# Patient Record
Sex: FEMALE | Race: WHITE | NOT HISPANIC OR LATINO | Employment: OTHER | URBAN - NONMETROPOLITAN AREA
[De-identification: names, ages, dates, MRNs, and addresses within clinical notes are randomized per-mention and may not be internally consistent; named-entity substitution may affect disease eponyms.]

---

## 2017-01-06 RX ORDER — APIXABAN 5 MG/1
TABLET, FILM COATED ORAL
Qty: 180 TABLET | Refills: 0 | Status: SHIPPED | OUTPATIENT
Start: 2017-01-06 | End: 2017-04-11 | Stop reason: SDUPTHER

## 2017-01-09 RX ORDER — CARVEDILOL 6.25 MG/1
TABLET ORAL
Qty: 180 TABLET | Refills: 0 | Status: SHIPPED | OUTPATIENT
Start: 2017-01-09 | End: 2017-04-25 | Stop reason: SDUPTHER

## 2017-01-09 RX ORDER — FUROSEMIDE 40 MG/1
TABLET ORAL
Qty: 135 TABLET | Refills: 0 | Status: SHIPPED | OUTPATIENT
Start: 2017-01-09 | End: 2017-06-10 | Stop reason: SDUPTHER

## 2017-01-10 RX ORDER — ALENDRONATE SODIUM 70 MG/1
TABLET ORAL
Qty: 12 TABLET | Refills: 0 | Status: SHIPPED | OUTPATIENT
Start: 2017-01-10 | End: 2017-06-10 | Stop reason: SDUPTHER

## 2017-01-16 RX ORDER — PANTOPRAZOLE SODIUM 40 MG/1
TABLET, DELAYED RELEASE ORAL
Qty: 90 TABLET | Refills: 0 | Status: SHIPPED | OUTPATIENT
Start: 2017-01-16 | End: 2017-06-10 | Stop reason: SDUPTHER

## 2017-02-23 ENCOUNTER — OFFICE VISIT (OUTPATIENT)
Dept: CARDIOLOGY | Facility: CLINIC | Age: 77
End: 2017-02-23

## 2017-02-23 VITALS
BODY MASS INDEX: 29.89 KG/M2 | SYSTOLIC BLOOD PRESSURE: 118 MMHG | HEIGHT: 66 IN | OXYGEN SATURATION: 98 % | DIASTOLIC BLOOD PRESSURE: 68 MMHG | WEIGHT: 186 LBS | HEART RATE: 66 BPM

## 2017-02-23 DIAGNOSIS — K21.9 GASTROESOPHAGEAL REFLUX DISEASE WITHOUT ESOPHAGITIS: ICD-10-CM

## 2017-02-23 DIAGNOSIS — J06.9 UPPER RESPIRATORY TRACT INFECTION, UNSPECIFIED TYPE: ICD-10-CM

## 2017-02-23 DIAGNOSIS — E78.2 MIXED HYPERLIPIDEMIA: ICD-10-CM

## 2017-02-23 DIAGNOSIS — I10 ESSENTIAL HYPERTENSION: ICD-10-CM

## 2017-02-23 DIAGNOSIS — I25.5 ISCHEMIC CARDIOMYOPATHY: ICD-10-CM

## 2017-02-23 DIAGNOSIS — I50.22 CHRONIC SYSTOLIC CONGESTIVE HEART FAILURE (HCC): ICD-10-CM

## 2017-02-23 DIAGNOSIS — Z79.01 CHRONIC ANTICOAGULATION: ICD-10-CM

## 2017-02-23 DIAGNOSIS — J20.9 ACUTE BRONCHITIS, UNSPECIFIED ORGANISM: ICD-10-CM

## 2017-02-23 DIAGNOSIS — J42 CHRONIC BRONCHITIS, UNSPECIFIED CHRONIC BRONCHITIS TYPE (HCC): ICD-10-CM

## 2017-02-23 DIAGNOSIS — J02.9 SORE THROAT: ICD-10-CM

## 2017-02-23 DIAGNOSIS — N19 RENAL FAILURE: ICD-10-CM

## 2017-02-23 DIAGNOSIS — I25.10 CORONARY ARTERY DISEASE INVOLVING NATIVE CORONARY ARTERY OF NATIVE HEART WITHOUT ANGINA PECTORIS: Primary | ICD-10-CM

## 2017-02-23 DIAGNOSIS — E03.9 HYPOTHYROIDISM, UNSPECIFIED TYPE: ICD-10-CM

## 2017-02-23 DIAGNOSIS — R05.9 COUGH: ICD-10-CM

## 2017-02-23 DIAGNOSIS — Z95.0 PACEMAKER: ICD-10-CM

## 2017-02-23 DIAGNOSIS — I48.20 CHRONIC A-FIB (HCC): ICD-10-CM

## 2017-02-23 LAB
FLUAV AG NPH QL: NEGATIVE
FLUBV AG NPH QL IA: NEGATIVE
S PYO AG THROAT QL: NEGATIVE

## 2017-02-23 PROCEDURE — 87880 STREP A ASSAY W/OPTIC: CPT | Performed by: INTERNAL MEDICINE

## 2017-02-23 PROCEDURE — 96372 THER/PROPH/DIAG INJ SC/IM: CPT | Performed by: INTERNAL MEDICINE

## 2017-02-23 PROCEDURE — 87081 CULTURE SCREEN ONLY: CPT | Performed by: INTERNAL MEDICINE

## 2017-02-23 PROCEDURE — 87804 INFLUENZA ASSAY W/OPTIC: CPT | Performed by: INTERNAL MEDICINE

## 2017-02-23 PROCEDURE — 99214 OFFICE O/P EST MOD 30 MIN: CPT | Performed by: INTERNAL MEDICINE

## 2017-02-23 RX ORDER — TRIAMCINOLONE ACETONIDE 40 MG/ML
40 INJECTION, SUSPENSION INTRA-ARTICULAR; INTRAMUSCULAR ONCE
Status: COMPLETED | OUTPATIENT
Start: 2017-02-23 | End: 2017-02-23

## 2017-02-23 RX ORDER — CEFTRIAXONE 500 MG/1
500 INJECTION, POWDER, FOR SOLUTION INTRAMUSCULAR; INTRAVENOUS EVERY 24 HOURS
Status: DISCONTINUED | OUTPATIENT
Start: 2017-02-23 | End: 2017-02-23

## 2017-02-23 RX ORDER — AMOXICILLIN 500 MG/1
500 CAPSULE ORAL 3 TIMES DAILY
Qty: 30 CAPSULE | Refills: 0 | Status: SHIPPED | OUTPATIENT
Start: 2017-02-23 | End: 2017-05-11

## 2017-02-23 RX ORDER — ALBUTEROL SULFATE 90 UG/1
2 AEROSOL, METERED RESPIRATORY (INHALATION) EVERY 4 HOURS PRN
Qty: 3.7 G | Refills: 2 | Status: SHIPPED | OUTPATIENT
Start: 2017-02-23 | End: 2018-05-24 | Stop reason: SDUPTHER

## 2017-02-23 RX ORDER — CEFTRIAXONE 500 MG/1
500 INJECTION, POWDER, FOR SOLUTION INTRAMUSCULAR; INTRAVENOUS ONCE
Status: COMPLETED | OUTPATIENT
Start: 2017-02-23 | End: 2017-02-23

## 2017-02-23 RX ADMIN — TRIAMCINOLONE ACETONIDE 40 MG: 40 INJECTION, SUSPENSION INTRA-ARTICULAR; INTRAMUSCULAR at 14:02

## 2017-02-23 RX ADMIN — CEFTRIAXONE 500 MG: 500 INJECTION, POWDER, FOR SOLUTION INTRAMUSCULAR; INTRAVENOUS at 14:01

## 2017-02-23 NOTE — PROGRESS NOTES
subjective     Chief Complaint   Patient presents with   • Coronary Artery Disease   • Atrial Fibrillation   • Hypertension     History of Present Illness     So throat and cough and wheezing.  Patient states that for last week or so she has been having sore throat and runny nose congestion coughing and mild wheezing.  Will check strep and flu screen.  She denies any fever or chills.  No myalgias.    Coronary artery disease  Ashely Feliciano has known coronary artery disease as described in the problem list. Quite stable. No chest pain palpitations or shortness of breath. she has not used any nitroglycerin. No drug side effects. she is trying to follow diet also. she is quite satisfied with the progress.    Ischemic cardiomyopathy  Patient has had ischemic cardiomyopathy LV ejection fraction 40% with apical hypokinesis and chronic systolic congestive heart failure.  She is compensated breathing very well denies any orthopnea PND or ankle edema.    Pacemaker  Patient has dual-chamber pacemaker.  Which has been functioning normally    Hypothyroidism  Ashely Feliciano has long-standing history of hypothyroidism.she is taking Synthroid.  Doing very well.  No drug side effects.  No change in Weight.  No cold intolerance. denies any constipation.  No dry skin and no hair loss.     HYPERTENSION  Ashely Feliciano has long-standing essential hypertension. she is taking medications regularly. There are no medication side effects. Blood pressure is very well controlled. There has been no headache nausea chest pain. There has been no syncopal or presyncopal episode. she denies episodes of hypo-tension or accelerated hypertension.    Atrial Fibrillation  Patient has persistent atrial fibrillation. This has been going on for years. Heart rate is controlled. Patient is fully anticoagulated. Patient is asymptomatic. No chest pain palpitations or shortness of breath. No syncope or presyncope.    GERD  Ashely Feliciano  has gastroesophageal reflux disorder however she significantly better on medications. There is no nausea or vomiting. No hematemesis or melena. No abdominal pain. Mild epigastric heartburns are noted. Appetite is good bowel movements are normal.    Hyperlipidemia  Ashely Feliciano has long-standing history of hyperlipidemia. Has been trying to lose weight and trying to follow diet and activity recommandations. Patient is tolerating medications very well. There has been no side effects. Latest lipid levels have been fluctuating.     Patient Active Problem List   Diagnosis   • CAD (coronary artery disease) status post CABG 1997 single-vessel*   • Ischemic cardiomyopathy with apical hypokinesis LV ejection fraction 40%*   • Chronic systolic congestive heart failure*   • Hyperlipidemia*   • Chronic a-fib*   • Pacemaker dual-chamber pacemaker [employer] December 2015*   • Hypothyroidism*   • Hypertension*   • Chronic anticoagulation*   • Gastroesophageal reflux disease without esophagitis*   • Renal failure   • URI (upper respiratory infection)       Social History   Substance Use Topics   • Smoking status: Former Smoker     Quit date: 1990   • Smokeless tobacco: Never Used   • Alcohol use No       Allergies   Allergen Reactions   • Codeine    • Eggs Or Egg-Derived Products    • Lisinopril          Current Outpatient Prescriptions:   •  alendronate (FOSAMAX) 70 MG tablet, TAKE 1 TABLET BY MOUTH EVERY WEEK, Disp: 12 tablet, Rfl: 0  •  carvedilol (COREG) 6.25 MG tablet, TAKE 1 TABLET BY MOUTH TWICE DAILY WITH MEALS, Disp: 180 tablet, Rfl: 0  •  cetirizine (ZyrTEC) 10 MG tablet, Take 1 tablet by mouth Daily., Disp: 90 tablet, Rfl: 0  •  ELIQUIS 5 MG tablet tablet, TAKE 1 TABLET BY MOUTH TWICE DAILY, Disp: 180 tablet, Rfl: 0  •  furosemide (LASIX) 40 MG tablet, TAKE 1 AND 1/2 TABLET BY MOUTH EVERY DAY, Disp: 135 tablet, Rfl: 0  •  gabapentin (NEURONTIN) 100 MG capsule, Take 1 capsule by mouth 2 (two) times a day., Disp:  180 capsule, Rfl: 2  •  glycopyrrolate (ROBINUL) 1 MG tablet, Take 1 mg by mouth 3 (three) times a day as needed., Disp: , Rfl:   •  isosorbide mononitrate (IMDUR) 60 MG 24 hr tablet, Take 1 tablet by mouth daily., Disp: 90 tablet, Rfl: 0  •  levothyroxine (SYNTHROID, LEVOTHROID) 25 MCG tablet, Take 1 tablet by mouth Daily., Disp: 90 tablet, Rfl: 0  •  montelukast (SINGULAIR) 10 MG tablet, Take 1 tablet by mouth every night., Disp: 90 tablet, Rfl: 0  •  nitroglycerin (NITROSTAT) 0.4 MG SL tablet, Place 0.4 mg under the tongue every 5 (five) minutes as needed for chest pain. Take no more than 3 doses in 15 minutes., Disp: , Rfl:   •  pantoprazole (PROTONIX) 40 MG EC tablet, TAKE 1 TABLET BY MOUTH EVERY DAY, Disp: 90 tablet, Rfl: 0  •  pitavastatin calcium (LIVALO) 1 MG tablet tablet, Take 1 tablet by mouth Daily., Disp: 90 tablet, Rfl: 0  •  sotalol (BETAPACE AF) 80 MG tablet tablet, TAKE 1/2 TABLET BY MOUTH TWICE DAILY, Disp: 90 tablet, Rfl: 0  •  spironolactone (ALDACTONE) 25 MG tablet, Take 1 tablet by mouth daily., Disp: 90 tablet, Rfl: 0  •  albuterol (PROVENTIL HFA;VENTOLIN HFA) 108 (90 BASE) MCG/ACT inhaler, Inhale 2 puffs Every 4 (Four) Hours As Needed for wheezing., Disp: 3.7 g, Rfl: 2  •  amoxicillin (AMOXIL) 500 MG capsule, Take 1 capsule by mouth 3 (Three) Times a Day., Disp: 30 capsule, Rfl: 0      The following portions of the patient's history were reviewed and updated as appropriate: allergies, current medications, past family history, past medical history, past social history, past surgical history and problem list.    Review of Systems   Constitution: Positive for weakness and malaise/fatigue. Negative for chills, diaphoresis and fever.   HENT: Positive for congestion and sore throat.    Eyes: Negative.    Cardiovascular: Negative for leg swelling.   Respiratory: Positive for cough. Negative for shortness of breath, sputum production and wheezing.    Skin: Negative.    Musculoskeletal: Positive for  "back pain and myalgias.   Gastrointestinal: Negative.    Genitourinary: Negative.           Objective:     Visit Vitals   • /68 (BP Location: Left arm, Patient Position: Sitting)   • Pulse 66   • Ht 66\" (167.6 cm)   • Wt 186 lb (84.4 kg)   • SpO2 98%   • BMI 30.02 kg/m2     Physical Exam   Constitutional: She appears well-developed and well-nourished.   HENT:   Head: Normocephalic and atraumatic.   Mouth/Throat: Oropharynx is clear and moist.   Eyes: Conjunctivae and EOM are normal. Pupils are equal, round, and reactive to light. No scleral icterus.   Neck: Normal range of motion. Neck supple. No JVD present. No tracheal deviation present. No thyromegaly present.   Cardiovascular: Normal rate, regular rhythm, normal heart sounds and intact distal pulses.  Exam reveals no friction rub.    No murmur heard.  Pulmonary/Chest: Effort normal and breath sounds normal. No respiratory distress. She has no wheezes. She has no rales. She exhibits no tenderness.   Abdominal: Soft. Bowel sounds are normal. She exhibits no distension and no mass. There is no tenderness. There is no rebound and no guarding.   Musculoskeletal: Normal range of motion. She exhibits no edema, tenderness or deformity.   Lymphadenopathy:     She has no cervical adenopathy.   Neurological: She is alert. She has normal reflexes. No cranial nerve deficit. She exhibits normal muscle tone. Coordination normal.   Skin: Skin is warm and dry.   Psychiatric: She has a normal mood and affect. Her behavior is normal. Judgment and thought content normal.         Lab Review  Lab Results   Component Value Date     12/07/2016    K 4.2 12/07/2016     12/07/2016    BUN 28 (H) 12/07/2016    CREATININE 1.43 (H) 12/07/2016    GLUCOSE 98 12/07/2016    CALCIUM 9.4 12/07/2016    ALT 21 12/07/2016    ALKPHOS 77 12/07/2016    LABIL2 1.3 (L) 12/07/2016     Lab Results   Component Value Date    CKTOTAL 51 12/07/2016     Lab Results   Component Value Date    WBC " 6.10 12/07/2016    HGB 12.8 12/07/2016    HCT 39.8 12/07/2016     12/07/2016     Lab Results   Component Value Date    INR 0.91 12/17/2015    INR 1.00 02/20/2014     Lab Results   Component Value Date    MG 2.2 01/09/2014     Lab Results   Component Value Date    TSH 5.647 (H) 12/07/2016     Lab Results   Component Value Date    BNP 77.0 12/07/2016     Lab Results   Component Value Date    CHLPL 219 (H) 04/05/2016     Lab Results   Component Value Date    CHOL 158 12/07/2016    TRIG 158 (H) 12/07/2016    HDL 50 (L) 12/07/2016    LDLCALC 76 12/07/2016    VLDL 31.6 12/07/2016    LDLHDL 1.53 12/07/2016         Procedures     I personally viewed and interpreted the patient's LAB data         Assessment:     1. Coronary artery disease involving native coronary artery of native heart without angina pectoris    2. Ischemic cardiomyopathy with apical hypokinesis LV ejection fraction 40%*    3. Chronic systolic congestive heart failure*    4. Mixed hyperlipidemia    5. Chronic a-fib*    6. Essential hypertension    7. Pacemaker dual-chamber pacemaker [employer] December 2015*    8. Hypothyroidism, unspecified type    9. Chronic anticoagulation*    10. Gastroesophageal reflux disease without esophagitis*    11. Renal failure    12. Chronic bronchitis, unspecified chronic bronchitis type    13. Acute bronchitis, unspecified organism    14. Sore throat    15. Cough    16. Upper respiratory tract infection, unspecified type          Plan:      Coronary artery disease  Patient is doing very well she has not had any chest pain palpitations or shortness of breath.  Patient has ischemic cardiomyopathy with LV ejection fraction of 40% with compensated heart failure and is totally asymptomatic.  She will continue current medications.  Refills of medication was given.    Hyperlipidemia  Cholesterol is fairly well controlled with the LDL around 76.  Further control was again discussed with healthy lifestyle.  Triglyceride is  elevated.  Patient needs to lose weight also.    Blood pressure is controlled.    Patient has chronic atrial fibrillation rate is controlled  She is fully anticoagulated with eliquis without any side effects.  She will continue current medications.  Lab work and EKG next visit.    Patient complains of upper respiratory tract infection with the wheezing and shortness of breath.  She does have COPD.  For that reason she was given Rocephin and Kenalog.  Flu antigen and strep screen was also obtained.  And patient was started on amoxicillin 500 mg 3 times a day.      Return in about 3 months (around 5/23/2017).

## 2017-02-25 LAB — BACTERIA SPEC AEROBE CULT: NORMAL

## 2017-02-27 PROBLEM — J06.9 URI (UPPER RESPIRATORY INFECTION): Status: ACTIVE | Noted: 2017-02-27

## 2017-03-14 RX ORDER — ISOSORBIDE MONONITRATE 60 MG/1
60 TABLET, EXTENDED RELEASE ORAL DAILY
Qty: 90 TABLET | Refills: 0 | Status: SHIPPED | OUTPATIENT
Start: 2017-03-14 | End: 2017-06-10 | Stop reason: SDUPTHER

## 2017-03-14 RX ORDER — SOTALOL HYDROCHLORIDE 80 MG/1
TABLET ORAL
Qty: 90 TABLET | Refills: 0 | Status: SHIPPED | OUTPATIENT
Start: 2017-03-14 | End: 2017-06-08 | Stop reason: SDUPTHER

## 2017-03-14 RX ORDER — SPIRONOLACTONE 25 MG/1
25 TABLET ORAL DAILY
Qty: 90 TABLET | Refills: 0 | Status: SHIPPED | OUTPATIENT
Start: 2017-03-14 | End: 2017-05-11 | Stop reason: SDUPTHER

## 2017-03-14 RX ORDER — SPIRONOLACTONE 25 MG/1
TABLET ORAL
Qty: 90 TABLET | Refills: 0 | Status: SHIPPED | OUTPATIENT
Start: 2017-03-14 | End: 2017-05-12

## 2017-03-14 RX ORDER — LEVOTHYROXINE SODIUM 0.03 MG/1
TABLET ORAL
Qty: 90 TABLET | Refills: 0 | Status: SHIPPED | OUTPATIENT
Start: 2017-03-14 | End: 2017-05-11

## 2017-03-28 RX ORDER — GABAPENTIN 100 MG/1
100 CAPSULE ORAL 2 TIMES DAILY
Qty: 180 CAPSULE | Refills: 2 | Status: SHIPPED | OUTPATIENT
Start: 2017-03-28 | End: 2018-01-10 | Stop reason: SDUPTHER

## 2017-04-05 ENCOUNTER — OFFICE VISIT (OUTPATIENT)
Dept: CARDIOLOGY | Facility: CLINIC | Age: 77
End: 2017-04-05

## 2017-04-05 DIAGNOSIS — I48.20 CHRONIC A-FIB (HCC): ICD-10-CM

## 2017-04-05 DIAGNOSIS — Z95.0 PACEMAKER: ICD-10-CM

## 2017-04-05 PROCEDURE — 93280 PM DEVICE PROGR EVAL DUAL: CPT | Performed by: INTERNAL MEDICINE

## 2017-04-11 ENCOUNTER — TELEPHONE (OUTPATIENT)
Dept: CARDIOLOGY | Facility: CLINIC | Age: 77
End: 2017-04-11

## 2017-04-11 NOTE — TELEPHONE ENCOUNTER
----- Message from Deja Seymour sent at 4/11/2017 10:46 AM EDT -----   ELIQUIS 5 MG 1 BID    SUZY YAÑEZ

## 2017-04-18 RX ORDER — CETIRIZINE HYDROCHLORIDE 10 MG/1
TABLET ORAL
Qty: 90 TABLET | Refills: 0 | Status: SHIPPED | OUTPATIENT
Start: 2017-04-18 | End: 2017-07-26 | Stop reason: SDUPTHER

## 2017-04-25 RX ORDER — CARVEDILOL 6.25 MG/1
6.25 TABLET ORAL 2 TIMES DAILY WITH MEALS
Qty: 180 TABLET | Refills: 0 | Status: SHIPPED | OUTPATIENT
Start: 2017-04-25 | End: 2017-07-26 | Stop reason: SDUPTHER

## 2017-05-09 ENCOUNTER — LAB (OUTPATIENT)
Dept: CARDIOLOGY | Facility: CLINIC | Age: 77
End: 2017-05-09

## 2017-05-09 DIAGNOSIS — E03.9 HYPOTHYROIDISM, UNSPECIFIED TYPE: ICD-10-CM

## 2017-05-09 DIAGNOSIS — E78.2 MIXED HYPERLIPIDEMIA: ICD-10-CM

## 2017-05-09 LAB
ALBUMIN SERPL-MCNC: 4.2 G/DL (ref 3.4–4.8)
ALBUMIN/GLOB SERPL: 1.2 G/DL (ref 1.5–2.5)
ALP SERPL-CCNC: 88 U/L (ref 35–104)
ALT SERPL W P-5'-P-CCNC: 45 U/L (ref 10–36)
ANION GAP SERPL CALCULATED.3IONS-SCNC: 5.5 MMOL/L (ref 3.6–11.2)
AST SERPL-CCNC: 38 U/L (ref 10–30)
BASOPHILS # BLD AUTO: 0.16 10*3/MM3 (ref 0–0.3)
BASOPHILS NFR BLD AUTO: 2.5 % (ref 0–2)
BILIRUB SERPL-MCNC: 0.6 MG/DL (ref 0.2–1.8)
BUN BLD-MCNC: 30 MG/DL (ref 7–21)
BUN/CREAT SERPL: 20.4 (ref 7–25)
CALCIUM SPEC-SCNC: 9.5 MG/DL (ref 7.7–10)
CHLORIDE SERPL-SCNC: 109 MMOL/L (ref 99–112)
CHOLEST SERPL-MCNC: 138 MG/DL (ref 0–200)
CK SERPL-CCNC: 64 U/L (ref 24–173)
CO2 SERPL-SCNC: 27.5 MMOL/L (ref 24.3–31.9)
CREAT BLD-MCNC: 1.47 MG/DL (ref 0.43–1.29)
DEPRECATED RDW RBC AUTO: 43.3 FL (ref 37–54)
EOSINOPHIL # BLD AUTO: 0.4 10*3/MM3 (ref 0–0.7)
EOSINOPHIL NFR BLD AUTO: 6.3 % (ref 0–7)
ERYTHROCYTE [DISTWIDTH] IN BLOOD BY AUTOMATED COUNT: 13 % (ref 11.5–14.5)
GFR SERPL CREATININE-BSD FRML MDRD: 35 ML/MIN/1.73
GLOBULIN UR ELPH-MCNC: 3.4 GM/DL
GLUCOSE BLD-MCNC: 95 MG/DL (ref 70–110)
HCT VFR BLD AUTO: 38.9 % (ref 37–47)
HDLC SERPL-MCNC: 45 MG/DL (ref 60–100)
HGB BLD-MCNC: 12.3 G/DL (ref 12–16)
IMM GRANULOCYTES # BLD: 0.02 10*3/MM3 (ref 0–0.03)
IMM GRANULOCYTES NFR BLD: 0.3 % (ref 0–0.5)
LDLC SERPL CALC-MCNC: 59 MG/DL (ref 0–100)
LDLC/HDLC SERPL: 1.32 {RATIO}
LYMPHOCYTES # BLD AUTO: 1.65 10*3/MM3 (ref 1–3)
LYMPHOCYTES NFR BLD AUTO: 26.1 % (ref 16–46)
MCH RBC QN AUTO: 30.1 PG (ref 27–33)
MCHC RBC AUTO-ENTMCNC: 31.6 G/DL (ref 33–37)
MCV RBC AUTO: 95.1 FL (ref 80–94)
MONOCYTES # BLD AUTO: 0.47 10*3/MM3 (ref 0.1–0.9)
MONOCYTES NFR BLD AUTO: 7.4 % (ref 0–12)
NEUTROPHILS # BLD AUTO: 3.63 10*3/MM3 (ref 1.4–6.5)
NEUTROPHILS NFR BLD AUTO: 57.4 % (ref 40–75)
OSMOLALITY SERPL CALC.SUM OF ELEC: 289.1 MOSM/KG (ref 273–305)
PLATELET # BLD AUTO: 169 10*3/MM3 (ref 130–400)
PMV BLD AUTO: 11.6 FL (ref 6–10)
POTASSIUM BLD-SCNC: 5.1 MMOL/L (ref 3.5–5.3)
PROT SERPL-MCNC: 7.6 G/DL (ref 6–8)
RBC # BLD AUTO: 4.09 10*6/MM3 (ref 4.2–5.4)
SODIUM BLD-SCNC: 142 MMOL/L (ref 135–153)
T4 FREE SERPL-MCNC: 1.09 NG/DL (ref 0.89–1.76)
TRIGL SERPL-MCNC: 169 MG/DL (ref 0–150)
TSH SERPL DL<=0.05 MIU/L-ACNC: 6.53 MIU/ML (ref 0.55–4.78)
VLDLC SERPL-MCNC: 33.8 MG/DL
WBC NRBC COR # BLD: 6.33 10*3/MM3 (ref 4.5–12.5)

## 2017-05-09 PROCEDURE — 80061 LIPID PANEL: CPT | Performed by: INTERNAL MEDICINE

## 2017-05-09 PROCEDURE — 84443 ASSAY THYROID STIM HORMONE: CPT | Performed by: INTERNAL MEDICINE

## 2017-05-09 PROCEDURE — 82550 ASSAY OF CK (CPK): CPT | Performed by: INTERNAL MEDICINE

## 2017-05-09 PROCEDURE — 80053 COMPREHEN METABOLIC PANEL: CPT | Performed by: INTERNAL MEDICINE

## 2017-05-09 PROCEDURE — 84439 ASSAY OF FREE THYROXINE: CPT | Performed by: INTERNAL MEDICINE

## 2017-05-09 PROCEDURE — 85025 COMPLETE CBC W/AUTO DIFF WBC: CPT | Performed by: INTERNAL MEDICINE

## 2017-05-11 ENCOUNTER — OFFICE VISIT (OUTPATIENT)
Dept: CARDIOLOGY | Facility: CLINIC | Age: 77
End: 2017-05-11

## 2017-05-11 DIAGNOSIS — J45.901 ASTHMATIC BRONCHITIS WITH ACUTE EXACERBATION: ICD-10-CM

## 2017-05-11 DIAGNOSIS — I25.10 CORONARY ARTERY DISEASE INVOLVING NATIVE CORONARY ARTERY OF NATIVE HEART WITHOUT ANGINA PECTORIS: ICD-10-CM

## 2017-05-11 DIAGNOSIS — I48.20 CHRONIC A-FIB (HCC): ICD-10-CM

## 2017-05-11 DIAGNOSIS — J06.9 UPPER RESPIRATORY TRACT INFECTION, UNSPECIFIED TYPE: Primary | ICD-10-CM

## 2017-05-11 DIAGNOSIS — E03.9 HYPOTHYROIDISM, UNSPECIFIED TYPE: ICD-10-CM

## 2017-05-11 PROCEDURE — 93000 ELECTROCARDIOGRAM COMPLETE: CPT | Performed by: INTERNAL MEDICINE

## 2017-05-11 PROCEDURE — 96372 THER/PROPH/DIAG INJ SC/IM: CPT | Performed by: INTERNAL MEDICINE

## 2017-05-11 PROCEDURE — 99214 OFFICE O/P EST MOD 30 MIN: CPT | Performed by: INTERNAL MEDICINE

## 2017-05-11 RX ORDER — AMOXICILLIN AND CLAVULANATE POTASSIUM 875; 125 MG/1; MG/1
1 TABLET, FILM COATED ORAL 2 TIMES DAILY
Qty: 10 TABLET | Refills: 0 | Status: SHIPPED | OUTPATIENT
Start: 2017-05-11 | End: 2017-08-10

## 2017-05-11 RX ORDER — LEVOTHYROXINE SODIUM 0.05 MG/1
50 TABLET ORAL DAILY
Qty: 90 TABLET | Refills: 1 | Status: SHIPPED | OUTPATIENT
Start: 2017-05-11 | End: 2017-11-02 | Stop reason: SDUPTHER

## 2017-05-12 VITALS
BODY MASS INDEX: 30.53 KG/M2 | WEIGHT: 190 LBS | SYSTOLIC BLOOD PRESSURE: 130 MMHG | HEIGHT: 66 IN | OXYGEN SATURATION: 97 % | DIASTOLIC BLOOD PRESSURE: 80 MMHG | HEART RATE: 78 BPM

## 2017-06-08 RX ORDER — SOTALOL HYDROCHLORIDE 80 MG/1
80 TABLET ORAL 2 TIMES DAILY
Qty: 90 TABLET | Refills: 0 | Status: SHIPPED | OUTPATIENT
Start: 2017-06-08 | End: 2017-09-13 | Stop reason: SDUPTHER

## 2017-06-12 RX ORDER — FUROSEMIDE 40 MG/1
TABLET ORAL
Qty: 135 TABLET | Refills: 0 | Status: SHIPPED | OUTPATIENT
Start: 2017-06-12 | End: 2018-05-24 | Stop reason: DRUGHIGH

## 2017-06-12 RX ORDER — ALENDRONATE SODIUM 70 MG/1
TABLET ORAL
Qty: 12 TABLET | Refills: 0 | Status: SHIPPED | OUTPATIENT
Start: 2017-06-12 | End: 2017-06-15

## 2017-06-12 RX ORDER — PANTOPRAZOLE SODIUM 40 MG/1
TABLET, DELAYED RELEASE ORAL
Qty: 90 TABLET | Refills: 0 | Status: ON HOLD | OUTPATIENT
Start: 2017-06-12 | End: 2019-02-25

## 2017-06-12 RX ORDER — ISOSORBIDE MONONITRATE 60 MG/1
TABLET, EXTENDED RELEASE ORAL
Qty: 90 TABLET | Refills: 0 | Status: SHIPPED | OUTPATIENT
Start: 2017-06-12 | End: 2017-12-15 | Stop reason: SDUPTHER

## 2017-06-15 ENCOUNTER — TELEPHONE (OUTPATIENT)
Dept: CARDIOLOGY | Facility: CLINIC | Age: 77
End: 2017-06-15

## 2017-06-15 NOTE — TELEPHONE ENCOUNTER
----- Message from Maria R Sanchez MD sent at 6/15/2017 12:07 PM EDT -----  DC Fosamax and Aldactone

## 2017-06-23 RX ORDER — PITAVASTATIN CALCIUM 1.04 MG/1
TABLET, FILM COATED ORAL
Qty: 90 TABLET | Refills: 0 | Status: SHIPPED | OUTPATIENT
Start: 2017-06-23 | End: 2017-09-19 | Stop reason: SDUPTHER

## 2017-07-13 RX ORDER — APIXABAN 5 MG/1
TABLET, FILM COATED ORAL
Qty: 180 TABLET | Refills: 0 | Status: SHIPPED | OUTPATIENT
Start: 2017-07-13 | End: 2017-10-08 | Stop reason: SDUPTHER

## 2017-07-26 RX ORDER — CARVEDILOL 6.25 MG/1
TABLET ORAL
Qty: 180 TABLET | Refills: 0 | Status: SHIPPED | OUTPATIENT
Start: 2017-07-26 | End: 2017-11-01 | Stop reason: SDUPTHER

## 2017-07-26 RX ORDER — CETIRIZINE HYDROCHLORIDE 10 MG/1
TABLET ORAL
Qty: 90 TABLET | Refills: 0 | Status: SHIPPED | OUTPATIENT
Start: 2017-07-26 | End: 2017-11-01 | Stop reason: SDUPTHER

## 2017-08-10 ENCOUNTER — OFFICE VISIT (OUTPATIENT)
Dept: CARDIOLOGY | Facility: CLINIC | Age: 77
End: 2017-08-10

## 2017-08-10 VITALS
OXYGEN SATURATION: 97 % | WEIGHT: 184.6 LBS | HEART RATE: 62 BPM | DIASTOLIC BLOOD PRESSURE: 84 MMHG | SYSTOLIC BLOOD PRESSURE: 128 MMHG | BODY MASS INDEX: 29.67 KG/M2 | HEIGHT: 66 IN

## 2017-08-10 DIAGNOSIS — E78.2 MIXED HYPERLIPIDEMIA: ICD-10-CM

## 2017-08-10 DIAGNOSIS — I50.22 CHRONIC SYSTOLIC CONGESTIVE HEART FAILURE (HCC): ICD-10-CM

## 2017-08-10 DIAGNOSIS — I48.20 CHRONIC A-FIB (HCC): ICD-10-CM

## 2017-08-10 DIAGNOSIS — Z12.31 ENCOUNTER FOR MAMMOGRAM TO ESTABLISH BASELINE MAMMOGRAM: ICD-10-CM

## 2017-08-10 DIAGNOSIS — I25.5 ISCHEMIC CARDIOMYOPATHY: ICD-10-CM

## 2017-08-10 DIAGNOSIS — Z95.0 PACEMAKER: ICD-10-CM

## 2017-08-10 DIAGNOSIS — J20.9 ACUTE BRONCHITIS, UNSPECIFIED ORGANISM: Primary | ICD-10-CM

## 2017-08-10 DIAGNOSIS — I10 ESSENTIAL HYPERTENSION: ICD-10-CM

## 2017-08-10 DIAGNOSIS — N19 RENAL FAILURE: ICD-10-CM

## 2017-08-10 DIAGNOSIS — I25.10 CORONARY ARTERY DISEASE INVOLVING NATIVE CORONARY ARTERY OF NATIVE HEART WITHOUT ANGINA PECTORIS: ICD-10-CM

## 2017-08-10 DIAGNOSIS — Z79.01 CHRONIC ANTICOAGULATION: ICD-10-CM

## 2017-08-10 DIAGNOSIS — E03.9 HYPOTHYROIDISM, UNSPECIFIED TYPE: ICD-10-CM

## 2017-08-10 PROCEDURE — 99214 OFFICE O/P EST MOD 30 MIN: CPT | Performed by: INTERNAL MEDICINE

## 2017-08-10 RX ORDER — AMOXICILLIN 500 MG/1
500 CAPSULE ORAL 3 TIMES DAILY
Qty: 30 CAPSULE | Refills: 0 | Status: SHIPPED | OUTPATIENT
Start: 2017-08-10 | End: 2017-08-10 | Stop reason: SDUPTHER

## 2017-08-10 RX ORDER — MONTELUKAST SODIUM 10 MG/1
10 TABLET ORAL NIGHTLY
Qty: 30 TABLET | Refills: 0 | Status: SHIPPED | OUTPATIENT
Start: 2017-08-10 | End: 2017-09-06 | Stop reason: SDUPTHER

## 2017-08-10 RX ORDER — AMOXICILLIN 500 MG/1
500 CAPSULE ORAL 3 TIMES DAILY
Qty: 21 CAPSULE | Refills: 0 | Status: SHIPPED | OUTPATIENT
Start: 2017-08-10 | End: 2018-02-22

## 2017-08-10 NOTE — PROGRESS NOTES
subjective     Chief Complaint   Patient presents with   • Follow-up   • Coronary Artery Disease   • Hypertension   • Hyperlipidemia   • Diarrhea     History of Present Illness  Patient complains of runny nose and sinus congestion cough and shortness of breath.  No fever or chills this has been going on for 4 days.  Otherwise he seems be doing very well.    Patient is 76 years old white female who states that for last 4 days she has been having upper respiratory tract infection she complains of postnasal drip, sore throat, hoarseness dry cough.  No fever but feels chilly.  She has not taken any antibiotics.  But feels that she needs something she is not getting any better.     Coronary artery disease  Ashely Feliciano has known coronary artery disease as described in the problem list. Quite stable. No chest pain palpitations or shortness of breath. she has not used any nitroglycerin. No drug side effects. she is trying to follow diet also. she is quite satisfied with the progress.     Ischemic cardiomyopathy  Patient has known coronary artery disease status post CABG.  She has ischemic cardiomyopathy with apical hypokinesis.  LV ejection fraction is 40%.  She has chronic systolic congestive heart failure.  Patient is taking Lasix.  Because of renal failure Lasix dose was decreased.  And Aldactone was discontinued.     HYPERTENSION  Ashely Feliciano has long-standing essential hypertension. she is taking medications regularly. There are no medication side effects. Blood pressure is very well controlled. There has been no headache nausea chest pain. There has been no syncopal or presyncopal episode. she denies episodes of hypo-tension or accelerated hypertension.     Atrial Fibrillation  Patient has persistent atrial fibrillation. This has been going on for years. Heart rate is controlled. Patient is fully anticoagulated. Patient is asymptomatic. No chest pain palpitations or shortness of breath. No syncope or  presyncope.     Chronic anticoagulation  Because of atrial fibrillation patient is anticoagulated.  She is taking eliquis 5 mg twice a day.  There are no side effects.  Patient has no bruising or bleeding.  Tolerating the dose very well.  Because of renal failure eliquis dose may need to be decreased but she is only 76 and is overweight will continue current dose.     GERD  Ashely Feliciano has gastroesophageal reflux disorder however she significantly better on medications. There is no nausea or vomiting. No hematemesis or melena. No abdominal pain. Mild epigastric heartburns are noted. Appetite is good bowel movements are normal.     COPD  Patient has COPD and is taking Proventil HFA and Singulair  She has been doing very well but she had developed bronchitis just few days ago and has been having coughing but no wheezing.     Hyperlipidemia  Ashely Feliciano has long-standing history of hyperlipidemia. Has been trying to lose weight and trying to follow diet and activity recommandations. Patient is tolerating medications very well. There has been no side effects. Latest lipid levels have been fluctuating.     Hypothyroidism  Ashely Feliciano has long-standing history of hypothyroidism.she is taking Synthroid.  Doing very well.  No drug side effects.  No change in Weight.  No cold intolerance. denies any constipation.  No dry skin and no hair loss.       Renal failure  Patient has chronic renal failure.  Creatinine is 1.47 on this visit.  Patient is asymptomatic.     High risk medication Betapace therapy  Patient is taking Betapace and is maintaining in sinus rhythm.  There has been no side effects.  QT is not prolonged.  No proarrhythmia effects.     Dual-chamber pacemaker  Patient has sick sinus syndrome requiring pacemaker  Patient has dual-chamber pacemaker which has been functioning normally.     Past Surgical History:   Procedure Laterality Date   • CARDIOVASCULAR STRESS TEST  06/2014   • CATARACT  EXTRACTION, BILATERAL     • CHOLECYSTECTOMY  2002   • COLONOSCOPY  05/2012   • ECHO - CONVERTED  07/2014   • PACEMAKER IMPLANTATION  12/17/2015     Family History   Problem Relation Age of Onset   • Coronary artery disease Other    • Hypertension Other    • Diabetes Other    • Heart attack Other    • Heart attack Mother 62     fatal MI   • Skin cancer Mother    • Diabetes type II Mother    • Melanoma Mother    • Heart attack Father 63     fatal MI   • Melanoma Sister 45   • Skin cancer Sister    • Heart attack Brother 62     fatal MI   • Heart attack Brother 80     Fatal MI   • Skin cancer Brother    • Heart disease Sister    • Heart disease Brother 75     pacemaker, CABG, Stents   • Osteoporosis Sister    • Pneumonia Brother    • Hypertension Brother    • Heart attack Sister 54     Fatal MI   • Heart attack Brother 59     Fatal MI     Past Medical History:   Diagnosis Date   • Atrial fibrillation    • CAD (coronary artery disease)    • Chronic a-fib    • Chronic anticoagulation    • Congestive heart failure     compensated   • Disease of thyroid gland    • Hyperlipidemia    • Hypertension    • Hypoxemia    • Ischemic cardiomyopathy with severe LV Disfunction    • Myocardial infarction    • Pacemaker     VVI     Patient Active Problem List   Diagnosis   • CAD (coronary artery disease) status post CABG 1997 single-vessel*   • Ischemic cardiomyopathy with apical hypokinesis LV ejection fraction 40%*   • Chronic systolic congestive heart failure*   • Hyperlipidemia*   • Chronic a-fib*   • Pacemaker dual-chamber pacemaker [employer] December 2015*   • Hypothyroidism*   • Hypertension*   • Chronic anticoagulation*   • Gastroesophageal reflux disease without esophagitis*   • Renal failure   • URI (upper respiratory infection)   • Asthmatic bronchitis with acute exacerbation       Social History   Substance Use Topics   • Smoking status: Former Smoker     Quit date: 1990   • Smokeless tobacco: Never Used   • Alcohol use No        Allergies   Allergen Reactions   • Codeine    • Eggs Or Egg-Derived Products    • Lisinopril        Current Outpatient Prescriptions on File Prior to Visit   Medication Sig   • albuterol (PROVENTIL HFA;VENTOLIN HFA) 108 (90 BASE) MCG/ACT inhaler Inhale 2 puffs Every 4 (Four) Hours As Needed for wheezing.   • carvedilol (COREG) 6.25 MG tablet TAKE 1 TABLET BY MOUTH TWICE DAILY WITH MEALS   • cetirizine (zyrTEC) 10 MG tablet TAKE 1 TABLET BY MOUTH DAILY   • ELIQUIS 5 MG tablet tablet TAKE 1 TABLET BY MOUTH TWICE DAILY   • furosemide (LASIX) 40 MG tablet TAKE 1 AND 1/2 TABLET BY MOUTH EVERY DAY (Patient taking differently: TAKE 40 MG DAILY)   • gabapentin (NEURONTIN) 100 MG capsule Take 1 capsule by mouth 2 (Two) Times a Day.   • glycopyrrolate (ROBINUL) 1 MG tablet Take 1 mg by mouth 3 (three) times a day as needed.   • isosorbide mononitrate (IMDUR) 60 MG 24 hr tablet TAKE 1 TABLET BY MOUTH DAILY   • levothyroxine (SYNTHROID) 50 MCG tablet Take 1 tablet by mouth Daily.   • LIVALO 1 MG tablet tablet TAKE 1 TABLET BY MOUTH DAILY   • nitroglycerin (NITROSTAT) 0.4 MG SL tablet Place 0.4 mg under the tongue every 5 (five) minutes as needed for chest pain. Take no more than 3 doses in 15 minutes.   • pantoprazole (PROTONIX) 40 MG EC tablet TAKE 1 TABLET BY MOUTH EVERY DAY   • sotalol (BETAPACE AF) 80 MG tablet tablet Take 1 tablet by mouth 2 (Two) Times a Day. PATIENT TAKES 1/2 TABLET TWICE A DAY     No current facility-administered medications on file prior to visit.          The following portions of the patient's history were reviewed and updated as appropriate: allergies, current medications, past family history, past medical history, past social history, past surgical history and problem list.    Review of Systems   Constitution: Negative.   HENT: Negative.  Negative for congestion and headaches.    Eyes: Negative.    Cardiovascular: Negative.  Negative for chest pain, cyanosis, dyspnea on exertion, irregular  "heartbeat, leg swelling, near-syncope, orthopnea, palpitations, paroxysmal nocturnal dyspnea and syncope.   Respiratory: Positive for cough, shortness of breath and wheezing. Negative for hemoptysis, sleep disturbances due to breathing and sputum production.    Hematologic/Lymphatic: Negative.    Musculoskeletal: Negative.    Gastrointestinal: Negative.    Neurological: Negative.           Objective:     /84 (BP Location: Left arm, Patient Position: Sitting)  Pulse 62  Ht 66\" (167.6 cm)  Wt 184 lb 9.6 oz (83.7 kg)  SpO2 97%  BMI 29.8 kg/m2  Physical Exam   Constitutional: She appears well-developed and well-nourished. No distress.   HENT:   Head: Normocephalic and atraumatic.   Right Ear: External ear normal.   Left Ear: External ear normal.   Mouth/Throat: Oropharyngeal exudate present.   Eyes: Conjunctivae and EOM are normal. Pupils are equal, round, and reactive to light. No scleral icterus.   Neck: Normal range of motion. Neck supple. No JVD present. No tracheal deviation present. No thyromegaly present.   Cardiovascular: Normal rate, regular rhythm, normal heart sounds and intact distal pulses.  PMI is not displaced.  Exam reveals no gallop, no friction rub and no decreased pulses.    No murmur heard.  Pulses:       Carotid pulses are 3+ on the right side, and 3+ on the left side.       Radial pulses are 3+ on the right side, and 3+ on the left side.   Pulmonary/Chest: Effort normal. No respiratory distress. She has wheezes. She has no rales. She exhibits no tenderness.   Abdominal: Soft. Bowel sounds are normal. She exhibits no distension, no abdominal bruit and no mass. There is no splenomegaly or hepatomegaly. There is no tenderness. There is no rebound and no guarding.   Musculoskeletal: Normal range of motion. She exhibits no edema, tenderness or deformity.   Lymphadenopathy:     She has no cervical adenopathy.   Neurological: She is alert. She has normal reflexes. No cranial nerve deficit. She " exhibits normal muscle tone. Coordination normal.   Skin: Skin is warm and dry. No rash noted. She is not diaphoretic. No erythema.   Psychiatric: She has a normal mood and affect. Her behavior is normal. Judgment and thought content normal.         Lab Review  Lab Results   Component Value Date     05/09/2017    K 5.1 05/09/2017     05/09/2017    BUN 30 (H) 05/09/2017    CREATININE 1.47 (H) 05/09/2017    GLUCOSE 95 05/09/2017    CALCIUM 9.5 05/09/2017    ALT 45 (H) 05/09/2017    ALKPHOS 88 05/09/2017    LABIL2 1.2 (L) 05/09/2017     Lab Results   Component Value Date    CKTOTAL 64 05/09/2017     Lab Results   Component Value Date    WBC 6.33 05/09/2017    HGB 12.3 05/09/2017    HCT 38.9 05/09/2017     05/09/2017     Lab Results   Component Value Date    INR 0.91 12/17/2015    INR 1.00 02/20/2014     Lab Results   Component Value Date    MG 2.2 01/09/2014     Lab Results   Component Value Date    TSH 6.527 (H) 05/09/2017     Lab Results   Component Value Date    BNP 77.0 12/07/2016     Lab Results   Component Value Date    CHOL 138 05/09/2017    CHLPL 219 (H) 04/05/2016    TRIG 169 (H) 05/09/2017    HDL 45 (L) 05/09/2017    LDLCALC 59 05/09/2017    VLDL 33.8 05/09/2017    LDLHDL 1.32 05/09/2017         Procedures       I personally viewed and interpreted the patient's LAB data         Assessment:     1. Acute bronchitis, unspecified organism    2. Encounter for mammogram to establish baseline mammogram    3. Coronary artery disease involving native coronary artery of native heart without angina pectoris    4. Chronic systolic congestive heart failure*    5. Chronic a-fib*    6. Mixed hyperlipidemia    7. Essential hypertension    8. Ischemic cardiomyopathy with apical hypokinesis LV ejection fraction 40%*    9. Pacemaker dual-chamber pacemaker [employer] December 2015*    10. Hypothyroidism, unspecified type    11. Renal failure    12. Chronic anticoagulation*          Plan:      Patient has  developed acute bronchitis.  He is coughing quite a bit and a lot of sinus drainage.  She was started on Zantac 10 mg daily.  Singulair 10 mg daily and amoxicillin 500 3 times a day.  Lab work scheduled in 3 months.  Health maintenance discussed.  She will have mammogram which was scheduled.    Otherwise patient seems be doing very well.  She has known coronary artery disease and is stable.  Patient has significant congestive heart failure but overall is doing very well.  Hyperlipidemia is very well controlled  Pacemaker is functioning normally.  Thyroid functions are also normal.  Follow-up scheduled            Return in about 3 months (around 11/10/2017).

## 2017-08-24 ENCOUNTER — HOSPITAL ENCOUNTER (OUTPATIENT)
Dept: MAMMOGRAPHY | Facility: HOSPITAL | Age: 77
Discharge: HOME | End: 2017-08-24
Attending: INTERNAL MEDICINE | Admitting: INTERNAL MEDICINE

## 2017-08-24 DIAGNOSIS — Z12.31 ENCOUNTER FOR MAMMOGRAM TO ESTABLISH BASELINE MAMMOGRAM: ICD-10-CM

## 2017-08-24 PROCEDURE — G0202 SCR MAMMO BI INCL CAD: HCPCS

## 2017-08-24 PROCEDURE — G0202 SCR MAMMO BI INCL CAD: HCPCS | Performed by: RADIOLOGY

## 2017-08-24 PROCEDURE — 77063 BREAST TOMOSYNTHESIS BI: CPT | Performed by: RADIOLOGY

## 2017-08-24 PROCEDURE — 77063 BREAST TOMOSYNTHESIS BI: CPT

## 2017-09-07 RX ORDER — MONTELUKAST SODIUM 10 MG/1
TABLET ORAL
Qty: 30 TABLET | Refills: 2 | Status: SHIPPED | OUTPATIENT
Start: 2017-09-07 | End: 2017-11-01 | Stop reason: SDUPTHER

## 2017-09-13 RX ORDER — SOTALOL HYDROCHLORIDE 80 MG/1
80 TABLET ORAL 2 TIMES DAILY
Qty: 60 TABLET | Refills: 0 | Status: SHIPPED | OUTPATIENT
Start: 2017-09-13 | End: 2017-10-11 | Stop reason: SDUPTHER

## 2017-09-13 RX ORDER — SOTALOL HYDROCHLORIDE 80 MG/1
TABLET ORAL
Qty: 90 TABLET | Refills: 0 | Status: CANCELLED | OUTPATIENT
Start: 2017-09-13

## 2017-09-15 ENCOUNTER — EPISODE CHANGES (OUTPATIENT)
Dept: CASE MANAGEMENT | Facility: OTHER | Age: 77
End: 2017-09-15

## 2017-09-19 RX ORDER — PITAVASTATIN CALCIUM 1.04 MG/1
TABLET, FILM COATED ORAL
Qty: 90 TABLET | Refills: 0 | Status: SHIPPED | OUTPATIENT
Start: 2017-09-19 | End: 2017-12-16 | Stop reason: SDUPTHER

## 2017-10-04 ENCOUNTER — CLINICAL SUPPORT (OUTPATIENT)
Dept: CARDIOLOGY | Facility: CLINIC | Age: 77
End: 2017-10-04

## 2017-10-04 DIAGNOSIS — Z95.0 PACEMAKER: ICD-10-CM

## 2017-10-04 DIAGNOSIS — I48.20 CHRONIC A-FIB (HCC): Primary | ICD-10-CM

## 2017-10-09 RX ORDER — APIXABAN 5 MG/1
TABLET, FILM COATED ORAL
Qty: 180 TABLET | Refills: 0 | Status: SHIPPED | OUTPATIENT
Start: 2017-10-09 | End: 2018-01-05 | Stop reason: SDUPTHER

## 2017-10-11 RX ORDER — SOTALOL HYDROCHLORIDE 80 MG/1
TABLET ORAL
Qty: 60 TABLET | Refills: 0 | Status: SHIPPED | OUTPATIENT
Start: 2017-10-11 | End: 2017-11-08 | Stop reason: SDUPTHER

## 2017-11-01 RX ORDER — CARVEDILOL 6.25 MG/1
6.25 TABLET ORAL 2 TIMES DAILY WITH MEALS
Qty: 180 TABLET | Refills: 0 | Status: SHIPPED | OUTPATIENT
Start: 2017-11-01 | End: 2018-05-24 | Stop reason: SDUPTHER

## 2017-11-01 RX ORDER — CETIRIZINE HYDROCHLORIDE 10 MG/1
10 TABLET ORAL DAILY
Qty: 90 TABLET | Refills: 0 | Status: SHIPPED | OUTPATIENT
Start: 2017-11-01 | End: 2018-01-31 | Stop reason: SDUPTHER

## 2017-11-01 RX ORDER — MONTELUKAST SODIUM 10 MG/1
10 TABLET ORAL NIGHTLY
Qty: 30 TABLET | Refills: 0 | Status: SHIPPED | OUTPATIENT
Start: 2017-11-01 | End: 2017-11-29 | Stop reason: SDUPTHER

## 2017-11-02 ENCOUNTER — LAB (OUTPATIENT)
Dept: LAB | Facility: HOSPITAL | Age: 77
End: 2017-11-02
Attending: INTERNAL MEDICINE

## 2017-11-02 DIAGNOSIS — E03.9 HYPOTHYROIDISM, UNSPECIFIED TYPE: ICD-10-CM

## 2017-11-02 DIAGNOSIS — E78.2 MIXED HYPERLIPIDEMIA: ICD-10-CM

## 2017-11-02 LAB
ALBUMIN SERPL-MCNC: 4.4 G/DL (ref 3.4–4.8)
ALBUMIN/GLOB SERPL: 1.4 G/DL (ref 1.5–2.5)
ALP SERPL-CCNC: 89 U/L (ref 35–104)
ALT SERPL W P-5'-P-CCNC: 21 U/L (ref 10–36)
ANION GAP SERPL CALCULATED.3IONS-SCNC: 5.5 MMOL/L (ref 3.6–11.2)
AST SERPL-CCNC: 21 U/L (ref 10–30)
BASOPHILS # BLD AUTO: 0.13 10*3/MM3 (ref 0–0.3)
BASOPHILS NFR BLD AUTO: 2.1 % (ref 0–2)
BILIRUB SERPL-MCNC: 0.8 MG/DL (ref 0.2–1.8)
BUN BLD-MCNC: 20 MG/DL (ref 7–21)
BUN/CREAT SERPL: 16.3 (ref 7–25)
CALCIUM SPEC-SCNC: 9.8 MG/DL (ref 7.7–10)
CHLORIDE SERPL-SCNC: 106 MMOL/L (ref 99–112)
CHOLEST SERPL-MCNC: 147 MG/DL (ref 0–200)
CK SERPL-CCNC: 59 U/L (ref 24–173)
CO2 SERPL-SCNC: 28.5 MMOL/L (ref 24.3–31.9)
CREAT BLD-MCNC: 1.23 MG/DL (ref 0.43–1.29)
DEPRECATED RDW RBC AUTO: 43.3 FL (ref 37–54)
EOSINOPHIL # BLD AUTO: 0.13 10*3/MM3 (ref 0–0.7)
EOSINOPHIL NFR BLD AUTO: 2.1 % (ref 0–7)
ERYTHROCYTE [DISTWIDTH] IN BLOOD BY AUTOMATED COUNT: 13.6 % (ref 11.5–14.5)
GFR SERPL CREATININE-BSD FRML MDRD: 43 ML/MIN/1.73
GLOBULIN UR ELPH-MCNC: 3.2 GM/DL
GLUCOSE BLD-MCNC: 109 MG/DL (ref 70–110)
HCT VFR BLD AUTO: 38.3 % (ref 37–47)
HDLC SERPL-MCNC: 51 MG/DL (ref 60–100)
HGB BLD-MCNC: 13 G/DL (ref 12–16)
IMM GRANULOCYTES # BLD: 0.02 10*3/MM3 (ref 0–0.03)
IMM GRANULOCYTES NFR BLD: 0.3 % (ref 0–0.5)
LDLC SERPL CALC-MCNC: 49 MG/DL (ref 0–100)
LDLC/HDLC SERPL: 0.96 {RATIO}
LYMPHOCYTES # BLD AUTO: 1.15 10*3/MM3 (ref 1–3)
LYMPHOCYTES NFR BLD AUTO: 18.5 % (ref 16–46)
MCH RBC QN AUTO: 30.7 PG (ref 27–33)
MCHC RBC AUTO-ENTMCNC: 33.9 G/DL (ref 33–37)
MCV RBC AUTO: 90.5 FL (ref 80–94)
MONOCYTES # BLD AUTO: 0.45 10*3/MM3 (ref 0.1–0.9)
MONOCYTES NFR BLD AUTO: 7.2 % (ref 0–12)
NEUTROPHILS # BLD AUTO: 4.35 10*3/MM3 (ref 1.4–6.5)
NEUTROPHILS NFR BLD AUTO: 69.8 % (ref 40–75)
OSMOLALITY SERPL CALC.SUM OF ELEC: 282.6 MOSM/KG (ref 273–305)
PLATELET # BLD AUTO: 194 10*3/MM3 (ref 130–400)
PMV BLD AUTO: 10.6 FL (ref 6–10)
POTASSIUM BLD-SCNC: 4 MMOL/L (ref 3.5–5.3)
PROT SERPL-MCNC: 7.6 G/DL (ref 6–8)
RBC # BLD AUTO: 4.23 10*6/MM3 (ref 4.2–5.4)
SODIUM BLD-SCNC: 140 MMOL/L (ref 135–153)
T4 FREE SERPL-MCNC: 1.38 NG/DL (ref 0.89–1.76)
TRIGL SERPL-MCNC: 234 MG/DL (ref 0–150)
TSH SERPL DL<=0.05 MIU/L-ACNC: 2.84 MIU/ML (ref 0.55–4.78)
VLDLC SERPL-MCNC: 46.8 MG/DL
WBC NRBC COR # BLD: 6.23 10*3/MM3 (ref 4.5–12.5)

## 2017-11-02 PROCEDURE — 80053 COMPREHEN METABOLIC PANEL: CPT | Performed by: INTERNAL MEDICINE

## 2017-11-02 PROCEDURE — 80061 LIPID PANEL: CPT | Performed by: INTERNAL MEDICINE

## 2017-11-02 PROCEDURE — 84443 ASSAY THYROID STIM HORMONE: CPT | Performed by: INTERNAL MEDICINE

## 2017-11-02 PROCEDURE — 82550 ASSAY OF CK (CPK): CPT | Performed by: INTERNAL MEDICINE

## 2017-11-02 PROCEDURE — 84439 ASSAY OF FREE THYROXINE: CPT | Performed by: INTERNAL MEDICINE

## 2017-11-02 PROCEDURE — 85025 COMPLETE CBC W/AUTO DIFF WBC: CPT | Performed by: INTERNAL MEDICINE

## 2017-11-02 RX ORDER — LEVOTHYROXINE SODIUM 0.05 MG/1
TABLET ORAL
Qty: 90 TABLET | Refills: 0 | Status: SHIPPED | OUTPATIENT
Start: 2017-11-02 | End: 2018-03-15 | Stop reason: SDUPTHER

## 2017-11-08 RX ORDER — SOTALOL HYDROCHLORIDE 80 MG/1
TABLET ORAL
Qty: 60 TABLET | Refills: 0 | Status: SHIPPED | OUTPATIENT
Start: 2017-11-08 | End: 2018-03-20 | Stop reason: SDUPTHER

## 2017-11-08 NOTE — PROGRESS NOTES
Pt was seen today and had pacemaker checked. Episode of afib controlled with pacemaker. No changes Will recheck in 6 months

## 2017-11-09 ENCOUNTER — OFFICE VISIT (OUTPATIENT)
Dept: CARDIOLOGY | Facility: CLINIC | Age: 77
End: 2017-11-09

## 2017-11-09 VITALS
DIASTOLIC BLOOD PRESSURE: 66 MMHG | HEART RATE: 65 BPM | OXYGEN SATURATION: 95 % | HEIGHT: 66 IN | BODY MASS INDEX: 30.44 KG/M2 | SYSTOLIC BLOOD PRESSURE: 124 MMHG | WEIGHT: 189.4 LBS

## 2017-11-09 DIAGNOSIS — E03.9 HYPOTHYROIDISM, UNSPECIFIED TYPE: ICD-10-CM

## 2017-11-09 DIAGNOSIS — I25.10 CORONARY ARTERY DISEASE INVOLVING NATIVE CORONARY ARTERY OF NATIVE HEART WITHOUT ANGINA PECTORIS: Primary | ICD-10-CM

## 2017-11-09 DIAGNOSIS — E78.2 MIXED HYPERLIPIDEMIA: ICD-10-CM

## 2017-11-09 DIAGNOSIS — I25.5 ISCHEMIC CARDIOMYOPATHY: ICD-10-CM

## 2017-11-09 DIAGNOSIS — I48.20 CHRONIC A-FIB (HCC): ICD-10-CM

## 2017-11-09 DIAGNOSIS — I10 ESSENTIAL HYPERTENSION: ICD-10-CM

## 2017-11-09 DIAGNOSIS — Z79.01 CHRONIC ANTICOAGULATION: ICD-10-CM

## 2017-11-09 DIAGNOSIS — Z95.0 PACEMAKER: ICD-10-CM

## 2017-11-09 DIAGNOSIS — I50.22 CHRONIC SYSTOLIC CONGESTIVE HEART FAILURE (HCC): ICD-10-CM

## 2017-11-09 PROCEDURE — 99214 OFFICE O/P EST MOD 30 MIN: CPT | Performed by: INTERNAL MEDICINE

## 2017-11-09 NOTE — PROGRESS NOTES
subjective     Chief Complaint   Patient presents with   • Nasal Congestion   • Headache   • Hypertension   • Atrial Fibrillation     History of Present Illness  Patient is 76 years old white female who is here for follow-up of multiple chronic medical problems.    Patient has known coronary artery disease requiring coronary artery bypass graft surgery.  She also has LV dysfunction with ejection fraction of 40% chronic systolic congestive heart failure on diuretic therapy.  Overall patient is doing very well.  She denies any chest pain palpitations but does get short of breath often and has poor exercise tolerance.  She denies any syncope or presyncope  No ankle edema.  No orthopnea    Her other problem is hypertension she is taking medications regularly without any side effects.  Overall she is doing better blood pressure 70 very well controlled.  She still has mild headaches off and on and nasal congestion.  No fever or chills.    She also has irregular heartbeat.  Heart rate is controlled.  She is anticoagulated with eliquis.  There are no side effects of the drug.    Patient also is concerned about her COPD she takes Proventil HFA and Singulair no fever or chills no cough or expectoration but complains of nasal congestion and sinus fullness.    Other problems consist of hyperlipidemia and hypothyroidism and renal failure.  No significant change.    Patient is on high risk medication Betapace therapy and eliquis.    She also has dual-chamber pacemaker which has been monitored closely and has been functioning normally.  Past Surgical History:   Procedure Laterality Date   • CARDIOVASCULAR STRESS TEST  06/2014   • CATARACT EXTRACTION, BILATERAL     • CHOLECYSTECTOMY  2002   • COLONOSCOPY  05/2012   • ECHO - CONVERTED  07/2014   • PACEMAKER IMPLANTATION  12/17/2015     Family History   Problem Relation Age of Onset   • Coronary artery disease Other    • Hypertension Other    • Diabetes Other    • Heart attack Other     • Heart attack Mother 62     fatal MI   • Skin cancer Mother    • Diabetes type II Mother    • Melanoma Mother    • Heart attack Father 63     fatal MI   • Melanoma Sister 45   • Skin cancer Sister    • Heart attack Brother 62     fatal MI   • Heart attack Brother 80     Fatal MI   • Skin cancer Brother    • Heart disease Sister    • Heart disease Brother 75     pacemaker, CABG, Stents   • Osteoporosis Sister    • Pneumonia Brother    • Hypertension Brother    • Heart attack Sister 54     Fatal MI   • Heart attack Brother 59     Fatal MI   • Breast cancer Neg Hx      Past Medical History:   Diagnosis Date   • Atrial fibrillation    • CAD (coronary artery disease)    • Chronic a-fib    • Chronic anticoagulation    • Congestive heart failure     compensated   • Disease of thyroid gland    • Hyperlipidemia    • Hypertension    • Hypoxemia    • Ischemic cardiomyopathy with severe LV Disfunction    • Myocardial infarction    • Pacemaker     VVI     Patient Active Problem List   Diagnosis   • CAD (coronary artery disease) status post CABG 1997 single-vessel*   • Ischemic cardiomyopathy with apical hypokinesis LV ejection fraction 40%*   • Chronic systolic congestive heart failure*   • Hyperlipidemia*   • Chronic a-fib*   • Pacemaker dual-chamber pacemaker Bi[employer]ron[employer] December 2015*   • Hypothyroidism*   • Hypertension*   • Chronic anticoagulation*   • Gastroesophageal reflux disease without esophagitis*   • Renal failure   • URI (upper respiratory infection)   • Asthmatic bronchitis with acute exacerbation       Social History   Substance Use Topics   • Smoking status: Former Smoker     Quit date: 1990   • Smokeless tobacco: Never Used   • Alcohol use No       Allergies   Allergen Reactions   • Codeine    • Eggs Or Egg-Derived Products    • Lisinopril        Current Outpatient Prescriptions on File Prior to Visit   Medication Sig   • albuterol (PROVENTIL HFA;VENTOLIN HFA) 108 (90 BASE) MCG/ACT inhaler Inhale 2 puffs Every  4 (Four) Hours As Needed for wheezing.   • amoxicillin (AMOXIL) 500 MG capsule Take 1 capsule by mouth 3 (Three) Times a Day.   • carvedilol (COREG) 6.25 MG tablet Take 1 tablet by mouth 2 (Two) Times a Day With Meals.   • cetirizine (zyrTEC) 10 MG tablet Take 1 tablet by mouth Daily.   • ELIQUIS 5 MG tablet tablet TAKE 1 TABLET BY MOUTH TWICE DAILY   • furosemide (LASIX) 40 MG tablet TAKE 1 AND 1/2 TABLET BY MOUTH EVERY DAY (Patient taking differently: TAKE 40 MG DAILY)   • gabapentin (NEURONTIN) 100 MG capsule Take 1 capsule by mouth 2 (Two) Times a Day.   • glycopyrrolate (ROBINUL) 1 MG tablet Take 1 mg by mouth 3 (three) times a day as needed.   • isosorbide mononitrate (IMDUR) 60 MG 24 hr tablet TAKE 1 TABLET BY MOUTH DAILY   • levothyroxine (SYNTHROID, LEVOTHROID) 50 MCG tablet TAKE 1 TABLET BY MOUTH DAILY   • LIVALO 1 MG tablet tablet TAKE 1 TABLET BY MOUTH DAILY   • montelukast (SINGULAIR) 10 MG tablet Take 1 tablet by mouth Every Night.   • nitroglycerin (NITROSTAT) 0.4 MG SL tablet Place 0.4 mg under the tongue every 5 (five) minutes as needed for chest pain. Take no more than 3 doses in 15 minutes.   • pantoprazole (PROTONIX) 40 MG EC tablet TAKE 1 TABLET BY MOUTH EVERY DAY   • sotalol (BETAPACE AF) 80 MG tablet tablet TAKE 1 TABLET BY MOUTH TWICE DAILY. PATIENT TAKE 1/2 TABLET 2 TIMES A DAY     No current facility-administered medications on file prior to visit.          The following portions of the patient's history were reviewed and updated as appropriate: allergies, current medications, past family history, past medical history, past social history, past surgical history and problem list.    Review of Systems   Constitution: Positive for weakness and malaise/fatigue.   HENT: Positive for congestion, ear pain and sore throat. Negative for ear discharge, hearing loss, hoarse voice and nosebleeds.    Eyes: Negative.    Cardiovascular: Negative.  Negative for chest pain, cyanosis, dyspnea on exertion,  "irregular heartbeat, leg swelling, near-syncope, orthopnea, palpitations, paroxysmal nocturnal dyspnea and syncope.   Respiratory: Positive for shortness of breath. Negative for sputum production and wheezing.    Hematologic/Lymphatic: Negative.    Skin: Negative.    Musculoskeletal: Positive for arthritis, back pain and myalgias.   Gastrointestinal: Negative.    Genitourinary: Negative.    Neurological: Negative for headaches.   Psychiatric/Behavioral: The patient is nervous/anxious.    Allergic/Immunologic: Positive for environmental allergies.          Objective:     /66 (BP Location: Left arm, Patient Position: Sitting)  Pulse 65  Ht 66\" (167.6 cm)  Wt 189 lb 6.4 oz (85.9 kg)  SpO2 95%  BMI 30.57 kg/m2  Physical Exam   Constitutional: She appears well-developed and well-nourished. No distress.   HENT:   Head: Normocephalic and atraumatic.   Mouth/Throat: Oropharynx is clear and moist. No oropharyngeal exudate.   Eyes: Conjunctivae and EOM are normal. Pupils are equal, round, and reactive to light. No scleral icterus.   Neck: Normal range of motion. Neck supple. No JVD present. No tracheal deviation present. No thyromegaly present.   Cardiovascular: Normal rate, regular rhythm, normal heart sounds and intact distal pulses.  PMI is not displaced.  Exam reveals no gallop, no friction rub and no decreased pulses.    No murmur heard.  Pulses:       Carotid pulses are 3+ on the right side, and 3+ on the left side.       Radial pulses are 3+ on the right side, and 3+ on the left side.   Pulmonary/Chest: Effort normal and breath sounds normal. No respiratory distress. She has no wheezes. She has no rales. She exhibits no tenderness.   Abdominal: Soft. Bowel sounds are normal. She exhibits no distension, no abdominal bruit and no mass. There is no splenomegaly or hepatomegaly. There is no tenderness. There is no rebound and no guarding.   Musculoskeletal: Normal range of motion. She exhibits no edema, " tenderness or deformity.   Lymphadenopathy:     She has no cervical adenopathy.   Neurological: She is alert. She has normal reflexes. No cranial nerve deficit. She exhibits normal muscle tone. Coordination normal.   Skin: Skin is warm and dry. No rash noted. She is not diaphoretic. No erythema.   Psychiatric: She has a normal mood and affect. Her behavior is normal. Judgment and thought content normal.         Lab Review  Lab Results   Component Value Date     11/02/2017    K 4.0 11/02/2017     11/02/2017    BUN 20 11/02/2017    CREATININE 1.23 11/02/2017    GLUCOSE 109 11/02/2017    CALCIUM 9.8 11/02/2017    ALT 21 11/02/2017    ALKPHOS 89 11/02/2017    LABIL2 1.4 (L) 11/02/2017     Lab Results   Component Value Date    CKTOTAL 59 11/02/2017     Lab Results   Component Value Date    WBC 6.23 11/02/2017    HGB 13.0 11/02/2017    HCT 38.3 11/02/2017     11/02/2017       Lab Results   Component Value Date    MG 2.2 01/09/2014     Lab Results   Component Value Date    TSH 2.843 11/02/2017     Lab Results   Component Value Date    BNP 77.0 12/07/2016     Lab Results   Component Value Date    CHLPL 219 (H) 04/05/2016    CHOL 147 11/02/2017    TRIG 234 (H) 11/02/2017    HDL 51 (L) 11/02/2017    LDLCALC 49 11/02/2017    VLDL 46.8 11/02/2017    LDLHDL 0.96 11/02/2017         Procedures       I personally viewed and interpreted the patient's LAB data         Assessment:     1. Coronary artery disease involving native coronary artery of native heart without angina pectoris    2. Chronic a-fib*    3. Chronic systolic congestive heart failure*    4. Mixed hyperlipidemia    5. Essential hypertension    6. Ischemic cardiomyopathy with apical hypokinesis LV ejection fraction 40%*    7. Pacemaker dual-chamber pacemaker [employer]ron[employer] December 2015*    8. Hypothyroidism, unspecified type    9. Chronic anticoagulation*          Plan:      Lab work reviewed and discussed with the patient.  Lipids are abnormal.   Cholesterol is normal at 147 but triglyceride 234.  Patient is overweight weight loss and dietary restrictions discussed.  Healthy lifestyle  Emphasized.    Patient will continue current medications.  Patient is taking Livalo 1 mg daily.  There are no side effects.  She was advised to continue Livalo therapy for hyperlipidemia she has not been able to tolerate other statins     She is stable from cardiac standpoint she has had CABG in the past currently she has no chest pain palpitations or shortness of breath.    Patient has compensated congestive heart failure currently asymptomatic  She was advised to continue Lasix 40 mg daily Coreg 6.25 daily.  She continues Betapace also    Pacemaker has been functioning normally.  Blood pressure is very well controlled on current medications  Thyroid functions are normal she was advised to continue Synthroid 50 µg daily.    At patient's request she was given amoxicillin 500 3 times a day although patient has no fever or chills she does have sinus congestion but she is quite concerned and wants antibiotics.  Follow-up scheduled      Return in about 3 months (around 2/9/2018).

## 2017-11-11 ENCOUNTER — HOSPITAL ENCOUNTER (EMERGENCY)
Facility: HOSPITAL | Age: 77
Discharge: HOME | End: 2017-11-11
Attending: EMERGENCY MEDICINE | Admitting: EMERGENCY MEDICINE

## 2017-11-11 VITALS
RESPIRATION RATE: 18 BRPM | OXYGEN SATURATION: 99 % | BODY MASS INDEX: 32.27 KG/M2 | TEMPERATURE: 98.1 F | HEIGHT: 64 IN | SYSTOLIC BLOOD PRESSURE: 154 MMHG | HEART RATE: 71 BPM | WEIGHT: 189 LBS | DIASTOLIC BLOOD PRESSURE: 80 MMHG

## 2017-11-11 DIAGNOSIS — S61.313A LACERATION OF LEFT MIDDLE FINGER WITHOUT FOREIGN BODY WITH DAMAGE TO NAIL, INITIAL ENCOUNTER: Primary | ICD-10-CM

## 2017-11-11 PROCEDURE — 99283 EMERGENCY DEPT VISIT LOW MDM: CPT

## 2017-11-11 RX ORDER — LIDOCAINE HYDROCHLORIDE 10 MG/ML
INJECTION, SOLUTION EPIDURAL; INFILTRATION; INTRACAUDAL; PERINEURAL
Status: COMPLETED
Start: 2017-11-11 | End: 2017-11-11

## 2017-11-11 RX ORDER — LIDOCAINE HYDROCHLORIDE 10 MG/ML
10 INJECTION, SOLUTION EPIDURAL; INFILTRATION; INTRACAUDAL; PERINEURAL ONCE
Status: COMPLETED | OUTPATIENT
Start: 2017-11-11 | End: 2017-11-11

## 2017-11-11 RX ORDER — LIDOCAINE HYDROCHLORIDE 20 MG/ML
10 INJECTION, SOLUTION INFILTRATION; PERINEURAL ONCE
Status: DISCONTINUED | OUTPATIENT
Start: 2017-11-11 | End: 2017-11-11

## 2017-11-11 RX ORDER — MUPIROCIN CALCIUM 20 MG/G
CREAM TOPICAL EVERY 12 HOURS SCHEDULED
Status: DISCONTINUED | OUTPATIENT
Start: 2017-11-11 | End: 2017-11-11

## 2017-11-11 RX ADMIN — LIDOCAINE HYDROCHLORIDE 10 ML: 10 INJECTION, SOLUTION EPIDURAL; INFILTRATION; INTRACAUDAL; PERINEURAL at 22:05

## 2017-11-12 NOTE — DISCHARGE INSTRUCTIONS
Please keep wound clean, covered and dry. Please see your PCP at the end of the week for wound check. Please return to ER if symptoms worsen.  Hypertension  Hypertension, commonly called high blood pressure, is when the force of blood pumping through your arteries is too strong. Your arteries are the blood vessels that carry blood from your heart throughout your body. A blood pressure reading consists of a higher number over a lower number, such as 110/72. The higher number (systolic) is the pressure inside your arteries when your heart pumps. The lower number (diastolic) is the pressure inside your arteries when your heart relaxes. Ideally you want your blood pressure below 120/80.  Hypertension forces your heart to work harder to pump blood. Your arteries may become narrow or stiff. Having untreated or uncontrolled hypertension can cause heart attack, stroke, kidney disease, and other problems.  RISK FACTORS  Some risk factors for high blood pressure are controllable. Others are not.   Risk factors you cannot control include:   · Race. You may be at higher risk if you are .  · Age. Risk increases with age.  · Gender. Men are at higher risk than women before age 45 years. After age 65, women are at higher risk than men.  Risk factors you can control include:  · Not getting enough exercise or physical activity.  · Being overweight.  · Getting too much fat, sugar, calories, or salt in your diet.  · Drinking too much alcohol.  SIGNS AND SYMPTOMS  Hypertension does not usually cause signs or symptoms. Extremely high blood pressure (hypertensive crisis) may cause headache, anxiety, shortness of breath, and nosebleed.  DIAGNOSIS  To check if you have hypertension, your health care provider will measure your blood pressure while you are seated, with your arm held at the level of your heart. It should be measured at least twice using the same arm. Certain conditions can cause a difference in blood pressure  between your right and left arms. A blood pressure reading that is higher than normal on one occasion does not mean that you need treatment. If it is not clear whether you have high blood pressure, you may be asked to return on a different day to have your blood pressure checked again. Or, you may be asked to monitor your blood pressure at home for 1 or more weeks.  TREATMENT  Treating high blood pressure includes making lifestyle changes and possibly taking medicine. Living a healthy lifestyle can help lower high blood pressure. You may need to change some of your habits.  Lifestyle changes may include:  · Following the DASH diet. This diet is high in fruits, vegetables, and whole grains. It is low in salt, red meat, and added sugars.  · Keep your sodium intake below 2,300 mg per day.  · Getting at least 30-45 minutes of aerobic exercise at least 4 times per week.  · Losing weight if necessary.  · Not smoking.  · Limiting alcoholic beverages.  · Learning ways to reduce stress.  Your health care provider may prescribe medicine if lifestyle changes are not enough to get your blood pressure under control, and if one of the following is true:  · You are 18-59 years of age and your systolic blood pressure is above 140.  · You are 60 years of age or older, and your systolic blood pressure is above 150.  · Your diastolic blood pressure is above 90.  · You have diabetes, and your systolic blood pressure is over 140 or your diastolic blood pressure is over 90.  · You have kidney disease and your blood pressure is above 140/90.  · You have heart disease and your blood pressure is above 140/90.  Your personal target blood pressure may vary depending on your medical conditions, your age, and other factors.  HOME CARE INSTRUCTIONS  · Have your blood pressure rechecked as directed by your health care provider.    · Take medicines only as directed by your health care provider. Follow the directions carefully. Blood pressure  medicines must be taken as prescribed. The medicine does not work as well when you skip doses. Skipping doses also puts you at risk for problems.  · Do not smoke.    · Monitor your blood pressure at home as directed by your health care provider.   SEEK MEDICAL CARE IF:   · You think you are having a reaction to medicines taken.  · You have recurrent headaches or feel dizzy.  · You have swelling in your ankles.  · You have trouble with your vision.  SEEK IMMEDIATE MEDICAL CARE IF:  · You develop a severe headache or confusion.  · You have unusual weakness, numbness, or feel faint.  · You have severe chest or abdominal pain.  · You vomit repeatedly.  · You have trouble breathing.  MAKE SURE YOU:   · Understand these instructions.  · Will watch your condition.  · Will get help right away if you are not doing well or get worse.     This information is not intended to replace advice given to you by your health care provider. Make sure you discuss any questions you have with your health care provider.     Document Released: 12/18/2006 Document Revised: 05/03/2016 Document Reviewed: 10/10/2014  [employer] Interactive Patient Education ©2017 [employer]

## 2017-11-12 NOTE — ED PROVIDER NOTES
Subjective   HPI Comments: This is a 76-year-old female patient who presents to the ER with a chief complaint of a finger laceration.  At approximately 8 PM, the patient was cutting up an apple.  The knife slipped and she tried to catch it.  It cut her left hand third digit.  The patient has had the bleeding controlled with a bandage since that time.  Pain is mild and is aching in quality.  It does not radiate.  There is no associated numbness or tingling.  There has been no significant redness, swelling, pus drainage.  Her tetanus vaccination is up-to-date.  She is on a Eliquis.    Patient is a 76 y.o. female presenting with skin laceration.   Laceration   Location:  Finger  Finger laceration location:  L middle finger  Length:  4 cm  Depth:  Through dermis  Quality: jagged    Bleeding: controlled    Time since incident:  2 hours  Laceration mechanism:  Knife  Pain details:     Quality:  Aching    Severity:  Mild    Timing:  Constant    Progression:  Unchanged  Foreign body present:  No foreign bodies  Relieved by:  None tried  Worsened by:  Movement and pressure  Ineffective treatments:  None tried  Tetanus status:  Up to date  Associated symptoms: no fever, no focal weakness, no numbness, no rash, no redness, no swelling and no streaking        Review of Systems   Constitutional: Negative.  Negative for fever.   HENT: Negative.    Respiratory: Negative.    Cardiovascular: Negative.  Negative for chest pain.   Gastrointestinal: Negative.  Negative for abdominal pain.   Endocrine: Negative.    Genitourinary: Negative.  Negative for dysuria.   Skin: Positive for wound. Negative for rash.   Neurological: Negative.  Negative for focal weakness.   Psychiatric/Behavioral: Negative.    All other systems reviewed and are negative.      Past Medical History:   Diagnosis Date   • Atrial fibrillation    • CAD (coronary artery disease)    • Chronic a-fib    • Chronic anticoagulation    • Congestive heart failure      compensated   • Disease of thyroid gland    • Hyperlipidemia    • Hypertension    • Hypoxemia    • Ischemic cardiomyopathy with severe LV Disfunction    • Myocardial infarction    • Pacemaker     VVI       Allergies   Allergen Reactions   • Codeine    • Eggs Or Egg-Derived Products    • Lisinopril        Past Surgical History:   Procedure Laterality Date   • CARDIOVASCULAR STRESS TEST  06/2014   • CATARACT EXTRACTION, BILATERAL     • CHOLECYSTECTOMY  2002   • COLONOSCOPY  05/2012   • ECHO - CONVERTED  07/2014   • PACEMAKER IMPLANTATION  12/17/2015       Family History   Problem Relation Age of Onset   • Coronary artery disease Other    • Hypertension Other    • Diabetes Other    • Heart attack Other    • Heart attack Mother 62     fatal MI   • Skin cancer Mother    • Diabetes type II Mother    • Melanoma Mother    • Heart attack Father 63     fatal MI   • Melanoma Sister 45   • Skin cancer Sister    • Heart attack Brother 62     fatal MI   • Heart attack Brother 80     Fatal MI   • Skin cancer Brother    • Heart disease Sister    • Heart disease Brother 75     pacemaker, CABG, Stents   • Osteoporosis Sister    • Pneumonia Brother    • Hypertension Brother    • Heart attack Sister 54     Fatal MI   • Heart attack Brother 59     Fatal MI   • Breast cancer Neg Hx        Social History     Social History   • Marital status:      Spouse name: N/A   • Number of children: N/A   • Years of education: N/A     Social History Main Topics   • Smoking status: Former Smoker     Quit date: 1990   • Smokeless tobacco: Never Used   • Alcohol use No   • Drug use: No   • Sexual activity: Not Asked     Other Topics Concern   • None     Social History Narrative   • None           Objective   Physical Exam   Constitutional: She is oriented to person, place, and time. She appears well-developed and well-nourished. No distress.   HENT:   Head: Normocephalic and atraumatic.   Right Ear: External ear normal.   Left Ear: External ear  normal.   Nose: Nose normal.   Eyes: Conjunctivae and EOM are normal. Pupils are equal, round, and reactive to light.   Neck: Normal range of motion. Neck supple. No JVD present. No tracheal deviation present.   Cardiovascular: Normal rate, regular rhythm and normal heart sounds.    No murmur heard.  Pulmonary/Chest: Effort normal and breath sounds normal. No respiratory distress. She has no wheezes.   Abdominal: Soft. Bowel sounds are normal. There is no tenderness.   Musculoskeletal: Normal range of motion. She exhibits no edema or deformity.   Neurological: She is alert and oriented to person, place, and time. No cranial nerve deficit.   Skin: Skin is warm and dry. No rash noted. She is not diaphoretic. No erythema. No pallor.   Left hand 3rd digit laceration involving the nail bed. 4 cm in length. 0.5 cm in depth. No edema, erythema, drainage. Bleeding controlled. Neurovascular status and sensation in LUE intact. Full ROM L hand 3rd digit.   Psychiatric: She has a normal mood and affect. Her behavior is normal. Thought content normal.   Nursing note and vitals reviewed.      Laceration repair  Date/Time: 11/12/2017 12:21 AM  Performed by: JOSH BETANCOURT  Authorized by: GABO PATRICIA   Consent: Verbal consent obtained.  Risks and benefits: risks, benefits and alternatives were discussed  Consent given by: patient  Body area: upper extremity  Location details: left long finger  Laceration length: 4 cm  Foreign bodies: no foreign bodies  Tendon involvement: none  Nerve involvement: none  Vascular damage: no  Anesthesia: local infiltration    Anesthesia:  Local Anesthetic: lidocaine 1% without epinephrine  Anesthetic total: 4 mL    Sedation:  Patient sedated: no  Preparation: Patient was prepped and draped in the usual sterile fashion.  Irrigation solution: saline  Irrigation method: syringe  Amount of cleaning: standard  Debridement: none  Degree of undermining: none  Skin closure: 5-0 Prolene  Number of  sutures: 5  Technique: simple  Approximation: close  Approximation difficulty: simple  Dressing: antibiotic ointment, 4x4 sterile gauze and non-adhesive packing strip  Patient tolerance: Patient tolerated the procedure well with no immediate complications               ED Course  ED Course   Comment By Time   Dr. Hdez evaluated the patient and advised suturing with the nail intact. TRAMAINE Tobin 11/11 9483   Patient diagnosed with left hand 3rd digit laceration. She will keep wound clean, covered and dry. She will be d/c. She will f/u with PCP next week for wound check. She will return to ER if symptoms worsen. TRAMAINE Tobin 11/11 2321                  MDM  Number of Diagnoses or Management Options     Amount and/or Complexity of Data Reviewed  Discuss the patient with other providers: yes        Final diagnoses:   Laceration of left middle finger without foreign body with damage to nail, initial encounter            TRAMAINE Tobin  11/12/17 0022       TRAMAINE Tobin  11/12/17 0030

## 2017-11-29 RX ORDER — MONTELUKAST SODIUM 10 MG/1
TABLET ORAL
Qty: 30 TABLET | Refills: 0 | Status: SHIPPED | OUTPATIENT
Start: 2017-11-29 | End: 2017-12-31 | Stop reason: SDUPTHER

## 2017-12-08 RX ORDER — SOTALOL HYDROCHLORIDE 80 MG/1
TABLET ORAL
Qty: 60 TABLET | Refills: 0 | OUTPATIENT
Start: 2017-12-08

## 2017-12-15 RX ORDER — ISOSORBIDE MONONITRATE 60 MG/1
TABLET, EXTENDED RELEASE ORAL
Qty: 90 TABLET | Refills: 0 | Status: SHIPPED | OUTPATIENT
Start: 2017-12-15 | End: 2018-03-14 | Stop reason: SDUPTHER

## 2017-12-18 RX ORDER — PITAVASTATIN CALCIUM 1.04 MG/1
TABLET, FILM COATED ORAL
Qty: 90 TABLET | Refills: 0 | Status: SHIPPED | OUTPATIENT
Start: 2017-12-18 | End: 2018-03-18 | Stop reason: SDUPTHER

## 2018-01-03 RX ORDER — MONTELUKAST SODIUM 10 MG/1
TABLET ORAL
Qty: 30 TABLET | Refills: 0 | Status: SHIPPED | OUTPATIENT
Start: 2018-01-03 | End: 2018-01-30 | Stop reason: SDUPTHER

## 2018-01-05 RX ORDER — APIXABAN 5 MG/1
TABLET, FILM COATED ORAL
Qty: 180 TABLET | Refills: 0 | Status: SHIPPED | OUTPATIENT
Start: 2018-01-05 | End: 2018-03-13 | Stop reason: SDUPTHER

## 2018-01-10 RX ORDER — GABAPENTIN 100 MG/1
100 CAPSULE ORAL
Qty: 180 CAPSULE | Refills: 0 | Status: SHIPPED | OUTPATIENT
Start: 2018-01-10 | End: 2018-05-24 | Stop reason: ALTCHOICE

## 2018-01-29 RX ORDER — LEVOTHYROXINE SODIUM 0.05 MG/1
TABLET ORAL
Qty: 90 TABLET | Refills: 0 | OUTPATIENT
Start: 2018-01-29

## 2018-01-29 RX ORDER — CARVEDILOL 6.25 MG/1
TABLET ORAL
Qty: 180 TABLET | Refills: 0 | OUTPATIENT
Start: 2018-01-29

## 2018-01-30 RX ORDER — MONTELUKAST SODIUM 10 MG/1
TABLET ORAL
Qty: 30 TABLET | Refills: 0 | Status: SHIPPED | OUTPATIENT
Start: 2018-01-30 | End: 2018-02-28 | Stop reason: SDUPTHER

## 2018-01-31 RX ORDER — CETIRIZINE HYDROCHLORIDE 10 MG/1
10 TABLET ORAL DAILY
Qty: 90 TABLET | Refills: 0 | Status: SHIPPED | OUTPATIENT
Start: 2018-01-31 | End: 2018-05-24 | Stop reason: SDUPTHER

## 2018-01-31 RX ORDER — CARVEDILOL 6.25 MG/1
TABLET ORAL
Qty: 180 TABLET | Refills: 0 | Status: SHIPPED | OUTPATIENT
Start: 2018-01-31 | End: 2018-04-29 | Stop reason: SDUPTHER

## 2018-01-31 NOTE — TELEPHONE ENCOUNTER
Patient called and needed refills on her medications. [place] and today I am sending Zyrtec and Coreg to her [facility].

## 2018-02-22 ENCOUNTER — OFFICE VISIT (OUTPATIENT)
Dept: CARDIOLOGY | Facility: CLINIC | Age: 78
End: 2018-02-22

## 2018-02-22 VITALS
HEIGHT: 64 IN | DIASTOLIC BLOOD PRESSURE: 70 MMHG | BODY MASS INDEX: 32.06 KG/M2 | WEIGHT: 187.8 LBS | OXYGEN SATURATION: 97 % | HEART RATE: 72 BPM | SYSTOLIC BLOOD PRESSURE: 114 MMHG

## 2018-02-22 DIAGNOSIS — Z79.01 CHRONIC ANTICOAGULATION: ICD-10-CM

## 2018-02-22 DIAGNOSIS — I10 ESSENTIAL HYPERTENSION: ICD-10-CM

## 2018-02-22 DIAGNOSIS — Z95.0 PACEMAKER: ICD-10-CM

## 2018-02-22 DIAGNOSIS — I25.10 CORONARY ARTERY DISEASE INVOLVING NATIVE CORONARY ARTERY OF NATIVE HEART WITHOUT ANGINA PECTORIS: Primary | ICD-10-CM

## 2018-02-22 DIAGNOSIS — I25.5 ISCHEMIC CARDIOMYOPATHY: ICD-10-CM

## 2018-02-22 DIAGNOSIS — I50.22 CHRONIC SYSTOLIC CONGESTIVE HEART FAILURE (HCC): ICD-10-CM

## 2018-02-22 DIAGNOSIS — I48.20 CHRONIC A-FIB (HCC): ICD-10-CM

## 2018-02-22 DIAGNOSIS — E03.9 HYPOTHYROIDISM, UNSPECIFIED TYPE: ICD-10-CM

## 2018-02-22 DIAGNOSIS — E78.2 MIXED HYPERLIPIDEMIA: ICD-10-CM

## 2018-02-22 PROCEDURE — 99214 OFFICE O/P EST MOD 30 MIN: CPT | Performed by: INTERNAL MEDICINE

## 2018-02-22 PROCEDURE — 93000 ELECTROCARDIOGRAM COMPLETE: CPT | Performed by: INTERNAL MEDICINE

## 2018-02-22 NOTE — PROGRESS NOTES
subjective     Chief Complaint   Patient presents with   • Follow-up   • Coronary Artery Disease   • Hyperlipidemia   • Hypertension     History of Present Illness  Patient is 77 years old white female who is here for cardiac evaluation because of multiple cardiac and medical problems.  Patient states that she is under a lot of stress.  According to her her  is very marin is always angry and difficult to live with.  Patient states that you cannot understand how difficult it is to be with him.  According to her patient refuses psychiatric consult as recommended.  She said that he will never go to psychiatric.    Patient denies any chest pain palpitations or shortness of breath.  She has known coronary artery disease requiring CABG in the past.  She also has LV dysfunction ejection fraction 40%.  She is compensated heart failure lately she is doing very well.  Denies orthopnea PND.  She does complain of mild ankle puffiness toward the end of the day.    Blood pressure is very well controlled.  Pacemaker has been functioning normally.  She does complain of palpitations and fast heartbeat on occasions.  She is taking Betapace therapy  He is anticoagulated with eliquis and tolerating medications very well.    Hyperlipidemia is controlled with Livalo.  Cholesterol has been good but triglyceride has been elevated.  Patient also has hypothyroidism on Synthroid therapy.  Mild COPD is also very well controlled.    Patient states that her biggest problem at this time his stress from her .      Past Surgical History:   Procedure Laterality Date   • CARDIOVASCULAR STRESS TEST  06/2014   • CATARACT EXTRACTION, BILATERAL     • CHOLECYSTECTOMY  2002   • COLONOSCOPY  05/2012   • ECHO - CONVERTED  07/2014   • PACEMAKER IMPLANTATION  12/17/2015     Family History   Problem Relation Age of Onset   • Coronary artery disease Other    • Hypertension Other    • Diabetes Other    • Heart attack Other    • Heart attack Mother  62     fatal MI   • Skin cancer Mother    • Diabetes type II Mother    • Melanoma Mother    • Heart attack Father 63     fatal MI   • Melanoma Sister 45   • Skin cancer Sister    • Heart attack Brother 62     fatal MI   • Heart attack Brother 80     Fatal MI   • Skin cancer Brother    • Heart disease Sister    • Heart disease Brother 75     pacemaker, CABG, Stents   • Osteoporosis Sister    • Pneumonia Brother    • Hypertension Brother    • Heart attack Sister 54     Fatal MI   • Heart attack Brother 59     Fatal MI   • Breast cancer Neg Hx      Past Medical History:   Diagnosis Date   • Atrial fibrillation    • CAD (coronary artery disease)    • Chronic a-fib    • Chronic anticoagulation    • Congestive heart failure     compensated   • Disease of thyroid gland    • Hyperlipidemia    • Hypertension    • Hypoxemia    • Ischemic cardiomyopathy with severe LV Disfunction    • Myocardial infarction    • Pacemaker     VVI     Patient Active Problem List   Diagnosis   • CAD (coronary artery disease) status post CABG 1997 single-vessel*   • Ischemic cardiomyopathy with apical hypokinesis LV ejection fraction 40%*   • Chronic systolic congestive heart failure*   • Hyperlipidemia*   • Chronic a-fib*   • Pacemaker dual-chamber pacemaker Bi[employer]ron[employer] December 2015*   • Hypothyroidism*   • Hypertension*   • Chronic anticoagulation*   • Gastroesophageal reflux disease without esophagitis*   • Renal failure   • URI (upper respiratory infection)   • Asthmatic bronchitis with acute exacerbation       Social History   Substance Use Topics   • Smoking status: Former Smoker     Quit date: 1990   • Smokeless tobacco: Never Used   • Alcohol use No       Allergies   Allergen Reactions   • Codeine Rash   • Eggs Or Egg-Derived Products Itching and Rash   • Grass Itching and Rash   • Lisinopril Rash       Current Outpatient Prescriptions on File Prior to Visit   Medication Sig   • albuterol (PROVENTIL HFA;VENTOLIN HFA) 108 (90 BASE) MCG/ACT  inhaler Inhale 2 puffs Every 4 (Four) Hours As Needed for wheezing.   • amoxicillin (AMOXIL) 500 MG capsule Take 1 capsule by mouth 3 (Three) Times a Day.   • carvedilol (COREG) 6.25 MG tablet Take 1 tablet by mouth 2 (Two) Times a Day With Meals.   • carvedilol (COREG) 6.25 MG tablet TAKE 1 TABLET BY MOUTH TWICE DAILY WITH MEALS   • cetirizine (zyrTEC) 10 MG tablet Take 1 tablet by mouth Daily.   • ELIQUIS 5 MG tablet tablet TAKE 1 TABLET BY MOUTH TWICE DAILY   • furosemide (LASIX) 40 MG tablet TAKE 1 AND 1/2 TABLET BY MOUTH EVERY DAY (Patient taking differently: TAKE 40 MG DAILY)   • gabapentin (NEURONTIN) 100 MG capsule Take 1 capsule by mouth 2 (Two) Times a Day.   • glycopyrrolate (ROBINUL) 1 MG tablet Take 1 mg by mouth 3 (three) times a day as needed.   • isosorbide mononitrate (IMDUR) 60 MG 24 hr tablet TAKE 1 TABLET BY MOUTH DAILY   • levothyroxine (SYNTHROID, LEVOTHROID) 50 MCG tablet TAKE 1 TABLET BY MOUTH DAILY   • LIVALO 1 MG tablet tablet TAKE 1 TABLET BY MOUTH DAILY   • montelukast (SINGULAIR) 10 MG tablet TAKE 1 TABLET BY MOUTH EVERY NIGHT   • nitroglycerin (NITROSTAT) 0.4 MG SL tablet Place 0.4 mg under the tongue every 5 (five) minutes as needed for chest pain. Take no more than 3 doses in 15 minutes.   • pantoprazole (PROTONIX) 40 MG EC tablet TAKE 1 TABLET BY MOUTH EVERY DAY   • sotalol (BETAPACE AF) 80 MG tablet tablet TAKE 1 TABLET BY MOUTH TWICE DAILY. PATIENT TAKE 1/2 TABLET 2 TIMES A DAY     No current facility-administered medications on file prior to visit.          The following portions of the patient's history were reviewed and updated as appropriate: allergies, current medications, past family history, past medical history, past social history, past surgical history and problem list.    Review of Systems   Constitution: Negative.   HENT: Negative.  Negative for congestion.    Eyes: Negative.    Cardiovascular: Positive for leg swelling and palpitations. Negative for chest pain,  "cyanosis, dyspnea on exertion, irregular heartbeat, near-syncope, orthopnea, paroxysmal nocturnal dyspnea and syncope.   Respiratory: Negative.  Negative for shortness of breath.    Hematologic/Lymphatic: Negative.    Musculoskeletal: Negative.    Gastrointestinal: Negative.    Neurological: Negative.  Negative for headaches.   Psychiatric/Behavioral: Negative for substance abuse, suicidal ideas and thoughts of violence. The patient is nervous/anxious.           Objective:     /70 (BP Location: Left arm, Patient Position: Sitting, Cuff Size: Adult)  Pulse 72  Ht 162.6 cm (64.02\")  Wt 85.2 kg (187 lb 12.8 oz)  SpO2 97%  BMI 32.22 kg/m2  Physical Exam   Constitutional: She appears well-developed and well-nourished. No distress.   HENT:   Head: Normocephalic and atraumatic.   Mouth/Throat: Oropharynx is clear and moist. No oropharyngeal exudate.   Eyes: Conjunctivae and EOM are normal. Pupils are equal, round, and reactive to light. No scleral icterus.   Neck: Normal range of motion. Neck supple. No JVD present. No tracheal deviation present. No thyromegaly present.   Cardiovascular: Normal rate, regular rhythm, normal heart sounds and intact distal pulses.  PMI is not displaced.  Exam reveals no gallop, no friction rub and no decreased pulses.    No murmur heard.  Pulses:       Carotid pulses are 3+ on the right side, and 3+ on the left side.       Radial pulses are 3+ on the right side, and 3+ on the left side.   Pulmonary/Chest: Effort normal and breath sounds normal. No respiratory distress. She has no wheezes. She has no rales. She exhibits no tenderness.   Abdominal: Soft. Bowel sounds are normal. She exhibits no distension, no abdominal bruit and no mass. There is no splenomegaly or hepatomegaly. There is no tenderness. There is no rebound and no guarding.   Musculoskeletal: Normal range of motion. She exhibits no edema, tenderness or deformity.   Lymphadenopathy:     She has no cervical adenopathy. "   Neurological: She is alert. She has normal reflexes. No cranial nerve deficit. She exhibits normal muscle tone. Coordination normal.   Skin: Skin is warm and dry. No rash noted. She is not diaphoretic. No erythema.   Psychiatric: She has a normal mood and affect. Her behavior is normal. Judgment and thought content normal.         Lab Review  Lab Results   Component Value Date     11/02/2017    K 4.0 11/02/2017     11/02/2017    BUN 20 11/02/2017    CREATININE 1.23 11/02/2017    GLUCOSE 109 11/02/2017    CALCIUM 9.8 11/02/2017    ALT 21 11/02/2017    ALKPHOS 89 11/02/2017    LABIL2 1.4 (L) 11/02/2017     Lab Results   Component Value Date    CKTOTAL 59 11/02/2017     Lab Results   Component Value Date    WBC 6.23 11/02/2017    HGB 13.0 11/02/2017    HCT 38.3 11/02/2017     11/02/2017     Lab Results   Component Value Date    INR 0.91 12/17/2015    INR 1.00 02/20/2014     Lab Results   Component Value Date    MG 2.2 01/09/2014     Lab Results   Component Value Date    TSH 2.843 11/02/2017     Lab Results   Component Value Date    BNP 77.0 12/07/2016     Lab Results   Component Value Date    CHLPL 219 (H) 04/05/2016    CHOL 147 11/02/2017    TRIG 234 (H) 11/02/2017    HDL 51 (L) 11/02/2017    VLDL 46.8 11/02/2017    LDLHDL 0.96 11/02/2017           ECG 12 Lead  Date/Time: 2/22/2018 5:17 PM  Performed by: LYLY JULIAN  Authorized by: LYLY JULIAN   Comparison: compared with previous ECG from 7/17/2014  Similar to previous ECG  Rhythm: paced  Rate: normal  QRS axis: left  Clinical impression: abnormal ECG               I personally viewed and interpreted the patient's LAB data         Assessment:     1. Coronary artery disease involving native coronary artery of native heart without angina pectoris    2. Chronic a-fib*    3. Chronic systolic congestive heart failure*    4. Mixed hyperlipidemia    5. Essential hypertension    6. Ischemic cardiomyopathy with apical hypokinesis LV  ejection fraction 40%*    7. Pacemaker dual-chamber pacemaker Biotronik December 2015*    8. Hypothyroidism, unspecified type    9. Chronic anticoagulation*          Plan:   Patient seems to be doing very well.  EKG reviewed and discussed with the patient.  Pacemaker seems to be functioning normally.  Old lab work from 3 months ago was again brought up and discussed.  Total cholesterol is 147 which is significantly better.  She will continue Livalo.  Clinically there is no heart failure.  Medications will be continued.  She will continue Coreg 6.25 twice a day.  She was instructed to take extra if needed.  Betapace was also continued.  She is taking eliquis without side effects.  Eliquis was continued.  Refills were given.  No change in therapy was made.  Lengthy discussion about her 's aggression and suggestion .  Psychiatric consult for her  recommended.             No Follow-up on file.

## 2018-02-28 RX ORDER — MONTELUKAST SODIUM 10 MG/1
TABLET ORAL
Qty: 30 TABLET | Refills: 0 | Status: SHIPPED | OUTPATIENT
Start: 2018-02-28 | End: 2018-03-28 | Stop reason: SDUPTHER

## 2018-03-13 ENCOUNTER — DOCUMENTATION (OUTPATIENT)
Dept: CARDIOLOGY | Facility: CLINIC | Age: 78
End: 2018-03-13

## 2018-03-13 RX ORDER — GABAPENTIN 100 MG/1
CAPSULE ORAL
Qty: 180 CAPSULE | Refills: 0 | OUTPATIENT
Start: 2018-03-13

## 2018-03-13 RX ORDER — APIXABAN 5 MG/1
TABLET, FILM COATED ORAL
Qty: 180 TABLET | Refills: 0 | Status: SHIPPED | OUTPATIENT
Start: 2018-03-13 | End: 2018-06-08 | Stop reason: SDUPTHER

## 2018-03-13 NOTE — PROGRESS NOTES
Patient was called per Deja in our office to schedule a sooner appt for a refill on her Gabapentin due to it being a scheduled RX.  She stated she would like to discontinue this (Gabapentin).   She stated she didn't want it refilled.

## 2018-03-14 RX ORDER — ISOSORBIDE MONONITRATE 60 MG/1
TABLET, EXTENDED RELEASE ORAL
Qty: 90 TABLET | Refills: 0 | Status: SHIPPED | OUTPATIENT
Start: 2018-03-14 | End: 2018-06-10 | Stop reason: SDUPTHER

## 2018-03-15 RX ORDER — LEVOTHYROXINE SODIUM 0.05 MG/1
TABLET ORAL
Qty: 90 TABLET | Refills: 0 | Status: SHIPPED | OUTPATIENT
Start: 2018-03-15 | End: 2018-06-11 | Stop reason: SDUPTHER

## 2018-03-19 RX ORDER — PITAVASTATIN CALCIUM 1.04 MG/1
TABLET, FILM COATED ORAL
Qty: 90 TABLET | Refills: 0 | Status: SHIPPED | OUTPATIENT
Start: 2018-03-19 | End: 2018-10-04 | Stop reason: SDUPTHER

## 2018-03-20 RX ORDER — LEVOTHYROXINE SODIUM 0.03 MG/1
TABLET ORAL
Qty: 90 TABLET | Refills: 0 | Status: SHIPPED | OUTPATIENT
Start: 2018-03-20 | End: 2018-05-24 | Stop reason: SDUPTHER

## 2018-03-20 RX ORDER — SOTALOL HYDROCHLORIDE 80 MG/1
TABLET ORAL
Qty: 60 TABLET | Refills: 0 | Status: SHIPPED | OUTPATIENT
Start: 2018-03-20 | End: 2018-04-17 | Stop reason: SDUPTHER

## 2018-03-20 RX ORDER — CETIRIZINE HYDROCHLORIDE 10 MG/1
10 TABLET ORAL DAILY
Qty: 90 TABLET | Refills: 0 | Status: SHIPPED | OUTPATIENT
Start: 2018-03-20 | End: 2018-05-24 | Stop reason: SDUPTHER

## 2018-03-20 RX ORDER — CETIRIZINE HYDROCHLORIDE 10 MG/1
TABLET ORAL
Qty: 90 TABLET | Refills: 0 | Status: SHIPPED | OUTPATIENT
Start: 2018-03-20 | End: 2018-08-08 | Stop reason: SDUPTHER

## 2018-03-28 RX ORDER — MONTELUKAST SODIUM 10 MG/1
TABLET ORAL
Qty: 30 TABLET | Refills: 0 | Status: SHIPPED | OUTPATIENT
Start: 2018-03-28 | End: 2018-04-26 | Stop reason: SDUPTHER

## 2018-04-17 ENCOUNTER — CLINICAL SUPPORT (OUTPATIENT)
Dept: CARDIOLOGY | Facility: CLINIC | Age: 78
End: 2018-04-17

## 2018-04-17 DIAGNOSIS — I48.20 CHRONIC A-FIB (HCC): ICD-10-CM

## 2018-04-17 DIAGNOSIS — Z95.0 PACEMAKER: ICD-10-CM

## 2018-04-17 PROCEDURE — 93288 INTERROG EVL PM/LDLS PM IP: CPT | Performed by: INTERNAL MEDICINE

## 2018-04-17 RX ORDER — SOTALOL HYDROCHLORIDE 80 MG/1
TABLET ORAL
Qty: 60 TABLET | Refills: 0 | Status: SHIPPED | OUTPATIENT
Start: 2018-04-17 | End: 2018-05-20 | Stop reason: SDUPTHER

## 2018-04-17 NOTE — PROGRESS NOTES
Mrs. Feliciano was seen today for a pacemaker check. She will be rechecked in 6 months everything was WNL.

## 2018-04-26 RX ORDER — MONTELUKAST SODIUM 10 MG/1
TABLET ORAL
Qty: 30 TABLET | Refills: 0 | Status: SHIPPED | OUTPATIENT
Start: 2018-04-26 | End: 2018-05-26 | Stop reason: SDUPTHER

## 2018-04-30 RX ORDER — CARVEDILOL 6.25 MG/1
TABLET ORAL
Qty: 180 TABLET | Refills: 0 | Status: SHIPPED | OUTPATIENT
Start: 2018-04-30 | End: 2018-07-19 | Stop reason: SDUPTHER

## 2018-05-08 ENCOUNTER — LAB (OUTPATIENT)
Dept: LAB | Facility: HOSPITAL | Age: 78
End: 2018-05-08
Attending: INTERNAL MEDICINE

## 2018-05-08 DIAGNOSIS — E03.9 HYPOTHYROIDISM, UNSPECIFIED TYPE: ICD-10-CM

## 2018-05-08 DIAGNOSIS — E78.2 MIXED HYPERLIPIDEMIA: ICD-10-CM

## 2018-05-08 LAB
ALBUMIN SERPL-MCNC: 4 G/DL (ref 3.4–4.8)
ALBUMIN/GLOB SERPL: 1.3 G/DL (ref 1.5–2.5)
ALP SERPL-CCNC: 73 U/L (ref 35–104)
ALT SERPL W P-5'-P-CCNC: 17 U/L (ref 10–36)
ANION GAP SERPL CALCULATED.3IONS-SCNC: 3.3 MMOL/L (ref 3.6–11.2)
AST SERPL-CCNC: 26 U/L (ref 10–30)
BASOPHILS # BLD AUTO: 0.14 10*3/MM3 (ref 0–0.3)
BASOPHILS NFR BLD AUTO: 2.6 % (ref 0–2)
BILIRUB SERPL-MCNC: 0.9 MG/DL (ref 0.2–1.8)
BUN BLD-MCNC: 17 MG/DL (ref 7–21)
BUN/CREAT SERPL: 15.6 (ref 7–25)
CALCIUM SPEC-SCNC: 9.4 MG/DL (ref 7.7–10)
CHLORIDE SERPL-SCNC: 108 MMOL/L (ref 99–112)
CHOLEST SERPL-MCNC: 113 MG/DL (ref 0–200)
CK SERPL-CCNC: 86 U/L (ref 24–173)
CO2 SERPL-SCNC: 27.7 MMOL/L (ref 24.3–31.9)
CREAT BLD-MCNC: 1.09 MG/DL (ref 0.43–1.29)
DEPRECATED RDW RBC AUTO: 44.7 FL (ref 37–54)
EOSINOPHIL # BLD AUTO: 0.25 10*3/MM3 (ref 0–0.7)
EOSINOPHIL NFR BLD AUTO: 4.7 % (ref 0–7)
ERYTHROCYTE [DISTWIDTH] IN BLOOD BY AUTOMATED COUNT: 13.9 % (ref 11.5–14.5)
GFR SERPL CREATININE-BSD FRML MDRD: 49 ML/MIN/1.73
GLOBULIN UR ELPH-MCNC: 3.2 GM/DL
GLUCOSE BLD-MCNC: 101 MG/DL (ref 70–110)
HCT VFR BLD AUTO: 38.3 % (ref 37–47)
HDLC SERPL-MCNC: 36 MG/DL (ref 60–100)
HGB BLD-MCNC: 12.9 G/DL (ref 12–16)
IMM GRANULOCYTES # BLD: 0.01 10*3/MM3 (ref 0–0.03)
IMM GRANULOCYTES NFR BLD: 0.2 % (ref 0–0.5)
LDLC SERPL CALC-MCNC: 51 MG/DL (ref 0–100)
LDLC/HDLC SERPL: 1.41 {RATIO}
LYMPHOCYTES # BLD AUTO: 1.17 10*3/MM3 (ref 1–3)
LYMPHOCYTES NFR BLD AUTO: 22.1 % (ref 16–46)
MCH RBC QN AUTO: 30.4 PG (ref 27–33)
MCHC RBC AUTO-ENTMCNC: 33.7 G/DL (ref 33–37)
MCV RBC AUTO: 90.1 FL (ref 80–94)
MONOCYTES # BLD AUTO: 0.48 10*3/MM3 (ref 0.1–0.9)
MONOCYTES NFR BLD AUTO: 9.1 % (ref 0–12)
NEUTROPHILS # BLD AUTO: 3.25 10*3/MM3 (ref 1.4–6.5)
NEUTROPHILS NFR BLD AUTO: 61.3 % (ref 40–75)
OSMOLALITY SERPL CALC.SUM OF ELEC: 279.2 MOSM/KG (ref 273–305)
PLATELET # BLD AUTO: 162 10*3/MM3 (ref 130–400)
PMV BLD AUTO: 11 FL (ref 6–10)
POTASSIUM BLD-SCNC: 4.9 MMOL/L (ref 3.5–5.3)
PROT SERPL-MCNC: 7.2 G/DL (ref 6–8)
RBC # BLD AUTO: 4.25 10*6/MM3 (ref 4.2–5.4)
SODIUM BLD-SCNC: 139 MMOL/L (ref 135–153)
T4 FREE SERPL-MCNC: 1.6 NG/DL (ref 0.89–1.76)
TRIGL SERPL-MCNC: 131 MG/DL (ref 0–150)
TSH SERPL DL<=0.05 MIU/L-ACNC: 0.89 MIU/ML (ref 0.55–4.78)
VLDLC SERPL-MCNC: 26.2 MG/DL
WBC NRBC COR # BLD: 5.3 10*3/MM3 (ref 4.5–12.5)

## 2018-05-08 PROCEDURE — 80053 COMPREHEN METABOLIC PANEL: CPT

## 2018-05-08 PROCEDURE — 85025 COMPLETE CBC W/AUTO DIFF WBC: CPT

## 2018-05-08 PROCEDURE — 80061 LIPID PANEL: CPT

## 2018-05-08 PROCEDURE — 82550 ASSAY OF CK (CPK): CPT

## 2018-05-08 PROCEDURE — 84439 ASSAY OF FREE THYROXINE: CPT

## 2018-05-08 PROCEDURE — 84443 ASSAY THYROID STIM HORMONE: CPT

## 2018-05-21 RX ORDER — SOTALOL HYDROCHLORIDE 80 MG/1
TABLET ORAL
Qty: 60 TABLET | Refills: 0 | Status: SHIPPED | OUTPATIENT
Start: 2018-05-21 | End: 2018-05-27

## 2018-05-24 ENCOUNTER — OFFICE VISIT (OUTPATIENT)
Dept: CARDIOLOGY | Facility: CLINIC | Age: 78
End: 2018-05-24

## 2018-05-24 VITALS
WEIGHT: 183 LBS | SYSTOLIC BLOOD PRESSURE: 118 MMHG | BODY MASS INDEX: 31.24 KG/M2 | HEART RATE: 80 BPM | OXYGEN SATURATION: 95 % | HEIGHT: 64 IN | DIASTOLIC BLOOD PRESSURE: 72 MMHG

## 2018-05-24 DIAGNOSIS — I10 ESSENTIAL HYPERTENSION: ICD-10-CM

## 2018-05-24 DIAGNOSIS — I50.22 CHRONIC SYSTOLIC CONGESTIVE HEART FAILURE (HCC): ICD-10-CM

## 2018-05-24 DIAGNOSIS — Z79.01 CHRONIC ANTICOAGULATION: ICD-10-CM

## 2018-05-24 DIAGNOSIS — E78.2 MIXED HYPERLIPIDEMIA: ICD-10-CM

## 2018-05-24 DIAGNOSIS — I25.10 CORONARY ARTERY DISEASE INVOLVING NATIVE CORONARY ARTERY OF NATIVE HEART WITHOUT ANGINA PECTORIS: ICD-10-CM

## 2018-05-24 DIAGNOSIS — Z95.0 PACEMAKER: ICD-10-CM

## 2018-05-24 DIAGNOSIS — I48.20 CHRONIC A-FIB (HCC): ICD-10-CM

## 2018-05-24 DIAGNOSIS — E03.9 HYPOTHYROIDISM, UNSPECIFIED TYPE: ICD-10-CM

## 2018-05-24 DIAGNOSIS — N39.3 STRESS INCONTINENCE: Primary | ICD-10-CM

## 2018-05-24 PROCEDURE — 99214 OFFICE O/P EST MOD 30 MIN: CPT | Performed by: INTERNAL MEDICINE

## 2018-05-24 RX ORDER — FUROSEMIDE 40 MG/1
60 TABLET ORAL DAILY
COMMUNITY
End: 2018-05-24 | Stop reason: SDUPTHER

## 2018-05-24 RX ORDER — ALBUTEROL SULFATE 90 UG/1
2 AEROSOL, METERED RESPIRATORY (INHALATION) EVERY 4 HOURS PRN
Qty: 3.7 G | Refills: 2 | Status: SHIPPED | OUTPATIENT
Start: 2018-05-24 | End: 2018-07-07 | Stop reason: SDUPTHER

## 2018-05-24 RX ORDER — FUROSEMIDE 40 MG/1
60 TABLET ORAL DAILY
Qty: 42 TABLET | Refills: 12 | Status: ON HOLD | OUTPATIENT
Start: 2018-05-24 | End: 2019-03-01 | Stop reason: SDUPTHER

## 2018-05-24 RX ORDER — SOLIFENACIN SUCCINATE 5 MG/1
5 TABLET, FILM COATED ORAL DAILY
Qty: 90 TABLET | Refills: 2 | Status: SHIPPED | OUTPATIENT
Start: 2018-05-24 | End: 2018-10-04 | Stop reason: ALTCHOICE

## 2018-05-24 NOTE — PROGRESS NOTES
subjective     Chief Complaint   Patient presents with   • Coronary Artery Disease   • Hyperlipidemia   • Congestive Heart Failure   • Hypertension   • Follow-up   • Results     Labs     History of Present Illness  Patient is 77 years old white female who is her with multiple complaints.  Patient states that the she gets short of breath quite easily.  And wants Proventil HFA.  She has tried that and that helps.    She also complains of stress incontinence.  Will try Vesicare.  Unfortunately patient has to take diuretic therapy.    Patient also complains of the fatigue and dyspnea on exertion.  Ankles swell mildly.  She has a history of ischemic cardiomyopathy with apical hypokinesia is in the [place] LV ejection fraction.  In the past she had declined device therapy.    Patient also has coronary artery disease status post CABG but she is not having any chest pain.    Her other problem also has chronic atrial fibrillation and chronic anticoagulation.      Past Surgical History:   Procedure Laterality Date   • CARDIOVASCULAR STRESS TEST  06/2014   • CATARACT EXTRACTION, BILATERAL     • CHOLECYSTECTOMY  2002   • COLONOSCOPY  05/2012   • ECHO - CONVERTED  07/2014   • PACEMAKER IMPLANTATION  12/17/2015     Family History   Problem Relation Age of Onset   • Coronary artery disease Other    • Hypertension Other    • Diabetes Other    • Heart attack Other    • Heart attack Mother 62        fatal MI   • Skin cancer Mother    • Diabetes type II Mother    • Melanoma Mother    • Heart attack Father 63        fatal MI   • Melanoma Sister 45   • Skin cancer Sister    • Heart attack Brother 62        fatal MI   • Heart attack Brother 80        Fatal MI   • Skin cancer Brother    • Heart disease Sister    • Heart disease Brother 75        pacemaker, CABG, Stents   • Osteoporosis Sister    • Pneumonia Brother    • Hypertension Brother    • Heart attack Sister 54        Fatal MI   • Heart attack Brother 59        Fatal MI   • Breast  cancer Neg Hx      Past Medical History:   Diagnosis Date   • Atrial fibrillation    • CAD (coronary artery disease)    • Chronic a-fib    • Chronic anticoagulation    • Congestive heart failure     compensated   • Disease of thyroid gland    • Hyperlipidemia    • Hypertension    • Hypoxemia    • Ischemic cardiomyopathy with severe LV Disfunction    • Myocardial infarction    • Pacemaker     VVI     Patient Active Problem List   Diagnosis   • CAD (coronary artery disease) status post CABG 1997 single-vessel*   • Ischemic cardiomyopathy with apical hypokinesis LV ejection fraction 40%*   • Chronic systolic congestive heart failure*   • Hyperlipidemia*   • Chronic a-fib*   • Pacemaker dual-chamber pacemaker Bi[employer]ron[employer] December 2015*   • Hypothyroidism*   • Hypertension*   • Chronic anticoagulation*   • Gastroesophageal reflux disease without esophagitis*   • Renal failure   • URI (upper respiratory infection)   • Asthmatic bronchitis with acute exacerbation   • Stress incontinence       Social History   Substance Use Topics   • Smoking status: Former Smoker     Quit date: 1990   • Smokeless tobacco: Never Used   • Alcohol use No       Allergies   Allergen Reactions   • Codeine Rash   • Eggs Or Egg-Derived Products Itching and Rash   • Grass Itching and Rash   • Lisinopril Rash       Current Outpatient Prescriptions on File Prior to Visit   Medication Sig   • carvedilol (COREG) 6.25 MG tablet TAKE 1 TABLET BY MOUTH TWICE DAILY WITH MEALS   • cetirizine (zyrTEC) 10 MG tablet TAKE 1 TABLET BY MOUTH DAILY   • ELIQUIS 5 MG tablet tablet TAKE 1 TABLET BY MOUTH TWICE DAILY   • glycopyrrolate (ROBINUL) 1 MG tablet Take 1 mg by mouth 3 (three) times a day as needed.   • isosorbide mononitrate (IMDUR) 60 MG 24 hr tablet TAKE 1 TABLET BY MOUTH DAILY   • levothyroxine (SYNTHROID, LEVOTHROID) 50 MCG tablet TAKE 1 TABLET BY MOUTH DAILY   • LIVALO 1 MG tablet tablet TAKE 1 TABLET BY MOUTH DAILY   • montelukast (SINGULAIR) 10 MG  "tablet TAKE 1 TABLET BY MOUTH EVERY NIGHT   • nitroglycerin (NITROSTAT) 0.4 MG SL tablet Place 0.4 mg under the tongue every 5 (five) minutes as needed for chest pain. Take no more than 3 doses in 15 minutes.   • pantoprazole (PROTONIX) 40 MG EC tablet TAKE 1 TABLET BY MOUTH EVERY DAY   • [DISCONTINUED] sotalol (BETAPACE AF) 80 MG tablet tablet TAKE 1 TABLET BY MOUTH TWICE DAILY. PATIENT TAKE 1/2 TABLET 2 TIMES A DAY     No current facility-administered medications on file prior to visit.          The following portions of the patient's history were reviewed and updated as appropriate: allergies, current medications, past family history, past medical history, past social history, past surgical history and problem list.    Review of Systems   Constitution: Positive for malaise/fatigue.   HENT: Negative.    Eyes: Negative.    Cardiovascular: Positive for dyspnea on exertion, irregular heartbeat and leg swelling. Negative for chest pain, claudication, cyanosis, near-syncope, orthopnea, paroxysmal nocturnal dyspnea and syncope.   Respiratory: Positive for shortness of breath. Negative for cough, sputum production and wheezing.    Endocrine: Negative.    Hematologic/Lymphatic: Negative.    Skin: Negative.    Musculoskeletal: Positive for myalgias.   Genitourinary: Positive for bladder incontinence and urgency.   Neurological: Negative.    Psychiatric/Behavioral: The patient is nervous/anxious.           Objective:     /72 (BP Location: Left arm, Patient Position: Sitting)   Pulse 80   Ht 162.6 cm (64.02\")   Wt 83 kg (183 lb)   SpO2 95%   BMI 31.39 kg/m²   Physical Exam   Constitutional: She appears well-developed and well-nourished. No distress.   HENT:   Head: Normocephalic and atraumatic.   Mouth/Throat: Oropharynx is clear and moist. No oropharyngeal exudate.   Eyes: Conjunctivae and EOM are normal. Pupils are equal, round, and reactive to light. No scleral icterus.   Neck: Normal range of motion. Neck " supple. No JVD present. No tracheal deviation present. No thyromegaly present.   Cardiovascular: Normal rate, normal heart sounds and intact distal pulses.  An irregularly irregular rhythm present. PMI is not displaced.  Exam reveals no gallop, no friction rub and no decreased pulses.    No murmur heard.  Pulses:       Carotid pulses are 3+ on the right side, and 3+ on the left side.       Radial pulses are 3+ on the right side, and 3+ on the left side.   Pulmonary/Chest: Effort normal and breath sounds normal. No respiratory distress. She has no wheezes. She has no rales. She exhibits no tenderness.   Abdominal: Soft. Bowel sounds are normal. She exhibits no distension, no abdominal bruit and no mass. There is no splenomegaly or hepatomegaly. There is no tenderness. There is no rebound and no guarding.   Musculoskeletal: Normal range of motion. She exhibits no edema, tenderness or deformity.   Lymphadenopathy:     She has no cervical adenopathy.   Neurological: She is alert. She has normal reflexes. No cranial nerve deficit. She exhibits normal muscle tone. Coordination normal.   Skin: Skin is warm and dry. No rash noted. She is not diaphoretic. No erythema.   Psychiatric: She has a normal mood and affect. Her behavior is normal. Judgment and thought content normal.         Lab Review  Lab Results   Component Value Date     05/08/2018    K 4.9 05/08/2018     05/08/2018    BUN 17 05/08/2018    CREATININE 1.09 05/08/2018    GLUCOSE 101 05/08/2018    CALCIUM 9.4 05/08/2018    ALT 17 05/08/2018    ALKPHOS 73 05/08/2018    LABIL2 1.3 (L) 05/08/2018     Lab Results   Component Value Date    CKTOTAL 86 05/08/2018     Lab Results   Component Value Date    WBC 5.30 05/08/2018    HGB 12.9 05/08/2018    HCT 38.3 05/08/2018     05/08/2018     Lab Results   Component Value Date    INR 0.91 12/17/2015    INR 1.00 02/20/2014     Lab Results   Component Value Date    MG 2.2 01/09/2014     Lab Results   Component  Value Date    TSH 0.889 05/08/2018       Lab Results   Component Value Date    CHLPL 219 (H) 04/05/2016    CHOL 113 05/08/2018    TRIG 131 05/08/2018    HDL 36 (L) 05/08/2018    VLDL 26.2 05/08/2018    LDLHDL 1.41 05/08/2018     Lab Results   Component Value Date    LDL 51 05/08/2018    LDL 49 11/02/2017       Procedures       I personally viewed and interpreted the patient's LAB data         Assessment:     1. Stress incontinence    2. Coronary artery disease involving native coronary artery of native heart without angina pectoris    3. Chronic a-fib*    4. Chronic systolic congestive heart failure*    5. Mixed hyperlipidemia    6. Essential hypertension    7. Pacemaker dual-chamber pacemaker [employer]ron[employer] December 2015*    8. Hypothyroidism, unspecified type    9. Chronic anticoagulation*          Plan:      Lab work reviewed and discussed with the patient.  CBC CMP and lipid panel is normal.  There is no sign of bronchitis or upper respiratory tract infection.    Shortness of breath is probably related to congestive heart failure.  Patient will take Lasix 20 mg daily.  BNP and echocardiogram has been scheduled for further evaluation    Patient is quite afraid that that'll cause her more urinary incontinence.  She was given Vesicare 5 daily for bladder control.  Patient is taking Betapace and that might create problem with QT prolongation.  Betapace was discontinued.  Patient will continue Coreg 6.25 twice a day .  Patient will let me know Tuesday after stopping Betapace if heart rate got any faster.  I do not think that she needs Betapace because she is in chronic atrial fibrillation    Thyroid functions are normal.  Synthroid 50 µg was continued.    Proventil HFA was also given.  Follow-up scheduled              Return in about 3 months (around 8/24/2018).

## 2018-05-27 PROBLEM — N39.3 STRESS INCONTINENCE: Status: ACTIVE | Noted: 2018-05-27

## 2018-05-29 RX ORDER — MONTELUKAST SODIUM 10 MG/1
TABLET ORAL
Qty: 30 TABLET | Refills: 0 | Status: SHIPPED | OUTPATIENT
Start: 2018-05-29 | End: 2018-06-25 | Stop reason: SDUPTHER

## 2018-06-08 RX ORDER — APIXABAN 5 MG/1
TABLET, FILM COATED ORAL
Qty: 180 TABLET | Refills: 0 | Status: SHIPPED | OUTPATIENT
Start: 2018-06-08 | End: 2018-09-04 | Stop reason: SDUPTHER

## 2018-06-11 RX ORDER — ISOSORBIDE MONONITRATE 60 MG/1
TABLET, EXTENDED RELEASE ORAL
Qty: 90 TABLET | Refills: 0 | Status: SHIPPED | OUTPATIENT
Start: 2018-06-11 | End: 2018-09-10 | Stop reason: SDUPTHER

## 2018-06-11 RX ORDER — LEVOTHYROXINE SODIUM 0.05 MG/1
TABLET ORAL
Qty: 90 TABLET | Refills: 0 | Status: SHIPPED | OUTPATIENT
Start: 2018-06-11 | End: 2018-09-10 | Stop reason: SDUPTHER

## 2018-06-15 RX ORDER — PITAVASTATIN CALCIUM 1.04 MG/1
1 TABLET, FILM COATED ORAL DAILY
Qty: 90 TABLET | Refills: 0 | Status: SHIPPED | OUTPATIENT
Start: 2018-06-15 | End: 2018-09-12 | Stop reason: SDUPTHER

## 2018-06-18 RX ORDER — SOTALOL HYDROCHLORIDE 80 MG/1
TABLET ORAL
Qty: 60 TABLET | Refills: 0 | OUTPATIENT
Start: 2018-06-18

## 2018-06-25 RX ORDER — MONTELUKAST SODIUM 10 MG/1
TABLET ORAL
Qty: 30 TABLET | Refills: 3 | Status: SHIPPED | OUTPATIENT
Start: 2018-06-25 | End: 2018-10-16 | Stop reason: SDUPTHER

## 2018-07-09 RX ORDER — ALBUTEROL SULFATE 90 MCG
HFA AEROSOL WITH ADAPTER (GRAM) INHALATION
Qty: 6.7 G | Refills: 0 | Status: SHIPPED | OUTPATIENT
Start: 2018-07-09 | End: 2018-07-24 | Stop reason: SDUPTHER

## 2018-07-19 RX ORDER — CARVEDILOL 6.25 MG/1
TABLET ORAL
Qty: 180 TABLET | Refills: 0 | Status: SHIPPED | OUTPATIENT
Start: 2018-07-19 | End: 2018-10-15 | Stop reason: SDUPTHER

## 2018-07-23 ENCOUNTER — HOSPITAL ENCOUNTER (OUTPATIENT)
Dept: CARDIOLOGY | Facility: HOSPITAL | Age: 78
Discharge: HOME | End: 2018-07-23
Attending: INTERNAL MEDICINE | Admitting: INTERNAL MEDICINE

## 2018-07-23 DIAGNOSIS — I50.22 CHRONIC SYSTOLIC CONGESTIVE HEART FAILURE (HCC): ICD-10-CM

## 2018-07-23 PROCEDURE — 93306 TTE W/DOPPLER COMPLETE: CPT | Performed by: INTERNAL MEDICINE

## 2018-07-23 PROCEDURE — 93306 TTE W/DOPPLER COMPLETE: CPT

## 2018-07-24 LAB
BH CV ECHO MEAS - % IVS THICK: 16.9 %
BH CV ECHO MEAS - % LVPW THICK: 54.2 %
BH CV ECHO MEAS - ACS: 1.5 CM
BH CV ECHO MEAS - AO ROOT AREA (BSA CORRECTED): 1.7
BH CV ECHO MEAS - AO ROOT AREA: 8.3 CM^2
BH CV ECHO MEAS - AO ROOT DIAM: 3.2 CM
BH CV ECHO MEAS - BSA(HAYCOCK): 2 M^2
BH CV ECHO MEAS - BSA: 1.9 M^2
BH CV ECHO MEAS - BZI_BMI: 31.4 KILOGRAMS/M^2
BH CV ECHO MEAS - BZI_METRIC_HEIGHT: 162.6 CM
BH CV ECHO MEAS - BZI_METRIC_WEIGHT: 83 KG
BH CV ECHO MEAS - EDV(CUBED): 153.1 ML
BH CV ECHO MEAS - EDV(TEICH): 138.3 ML
BH CV ECHO MEAS - EF(CUBED): 62.7 %
BH CV ECHO MEAS - EF(TEICH): 53.8 %
BH CV ECHO MEAS - ESV(CUBED): 57.1 ML
BH CV ECHO MEAS - ESV(TEICH): 63.9 ML
BH CV ECHO MEAS - FS: 28 %
BH CV ECHO MEAS - IVS/LVPW: 1.1
BH CV ECHO MEAS - IVSD: 0.97 CM
BH CV ECHO MEAS - IVSS: 1.1 CM
BH CV ECHO MEAS - LA DIMENSION: 4.4 CM
BH CV ECHO MEAS - LA/AO: 1.4
BH CV ECHO MEAS - LV MASS(C)D: 187.8 GRAMS
BH CV ECHO MEAS - LV MASS(C)DI: 99.7 GRAMS/M^2
BH CV ECHO MEAS - LV MASS(C)S: 170.3 GRAMS
BH CV ECHO MEAS - LV MASS(C)SI: 90.4 GRAMS/M^2
BH CV ECHO MEAS - LVIDD: 5.3 CM
BH CV ECHO MEAS - LVIDS: 3.8 CM
BH CV ECHO MEAS - LVOT AREA (M): 2.3 CM^2
BH CV ECHO MEAS - LVOT AREA: 2.4 CM^2
BH CV ECHO MEAS - LVOT DIAM: 1.7 CM
BH CV ECHO MEAS - LVPWD: 0.92 CM
BH CV ECHO MEAS - LVPWS: 1.4 CM
BH CV ECHO MEAS - MV A MAX VEL: 17.3 CM/SEC
BH CV ECHO MEAS - MV E MAX VEL: 216.3 CM/SEC
BH CV ECHO MEAS - MV E/A: 12.5
BH CV ECHO MEAS - PA ACC SLOPE: 1088 CM/SEC^2
BH CV ECHO MEAS - PA ACC TIME: 0.07 SEC
BH CV ECHO MEAS - PA PR(ACCEL): 48.9 MMHG
BH CV ECHO MEAS - RAP SYSTOLE: 10 MMHG
BH CV ECHO MEAS - RVDD: 3.4 CM
BH CV ECHO MEAS - RVSP: 56.8 MMHG
BH CV ECHO MEAS - SI(CUBED): 51 ML/M^2
BH CV ECHO MEAS - SI(TEICH): 39.5 ML/M^2
BH CV ECHO MEAS - SV(CUBED): 96.1 ML
BH CV ECHO MEAS - SV(TEICH): 74.4 ML
BH CV ECHO MEAS - TR MAX VEL: 342.1 CM/SEC
MAXIMAL PREDICTED HEART RATE: 145 BPM
STRESS TARGET HR: 123 BPM

## 2018-07-24 RX ORDER — ALBUTEROL SULFATE 90 MCG
HFA AEROSOL WITH ADAPTER (GRAM) INHALATION
Qty: 6.7 G | Refills: 0 | Status: SHIPPED | OUTPATIENT
Start: 2018-07-24 | End: 2018-08-14 | Stop reason: SDUPTHER

## 2018-07-25 ENCOUNTER — TELEPHONE (OUTPATIENT)
Dept: CARDIOLOGY | Facility: CLINIC | Age: 78
End: 2018-07-25

## 2018-07-25 DIAGNOSIS — I50.9 CONGESTIVE HEART FAILURE, UNSPECIFIED CONGESTIVE HEART FAILURE CHRONICITY, UNSPECIFIED CONGESTIVE HEART FAILURE TYPE: Primary | ICD-10-CM

## 2018-07-25 NOTE — TELEPHONE ENCOUNTER
----- Message from MariaR Sanchez MD sent at 7/24/2018 11:49 AM EDT -----  Echo is okay  Heart is weak but appears to be stronger than before.  BNP is missing (need to make sure you have your lab drawn).

## 2018-08-08 RX ORDER — CETIRIZINE HYDROCHLORIDE 10 MG/1
TABLET ORAL
Qty: 90 TABLET | Refills: 0 | Status: ON HOLD | OUTPATIENT
Start: 2018-08-08 | End: 2019-02-25

## 2018-08-13 ENCOUNTER — LAB (OUTPATIENT)
Dept: LAB | Facility: HOSPITAL | Age: 78
End: 2018-08-13
Attending: INTERNAL MEDICINE

## 2018-08-13 DIAGNOSIS — I50.9 CONGESTIVE HEART FAILURE, UNSPECIFIED CONGESTIVE HEART FAILURE CHRONICITY, UNSPECIFIED CONGESTIVE HEART FAILURE TYPE: ICD-10-CM

## 2018-08-13 LAB — BNP SERPL-MCNC: 351 PG/ML (ref 0–100)

## 2018-08-13 PROCEDURE — 36415 COLL VENOUS BLD VENIPUNCTURE: CPT

## 2018-08-13 PROCEDURE — 83880 ASSAY OF NATRIURETIC PEPTIDE: CPT

## 2018-08-14 RX ORDER — ALBUTEROL SULFATE 90 MCG
HFA AEROSOL WITH ADAPTER (GRAM) INHALATION
Qty: 6.7 G | Refills: 3 | Status: SHIPPED | OUTPATIENT
Start: 2018-08-14 | End: 2019-04-26 | Stop reason: ALTCHOICE

## 2018-08-14 NOTE — TELEPHONE ENCOUNTER
----- Message from Maria R Sanchez MD sent at 8/14/2018  9:05 AM EDT -----  Increase Lasix 40 mg daily

## 2018-08-14 NOTE — TELEPHONE ENCOUNTER
"Advised pt; she states she is already taking Lasix 40 mg qd, and that she has also \"been eating a lot of pork\".  She says she will stay away from pork and watch her salt, but wants to know if you want to increase the Lasix to 60 mg qd?  "

## 2018-09-04 RX ORDER — APIXABAN 5 MG/1
TABLET, FILM COATED ORAL
Qty: 180 TABLET | Refills: 0 | Status: SHIPPED | OUTPATIENT
Start: 2018-09-04 | End: 2018-12-01 | Stop reason: SDUPTHER

## 2018-09-10 RX ORDER — LEVOTHYROXINE SODIUM 0.05 MG/1
TABLET ORAL
Qty: 90 TABLET | Refills: 0 | Status: SHIPPED | OUTPATIENT
Start: 2018-09-10 | End: 2018-12-10 | Stop reason: SDUPTHER

## 2018-09-10 RX ORDER — ISOSORBIDE MONONITRATE 60 MG/1
TABLET, EXTENDED RELEASE ORAL
Qty: 90 TABLET | Refills: 0 | Status: SHIPPED | OUTPATIENT
Start: 2018-09-10 | End: 2018-10-08 | Stop reason: SDUPTHER

## 2018-09-12 RX ORDER — PITAVASTATIN CALCIUM 1.04 MG/1
1 TABLET, FILM COATED ORAL DAILY
Qty: 90 TABLET | Refills: 0 | Status: SHIPPED | OUTPATIENT
Start: 2018-09-12 | End: 2018-12-10 | Stop reason: SDUPTHER

## 2018-10-04 ENCOUNTER — OFFICE VISIT (OUTPATIENT)
Dept: CARDIOLOGY | Facility: CLINIC | Age: 78
End: 2018-10-04

## 2018-10-04 VITALS
SYSTOLIC BLOOD PRESSURE: 128 MMHG | OXYGEN SATURATION: 99 % | HEIGHT: 64 IN | BODY MASS INDEX: 31.07 KG/M2 | DIASTOLIC BLOOD PRESSURE: 74 MMHG | WEIGHT: 182 LBS | HEART RATE: 73 BPM

## 2018-10-04 DIAGNOSIS — N39.3 STRESS INCONTINENCE: ICD-10-CM

## 2018-10-04 DIAGNOSIS — I50.22 CHRONIC SYSTOLIC CONGESTIVE HEART FAILURE (HCC): ICD-10-CM

## 2018-10-04 DIAGNOSIS — E78.2 MIXED HYPERLIPIDEMIA: ICD-10-CM

## 2018-10-04 DIAGNOSIS — I25.10 CORONARY ARTERY DISEASE INVOLVING NATIVE CORONARY ARTERY OF NATIVE HEART WITHOUT ANGINA PECTORIS: ICD-10-CM

## 2018-10-04 DIAGNOSIS — I10 ESSENTIAL HYPERTENSION: ICD-10-CM

## 2018-10-04 DIAGNOSIS — I25.5 ISCHEMIC CARDIOMYOPATHY: Primary | ICD-10-CM

## 2018-10-04 DIAGNOSIS — Z79.01 CHRONIC ANTICOAGULATION: ICD-10-CM

## 2018-10-04 DIAGNOSIS — I48.20 CHRONIC A-FIB (HCC): ICD-10-CM

## 2018-10-04 DIAGNOSIS — E03.9 HYPOTHYROIDISM, UNSPECIFIED TYPE: ICD-10-CM

## 2018-10-04 PROCEDURE — 99214 OFFICE O/P EST MOD 30 MIN: CPT | Performed by: INTERNAL MEDICINE

## 2018-10-04 NOTE — PROGRESS NOTES
subjective     Chief Complaint   Patient presents with   • Sinus Problem   • Hypertension     Follow up   • Hyperlipidemia     Follow up   • Coronary Artery Disease     Follow up     History of Present Illness  Patient is 77 years old white female was multiple chronic medical problems.  She has known coronary artery disease status post CABG in 1997.  She denies any chest pain palpitations or shortness of breath.  Patient is taking statin therapy along with beta blocker therapy.  She has not used any nitroglycerin but she is taking Imdur.  Patient also has chronic atrial fibrillation.  Rate is controlled with beta blocker therapy.  She is anticoagulated with eliquis 5 mg twice a day was no drug side effects.    Hyperlipidemia is being treated with Livalo.  Lab work scheduled.  Patient does not have any side effects.  She is intolerant to other statins.    She also complains of environmental allergies and sinus problems.  She is taking Zantac and Singulair along with Proventil HFA.    GI symptoms are very well controlled with Protonix 40 mg daily.  Patient also has hypothyroidism on Synthroid 50 µg daily which will be continued.    Past Surgical History:   Procedure Laterality Date   • CARDIOVASCULAR STRESS TEST  06/2014   • CATARACT EXTRACTION, BILATERAL     • CHOLECYSTECTOMY  2002   • COLONOSCOPY  05/2012   • ECHO - CONVERTED  07/2014   • PACEMAKER IMPLANTATION  12/17/2015     Family History   Problem Relation Age of Onset   • Coronary artery disease Other    • Hypertension Other    • Diabetes Other    • Heart attack Other    • Heart attack Mother 62        fatal MI   • Skin cancer Mother    • Diabetes type II Mother    • Melanoma Mother    • Heart attack Father 63        fatal MI   • Melanoma Sister 45   • Skin cancer Sister    • Heart attack Brother 62        fatal MI   • Heart attack Brother 80        Fatal MI   • Skin cancer Brother    • Heart disease Sister    • Heart disease Brother 75        pacemaker, CABG,  Stents   • Osteoporosis Sister    • Pneumonia Brother    • Hypertension Brother    • Heart attack Sister 54        Fatal MI   • Heart attack Brother 59        Fatal MI   • Breast cancer Neg Hx      Past Medical History:   Diagnosis Date   • Atrial fibrillation (CMS/HCC)    • CAD (coronary artery disease)    • Chronic a-fib (CMS/HCC)    • Chronic anticoagulation    • Congestive heart failure (CMS/HCC)     compensated   • Disease of thyroid gland    • Hyperlipidemia    • Hypertension    • Hypoxemia    • Ischemic cardiomyopathy with severe LV Disfunction    • Myocardial infarction (CMS/HCC)    • Pacemaker     VVI     Patient Active Problem List   Diagnosis   • CAD (coronary artery disease) status post CABG 1997 single-vessel*   • Ischemic cardiomyopathy with apical hypokinesis LV ejection fraction 40%*   • Chronic systolic congestive heart failure*   • Hyperlipidemia*   • Chronic a-fib*   • Pacemaker dual-chamber pacemaker [employer] December 2015*   • Hypothyroidism*   • Hypertension*   • Chronic anticoagulation*   • Gastroesophageal reflux disease without esophagitis*   • URI (upper respiratory infection)   • Asthmatic bronchitis with acute exacerbation   • Stress incontinence       Social History   Substance Use Topics   • Smoking status: Former Smoker     Quit date: 1990   • Smokeless tobacco: Never Used   • Alcohol use No       Allergies   Allergen Reactions   • Codeine Rash   • Eggs Or Egg-Derived Products Itching and Rash   • Grass Itching and Rash   • Lisinopril Rash       Current Outpatient Prescriptions on File Prior to Visit   Medication Sig   • carvedilol (COREG) 6.25 MG tablet TAKE 1 TABLET BY MOUTH TWICE DAILY WITH MEALS   • cetirizine (zyrTEC) 10 MG tablet TAKE 1 TABLET BY MOUTH DAILY   • ELIQUIS 5 MG tablet tablet TAKE 1 TABLET BY MOUTH TWICE DAILY   • furosemide (LASIX) 40 MG tablet Take 1.5 tablets by mouth Daily.   • isosorbide mononitrate (IMDUR) 60 MG 24 hr tablet TAKE 1 TABLET BY MOUTH DAILY   •  "levothyroxine (SYNTHROID, LEVOTHROID) 50 MCG tablet TAKE 1 TABLET BY MOUTH DAILY   • LIVALO 1 MG tablet tablet TAKE 1 TABLET BY MOUTH DAILY   • montelukast (SINGULAIR) 10 MG tablet TAKE 1 TABLET BY MOUTH EVERY NIGHT   • nitroglycerin (NITROSTAT) 0.4 MG SL tablet Place 0.4 mg under the tongue every 5 (five) minutes as needed for chest pain. Take no more than 3 doses in 15 minutes.   • pantoprazole (PROTONIX) 40 MG EC tablet TAKE 1 TABLET BY MOUTH EVERY DAY (Patient taking differently: 1 tablet q 2-3 days)   • PROVENTIL  (90 Base) MCG/ACT inhaler INHALE 2 PUFFS BY MOUTH EVERY 4 HOURS AS NEEDED FOR WHEEZING     No current facility-administered medications on file prior to visit.          The following portions of the patient's history were reviewed and updated as appropriate: allergies, current medications, past family history, past medical history, past social history, past surgical history and problem list.    Review of Systems   Constitution: Negative.   HENT: Positive for congestion.    Eyes: Negative.    Cardiovascular: Negative.  Negative for chest pain, cyanosis, dyspnea on exertion, irregular heartbeat, leg swelling, near-syncope, orthopnea, palpitations, paroxysmal nocturnal dyspnea and syncope.   Respiratory: Negative.  Negative for shortness of breath.    Hematologic/Lymphatic: Negative.    Musculoskeletal: Negative.    Gastrointestinal: Negative.    Neurological: Negative.  Negative for headaches.   Allergic/Immunologic: Positive for environmental allergies.          Objective:     /74 (BP Location: Left arm, Patient Position: Sitting, Cuff Size: Adult)   Pulse 73   Ht 162.6 cm (64\")   Wt 82.6 kg (182 lb)   SpO2 99%   BMI 31.24 kg/m²   Physical Exam   Constitutional: She appears well-developed and well-nourished. No distress.   HENT:   Head: Normocephalic and atraumatic.   Mouth/Throat: Oropharynx is clear and moist. No oropharyngeal exudate.   Eyes: Pupils are equal, round, and reactive " to light. Conjunctivae and EOM are normal. No scleral icterus.   Neck: Normal range of motion. Neck supple. No JVD present. No tracheal deviation present. No thyromegaly present.   Cardiovascular: Normal rate, regular rhythm, normal heart sounds and intact distal pulses.  PMI is not displaced.  Exam reveals no gallop, no friction rub and no decreased pulses.    No murmur heard.  Pulses:       Carotid pulses are 3+ on the right side, and 3+ on the left side.       Radial pulses are 3+ on the right side, and 3+ on the left side.   Pulmonary/Chest: Effort normal and breath sounds normal. No respiratory distress. She has no wheezes. She has no rales. She exhibits no tenderness.   Abdominal: Soft. Bowel sounds are normal. She exhibits no distension, no abdominal bruit and no mass. There is no splenomegaly or hepatomegaly. There is no tenderness. There is no rebound and no guarding.   Musculoskeletal: Normal range of motion. She exhibits no edema, tenderness or deformity.   Lymphadenopathy:     She has no cervical adenopathy.   Neurological: She is alert. She has normal reflexes. No cranial nerve deficit. She exhibits normal muscle tone. Coordination normal.   Skin: Skin is warm and dry. No rash noted. She is not diaphoretic. No erythema.   Psychiatric: She has a normal mood and affect. Her behavior is normal. Judgment and thought content normal.         Lab Review  Lab Results   Component Value Date     05/08/2018    K 4.9 05/08/2018     05/08/2018    BUN 17 05/08/2018    CREATININE 1.09 05/08/2018    GLUCOSE 101 05/08/2018    CALCIUM 9.4 05/08/2018    ALT 17 05/08/2018    ALKPHOS 73 05/08/2018    LABIL2 1.3 (L) 04/05/2016     Lab Results   Component Value Date    CKTOTAL 86 05/08/2018     Lab Results   Component Value Date    WBC 5.30 05/08/2018    HGB 12.9 05/08/2018    HCT 38.3 05/08/2018     05/08/2018     Lab Results   Component Value Date    INR 0.91 12/17/2015    INR 1.00 02/20/2014     Lab  Results   Component Value Date    MG 2.2 01/09/2014     Lab Results   Component Value Date    TSH 0.889 05/08/2018     Lab Results   Component Value Date    .0 (H) 08/13/2018     Lab Results   Component Value Date    CHLPL 219 (H) 04/05/2016    CHOL 113 05/08/2018    TRIG 131 05/08/2018    HDL 36 (L) 05/08/2018    VLDL 26.2 05/08/2018    LDLHDL 1.41 05/08/2018     Lab Results   Component Value Date    LDL 51 05/08/2018    LDL 49 11/02/2017       Procedures       I personally viewed and interpreted the patient's LAB data         Assessment:     1. Ischemic cardiomyopathy with apical hypokinesis LV ejection fraction 40%*    2. Essential hypertension    3. Chronic systolic congestive heart failure*    4. Chronic a-fib*    5. Coronary artery disease involving native coronary artery of native heart without angina pectoris    6. Stress incontinence    7. Hypothyroidism, unspecified type    8. Chronic anticoagulation*    9. Mixed hyperlipidemia          Plan:      Patient has known coronary artery disease status post CABG and ischemic cardiomyopathy.  Heart failure is compensated.  Patient denies any orthopnea PND or ankle edema.  She was advised to take baby aspirin daily.  She will continue statin and beta blocker therapy.  Lab work scheduled for next visit.  Anticoagulation with eliquis will be continued.  Blood pressure is also very well controlled.  Patient has sinus congestion environmental allergies she will continue Zyrtec and Singulair.  Follow-up scheduled        Return in about 3 months (around 1/4/2019).

## 2018-10-08 RX ORDER — ISOSORBIDE MONONITRATE 60 MG/1
TABLET, EXTENDED RELEASE ORAL
Qty: 90 TABLET | Refills: 0 | Status: SHIPPED | OUTPATIENT
Start: 2018-10-08 | End: 2019-01-05 | Stop reason: SDUPTHER

## 2018-10-10 ENCOUNTER — CLINICAL SUPPORT (OUTPATIENT)
Dept: CARDIOLOGY | Facility: CLINIC | Age: 78
End: 2018-10-10

## 2018-10-10 DIAGNOSIS — Z95.0 PACEMAKER: Primary | ICD-10-CM

## 2018-10-10 DIAGNOSIS — I25.5 ISCHEMIC CARDIOMYOPATHY: ICD-10-CM

## 2018-10-10 DIAGNOSIS — I48.20 CHRONIC A-FIB (HCC): ICD-10-CM

## 2018-10-10 PROCEDURE — 93288 INTERROG EVL PM/LDLS PM IP: CPT | Performed by: INTERNAL MEDICINE

## 2018-10-15 RX ORDER — CARVEDILOL 6.25 MG/1
TABLET ORAL
Qty: 180 TABLET | Refills: 0 | Status: SHIPPED | OUTPATIENT
Start: 2018-10-15 | End: 2019-01-11 | Stop reason: SDUPTHER

## 2018-10-16 RX ORDER — MONTELUKAST SODIUM 10 MG/1
TABLET ORAL
Qty: 30 TABLET | Refills: 3 | Status: SHIPPED | OUTPATIENT
Start: 2018-10-16 | End: 2019-02-04 | Stop reason: SDUPTHER

## 2018-12-03 RX ORDER — APIXABAN 5 MG/1
TABLET, FILM COATED ORAL
Qty: 180 TABLET | Refills: 0 | Status: ON HOLD | OUTPATIENT
Start: 2018-12-03 | End: 2019-03-01 | Stop reason: SDUPTHER

## 2018-12-10 RX ORDER — LEVOTHYROXINE SODIUM 0.05 MG/1
TABLET ORAL
Qty: 90 TABLET | Refills: 0 | Status: SHIPPED | OUTPATIENT
Start: 2018-12-10 | End: 2019-03-08 | Stop reason: SDUPTHER

## 2018-12-10 RX ORDER — PITAVASTATIN CALCIUM 1.04 MG/1
1 TABLET, FILM COATED ORAL DAILY
Qty: 90 TABLET | Refills: 0 | Status: ON HOLD | OUTPATIENT
Start: 2018-12-10 | End: 2019-02-25

## 2019-01-07 RX ORDER — ISOSORBIDE MONONITRATE 60 MG/1
TABLET, EXTENDED RELEASE ORAL
Qty: 90 TABLET | Refills: 0 | Status: SHIPPED | OUTPATIENT
Start: 2019-01-07 | End: 2019-04-05 | Stop reason: SDUPTHER

## 2019-01-10 ENCOUNTER — LAB (OUTPATIENT)
Dept: LAB | Facility: HOSPITAL | Age: 79
End: 2019-01-10
Attending: INTERNAL MEDICINE

## 2019-01-10 DIAGNOSIS — E03.9 HYPOTHYROIDISM, UNSPECIFIED TYPE: ICD-10-CM

## 2019-01-10 DIAGNOSIS — E78.2 MIXED HYPERLIPIDEMIA: ICD-10-CM

## 2019-01-10 LAB
ALBUMIN SERPL-MCNC: 4.5 G/DL (ref 3.4–4.8)
ALBUMIN/GLOB SERPL: 1.4 G/DL (ref 1.5–2.5)
ALP SERPL-CCNC: 76 U/L (ref 35–104)
ALT SERPL W P-5'-P-CCNC: 18 U/L (ref 10–36)
ANION GAP SERPL CALCULATED.3IONS-SCNC: 5.7 MMOL/L (ref 3.6–11.2)
AST SERPL-CCNC: 24 U/L (ref 10–30)
BASOPHILS # BLD AUTO: 0.1 10*3/MM3 (ref 0–0.3)
BASOPHILS NFR BLD AUTO: 1.6 % (ref 0–2)
BILIRUB SERPL-MCNC: 0.7 MG/DL (ref 0.2–1.8)
BUN BLD-MCNC: 18 MG/DL (ref 7–21)
BUN/CREAT SERPL: 17.3 (ref 7–25)
CALCIUM SPEC-SCNC: 9.3 MG/DL (ref 7.7–10)
CHLORIDE SERPL-SCNC: 107 MMOL/L (ref 99–112)
CHOLEST SERPL-MCNC: 145 MG/DL (ref 0–200)
CK SERPL-CCNC: 50 U/L (ref 24–173)
CO2 SERPL-SCNC: 27.3 MMOL/L (ref 24.3–31.9)
CREAT BLD-MCNC: 1.04 MG/DL (ref 0.43–1.29)
DEPRECATED RDW RBC AUTO: 44.6 FL (ref 37–54)
EOSINOPHIL # BLD AUTO: 0.17 10*3/MM3 (ref 0–0.7)
EOSINOPHIL NFR BLD AUTO: 2.7 % (ref 0–7)
ERYTHROCYTE [DISTWIDTH] IN BLOOD BY AUTOMATED COUNT: 13.6 % (ref 11.5–14.5)
GFR SERPL CREATININE-BSD FRML MDRD: 52 ML/MIN/1.73
GLOBULIN UR ELPH-MCNC: 3.2 GM/DL
GLUCOSE BLD-MCNC: 106 MG/DL (ref 70–110)
HCT VFR BLD AUTO: 38.3 % (ref 37–47)
HDLC SERPL-MCNC: 48 MG/DL (ref 60–100)
HGB BLD-MCNC: 12.6 G/DL (ref 12–16)
IMM GRANULOCYTES # BLD AUTO: 0.02 10*3/MM3 (ref 0–0.03)
IMM GRANULOCYTES NFR BLD AUTO: 0.3 % (ref 0–0.5)
LDLC SERPL CALC-MCNC: 62 MG/DL (ref 0–100)
LDLC/HDLC SERPL: 1.3 {RATIO}
LYMPHOCYTES # BLD AUTO: 1.21 10*3/MM3 (ref 1–3)
LYMPHOCYTES NFR BLD AUTO: 19 % (ref 16–46)
MCH RBC QN AUTO: 30.5 PG (ref 27–33)
MCHC RBC AUTO-ENTMCNC: 32.9 G/DL (ref 33–37)
MCV RBC AUTO: 92.7 FL (ref 80–94)
MONOCYTES # BLD AUTO: 0.4 10*3/MM3 (ref 0.1–0.9)
MONOCYTES NFR BLD AUTO: 6.3 % (ref 0–12)
NEUTROPHILS # BLD AUTO: 4.47 10*3/MM3 (ref 1.4–6.5)
NEUTROPHILS NFR BLD AUTO: 70.1 % (ref 40–75)
OSMOLALITY SERPL CALC.SUM OF ELEC: 281.7 MOSM/KG (ref 273–305)
PLATELET # BLD AUTO: 193 10*3/MM3 (ref 130–400)
PMV BLD AUTO: 11.7 FL (ref 6–10)
POTASSIUM BLD-SCNC: 4.4 MMOL/L (ref 3.5–5.3)
PROT SERPL-MCNC: 7.7 G/DL (ref 6–8)
RBC # BLD AUTO: 4.13 10*6/MM3 (ref 4.2–5.4)
SODIUM BLD-SCNC: 140 MMOL/L (ref 135–153)
T4 FREE SERPL-MCNC: 1.51 NG/DL (ref 0.89–1.76)
TRIGL SERPL-MCNC: 174 MG/DL (ref 0–150)
TSH SERPL DL<=0.05 MIU/L-ACNC: 1.94 MIU/ML (ref 0.55–4.78)
VLDLC SERPL-MCNC: 34.8 MG/DL
WBC NRBC COR # BLD: 6.37 10*3/MM3 (ref 4.5–12.5)

## 2019-01-10 PROCEDURE — 85025 COMPLETE CBC W/AUTO DIFF WBC: CPT

## 2019-01-10 PROCEDURE — 36415 COLL VENOUS BLD VENIPUNCTURE: CPT

## 2019-01-10 PROCEDURE — 80061 LIPID PANEL: CPT

## 2019-01-10 PROCEDURE — 84443 ASSAY THYROID STIM HORMONE: CPT

## 2019-01-10 PROCEDURE — 80053 COMPREHEN METABOLIC PANEL: CPT

## 2019-01-10 PROCEDURE — 84439 ASSAY OF FREE THYROXINE: CPT

## 2019-01-10 PROCEDURE — 82550 ASSAY OF CK (CPK): CPT

## 2019-01-11 RX ORDER — CARVEDILOL 6.25 MG/1
TABLET ORAL
Qty: 180 TABLET | Refills: 0 | Status: SHIPPED | OUTPATIENT
Start: 2019-01-11 | End: 2019-04-11 | Stop reason: SDUPTHER

## 2019-01-31 ENCOUNTER — OFFICE VISIT (OUTPATIENT)
Dept: CARDIOLOGY | Facility: CLINIC | Age: 79
End: 2019-01-31

## 2019-01-31 VITALS
HEART RATE: 73 BPM | WEIGHT: 179 LBS | OXYGEN SATURATION: 98 % | BODY MASS INDEX: 30.56 KG/M2 | DIASTOLIC BLOOD PRESSURE: 70 MMHG | SYSTOLIC BLOOD PRESSURE: 132 MMHG | HEIGHT: 64 IN

## 2019-01-31 DIAGNOSIS — E03.9 HYPOTHYROIDISM, UNSPECIFIED TYPE: ICD-10-CM

## 2019-01-31 DIAGNOSIS — I25.10 CORONARY ARTERY DISEASE INVOLVING NATIVE CORONARY ARTERY OF NATIVE HEART WITHOUT ANGINA PECTORIS: ICD-10-CM

## 2019-01-31 DIAGNOSIS — E78.2 MIXED HYPERLIPIDEMIA: ICD-10-CM

## 2019-01-31 DIAGNOSIS — I25.5 ISCHEMIC CARDIOMYOPATHY: ICD-10-CM

## 2019-01-31 DIAGNOSIS — I50.22 CHRONIC SYSTOLIC CONGESTIVE HEART FAILURE (HCC): ICD-10-CM

## 2019-01-31 DIAGNOSIS — I10 ESSENTIAL HYPERTENSION: ICD-10-CM

## 2019-01-31 DIAGNOSIS — I48.20 CHRONIC A-FIB (HCC): ICD-10-CM

## 2019-01-31 DIAGNOSIS — N39.3 STRESS INCONTINENCE: ICD-10-CM

## 2019-01-31 DIAGNOSIS — Z95.0 PACEMAKER: Primary | ICD-10-CM

## 2019-01-31 DIAGNOSIS — Z79.01 CHRONIC ANTICOAGULATION: ICD-10-CM

## 2019-01-31 PROCEDURE — 99214 OFFICE O/P EST MOD 30 MIN: CPT | Performed by: INTERNAL MEDICINE

## 2019-01-31 RX ORDER — NITROGLYCERIN 0.4 MG/1
0.4 TABLET SUBLINGUAL
Qty: 12 TABLET | Refills: 0 | Status: SHIPPED | OUTPATIENT
Start: 2019-01-31 | End: 2019-04-02 | Stop reason: SDUPTHER

## 2019-01-31 NOTE — PROGRESS NOTES
subjective     Chief Complaint   Patient presents with   • Coronary Artery Disease   • Follow-up     History of Present Illness  Patient is here for follow-up of multiple chronic medical problems.  Patient has known coronary artery disease she had CABG in 1997.  Patient has been doing very well cardiac workup was discussed.  Stress test was suggested.  Patient wants to wait till summer.  She states that she doing very well    Patient also has ischemic cardiomyopathy with apical hypokinesis LV ejection fraction around 40%.  She is taking her medications regularly denies any orthopnea PND or ankle edema.    Patient also has dual-chamber pacemaker which is functioning normally and is being monitored closely.    Blood pressure is very well controlled.  Hyperlipidemia is controlled with Livalo.  Patient could not tolerate any other statin but she is doing very well with Livalo.    She also has hypothyroidism on Synthroid replacement therapy.  Patient is chronically anticoagulated with eliquis without any side effects.  She also is doing better with the Protonix for gastroesophageal reflux disorder.    Past Surgical History:   Procedure Laterality Date   • CARDIOVASCULAR STRESS TEST  06/2014   • CATARACT EXTRACTION, BILATERAL     • CHOLECYSTECTOMY  2002   • COLONOSCOPY  05/2012   • ECHO - CONVERTED  07/2014   • PACEMAKER IMPLANTATION  12/17/2015     Family History   Problem Relation Age of Onset   • Coronary artery disease Other    • Hypertension Other    • Diabetes Other    • Heart attack Other    • Heart attack Mother 62        fatal MI   • Skin cancer Mother    • Diabetes type II Mother    • Melanoma Mother    • Heart attack Father 63        fatal MI   • Melanoma Sister 45   • Skin cancer Sister    • Heart attack Brother 62        fatal MI   • Heart attack Brother 80        Fatal MI   • Skin cancer Brother    • Heart disease Sister    • Heart disease Brother 75        pacemaker, CABG, Stents   • Osteoporosis Sister     • Pneumonia Brother    • Hypertension Brother    • Heart attack Sister 54        Fatal MI   • Heart attack Brother 59        Fatal MI   • Breast cancer Neg Hx      Past Medical History:   Diagnosis Date   • Atrial fibrillation (CMS/HCC)    • CAD (coronary artery disease)    • Chronic a-fib (CMS/HCC)    • Chronic anticoagulation    • Congestive heart failure (CMS/HCC)     compensated   • Disease of thyroid gland    • Hyperlipidemia    • Hypertension    • Hypoxemia    • Ischemic cardiomyopathy with severe LV Disfunction    • Myocardial infarction (CMS/HCC)    • Pacemaker     VVI     Patient Active Problem List   Diagnosis   • CAD (coronary artery disease) status post CABG  single-vessel*   • Ischemic cardiomyopathy with apical hypokinesis LV ejection fraction 40%*   • Chronic systolic congestive heart failure*   • Hyperlipidemia*   • Chronic a-fib*   • Pacemaker dual-chamber pacemaker [employer] 2015*   • Hypothyroidism*   • Hypertension*   • Chronic anticoagulation*   • Gastroesophageal reflux disease without esophagitis*   • URI (upper respiratory infection)   • Asthmatic bronchitis with acute exacerbation   • Stress incontinence       Social History     Tobacco Use   • Smoking status: Former Smoker     Last attempt to quit:      Years since quittin.1   • Smokeless tobacco: Never Used   Substance Use Topics   • Alcohol use: No   • Drug use: No       Allergies   Allergen Reactions   • Codeine Rash   • Eggs Or Egg-Derived Products Itching and Rash   • Grass Itching and Rash   • Lisinopril Rash       Current Outpatient Medications on File Prior to Visit   Medication Sig   • aspirin 81 MG tablet Take 1 tablet by mouth Daily.   • carvedilol (COREG) 6.25 MG tablet TAKE 1 TABLET BY MOUTH TWICE DAILY WITH MEALS   • cetirizine (zyrTEC) 10 MG tablet TAKE 1 TABLET BY MOUTH DAILY   • ELIQUIS 5 MG tablet tablet TAKE 1 TABLET BY MOUTH TWICE DAILY   • furosemide (LASIX) 40 MG tablet Take 1.5 tablets by mouth  "Daily.   • isosorbide mononitrate (IMDUR) 60 MG 24 hr tablet TAKE 1 TABLET BY MOUTH DAILY   • levothyroxine (SYNTHROID, LEVOTHROID) 50 MCG tablet TAKE 1 TABLET BY MOUTH DAILY   • LIVALO 1 MG tablet tablet TAKE 1 TABLET BY MOUTH DAILY   • montelukast (SINGULAIR) 10 MG tablet TAKE 1 TABLET BY MOUTH EVERY NIGHT   • pantoprazole (PROTONIX) 40 MG EC tablet TAKE 1 TABLET BY MOUTH EVERY DAY (Patient taking differently: 1 tablet q 2-3 days)   • PROVENTIL  (90 Base) MCG/ACT inhaler INHALE 2 PUFFS BY MOUTH EVERY 4 HOURS AS NEEDED FOR WHEEZING   • [DISCONTINUED] nitroglycerin (NITROSTAT) 0.4 MG SL tablet Place 0.4 mg under the tongue every 5 (five) minutes as needed for chest pain. Take no more than 3 doses in 15 minutes.     No current facility-administered medications on file prior to visit.          The following portions of the patient's history were reviewed and updated as appropriate: allergies, current medications, past family history, past medical history, past social history, past surgical history and problem list.    Review of Systems   Constitution: Negative.   HENT: Negative.  Negative for congestion.    Eyes: Negative.    Cardiovascular: Negative.  Negative for chest pain, cyanosis, dyspnea on exertion, irregular heartbeat, leg swelling, near-syncope, orthopnea, palpitations, paroxysmal nocturnal dyspnea and syncope.   Respiratory: Negative.  Negative for shortness of breath.    Hematologic/Lymphatic: Negative.    Musculoskeletal: Negative.    Gastrointestinal: Negative.    Neurological: Negative.  Negative for headaches.          Objective:     /70 (BP Location: Left arm, Patient Position: Sitting)   Pulse 73   Ht 162.6 cm (64\")   Wt 81.2 kg (179 lb)   SpO2 98%   BMI 30.73 kg/m²   Physical Exam   Constitutional: She appears well-developed and well-nourished. No distress.   HENT:   Head: Normocephalic and atraumatic.   Mouth/Throat: Oropharynx is clear and moist. No oropharyngeal exudate.   Eyes: " Conjunctivae and EOM are normal. Pupils are equal, round, and reactive to light. No scleral icterus.   Neck: Normal range of motion. Neck supple. No JVD present. No tracheal deviation present. No thyromegaly present.   Cardiovascular: Normal rate, regular rhythm, normal heart sounds and intact distal pulses. PMI is not displaced. Exam reveals no gallop, no friction rub and no decreased pulses.   No murmur heard.  Pulses:       Carotid pulses are 3+ on the right side, and 3+ on the left side.       Radial pulses are 3+ on the right side, and 3+ on the left side.   Pulmonary/Chest: Effort normal and breath sounds normal. No respiratory distress. She has no wheezes. She has no rales. She exhibits no tenderness.   Abdominal: Soft. Bowel sounds are normal. She exhibits no distension, no abdominal bruit and no mass. There is no splenomegaly or hepatomegaly. There is no tenderness. There is no rebound and no guarding.   Musculoskeletal: Normal range of motion. She exhibits no edema, tenderness or deformity.   Lymphadenopathy:     She has no cervical adenopathy.   Neurological: She is alert. She has normal reflexes. No cranial nerve deficit. She exhibits normal muscle tone. Coordination normal.   Skin: Skin is warm and dry. No rash noted. She is not diaphoretic. No erythema.   Psychiatric: She has a normal mood and affect. Her behavior is normal. Judgment and thought content normal.         Lab Review  Lab Results   Component Value Date     01/10/2019    K 4.4 01/10/2019     01/10/2019    BUN 18 01/10/2019    CREATININE 1.04 01/10/2019    GLUCOSE 106 01/10/2019    CALCIUM 9.3 01/10/2019    ALT 18 01/10/2019    ALKPHOS 76 01/10/2019    LABIL2 1.3 (L) 04/05/2016     Lab Results   Component Value Date    CKTOTAL 50 01/10/2019     Lab Results   Component Value Date    WBC 6.37 01/10/2019    HGB 12.6 01/10/2019    HCT 38.3 01/10/2019     01/10/2019       Lab Results   Component Value Date    TSH 1.942  01/10/2019       Lab Results   Component Value Date    CHLPL 219 (H) 04/05/2016    CHOL 145 01/10/2019    TRIG 174 (H) 01/10/2019    HDL 48 (L) 01/10/2019    VLDL 34.8 01/10/2019    LDLHDL 1.30 01/10/2019     Lab Results   Component Value Date    LDL 62 01/10/2019    LDL 51 05/08/2018       Procedures       I personally viewed and interpreted the patient's LAB data         Assessment:     1. Pacemaker dual-chamber pacemaker [employer] December 2015*    2. Ischemic cardiomyopathy with apical hypokinesis LV ejection fraction 40%*    3. Essential hypertension    4. Mixed hyperlipidemia    5. Chronic systolic congestive heart failure*    6. Chronic a-fib*    7. Coronary artery disease involving native coronary artery of native heart without angina pectoris    8. Stress incontinence    9. Hypothyroidism, unspecified type    10. Chronic anticoagulation*          Plan:      Lab work reviewed and discussed with the patient  Lipid panel shows LDL of 82.  Patient was advised to continue Livalo 1 mg daily.  Patient has chronic atrial fibrillation rate is very well controlled with Coreg.  She is anticoagulated with Xarelto which will be continued.  Pacemaker has been functioning normally.    Thyroid functions are normal Synthroid dose was continued.  Patient has ischemic cardiomyopathy with prior history of coronary artery disease requiring CABG.  She has not had cardiac workup in quite some time.  An echo and stress test was recommended.  Patient wants to wait till summer.    DEXA scan was also discussed.  Follow-up scheduled refills were given  Health maintenance issues were discussed        Return in about 3 months (around 4/30/2019).

## 2019-02-04 RX ORDER — MONTELUKAST SODIUM 10 MG/1
TABLET ORAL
Qty: 30 TABLET | Refills: 0 | Status: SHIPPED | OUTPATIENT
Start: 2019-02-04 | End: 2019-03-04 | Stop reason: SDUPTHER

## 2019-02-05 ENCOUNTER — OFFICE VISIT (OUTPATIENT)
Dept: CARDIOLOGY | Facility: CLINIC | Age: 79
End: 2019-02-05

## 2019-02-05 VITALS
TEMPERATURE: 86 F | WEIGHT: 178 LBS | BODY MASS INDEX: 30.39 KG/M2 | OXYGEN SATURATION: 98 % | HEIGHT: 64 IN | SYSTOLIC BLOOD PRESSURE: 132 MMHG | DIASTOLIC BLOOD PRESSURE: 78 MMHG

## 2019-02-05 DIAGNOSIS — B02.9 HERPES ZOSTER WITHOUT COMPLICATION: Primary | ICD-10-CM

## 2019-02-05 DIAGNOSIS — Z79.01 CHRONIC ANTICOAGULATION: ICD-10-CM

## 2019-02-05 DIAGNOSIS — I25.10 CORONARY ARTERY DISEASE INVOLVING NATIVE CORONARY ARTERY OF NATIVE HEART WITHOUT ANGINA PECTORIS: ICD-10-CM

## 2019-02-05 DIAGNOSIS — M54.50 ACUTE MIDLINE LOW BACK PAIN WITHOUT SCIATICA: ICD-10-CM

## 2019-02-05 PROCEDURE — 99214 OFFICE O/P EST MOD 30 MIN: CPT | Performed by: INTERNAL MEDICINE

## 2019-02-05 RX ORDER — HYDROCODONE BITARTRATE AND ACETAMINOPHEN 7.5; 325 MG/1; MG/1
1 TABLET ORAL EVERY 6 HOURS PRN
Qty: 30 TABLET | Refills: 0 | Status: ON HOLD | OUTPATIENT
Start: 2019-02-05 | End: 2019-02-25

## 2019-02-05 RX ORDER — GABAPENTIN 100 MG/1
200 CAPSULE ORAL 2 TIMES DAILY
Qty: 60 CAPSULE | Refills: 0 | Status: SHIPPED | OUTPATIENT
Start: 2019-02-05 | End: 2019-04-29 | Stop reason: SDUPTHER

## 2019-02-05 RX ORDER — ACYCLOVIR 800 MG/1
800 TABLET ORAL
Qty: 35 TABLET | Refills: 0 | Status: ON HOLD | OUTPATIENT
Start: 2019-02-05 | End: 2019-02-25

## 2019-02-05 NOTE — PROGRESS NOTES
subjective     Chief Complaint   Patient presents with   • Herpes Zoster     History of Present Illness    Patient is 78 years old white female who complains off severe rash in the right groin area and right sacral area.  She states that blisters appeared about 4 days ago and they're quite painful she also has been having a lot of low back pain  Patient has known coronary artery disease status post CABG  Has chronic congestive heart failure  She also has hypertension and hyperlipidemia which is very well controlled.  Patient has had immunization for shingles in the past and has one episode of zoster in the past.    Past Surgical History:   Procedure Laterality Date   • CARDIOVASCULAR STRESS TEST  06/2014   • CATARACT EXTRACTION, BILATERAL     • CHOLECYSTECTOMY  2002   • COLONOSCOPY  05/2012   • ECHO - CONVERTED  07/2014   • PACEMAKER IMPLANTATION  12/17/2015     Family History   Problem Relation Age of Onset   • Coronary artery disease Other    • Hypertension Other    • Diabetes Other    • Heart attack Other    • Heart attack Mother 62        fatal MI   • Skin cancer Mother    • Diabetes type II Mother    • Melanoma Mother    • Heart attack Father 63        fatal MI   • Melanoma Sister 45   • Skin cancer Sister    • Heart attack Brother 62        fatal MI   • Heart attack Brother 80        Fatal MI   • Skin cancer Brother    • Heart disease Sister    • Heart disease Brother 75        pacemaker, CABG, Stents   • Osteoporosis Sister    • Pneumonia Brother    • Hypertension Brother    • Heart attack Sister 54        Fatal MI   • Heart attack Brother 59        Fatal MI   • Breast cancer Neg Hx      Past Medical History:   Diagnosis Date   • Atrial fibrillation (CMS/HCC)    • CAD (coronary artery disease)    • Chronic a-fib (CMS/HCC)    • Chronic anticoagulation    • Congestive heart failure (CMS/HCC)     compensated   • Disease of thyroid gland    • Hyperlipidemia    • Hypertension    • Hypoxemia    • Ischemic  cardiomyopathy with severe LV Disfunction    • Myocardial infarction (CMS/HCC)    • Pacemaker     VVI     Patient Active Problem List   Diagnosis   • CAD (coronary artery disease) status post CABG  single-vessel*   • Ischemic cardiomyopathy with apical hypokinesis LV ejection fraction 40%*   • Chronic systolic congestive heart failure*   • Hyperlipidemia*   • Chronic a-fib*   • Pacemaker dual-chamber pacemaker [employer] 2015*   • Hypothyroidism*   • Hypertension*   • Chronic anticoagulation*   • Gastroesophageal reflux disease without esophagitis*   • URI (upper respiratory infection)   • Asthmatic bronchitis with acute exacerbation   • Stress incontinence   • Herpes zoster without complication   • Acute midline low back pain without sciatica       Social History     Tobacco Use   • Smoking status: Former Smoker     Last attempt to quit:      Years since quittin.   • Smokeless tobacco: Never Used   Substance Use Topics   • Alcohol use: No   • Drug use: No       Allergies   Allergen Reactions   • Codeine Rash   • Eggs Or Egg-Derived Products Itching and Rash   • Grass Itching and Rash   • Lisinopril Rash       Current Outpatient Medications on File Prior to Visit   Medication Sig   • aspirin 81 MG tablet Take 1 tablet by mouth Daily.   • carvedilol (COREG) 6.25 MG tablet TAKE 1 TABLET BY MOUTH TWICE DAILY WITH MEALS   • cetirizine (zyrTEC) 10 MG tablet TAKE 1 TABLET BY MOUTH DAILY   • ELIQUIS 5 MG tablet tablet TAKE 1 TABLET BY MOUTH TWICE DAILY   • furosemide (LASIX) 40 MG tablet Take 1.5 tablets by mouth Daily.   • isosorbide mononitrate (IMDUR) 60 MG 24 hr tablet TAKE 1 TABLET BY MOUTH DAILY   • levothyroxine (SYNTHROID, LEVOTHROID) 50 MCG tablet TAKE 1 TABLET BY MOUTH DAILY   • LIVALO 1 MG tablet tablet TAKE 1 TABLET BY MOUTH DAILY   • montelukast (SINGULAIR) 10 MG tablet TAKE 1 TABLET BY MOUTH EVERY NIGHT   • nitroglycerin (NITROSTAT) 0.4 MG SL tablet Place 1 tablet under the tongue Every  "5 (Five) Minutes As Needed for Chest Pain. Take no more than 3 doses in 15 minutes.   • pantoprazole (PROTONIX) 40 MG EC tablet TAKE 1 TABLET BY MOUTH EVERY DAY (Patient taking differently: 1 tablet q 2-3 days)   • PROVENTIL  (90 Base) MCG/ACT inhaler INHALE 2 PUFFS BY MOUTH EVERY 4 HOURS AS NEEDED FOR WHEEZING     No current facility-administered medications on file prior to visit.          The following portions of the patient's history were reviewed and updated as appropriate: allergies, current medications, past family history, past medical history, past social history, past surgical history and problem list.    Review of Systems   Constitution: Negative.   HENT: Negative.  Negative for congestion.    Eyes: Negative.    Cardiovascular: Negative.  Negative for chest pain, cyanosis, dyspnea on exertion, irregular heartbeat, leg swelling, near-syncope, orthopnea, palpitations, paroxysmal nocturnal dyspnea and syncope.   Respiratory: Negative.  Negative for shortness of breath.    Hematologic/Lymphatic: Negative.    Skin: Positive for rash.   Musculoskeletal: Positive for back pain.   Gastrointestinal: Negative.    Neurological: Negative.  Negative for headaches.          Objective:     /78 (BP Location: Left arm, Patient Position: Sitting)   Temp (!) 86 °F (30 °C)   Ht 162.6 cm (64\")   Wt 80.7 kg (178 lb)   SpO2 98%   BMI 30.55 kg/m²   Physical Exam   Constitutional: She appears well-developed and well-nourished. No distress.   HENT:   Head: Normocephalic and atraumatic.   Mouth/Throat: Oropharynx is clear and moist. No oropharyngeal exudate.   Eyes: Conjunctivae and EOM are normal. Pupils are equal, round, and reactive to light. No scleral icterus.   Neck: Normal range of motion. Neck supple. No JVD present. No tracheal deviation present. No thyromegaly present.   Cardiovascular: Normal rate, regular rhythm, normal heart sounds and intact distal pulses. PMI is not displaced. Exam reveals no " gallop, no friction rub and no decreased pulses.   No murmur heard.  Pulses:       Carotid pulses are 3+ on the right side, and 3+ on the left side.       Radial pulses are 3+ on the right side, and 3+ on the left side.   Pulmonary/Chest: Effort normal and breath sounds normal. No respiratory distress. She has no wheezes. She has no rales. She exhibits no tenderness.   Abdominal: Soft. Bowel sounds are normal. She exhibits no distension, no abdominal bruit and no mass. There is no splenomegaly or hepatomegaly. There is no tenderness. There is no rebound and no guarding.   Musculoskeletal: Normal range of motion. She exhibits no edema, tenderness or deformity.   Lymphadenopathy:     She has no cervical adenopathy.   Neurological: She is alert. She has normal reflexes. No cranial nerve deficit. She exhibits normal muscle tone. Coordination normal.   Skin: Skin is warm and dry. Rash noted. She is not diaphoretic. No erythema.   vesicular eruption right sacral area and right side of breath   Psychiatric: She has a normal mood and affect. Her behavior is normal. Judgment and thought content normal.         Lab Review  Lab Results   Component Value Date     01/10/2019    K 4.4 01/10/2019     01/10/2019    BUN 18 01/10/2019    CREATININE 1.04 01/10/2019    GLUCOSE 106 01/10/2019    CALCIUM 9.3 01/10/2019    ALT 18 01/10/2019    ALKPHOS 76 01/10/2019    LABIL2 1.3 (L) 04/05/2016     Lab Results   Component Value Date    CKTOTAL 50 01/10/2019     Lab Results   Component Value Date    WBC 6.37 01/10/2019    HGB 12.6 01/10/2019    HCT 38.3 01/10/2019     01/10/2019     Lab Results   Component Value Date    INR 0.91 12/17/2015    INR 1.00 02/20/2014     Lab Results   Component Value Date    MG 2.2 01/09/2014     Lab Results   Component Value Date    TSH 1.942 01/10/2019     Lab Results   Component Value Date    .0 (H) 08/13/2018     Lab Results   Component Value Date    CHLPL 219 (H) 04/05/2016    CHOL  145 01/10/2019    TRIG 174 (H) 01/10/2019    HDL 48 (L) 01/10/2019    VLDL 34.8 01/10/2019    LDLHDL 1.30 01/10/2019     Lab Results   Component Value Date    LDL 62 01/10/2019    LDL 51 05/08/2018       Procedures       I personally viewed and interpreted the patient's LAB data         Assessment:     1. Herpes zoster without complication    2. Acute midline low back pain without sciatica    3. Chronic anticoagulation*    4. Coronary artery disease involving native coronary artery of native heart without angina pectoris          Plan:      Patient has developed zoster lesions in the right sacral area starting about 4 days ago.  Patient complains of severe pain in that area and lower back.  She has had shingles in the upper thoracic area.  Years ago and had immunization.    There is no sign of occult malignancy she is planning to have colonoscopy and mammogram this year.    She was started on acyclovir 800 mg 5 times daily.  She will also take Neurontin and was given Lortab to take on when necessary basis.  Addiction was discussed with the patient including other side effects including constipation  Follow-up scheduled        No Follow-up on file.

## 2019-02-25 ENCOUNTER — APPOINTMENT (OUTPATIENT)
Dept: ULTRASOUND IMAGING | Facility: HOSPITAL | Age: 79
End: 2019-02-25

## 2019-02-25 ENCOUNTER — APPOINTMENT (OUTPATIENT)
Dept: GENERAL RADIOLOGY | Facility: HOSPITAL | Age: 79
End: 2019-02-25

## 2019-02-25 ENCOUNTER — APPOINTMENT (OUTPATIENT)
Dept: CARDIOLOGY | Facility: HOSPITAL | Age: 79
End: 2019-02-25

## 2019-02-25 ENCOUNTER — HOSPITAL ENCOUNTER (INPATIENT)
Facility: HOSPITAL | Age: 79
LOS: 2 days | Discharge: HOME | End: 2019-03-01
Attending: EMERGENCY MEDICINE | Admitting: INTERNAL MEDICINE

## 2019-02-25 DIAGNOSIS — R07.9 CHEST PAIN, UNSPECIFIED TYPE: ICD-10-CM

## 2019-02-25 DIAGNOSIS — I50.9 CHRONIC CONGESTIVE HEART FAILURE, UNSPECIFIED HEART FAILURE TYPE (HCC): Primary | ICD-10-CM

## 2019-02-25 LAB
A-A DO2: 34 MMHG (ref 0–300)
ALBUMIN SERPL-MCNC: 3.9 G/DL (ref 3.4–4.8)
ALBUMIN UR-MCNC: 4.5 MG/L
ALBUMIN/GLOB SERPL: 1.3 G/DL (ref 1.5–2.5)
ALP SERPL-CCNC: 97 U/L (ref 35–104)
ALT SERPL W P-5'-P-CCNC: 28 U/L (ref 10–36)
ANION GAP SERPL CALCULATED.3IONS-SCNC: 7.1 MMOL/L (ref 3.6–11.2)
ARTERIAL PATENCY WRIST A: ABNORMAL
AST SERPL-CCNC: 28 U/L (ref 10–30)
ATMOSPHERIC PRESS: 737 MMHG
BASE EXCESS BLDA CALC-SCNC: -1.1 MMOL/L
BASOPHILS # BLD AUTO: 0.07 10*3/MM3 (ref 0–0.3)
BASOPHILS NFR BLD AUTO: 1.5 % (ref 0–2)
BDY SITE: ABNORMAL
BH CV ECHO MEAS - % IVS THICK: -16.1 %
BH CV ECHO MEAS - % LVPW THICK: 9.3 %
BH CV ECHO MEAS - ACS: 2.1 CM
BH CV ECHO MEAS - AO MAX PG: 5.2 MMHG
BH CV ECHO MEAS - AO MEAN PG: 2.3 MMHG
BH CV ECHO MEAS - AO ROOT AREA (BSA CORRECTED): 1.6
BH CV ECHO MEAS - AO ROOT AREA: 6.9 CM^2
BH CV ECHO MEAS - AO ROOT DIAM: 3 CM
BH CV ECHO MEAS - AO V2 MAX: 114.3 CM/SEC
BH CV ECHO MEAS - AO V2 MEAN: 69.9 CM/SEC
BH CV ECHO MEAS - AO V2 VTI: 23.4 CM
BH CV ECHO MEAS - BSA(HAYCOCK): 2 M^2
BH CV ECHO MEAS - BSA: 1.9 M^2
BH CV ECHO MEAS - BZI_BMI: 30.5 KILOGRAMS/M^2
BH CV ECHO MEAS - BZI_METRIC_HEIGHT: 165.1 CM
BH CV ECHO MEAS - BZI_METRIC_WEIGHT: 83 KG
BH CV ECHO MEAS - EDV(CUBED): 162.2 ML
BH CV ECHO MEAS - EDV(MOD-SP4): 65 ML
BH CV ECHO MEAS - EDV(TEICH): 144.6 ML
BH CV ECHO MEAS - EF(CUBED): 53.7 %
BH CV ECHO MEAS - EF(MOD-SP4): 60 %
BH CV ECHO MEAS - EF(TEICH): 45.1 %
BH CV ECHO MEAS - ESV(CUBED): 75.1 ML
BH CV ECHO MEAS - ESV(MOD-SP4): 26 ML
BH CV ECHO MEAS - ESV(TEICH): 79.5 ML
BH CV ECHO MEAS - FS: 22.6 %
BH CV ECHO MEAS - IVS/LVPW: 1.1
BH CV ECHO MEAS - IVSD: 1.1 CM
BH CV ECHO MEAS - IVSS: 0.92 CM
BH CV ECHO MEAS - LA DIMENSION: 4.6 CM
BH CV ECHO MEAS - LA/AO: 1.6
BH CV ECHO MEAS - LV DIASTOLIC VOL/BSA (35-75): 34.1 ML/M^2
BH CV ECHO MEAS - LV MASS(C)D: 224.1 GRAMS
BH CV ECHO MEAS - LV MASS(C)DI: 117.6 GRAMS/M^2
BH CV ECHO MEAS - LV MASS(C)S: 139.9 GRAMS
BH CV ECHO MEAS - LV MASS(C)SI: 73.4 GRAMS/M^2
BH CV ECHO MEAS - LV SYSTOLIC VOL/BSA (12-30): 13.6 ML/M^2
BH CV ECHO MEAS - LVIDD: 5.5 CM
BH CV ECHO MEAS - LVIDS: 4.2 CM
BH CV ECHO MEAS - LVLD AP4: 6.3 CM
BH CV ECHO MEAS - LVLS AP4: 4.7 CM
BH CV ECHO MEAS - LVOT AREA (M): 2.8 CM^2
BH CV ECHO MEAS - LVOT AREA: 3 CM^2
BH CV ECHO MEAS - LVOT DIAM: 1.9 CM
BH CV ECHO MEAS - LVPWD: 1 CM
BH CV ECHO MEAS - LVPWS: 1.1 CM
BH CV ECHO MEAS - MV A MAX VEL: 38 CM/SEC
BH CV ECHO MEAS - MV E MAX VEL: 100.7 CM/SEC
BH CV ECHO MEAS - MV E/A: 2.6
BH CV ECHO MEAS - PA ACC SLOPE: 1623 CM/SEC^2
BH CV ECHO MEAS - PA ACC TIME: 0.06 SEC
BH CV ECHO MEAS - PA PR(ACCEL): 50.5 MMHG
BH CV ECHO MEAS - RAP SYSTOLE: 10 MMHG
BH CV ECHO MEAS - RVSP: 74.6 MMHG
BH CV ECHO MEAS - SI(AO): 84.4 ML/M^2
BH CV ECHO MEAS - SI(CUBED): 45.7 ML/M^2
BH CV ECHO MEAS - SI(MOD-SP4): 20.5 ML/M^2
BH CV ECHO MEAS - SI(TEICH): 34.2 ML/M^2
BH CV ECHO MEAS - SV(AO): 160.7 ML
BH CV ECHO MEAS - SV(CUBED): 87.1 ML
BH CV ECHO MEAS - SV(MOD-SP4): 39 ML
BH CV ECHO MEAS - SV(TEICH): 65.1 ML
BH CV ECHO MEAS - TR MAX VEL: 402 CM/SEC
BILIRUB SERPL-MCNC: 0.6 MG/DL (ref 0.2–1.8)
BILIRUB UR QL STRIP: NEGATIVE
BNP SERPL-MCNC: 374 PG/ML (ref 0–100)
BODY TEMPERATURE: 98.6 C
BUN BLD-MCNC: 29 MG/DL (ref 7–21)
BUN/CREAT SERPL: 20 (ref 7–25)
CALCIUM SPEC-SCNC: 9 MG/DL (ref 7.7–10)
CHLORIDE SERPL-SCNC: 108 MMOL/L (ref 99–112)
CLARITY UR: CLEAR
CO2 SERPL-SCNC: 23.9 MMOL/L (ref 24.3–31.9)
COHGB MFR BLD: 1.4 % (ref 0–5)
COLOR UR: YELLOW
CREAT BLD-MCNC: 1.45 MG/DL (ref 0.43–1.29)
CREAT UR-MCNC: 36.6 MG/DL
CREAT UR-MCNC: 36.6 MG/DL
DEPRECATED RDW RBC AUTO: 46.4 FL (ref 37–54)
EOSINOPHIL # BLD AUTO: 0.48 10*3/MM3 (ref 0–0.7)
EOSINOPHIL NFR BLD AUTO: 10.2 % (ref 0–7)
ERYTHROCYTE [DISTWIDTH] IN BLOOD BY AUTOMATED COUNT: 14.1 % (ref 11.5–14.5)
GFR SERPL CREATININE-BSD FRML MDRD: 35 ML/MIN/1.73
GLOBULIN UR ELPH-MCNC: 3.1 GM/DL
GLUCOSE BLD-MCNC: 101 MG/DL (ref 70–110)
GLUCOSE UR STRIP-MCNC: NEGATIVE MG/DL
HCO3 BLDA-SCNC: 22.3 MMOL/L (ref 22–26)
HCT VFR BLD AUTO: 34.7 % (ref 37–47)
HCT VFR BLD CALC: 35 % (ref 37–47)
HGB BLD-MCNC: 11.4 G/DL (ref 12–16)
HGB BLDA-MCNC: 12 G/DL (ref 12–16)
HGB UR QL STRIP.AUTO: NEGATIVE
HOLD SPECIMEN: NORMAL
HOLD SPECIMEN: NORMAL
HOROWITZ INDEX BLD+IHG-RTO: 21 %
IMM GRANULOCYTES # BLD AUTO: 0.01 10*3/MM3 (ref 0–0.03)
IMM GRANULOCYTES NFR BLD AUTO: 0.2 % (ref 0–0.5)
KETONES UR QL STRIP: NEGATIVE
LEUKOCYTE ESTERASE UR QL STRIP.AUTO: NEGATIVE
LIPASE SERPL-CCNC: 52 U/L (ref 13–60)
LYMPHOCYTES # BLD AUTO: 1.15 10*3/MM3 (ref 1–3)
LYMPHOCYTES NFR BLD AUTO: 24.5 % (ref 16–46)
MAGNESIUM SERPL-MCNC: 2.2 MG/DL (ref 1.7–2.6)
MAXIMAL PREDICTED HEART RATE: 144 BPM
MCH RBC QN AUTO: 30 PG (ref 27–33)
MCHC RBC AUTO-ENTMCNC: 32.9 G/DL (ref 33–37)
MCV RBC AUTO: 91.3 FL (ref 80–94)
METHGB BLD QL: 0.1 % (ref 0–3)
MICROALBUMIN/CREAT UR: 12.3 MG/G
MODALITY: ABNORMAL
MONOCYTES # BLD AUTO: 0.35 10*3/MM3 (ref 0.1–0.9)
MONOCYTES NFR BLD AUTO: 7.4 % (ref 0–12)
NEUTROPHILS # BLD AUTO: 2.64 10*3/MM3 (ref 1.4–6.5)
NEUTROPHILS NFR BLD AUTO: 56.2 % (ref 40–75)
NITRITE UR QL STRIP: NEGATIVE
OSMOLALITY SERPL CALC.SUM OF ELEC: 283.5 MOSM/KG (ref 273–305)
OXYHGB MFR BLDV: 93.1 % (ref 85–100)
PCO2 BLDA: 33.1 MM HG (ref 35–45)
PH BLDA: 7.45 PH UNITS (ref 7.35–7.45)
PH UR STRIP.AUTO: 7 [PH] (ref 5–8)
PLATELET # BLD AUTO: 139 10*3/MM3 (ref 130–400)
PMV BLD AUTO: 11.1 FL (ref 6–10)
PO2 BLDA: 71.3 MM HG (ref 80–100)
POTASSIUM BLD-SCNC: 4.3 MMOL/L (ref 3.5–5.3)
PROT SERPL-MCNC: 7 G/DL (ref 6–8)
PROT UR QL STRIP: NEGATIVE
PROT UR-MCNC: 4 MG/DL
PROT/CREAT UR: 109.3 MG/G CREA (ref 0–200)
RBC # BLD AUTO: 3.8 10*6/MM3 (ref 4.2–5.4)
SAO2 % BLDCOA: 94.5 % (ref 90–100)
SODIUM BLD-SCNC: 139 MMOL/L (ref 135–153)
SP GR UR STRIP: 1.01 (ref 1–1.03)
STRESS TARGET HR: 122 BPM
T4 FREE SERPL-MCNC: 1.18 NG/DL (ref 0.89–1.76)
TROPONIN I SERPL-MCNC: 0.02 NG/ML
TROPONIN I SERPL-MCNC: 0.04 NG/ML
TROPONIN I SERPL-MCNC: <0.006 NG/ML
TSH SERPL DL<=0.05 MIU/L-ACNC: 1.93 MIU/ML (ref 0.55–4.78)
UROBILINOGEN UR QL STRIP: NORMAL
WBC NRBC COR # BLD: 4.7 10*3/MM3 (ref 4.5–12.5)
WHOLE BLOOD HOLD SPECIMEN: NORMAL
WHOLE BLOOD HOLD SPECIMEN: NORMAL

## 2019-02-25 PROCEDURE — 82570 ASSAY OF URINE CREATININE: CPT | Performed by: INTERNAL MEDICINE

## 2019-02-25 PROCEDURE — 84484 ASSAY OF TROPONIN QUANT: CPT | Performed by: EMERGENCY MEDICINE

## 2019-02-25 PROCEDURE — 83690 ASSAY OF LIPASE: CPT | Performed by: EMERGENCY MEDICINE

## 2019-02-25 PROCEDURE — 93010 ELECTROCARDIOGRAM REPORT: CPT | Performed by: INTERNAL MEDICINE

## 2019-02-25 PROCEDURE — 71045 X-RAY EXAM CHEST 1 VIEW: CPT

## 2019-02-25 PROCEDURE — 83880 ASSAY OF NATRIURETIC PEPTIDE: CPT | Performed by: EMERGENCY MEDICINE

## 2019-02-25 PROCEDURE — G0378 HOSPITAL OBSERVATION PER HR: HCPCS

## 2019-02-25 PROCEDURE — 76775 US EXAM ABDO BACK WALL LIM: CPT

## 2019-02-25 PROCEDURE — 36600 WITHDRAWAL OF ARTERIAL BLOOD: CPT | Performed by: EMERGENCY MEDICINE

## 2019-02-25 PROCEDURE — 93306 TTE W/DOPPLER COMPLETE: CPT

## 2019-02-25 PROCEDURE — 93005 ELECTROCARDIOGRAM TRACING: CPT | Performed by: PHYSICIAN ASSISTANT

## 2019-02-25 PROCEDURE — 84443 ASSAY THYROID STIM HORMONE: CPT | Performed by: EMERGENCY MEDICINE

## 2019-02-25 PROCEDURE — 83050 HGB METHEMOGLOBIN QUAN: CPT | Performed by: EMERGENCY MEDICINE

## 2019-02-25 PROCEDURE — 93005 ELECTROCARDIOGRAM TRACING: CPT | Performed by: EMERGENCY MEDICINE

## 2019-02-25 PROCEDURE — 93306 TTE W/DOPPLER COMPLETE: CPT | Performed by: INTERNAL MEDICINE

## 2019-02-25 PROCEDURE — 99284 EMERGENCY DEPT VISIT MOD MDM: CPT

## 2019-02-25 PROCEDURE — 71045 X-RAY EXAM CHEST 1 VIEW: CPT | Performed by: RADIOLOGY

## 2019-02-25 PROCEDURE — 82043 UR ALBUMIN QUANTITATIVE: CPT | Performed by: INTERNAL MEDICINE

## 2019-02-25 PROCEDURE — 94799 UNLISTED PULMONARY SVC/PX: CPT

## 2019-02-25 PROCEDURE — 84484 ASSAY OF TROPONIN QUANT: CPT | Performed by: PHYSICIAN ASSISTANT

## 2019-02-25 PROCEDURE — 84156 ASSAY OF PROTEIN URINE: CPT | Performed by: INTERNAL MEDICINE

## 2019-02-25 PROCEDURE — 76775 US EXAM ABDO BACK WALL LIM: CPT | Performed by: RADIOLOGY

## 2019-02-25 PROCEDURE — 25010000002 FUROSEMIDE PER 20 MG: Performed by: EMERGENCY MEDICINE

## 2019-02-25 PROCEDURE — 80053 COMPREHEN METABOLIC PANEL: CPT | Performed by: EMERGENCY MEDICINE

## 2019-02-25 PROCEDURE — 81003 URINALYSIS AUTO W/O SCOPE: CPT | Performed by: EMERGENCY MEDICINE

## 2019-02-25 PROCEDURE — 84439 ASSAY OF FREE THYROXINE: CPT | Performed by: EMERGENCY MEDICINE

## 2019-02-25 PROCEDURE — 82375 ASSAY CARBOXYHB QUANT: CPT | Performed by: EMERGENCY MEDICINE

## 2019-02-25 PROCEDURE — 85025 COMPLETE CBC W/AUTO DIFF WBC: CPT | Performed by: EMERGENCY MEDICINE

## 2019-02-25 PROCEDURE — 82805 BLOOD GASES W/O2 SATURATION: CPT | Performed by: EMERGENCY MEDICINE

## 2019-02-25 PROCEDURE — 83735 ASSAY OF MAGNESIUM: CPT | Performed by: EMERGENCY MEDICINE

## 2019-02-25 RX ORDER — ALBUTEROL SULFATE 2.5 MG/3ML
2.5 SOLUTION RESPIRATORY (INHALATION) EVERY 4 HOURS PRN
Status: DISCONTINUED | OUTPATIENT
Start: 2019-02-25 | End: 2019-03-01 | Stop reason: HOSPADM

## 2019-02-25 RX ORDER — NITROGLYCERIN 0.4 MG/1
0.4 TABLET SUBLINGUAL
Status: DISCONTINUED | OUTPATIENT
Start: 2019-02-25 | End: 2019-03-01 | Stop reason: HOSPADM

## 2019-02-25 RX ORDER — ONDANSETRON 2 MG/ML
4 INJECTION INTRAMUSCULAR; INTRAVENOUS EVERY 6 HOURS PRN
Status: DISCONTINUED | OUTPATIENT
Start: 2019-02-25 | End: 2019-03-01 | Stop reason: HOSPADM

## 2019-02-25 RX ORDER — GABAPENTIN 100 MG/1
200 CAPSULE ORAL 2 TIMES DAILY
Status: DISCONTINUED | OUTPATIENT
Start: 2019-02-25 | End: 2019-03-01 | Stop reason: HOSPADM

## 2019-02-25 RX ORDER — ACETAMINOPHEN 325 MG/1
650 TABLET ORAL EVERY 4 HOURS PRN
Status: DISCONTINUED | OUTPATIENT
Start: 2019-02-25 | End: 2019-03-01 | Stop reason: HOSPADM

## 2019-02-25 RX ORDER — MONTELUKAST SODIUM 10 MG/1
10 TABLET ORAL NIGHTLY
Status: DISCONTINUED | OUTPATIENT
Start: 2019-02-25 | End: 2019-03-01 | Stop reason: HOSPADM

## 2019-02-25 RX ORDER — LEVOTHYROXINE SODIUM 0.05 MG/1
50 TABLET ORAL
Status: DISCONTINUED | OUTPATIENT
Start: 2019-02-25 | End: 2019-03-01 | Stop reason: HOSPADM

## 2019-02-25 RX ORDER — NITROGLYCERIN 0.4 MG/1
0.4 TABLET SUBLINGUAL
Status: CANCELLED | OUTPATIENT
Start: 2019-02-25

## 2019-02-25 RX ORDER — SODIUM CHLORIDE 0.9 % (FLUSH) 0.9 %
3-10 SYRINGE (ML) INJECTION AS NEEDED
Status: DISCONTINUED | OUTPATIENT
Start: 2019-02-25 | End: 2019-03-01 | Stop reason: HOSPADM

## 2019-02-25 RX ORDER — SODIUM CHLORIDE 0.9 % (FLUSH) 0.9 %
3 SYRINGE (ML) INJECTION EVERY 12 HOURS SCHEDULED
Status: DISCONTINUED | OUTPATIENT
Start: 2019-02-25 | End: 2019-03-01 | Stop reason: HOSPADM

## 2019-02-25 RX ORDER — SODIUM CHLORIDE 0.9 % (FLUSH) 0.9 %
10 SYRINGE (ML) INJECTION AS NEEDED
Status: DISCONTINUED | OUTPATIENT
Start: 2019-02-25 | End: 2019-03-01 | Stop reason: HOSPADM

## 2019-02-25 RX ORDER — CALCIUM CARBONATE 200(500)MG
2 TABLET,CHEWABLE ORAL 2 TIMES DAILY PRN
Status: DISCONTINUED | OUTPATIENT
Start: 2019-02-25 | End: 2019-03-01 | Stop reason: HOSPADM

## 2019-02-25 RX ORDER — FUROSEMIDE 10 MG/ML
60 INJECTION INTRAMUSCULAR; INTRAVENOUS ONCE
Status: COMPLETED | OUTPATIENT
Start: 2019-02-25 | End: 2019-02-25

## 2019-02-25 RX ORDER — CARVEDILOL 6.25 MG/1
6.25 TABLET ORAL 2 TIMES DAILY WITH MEALS
Status: DISCONTINUED | OUTPATIENT
Start: 2019-02-25 | End: 2019-03-01 | Stop reason: HOSPADM

## 2019-02-25 RX ORDER — ISOSORBIDE MONONITRATE 60 MG/1
60 TABLET, EXTENDED RELEASE ORAL DAILY
Status: DISCONTINUED | OUTPATIENT
Start: 2019-02-25 | End: 2019-03-01 | Stop reason: HOSPADM

## 2019-02-25 RX ADMIN — ISOSORBIDE MONONITRATE 60 MG: 60 TABLET, EXTENDED RELEASE ORAL at 18:07

## 2019-02-25 RX ADMIN — APIXABAN 5 MG: 5 TABLET, FILM COATED ORAL at 10:01

## 2019-02-25 RX ADMIN — LEVOTHYROXINE SODIUM 50 MCG: 75 TABLET ORAL at 18:07

## 2019-02-25 RX ADMIN — FUROSEMIDE 60 MG: 10 INJECTION, SOLUTION INTRAMUSCULAR; INTRAVENOUS at 09:55

## 2019-02-25 RX ADMIN — NITROGLYCERIN 1 INCH: 20 OINTMENT TOPICAL at 09:54

## 2019-02-25 RX ADMIN — SODIUM CHLORIDE, PRESERVATIVE FREE 3 ML: 5 INJECTION INTRAVENOUS at 20:38

## 2019-02-25 RX ADMIN — APIXABAN 5 MG: 5 TABLET, FILM COATED ORAL at 18:07

## 2019-02-25 RX ADMIN — MONTELUKAST SODIUM 10 MG: 10 TABLET, COATED ORAL at 20:38

## 2019-02-25 RX ADMIN — CARVEDILOL 6.25 MG: 6.25 TABLET, FILM COATED ORAL at 18:07

## 2019-02-25 RX ADMIN — GABAPENTIN 200 MG: 100 CAPSULE ORAL at 20:38

## 2019-02-26 LAB
ALBUMIN SERPL-MCNC: 4 G/DL (ref 3.4–4.8)
ALBUMIN/GLOB SERPL: 1.3 G/DL (ref 1.5–2.5)
ALP SERPL-CCNC: 90 U/L (ref 35–104)
ALT SERPL W P-5'-P-CCNC: 21 U/L (ref 10–36)
ANION GAP SERPL CALCULATED.3IONS-SCNC: 7.7 MMOL/L (ref 3.6–11.2)
AST SERPL-CCNC: 25 U/L (ref 10–30)
BASOPHILS # BLD AUTO: 0.06 10*3/MM3 (ref 0–0.3)
BASOPHILS NFR BLD AUTO: 1.7 % (ref 0–2)
BILIRUB SERPL-MCNC: 0.8 MG/DL (ref 0.2–1.8)
BNP SERPL-MCNC: 311 PG/ML (ref 0–100)
BUN BLD-MCNC: 28 MG/DL (ref 7–21)
BUN/CREAT SERPL: 26.4 (ref 7–25)
CALCIUM SPEC-SCNC: 9 MG/DL (ref 7.7–10)
CHLORIDE SERPL-SCNC: 107 MMOL/L (ref 99–112)
CO2 SERPL-SCNC: 24.3 MMOL/L (ref 24.3–31.9)
CREAT BLD-MCNC: 1.06 MG/DL (ref 0.43–1.29)
CRP SERPL-MCNC: <0.5 MG/DL (ref 0–0.99)
DEPRECATED RDW RBC AUTO: 45.1 FL (ref 37–54)
EOSINOPHIL # BLD AUTO: 0.44 10*3/MM3 (ref 0–0.7)
EOSINOPHIL NFR BLD AUTO: 12.5 % (ref 0–7)
ERYTHROCYTE [DISTWIDTH] IN BLOOD BY AUTOMATED COUNT: 13.9 % (ref 11.5–14.5)
GFR SERPL CREATININE-BSD FRML MDRD: 50 ML/MIN/1.73
GLOBULIN UR ELPH-MCNC: 3.1 GM/DL
GLUCOSE BLD-MCNC: 85 MG/DL (ref 70–110)
HCT VFR BLD AUTO: 35.4 % (ref 37–47)
HGB BLD-MCNC: 11.7 G/DL (ref 12–16)
IMM GRANULOCYTES # BLD AUTO: 0 10*3/MM3 (ref 0–0.03)
IMM GRANULOCYTES NFR BLD AUTO: 0 % (ref 0–0.5)
LYMPHOCYTES # BLD AUTO: 1.49 10*3/MM3 (ref 1–3)
LYMPHOCYTES NFR BLD AUTO: 42.2 % (ref 16–46)
MCH RBC QN AUTO: 30.3 PG (ref 27–33)
MCHC RBC AUTO-ENTMCNC: 33.1 G/DL (ref 33–37)
MCV RBC AUTO: 91.7 FL (ref 80–94)
MONOCYTES # BLD AUTO: 0.31 10*3/MM3 (ref 0.1–0.9)
MONOCYTES NFR BLD AUTO: 8.8 % (ref 0–12)
NEUTROPHILS # BLD AUTO: 1.23 10*3/MM3 (ref 1.4–6.5)
NEUTROPHILS NFR BLD AUTO: 34.8 % (ref 40–75)
OSMOLALITY SERPL CALC.SUM OF ELEC: 282.3 MOSM/KG (ref 273–305)
PLATELET # BLD AUTO: 131 10*3/MM3 (ref 130–400)
PMV BLD AUTO: 10.7 FL (ref 6–10)
POTASSIUM BLD-SCNC: 3.7 MMOL/L (ref 3.5–5.3)
PROT SERPL-MCNC: 7.1 G/DL (ref 6–8)
RBC # BLD AUTO: 3.86 10*6/MM3 (ref 4.2–5.4)
SODIUM BLD-SCNC: 139 MMOL/L (ref 135–153)
TROPONIN I SERPL-MCNC: 0.03 NG/ML
TROPONIN I SERPL-MCNC: 0.05 NG/ML
TSH SERPL DL<=0.05 MIU/L-ACNC: 3.13 MIU/ML (ref 0.55–4.78)
WBC NRBC COR # BLD: 3.53 10*3/MM3 (ref 4.5–12.5)

## 2019-02-26 PROCEDURE — 94640 AIRWAY INHALATION TREATMENT: CPT

## 2019-02-26 PROCEDURE — 86140 C-REACTIVE PROTEIN: CPT | Performed by: PHYSICIAN ASSISTANT

## 2019-02-26 PROCEDURE — 84443 ASSAY THYROID STIM HORMONE: CPT | Performed by: PHYSICIAN ASSISTANT

## 2019-02-26 PROCEDURE — 83880 ASSAY OF NATRIURETIC PEPTIDE: CPT | Performed by: PHYSICIAN ASSISTANT

## 2019-02-26 PROCEDURE — 84484 ASSAY OF TROPONIN QUANT: CPT | Performed by: PHYSICIAN ASSISTANT

## 2019-02-26 PROCEDURE — 93010 ELECTROCARDIOGRAM REPORT: CPT | Performed by: INTERNAL MEDICINE

## 2019-02-26 PROCEDURE — 80053 COMPREHEN METABOLIC PANEL: CPT | Performed by: PHYSICIAN ASSISTANT

## 2019-02-26 PROCEDURE — G0378 HOSPITAL OBSERVATION PER HR: HCPCS

## 2019-02-26 PROCEDURE — 99222 1ST HOSP IP/OBS MODERATE 55: CPT | Performed by: NURSE PRACTITIONER

## 2019-02-26 PROCEDURE — 94799 UNLISTED PULMONARY SVC/PX: CPT

## 2019-02-26 PROCEDURE — 85025 COMPLETE CBC W/AUTO DIFF WBC: CPT | Performed by: PHYSICIAN ASSISTANT

## 2019-02-26 PROCEDURE — 93005 ELECTROCARDIOGRAM TRACING: CPT | Performed by: PHYSICIAN ASSISTANT

## 2019-02-26 PROCEDURE — 25010000002 FUROSEMIDE PER 20 MG: Performed by: NURSE PRACTITIONER

## 2019-02-26 RX ORDER — ROSUVASTATIN CALCIUM 10 MG/1
10 TABLET, COATED ORAL NIGHTLY
Status: DISCONTINUED | OUTPATIENT
Start: 2019-02-26 | End: 2019-03-01 | Stop reason: HOSPADM

## 2019-02-26 RX ORDER — ASPIRIN 81 MG/1
81 TABLET ORAL DAILY
Status: DISCONTINUED | OUTPATIENT
Start: 2019-02-26 | End: 2019-03-01 | Stop reason: HOSPADM

## 2019-02-26 RX ORDER — FUROSEMIDE 10 MG/ML
40 INJECTION INTRAMUSCULAR; INTRAVENOUS ONCE
Status: COMPLETED | OUTPATIENT
Start: 2019-02-26 | End: 2019-02-26

## 2019-02-26 RX ADMIN — GABAPENTIN 200 MG: 100 CAPSULE ORAL at 09:05

## 2019-02-26 RX ADMIN — LEVOTHYROXINE SODIUM 50 MCG: 75 TABLET ORAL at 06:05

## 2019-02-26 RX ADMIN — SODIUM CHLORIDE, PRESERVATIVE FREE 3 ML: 5 INJECTION INTRAVENOUS at 09:06

## 2019-02-26 RX ADMIN — MONTELUKAST SODIUM 10 MG: 10 TABLET, COATED ORAL at 20:32

## 2019-02-26 RX ADMIN — GABAPENTIN 200 MG: 100 CAPSULE ORAL at 20:55

## 2019-02-26 RX ADMIN — SODIUM CHLORIDE, PRESERVATIVE FREE 3 ML: 5 INJECTION INTRAVENOUS at 20:32

## 2019-02-26 RX ADMIN — FUROSEMIDE 40 MG: 10 INJECTION, SOLUTION INTRAMUSCULAR; INTRAVENOUS at 06:09

## 2019-02-26 RX ADMIN — ASPIRIN 81 MG: 81 TABLET ORAL at 20:32

## 2019-02-26 RX ADMIN — ROSUVASTATIN CALCIUM 10 MG: 10 TABLET, FILM COATED ORAL at 20:32

## 2019-02-26 RX ADMIN — CARVEDILOL 6.25 MG: 6.25 TABLET, FILM COATED ORAL at 18:35

## 2019-02-26 RX ADMIN — APIXABAN 5 MG: 5 TABLET, FILM COATED ORAL at 20:32

## 2019-02-26 RX ADMIN — ALBUTEROL SULFATE 2.5 MG: 2.5 SOLUTION RESPIRATORY (INHALATION) at 19:37

## 2019-02-26 RX ADMIN — CARVEDILOL 6.25 MG: 6.25 TABLET, FILM COATED ORAL at 09:05

## 2019-02-26 RX ADMIN — ALBUTEROL SULFATE 2.5 MG: 2.5 SOLUTION RESPIRATORY (INHALATION) at 07:02

## 2019-02-26 RX ADMIN — APIXABAN 5 MG: 5 TABLET, FILM COATED ORAL at 09:05

## 2019-02-26 RX ADMIN — ISOSORBIDE MONONITRATE 60 MG: 60 TABLET, EXTENDED RELEASE ORAL at 09:05

## 2019-02-26 RX ADMIN — Medication 2 TABLET: at 11:52

## 2019-02-27 ENCOUNTER — APPOINTMENT (OUTPATIENT)
Dept: CT IMAGING | Facility: HOSPITAL | Age: 79
End: 2019-02-27

## 2019-02-27 ENCOUNTER — APPOINTMENT (OUTPATIENT)
Dept: NUCLEAR MEDICINE | Facility: HOSPITAL | Age: 79
End: 2019-02-27

## 2019-02-27 ENCOUNTER — APPOINTMENT (OUTPATIENT)
Dept: CARDIOLOGY | Facility: HOSPITAL | Age: 79
End: 2019-02-27

## 2019-02-27 LAB
ANION GAP SERPL CALCULATED.3IONS-SCNC: 6.7 MMOL/L (ref 3.6–11.2)
BASOPHILS # BLD AUTO: 0.06 10*3/MM3 (ref 0–0.3)
BASOPHILS NFR BLD AUTO: 1.2 % (ref 0–2)
BH CV NUCLEAR PRIOR STUDY: 3
BH CV STRESS BP STAGE 1: NORMAL
BH CV STRESS BP STAGE 2: NORMAL
BH CV STRESS COMMENTS STAGE 1: NORMAL
BH CV STRESS COMMENTS STAGE 2: NORMAL
BH CV STRESS DOSE REGADENOSON STAGE 1: 0.4
BH CV STRESS DURATION MIN STAGE 1: 0
BH CV STRESS DURATION MIN STAGE 2: 4
BH CV STRESS DURATION SEC STAGE 1: 10
BH CV STRESS DURATION SEC STAGE 2: 0
BH CV STRESS HR STAGE 1: 68
BH CV STRESS HR STAGE 2: 88
BH CV STRESS PROTOCOL 1: NORMAL
BH CV STRESS RECOVERY BP: NORMAL MMHG
BH CV STRESS RECOVERY HR: 65 BPM
BH CV STRESS STAGE 1: 1
BH CV STRESS STAGE 2: 2
BNP SERPL-MCNC: 104 PG/ML (ref 0–100)
BUN BLD-MCNC: 28 MG/DL (ref 7–21)
BUN/CREAT SERPL: 25.9 (ref 7–25)
CALCIUM SPEC-SCNC: 9.2 MG/DL (ref 7.7–10)
CHLORIDE SERPL-SCNC: 107 MMOL/L (ref 99–112)
CO2 SERPL-SCNC: 24.3 MMOL/L (ref 24.3–31.9)
CREAT BLD-MCNC: 1.08 MG/DL (ref 0.43–1.29)
CRP SERPL-MCNC: <0.5 MG/DL (ref 0–0.99)
DEPRECATED RDW RBC AUTO: 45.8 FL (ref 37–54)
EOSINOPHIL # BLD AUTO: 0.36 10*3/MM3 (ref 0–0.7)
EOSINOPHIL NFR BLD AUTO: 7.5 % (ref 0–7)
ERYTHROCYTE [DISTWIDTH] IN BLOOD BY AUTOMATED COUNT: 13.9 % (ref 11.5–14.5)
GFR SERPL CREATININE-BSD FRML MDRD: 49 ML/MIN/1.73
GLUCOSE BLD-MCNC: 90 MG/DL (ref 70–110)
HCT VFR BLD AUTO: 36.6 % (ref 37–47)
HGB BLD-MCNC: 12.2 G/DL (ref 12–16)
IMM GRANULOCYTES # BLD AUTO: 0.02 10*3/MM3 (ref 0–0.03)
IMM GRANULOCYTES NFR BLD AUTO: 0.4 % (ref 0–0.5)
LV EF NUC BP: 48 %
LYMPHOCYTES # BLD AUTO: 1.31 10*3/MM3 (ref 1–3)
LYMPHOCYTES NFR BLD AUTO: 27.2 % (ref 16–46)
MAXIMAL PREDICTED HEART RATE: 144 BPM
MCH RBC QN AUTO: 30.5 PG (ref 27–33)
MCHC RBC AUTO-ENTMCNC: 33.3 G/DL (ref 33–37)
MCV RBC AUTO: 91.5 FL (ref 80–94)
MONOCYTES # BLD AUTO: 0.6 10*3/MM3 (ref 0.1–0.9)
MONOCYTES NFR BLD AUTO: 12.5 % (ref 0–12)
NEUTROPHILS # BLD AUTO: 2.46 10*3/MM3 (ref 1.4–6.5)
NEUTROPHILS NFR BLD AUTO: 51.2 % (ref 40–75)
OSMOLALITY SERPL CALC.SUM OF ELEC: 280.7 MOSM/KG (ref 273–305)
PERCENT MAX PREDICTED HR: 61.11 %
PLATELET # BLD AUTO: 133 10*3/MM3 (ref 130–400)
PMV BLD AUTO: 11.2 FL (ref 6–10)
POTASSIUM BLD-SCNC: 4.2 MMOL/L (ref 3.5–5.3)
RBC # BLD AUTO: 4 10*6/MM3 (ref 4.2–5.4)
SODIUM BLD-SCNC: 138 MMOL/L (ref 135–153)
STRESS BASELINE BP: NORMAL MMHG
STRESS BASELINE HR: 65 BPM
STRESS PERCENT HR: 72 %
STRESS POST PEAK BP: NORMAL MMHG
STRESS POST PEAK HR: 88 BPM
STRESS TARGET HR: 122 BPM
WBC NRBC COR # BLD: 4.81 10*3/MM3 (ref 4.5–12.5)

## 2019-02-27 PROCEDURE — 25010000002 REGADENOSON 0.4 MG/5ML SOLUTION: Performed by: INTERNAL MEDICINE

## 2019-02-27 PROCEDURE — 78452 HT MUSCLE IMAGE SPECT MULT: CPT | Performed by: INTERNAL MEDICINE

## 2019-02-27 PROCEDURE — 78452 HT MUSCLE IMAGE SPECT MULT: CPT

## 2019-02-27 PROCEDURE — 99233 SBSQ HOSP IP/OBS HIGH 50: CPT | Performed by: INTERNAL MEDICINE

## 2019-02-27 PROCEDURE — 94799 UNLISTED PULMONARY SVC/PX: CPT

## 2019-02-27 PROCEDURE — 83880 ASSAY OF NATRIURETIC PEPTIDE: CPT | Performed by: INTERNAL MEDICINE

## 2019-02-27 PROCEDURE — 25010000002 AMINOPHYLLINE PER 250 MG: Performed by: NURSE PRACTITIONER

## 2019-02-27 PROCEDURE — 80048 BASIC METABOLIC PNL TOTAL CA: CPT | Performed by: PHYSICIAN ASSISTANT

## 2019-02-27 PROCEDURE — 85025 COMPLETE CBC W/AUTO DIFF WBC: CPT | Performed by: PHYSICIAN ASSISTANT

## 2019-02-27 PROCEDURE — 71250 CT THORAX DX C-: CPT | Performed by: RADIOLOGY

## 2019-02-27 PROCEDURE — 0 TECHNETIUM SESTAMIBI: Performed by: INTERNAL MEDICINE

## 2019-02-27 PROCEDURE — 86140 C-REACTIVE PROTEIN: CPT | Performed by: PHYSICIAN ASSISTANT

## 2019-02-27 PROCEDURE — 99223 1ST HOSP IP/OBS HIGH 75: CPT | Performed by: INTERNAL MEDICINE

## 2019-02-27 PROCEDURE — 93017 CV STRESS TEST TRACING ONLY: CPT

## 2019-02-27 PROCEDURE — 99232 SBSQ HOSP IP/OBS MODERATE 35: CPT | Performed by: NURSE PRACTITIONER

## 2019-02-27 PROCEDURE — 71250 CT THORAX DX C-: CPT

## 2019-02-27 PROCEDURE — 93018 CV STRESS TEST I&R ONLY: CPT | Performed by: INTERNAL MEDICINE

## 2019-02-27 PROCEDURE — A9500 TC99M SESTAMIBI: HCPCS | Performed by: INTERNAL MEDICINE

## 2019-02-27 RX ORDER — AMINOPHYLLINE DIHYDRATE 25 MG/ML
100 INJECTION, SOLUTION INTRAVENOUS
Status: COMPLETED | OUTPATIENT
Start: 2019-02-27 | End: 2019-02-27

## 2019-02-27 RX ADMIN — MONTELUKAST SODIUM 10 MG: 10 TABLET, COATED ORAL at 21:31

## 2019-02-27 RX ADMIN — TECHNETIUM TC 99M SESTAMIBI 1 DOSE: 1 INJECTION INTRAVENOUS at 09:45

## 2019-02-27 RX ADMIN — ALBUTEROL SULFATE 2.5 MG: 2.5 SOLUTION RESPIRATORY (INHALATION) at 07:32

## 2019-02-27 RX ADMIN — ROSUVASTATIN CALCIUM 10 MG: 10 TABLET, FILM COATED ORAL at 21:31

## 2019-02-27 RX ADMIN — REGADENOSON 0.4 MG: 0.08 INJECTION, SOLUTION INTRAVENOUS at 11:37

## 2019-02-27 RX ADMIN — GABAPENTIN 200 MG: 100 CAPSULE ORAL at 21:31

## 2019-02-27 RX ADMIN — CARVEDILOL 6.25 MG: 6.25 TABLET, FILM COATED ORAL at 16:49

## 2019-02-27 RX ADMIN — GABAPENTIN 200 MG: 100 CAPSULE ORAL at 08:13

## 2019-02-27 RX ADMIN — TECHNETIUM TC 99M SESTAMIBI 1 DOSE: 1 INJECTION INTRAVENOUS at 11:37

## 2019-02-27 RX ADMIN — APIXABAN 5 MG: 5 TABLET, FILM COATED ORAL at 21:31

## 2019-02-27 RX ADMIN — ASPIRIN 81 MG: 81 TABLET ORAL at 08:13

## 2019-02-27 RX ADMIN — APIXABAN 5 MG: 5 TABLET, FILM COATED ORAL at 08:13

## 2019-02-27 RX ADMIN — AMINOPHYLLINE 100 MG: 25 INJECTION, SOLUTION INTRAVENOUS at 12:07

## 2019-02-28 LAB
ALBUMIN SERPL-MCNC: 3.7 G/DL (ref 3.4–4.8)
ALBUMIN/GLOB SERPL: 1.1 G/DL (ref 1.5–2.5)
ALP SERPL-CCNC: 84 U/L (ref 35–104)
ALT SERPL W P-5'-P-CCNC: 20 U/L (ref 10–36)
ANION GAP SERPL CALCULATED.3IONS-SCNC: 7.5 MMOL/L (ref 3.6–11.2)
AST SERPL-CCNC: 24 U/L (ref 10–30)
BASOPHILS # BLD AUTO: 0.09 10*3/MM3 (ref 0–0.3)
BASOPHILS NFR BLD AUTO: 2.1 % (ref 0–2)
BILIRUB SERPL-MCNC: 0.8 MG/DL (ref 0.2–1.8)
BNP SERPL-MCNC: 150 PG/ML (ref 0–100)
BUN BLD-MCNC: 17 MG/DL (ref 7–21)
BUN/CREAT SERPL: 17.2 (ref 7–25)
CALCIUM SPEC-SCNC: 9 MG/DL (ref 7.7–10)
CHLORIDE SERPL-SCNC: 108 MMOL/L (ref 99–112)
CO2 SERPL-SCNC: 22.5 MMOL/L (ref 24.3–31.9)
CREAT BLD-MCNC: 0.99 MG/DL (ref 0.43–1.29)
CRP SERPL-MCNC: <0.5 MG/DL (ref 0–0.99)
DEPRECATED RDW RBC AUTO: 45.2 FL (ref 37–54)
EOSINOPHIL # BLD AUTO: 0.43 10*3/MM3 (ref 0–0.7)
EOSINOPHIL NFR BLD AUTO: 10.1 % (ref 0–7)
ERYTHROCYTE [DISTWIDTH] IN BLOOD BY AUTOMATED COUNT: 13.9 % (ref 11.5–14.5)
GFR SERPL CREATININE-BSD FRML MDRD: 55 ML/MIN/1.73
GLOBULIN UR ELPH-MCNC: 3.3 GM/DL
GLUCOSE BLD-MCNC: 81 MG/DL (ref 70–110)
HCT VFR BLD AUTO: 36.9 % (ref 37–47)
HGB BLD-MCNC: 12.2 G/DL (ref 12–16)
IMM GRANULOCYTES # BLD AUTO: 0.01 10*3/MM3 (ref 0–0.03)
IMM GRANULOCYTES NFR BLD AUTO: 0.2 % (ref 0–0.5)
LYMPHOCYTES # BLD AUTO: 1.69 10*3/MM3 (ref 1–3)
LYMPHOCYTES NFR BLD AUTO: 39.9 % (ref 16–46)
MCH RBC QN AUTO: 30.4 PG (ref 27–33)
MCHC RBC AUTO-ENTMCNC: 33.1 G/DL (ref 33–37)
MCV RBC AUTO: 92 FL (ref 80–94)
MONOCYTES # BLD AUTO: 0.49 10*3/MM3 (ref 0.1–0.9)
MONOCYTES NFR BLD AUTO: 11.6 % (ref 0–12)
NEUTROPHILS # BLD AUTO: 1.53 10*3/MM3 (ref 1.4–6.5)
NEUTROPHILS NFR BLD AUTO: 36.1 % (ref 40–75)
OSMOLALITY SERPL CALC.SUM OF ELEC: 276.3 MOSM/KG (ref 273–305)
PLATELET # BLD AUTO: 135 10*3/MM3 (ref 130–400)
PMV BLD AUTO: 10.7 FL (ref 6–10)
POTASSIUM BLD-SCNC: 4.2 MMOL/L (ref 3.5–5.3)
PROT SERPL-MCNC: 7 G/DL (ref 6–8)
RBC # BLD AUTO: 4.01 10*6/MM3 (ref 4.2–5.4)
SODIUM BLD-SCNC: 138 MMOL/L (ref 135–153)
WBC NRBC COR # BLD: 4.24 10*3/MM3 (ref 4.5–12.5)

## 2019-02-28 PROCEDURE — 99232 SBSQ HOSP IP/OBS MODERATE 35: CPT | Performed by: PHYSICIAN ASSISTANT

## 2019-02-28 PROCEDURE — 99232 SBSQ HOSP IP/OBS MODERATE 35: CPT | Performed by: INTERNAL MEDICINE

## 2019-02-28 PROCEDURE — 83880 ASSAY OF NATRIURETIC PEPTIDE: CPT | Performed by: NURSE PRACTITIONER

## 2019-02-28 PROCEDURE — 86140 C-REACTIVE PROTEIN: CPT | Performed by: PHYSICIAN ASSISTANT

## 2019-02-28 PROCEDURE — 85025 COMPLETE CBC W/AUTO DIFF WBC: CPT | Performed by: PHYSICIAN ASSISTANT

## 2019-02-28 PROCEDURE — 94799 UNLISTED PULMONARY SVC/PX: CPT

## 2019-02-28 PROCEDURE — 94640 AIRWAY INHALATION TREATMENT: CPT

## 2019-02-28 PROCEDURE — 80053 COMPREHEN METABOLIC PANEL: CPT | Performed by: PHYSICIAN ASSISTANT

## 2019-02-28 RX ORDER — HYDRALAZINE HYDROCHLORIDE 10 MG/1
10 TABLET, FILM COATED ORAL EVERY 8 HOURS SCHEDULED
Status: DISCONTINUED | OUTPATIENT
Start: 2019-02-28 | End: 2019-03-01 | Stop reason: HOSPADM

## 2019-02-28 RX ORDER — IPRATROPIUM BROMIDE AND ALBUTEROL SULFATE 2.5; .5 MG/3ML; MG/3ML
3 SOLUTION RESPIRATORY (INHALATION)
Status: DISCONTINUED | OUTPATIENT
Start: 2019-02-28 | End: 2019-03-01 | Stop reason: HOSPADM

## 2019-02-28 RX ORDER — ACETYLCYSTEINE 200 MG/ML
3 SOLUTION ORAL; RESPIRATORY (INHALATION)
Status: DISCONTINUED | OUTPATIENT
Start: 2019-02-28 | End: 2019-03-01 | Stop reason: HOSPADM

## 2019-02-28 RX ORDER — BUDESONIDE AND FORMOTEROL FUMARATE DIHYDRATE 160; 4.5 UG/1; UG/1
2 AEROSOL RESPIRATORY (INHALATION)
Status: DISCONTINUED | OUTPATIENT
Start: 2019-02-28 | End: 2019-03-01 | Stop reason: HOSPADM

## 2019-02-28 RX ADMIN — ASPIRIN 81 MG: 81 TABLET ORAL at 08:17

## 2019-02-28 RX ADMIN — APIXABAN 5 MG: 5 TABLET, FILM COATED ORAL at 19:50

## 2019-02-28 RX ADMIN — GABAPENTIN 200 MG: 100 CAPSULE ORAL at 08:26

## 2019-02-28 RX ADMIN — ACETYLCYSTEINE 3 ML: 200 SOLUTION ORAL; RESPIRATORY (INHALATION) at 18:41

## 2019-02-28 RX ADMIN — HYDRALAZINE HYDROCHLORIDE 10 MG: 10 TABLET ORAL at 19:51

## 2019-02-28 RX ADMIN — Medication 2 TABLET: at 19:50

## 2019-02-28 RX ADMIN — CARVEDILOL 6.25 MG: 6.25 TABLET, FILM COATED ORAL at 17:17

## 2019-02-28 RX ADMIN — ISOSORBIDE MONONITRATE 60 MG: 60 TABLET, EXTENDED RELEASE ORAL at 08:17

## 2019-02-28 RX ADMIN — CARVEDILOL 6.25 MG: 6.25 TABLET, FILM COATED ORAL at 08:16

## 2019-02-28 RX ADMIN — LEVOTHYROXINE SODIUM 50 MCG: 75 TABLET ORAL at 05:58

## 2019-02-28 RX ADMIN — BUDESONIDE AND FORMOTEROL FUMARATE DIHYDRATE 2 PUFF: 160; 4.5 AEROSOL RESPIRATORY (INHALATION) at 18:42

## 2019-02-28 RX ADMIN — IPRATROPIUM BROMIDE AND ALBUTEROL SULFATE 3 ML: .5; 3 SOLUTION RESPIRATORY (INHALATION) at 18:41

## 2019-02-28 RX ADMIN — MONTELUKAST SODIUM 10 MG: 10 TABLET, COATED ORAL at 19:50

## 2019-02-28 RX ADMIN — GABAPENTIN 200 MG: 100 CAPSULE ORAL at 19:51

## 2019-02-28 RX ADMIN — APIXABAN 5 MG: 5 TABLET, FILM COATED ORAL at 08:16

## 2019-02-28 RX ADMIN — ROSUVASTATIN CALCIUM 10 MG: 10 TABLET, FILM COATED ORAL at 19:50

## 2019-02-28 RX ADMIN — SODIUM CHLORIDE, PRESERVATIVE FREE 3 ML: 5 INJECTION INTRAVENOUS at 08:17

## 2019-03-01 VITALS
TEMPERATURE: 97.6 F | HEART RATE: 65 BPM | SYSTOLIC BLOOD PRESSURE: 119 MMHG | OXYGEN SATURATION: 95 % | WEIGHT: 169.3 LBS | RESPIRATION RATE: 18 BRPM | BODY MASS INDEX: 28.21 KG/M2 | HEIGHT: 65 IN | DIASTOLIC BLOOD PRESSURE: 64 MMHG

## 2019-03-01 LAB
ANION GAP SERPL CALCULATED.3IONS-SCNC: 6.7 MMOL/L (ref 3.6–11.2)
BASOPHILS # BLD AUTO: 0.1 10*3/MM3 (ref 0–0.3)
BASOPHILS NFR BLD AUTO: 2.1 % (ref 0–2)
BUN BLD-MCNC: 20 MG/DL (ref 7–21)
BUN/CREAT SERPL: 22.7 (ref 7–25)
CALCIUM SPEC-SCNC: 9.2 MG/DL (ref 7.7–10)
CHLORIDE SERPL-SCNC: 109 MMOL/L (ref 99–112)
CO2 SERPL-SCNC: 22.3 MMOL/L (ref 24.3–31.9)
CREAT BLD-MCNC: 0.88 MG/DL (ref 0.43–1.29)
DEPRECATED RDW RBC AUTO: 44 FL (ref 37–54)
EOSINOPHIL # BLD AUTO: 0.37 10*3/MM3 (ref 0–0.7)
EOSINOPHIL NFR BLD AUTO: 7.9 % (ref 0–7)
ERYTHROCYTE [DISTWIDTH] IN BLOOD BY AUTOMATED COUNT: 13.7 % (ref 11.5–14.5)
GFR SERPL CREATININE-BSD FRML MDRD: 62 ML/MIN/1.73
GLUCOSE BLD-MCNC: 91 MG/DL (ref 70–110)
HCT VFR BLD AUTO: 36 % (ref 37–47)
HGB BLD-MCNC: 12.1 G/DL (ref 12–16)
IMM GRANULOCYTES # BLD AUTO: 0.01 10*3/MM3 (ref 0–0.03)
IMM GRANULOCYTES NFR BLD AUTO: 0.2 % (ref 0–0.5)
LYMPHOCYTES # BLD AUTO: 1.59 10*3/MM3 (ref 1–3)
LYMPHOCYTES NFR BLD AUTO: 33.8 % (ref 16–46)
MCH RBC QN AUTO: 30.2 PG (ref 27–33)
MCHC RBC AUTO-ENTMCNC: 33.6 G/DL (ref 33–37)
MCV RBC AUTO: 89.8 FL (ref 80–94)
MONOCYTES # BLD AUTO: 0.43 10*3/MM3 (ref 0.1–0.9)
MONOCYTES NFR BLD AUTO: 9.1 % (ref 0–12)
NEUTROPHILS # BLD AUTO: 2.2 10*3/MM3 (ref 1.4–6.5)
NEUTROPHILS NFR BLD AUTO: 46.9 % (ref 40–75)
OSMOLALITY SERPL CALC.SUM OF ELEC: 277.9 MOSM/KG (ref 273–305)
PLATELET # BLD AUTO: 142 10*3/MM3 (ref 130–400)
PMV BLD AUTO: 10.7 FL (ref 6–10)
POTASSIUM BLD-SCNC: 4.1 MMOL/L (ref 3.5–5.3)
RBC # BLD AUTO: 4.01 10*6/MM3 (ref 4.2–5.4)
SODIUM BLD-SCNC: 138 MMOL/L (ref 135–153)
WBC NRBC COR # BLD: 4.7 10*3/MM3 (ref 4.5–12.5)

## 2019-03-01 PROCEDURE — 94799 UNLISTED PULMONARY SVC/PX: CPT

## 2019-03-01 PROCEDURE — 80048 BASIC METABOLIC PNL TOTAL CA: CPT | Performed by: PHYSICIAN ASSISTANT

## 2019-03-01 PROCEDURE — 85025 COMPLETE CBC W/AUTO DIFF WBC: CPT | Performed by: PHYSICIAN ASSISTANT

## 2019-03-01 PROCEDURE — 99239 HOSP IP/OBS DSCHRG MGMT >30: CPT | Performed by: PHYSICIAN ASSISTANT

## 2019-03-01 RX ORDER — ASPIRIN 81 MG/1
81 TABLET ORAL DAILY
Qty: 30 TABLET | Refills: 0 | Status: SHIPPED | OUTPATIENT
Start: 2019-03-02 | End: 2020-06-17

## 2019-03-01 RX ORDER — BUDESONIDE AND FORMOTEROL FUMARATE DIHYDRATE 160; 4.5 UG/1; UG/1
2 AEROSOL RESPIRATORY (INHALATION)
Qty: 10.2 G | Refills: 12 | Status: SHIPPED | OUTPATIENT
Start: 2019-03-01 | End: 2019-03-01

## 2019-03-01 RX ORDER — APIXABAN 5 MG/1
TABLET, FILM COATED ORAL
Qty: 180 TABLET | Refills: 0 | Status: SHIPPED | OUTPATIENT
Start: 2019-03-01 | End: 2019-05-24 | Stop reason: SDUPTHER

## 2019-03-01 RX ORDER — ROSUVASTATIN CALCIUM 10 MG/1
10 TABLET, COATED ORAL NIGHTLY
Qty: 30 TABLET | Refills: 0 | Status: SHIPPED | OUTPATIENT
Start: 2019-03-01 | End: 2019-03-01

## 2019-03-01 RX ORDER — IPRATROPIUM BROMIDE AND ALBUTEROL SULFATE 2.5; .5 MG/3ML; MG/3ML
3 SOLUTION RESPIRATORY (INHALATION)
Qty: 360 ML | Refills: 0 | Status: SHIPPED | OUTPATIENT
Start: 2019-03-01 | End: 2019-03-01

## 2019-03-01 RX ORDER — HYDRALAZINE HYDROCHLORIDE 10 MG/1
10 TABLET, FILM COATED ORAL EVERY 8 HOURS SCHEDULED
Qty: 90 TABLET | Refills: 0 | Status: SHIPPED | OUTPATIENT
Start: 2019-03-01 | End: 2019-10-28

## 2019-03-01 RX ORDER — ROSUVASTATIN CALCIUM 10 MG/1
10 TABLET, COATED ORAL NIGHTLY
Qty: 30 TABLET | Refills: 0 | Status: SHIPPED | OUTPATIENT
Start: 2019-03-01 | End: 2019-03-05

## 2019-03-01 RX ORDER — ASPIRIN 81 MG/1
81 TABLET ORAL DAILY
Qty: 30 TABLET | Refills: 0 | Status: SHIPPED | OUTPATIENT
Start: 2019-03-02 | End: 2019-03-01

## 2019-03-01 RX ORDER — BUDESONIDE AND FORMOTEROL FUMARATE DIHYDRATE 160; 4.5 UG/1; UG/1
2 AEROSOL RESPIRATORY (INHALATION)
Qty: 10.2 G | Refills: 12 | Status: SHIPPED | OUTPATIENT
Start: 2019-03-01 | End: 2019-04-26 | Stop reason: ALTCHOICE

## 2019-03-01 RX ORDER — IPRATROPIUM BROMIDE AND ALBUTEROL SULFATE 2.5; .5 MG/3ML; MG/3ML
3 SOLUTION RESPIRATORY (INHALATION)
Qty: 360 ML | Refills: 0 | Status: SHIPPED | OUTPATIENT
Start: 2019-03-01 | End: 2019-04-26 | Stop reason: ALTCHOICE

## 2019-03-01 RX ORDER — HYDRALAZINE HYDROCHLORIDE 10 MG/1
10 TABLET, FILM COATED ORAL EVERY 8 HOURS SCHEDULED
Qty: 90 TABLET | Refills: 0 | Status: SHIPPED | OUTPATIENT
Start: 2019-03-01 | End: 2019-03-01

## 2019-03-01 RX ORDER — FUROSEMIDE 40 MG/1
40 TABLET ORAL DAILY
Qty: 30 TABLET | Refills: 12 | Status: SHIPPED | OUTPATIENT
Start: 2019-03-01 | End: 2019-03-01

## 2019-03-01 RX ORDER — FUROSEMIDE 20 MG/1
20 TABLET ORAL DAILY
Qty: 30 TABLET | Refills: 12 | Status: SHIPPED | OUTPATIENT
Start: 2019-03-01 | End: 2020-11-09

## 2019-03-01 RX ADMIN — ISOSORBIDE MONONITRATE 60 MG: 60 TABLET, EXTENDED RELEASE ORAL at 08:16

## 2019-03-01 RX ADMIN — SODIUM CHLORIDE, PRESERVATIVE FREE 3 ML: 5 INJECTION INTRAVENOUS at 08:17

## 2019-03-01 RX ADMIN — IPRATROPIUM BROMIDE AND ALBUTEROL SULFATE 3 ML: .5; 3 SOLUTION RESPIRATORY (INHALATION) at 12:50

## 2019-03-01 RX ADMIN — LEVOTHYROXINE SODIUM 50 MCG: 75 TABLET ORAL at 04:56

## 2019-03-01 RX ADMIN — CARVEDILOL 6.25 MG: 6.25 TABLET, FILM COATED ORAL at 08:16

## 2019-03-01 RX ADMIN — IPRATROPIUM BROMIDE AND ALBUTEROL SULFATE 3 ML: .5; 3 SOLUTION RESPIRATORY (INHALATION) at 06:53

## 2019-03-01 RX ADMIN — BUDESONIDE AND FORMOTEROL FUMARATE DIHYDRATE 2 PUFF: 160; 4.5 AEROSOL RESPIRATORY (INHALATION) at 06:53

## 2019-03-01 RX ADMIN — APIXABAN 5 MG: 5 TABLET, FILM COATED ORAL at 08:16

## 2019-03-01 RX ADMIN — HYDRALAZINE HYDROCHLORIDE 10 MG: 10 TABLET ORAL at 04:56

## 2019-03-01 RX ADMIN — ACETYLCYSTEINE 3 ML: 200 SOLUTION ORAL; RESPIRATORY (INHALATION) at 06:53

## 2019-03-01 RX ADMIN — ASPIRIN 81 MG: 81 TABLET ORAL at 08:16

## 2019-03-01 RX ADMIN — IPRATROPIUM BROMIDE AND ALBUTEROL SULFATE 3 ML: .5; 3 SOLUTION RESPIRATORY (INHALATION) at 00:42

## 2019-03-01 RX ADMIN — GABAPENTIN 200 MG: 100 CAPSULE ORAL at 08:16

## 2019-03-01 NOTE — DISCHARGE INSTR - APPOINTMENTS
Pt  Has  And  Apt  With  Dr galicia  For may 7  At  10 am   At  [facility]   In  [place]

## 2019-03-01 NOTE — PLAN OF CARE
Problem: Patient Care Overview  Goal: Plan of Care Review  Outcome: Ongoing (interventions implemented as appropriate)    Goal: Individualization and Mutuality  Outcome: Ongoing (interventions implemented as appropriate)    Goal: Discharge Needs Assessment  Outcome: Ongoing (interventions implemented as appropriate)    Goal: Interprofessional Rounds/Family Conf  Outcome: Ongoing (interventions implemented as appropriate)      Problem: Cardiac Output Decreased (Adult)  Goal: Identify Related Risk Factors and Signs and Symptoms  Outcome: Ongoing (interventions implemented as appropriate)    Goal: Effective Tissue Perfusion  Outcome: Ongoing (interventions implemented as appropriate)      Problem: Skin Injury Risk (Adult)  Goal: Identify Related Risk Factors and Signs and Symptoms  Outcome: Ongoing (interventions implemented as appropriate)

## 2019-03-01 NOTE — PLAN OF CARE
Problem: Patient Care Overview  Goal: Plan of Care Review  Outcome: Ongoing (interventions implemented as appropriate)      Problem: Cardiac Output Decreased (Adult)  Goal: Identify Related Risk Factors and Signs and Symptoms  Outcome: Ongoing (interventions implemented as appropriate)    Goal: Effective Tissue Perfusion  Outcome: Ongoing (interventions implemented as appropriate)      Problem: Skin Injury Risk (Adult)  Goal: Identify Related Risk Factors and Signs and Symptoms  Outcome: Ongoing (interventions implemented as appropriate)    Goal: Skin Health and Integrity  Outcome: Ongoing (interventions implemented as appropriate)

## 2019-03-01 NOTE — DISCHARGE SUMMARY
[facility] HOSPITALIST DISCHARGE SUMMARY    Patient Identification:  Name:  Ashely Feliciano  Age:  78 y.o.  Sex:  female  :  1940  MRN:  [id number]  Visit Number:  [id number]    Date of Admission: 2019  Date of Discharge: 3/1/2019    PCP: Maria R Sanchez MD    Discharging Provider: TRAMAINE Yates    Discharge Diagnoses     Discharge Diagnoses:  1. Acute on chronic systolic congestive heart failure  2. Advanced ischemic cardiomyopathy with estimated ejection fraction of 31-35%  3. Coronary artery disease status post CABG as well as prior stenting, stable  4. Chronic atrial fibrillation, rate controlled  5. Chronic oral anticoagulation with Eliquis  6. CKD stage III  7. Severe pulmonary hypertension  8. Hypothyroidism  9. Sick sinus syndrome status post permanent pacemaker implantation  10. Obesity, BMI 30.52    Consults/Procedures     Consults:   Consults     Date and Time Order Name Status Description    2019 0928 Inpatient Pulmonology Consult Completed     2019 0732 Inpatient Cardiology Consult Completed     2019 1343 Inpatient Nephrology Consult Completed         Procedures Performed:  1. Transthoracic echocardiogram  2. Nuclear medicine stress test  3. High resolution CT scan of the chest     History of Presenting Illness     Chief Complaint   Patient presents with   • Chest Pain     LASTNIGHT AND SOA FOR WEEK     Ms. Feliciano is a 78 y.o. female who presented to [facility] complaining of shortness of breath. ED workup was positive for acute exacerbation of chronic congestive heart failure as well as acute kidney injury on stage III CKD. Please see the admitting history and physical for further details.      Hospital Course     Ms. Feliciano was initially admitted to the observation unit. She was diuresed with IV furosemide with improvement noted in her shortness of breath. Initial troponins were marginally elevated and returned to normal.  Initial creatinine was elevated at 1.45, which has since then improved to 0.88.  Renal ultrasound was unremarkable. Transthoracic echocardiogram showed a reduced ejection fraction at 31-35%.  Previous EF in July 2018 was 45-50%.  With the significant reduction in EF, as well as gray zone troponin with acute systolic CHF, she underwent further evaluation with a nuclear medicine stress test. This did not reveal any evidence of myocardial ischemia.  She had a high resolution CT scan of the chest which did not reveal any coarse or fine reticulonodular pattern.  There was cardiomegaly and coronary artery calcifications noted.      Although her shortness of breath had improved, it was felt that she needed to stay past the second midnight and she was admitted to the hospitalist services as an inpatient. Her transthoracic echocardiogram had revealed evidence of severe pulmonary hypertension and pulmonology was consulted.  It was recommended that she undergo right heart catheterization once her medical therapy is optimized for the cardiomyopathy.  It was also recommended that she use a BiPAP at night, however she refuses this.  At this point, she is unsure if she would pursue right heart catheterization, but states that she will follow-up with her primary cardiologist to discuss this as an outpatient.  Dr. Joaquin discussed with pulmonology over the phone and they agreed that this could be performed as an outpatient at a later date.     Medications for cardiomyopathy were optimized.  She was continued on her home doses of carvedilol and isosorbide mononitrate.  Hydralazine was also added as well in place of an ACE/ARB/Entresto given history of reported allergy to lisinopril. On 03/01/19, the patient reported that she was breathing well and continued to have good urination.  Edema had improved.  She has been up walking the halls and to the restroom on her own on room air without difficulty.  It was felt that she was stable for  discharge home.  The recommendation of a LifeVest as well as its indication were discussed with the patient.  The risk of life-threatening arrhythmia and sudden death were discussed and the possible need for upgrading her pacemaker to an ICD later on should her EF fail to improve.  The patient reports that if it is her time to go, she is ready.  She does not want the LifeVest and states that she would not want to have an ICD placed, even if recommended.  She expresses understanding of the risk and benefits and continues to refuse.  She will follow-up with Dr. Sanchez in 1 week and Dr. Castro in 3 weeks.  Follow-up with PCP in 1 week as well.  She was started on oral furosemide 20mg daily at discharge. She was also educated on a low sodium diet.     Discharge Vitals/Physical Examination     Vital Signs:  Temp:  [97.5 °F (36.4 °C)-97.9 °F (36.6 °C)] 97.6 °F (36.4 °C)  Heart Rate:  [63-94] 65  Resp:  [16-18] 18  BP: (119-141)/(63-76) 119/64  Mean Arterial Pressure (Non-Invasive) for the past 24 hrs (Last 3 readings):   Noninvasive MAP (mmHg)   03/01/19 0614 85   03/01/19 0258 96   02/28/19 1847 88     SpO2 Percentage    03/01/19 0653 03/01/19 1056 03/01/19 1250   SpO2: 94% 96% 95%     SpO2:  [94 %-99 %] 95 %  on   ;   Device (Oxygen Therapy): room air    Body mass index is 28.17 kg/m².  Wt Readings from Last 3 Encounters:   03/01/19 76.8 kg (169 lb 4.8 oz)   02/05/19 80.7 kg (178 lb)   01/31/19 81.2 kg (179 lb)       Physical Exam:  Physical Exam   Constitutional: She is oriented to person, place, and time. She appears well-developed and well-nourished. No distress.   HENT:   Head: Normocephalic and atraumatic.   Eyes: Conjunctivae are normal. Right eye exhibits no discharge. Left eye exhibits no discharge. No scleral icterus.   Neck: Normal range of motion. Neck supple. No JVD present. Carotid bruit is not present.   Cardiovascular: Normal rate, regular rhythm and normal heart sounds. Exam reveals no gallop and no  friction rub.   No murmur heard.  Pulmonary/Chest: Effort normal. No respiratory distress. She has no wheezes. She has rales (Few faint rales left base. Improved. ). She exhibits no tenderness.   Abdominal: Soft. Bowel sounds are normal. She exhibits no distension and no mass. There is no tenderness. There is no rebound and no guarding. No hernia.   Musculoskeletal: Normal range of motion. She exhibits no edema.   Neurological: She is alert and oriented to person, place, and time.   Skin: Skin is warm and dry. No rash noted. She is not diaphoretic. No erythema. No pallor.   Psychiatric: She has a normal mood and affect. Her behavior is normal.   Nursing note and vitals reviewed.    Pertinent Laboratory/Radiology Results     Pertinent Laboratory Results:  Results from last 7 days   Lab Units 02/26/19  1121 02/26/19  0342 02/25/19  1842   TROPONIN I ng/mL 0.032 0.046* 0.045*         Results from last 7 days   Lab Units 02/25/19  1000   PH, ARTERIAL pH units 7.447   PO2 ART mm Hg 71.3*   PCO2, ARTERIAL mm Hg 33.1*   HCO3 ART mmol/L 22.3     Results from last 7 days   Lab Units 03/01/19  0539 02/28/19  0408 02/27/19  0730 02/26/19  0342   CRP mg/dL  --  <0.50 <0.50 <0.50   WBC 10*3/mm3 4.70 4.24* 4.81 3.53*   HEMOGLOBIN g/dL 12.1 12.2 12.2 11.7*   HEMATOCRIT % 36.0* 36.9* 36.6* 35.4*   MCV fL 89.8 92.0 91.5 91.7   MCHC g/dL 33.6 33.1 33.3 33.1   PLATELETS 10*3/mm3 142 135 133 131     Results from last 7 days   Lab Units 03/01/19  0539 02/28/19  0408 02/27/19  0730 02/26/19  0342 02/25/19  0951   SODIUM mmol/L 138 138 138 139 139   POTASSIUM mmol/L 4.1 4.2 4.2 3.7 4.3   MAGNESIUM mg/dL  --   --   --   --  2.2   CHLORIDE mmol/L 109 108 107 107 108   CO2 mmol/L 22.3* 22.5* 24.3 24.3 23.9*   BUN mg/dL 20 17 28* 28* 29*   CREATININE mg/dL 0.88 0.99 1.08 1.06 1.45*   EGFR IF NONAFRICN AM mL/min/1.73 62 55* 49* 50* 35*   CALCIUM mg/dL 9.2 9.0 9.2 9.0 9.0   GLUCOSE mg/dL 91 81 90 85 101   ALBUMIN g/dL  --  3.70  --  4.00 3.90    BILIRUBIN mg/dL  --  0.8  --  0.8 0.6   ALK PHOS U/L  --  84  --  90 97   AST (SGOT) U/L  --  24  --  25 28   ALT (SGPT) U/L  --  20  --  21 28   Estimated Creatinine Clearance: 55.7 mL/min (by C-G formula based on SCr of 0.88 mg/dL).    Lab Results   Component Value Date    TSH 3.131 02/26/2019    FREET4 1.18 02/25/2019     Pain Management Panel     Pain Management Panel Latest Ref Rng & Units 2/25/2019 2/25/2019    CREATININE UR mg/dL 36.6 36.6        Pertinent Radiology Results:  Imaging Results (all)     Procedure Component Value Units Date/Time    CT Chest Hi Resolution [[id number]] Collected:  02/27/19 1506     Updated:  02/27/19 1601    Narrative:       CT CHEST HI RESOLUTION-      HISTORY:   R/O ILD; I50.9-Heart failure, unspecified; R07.9-Chest pain,  unspecified          DOSE: 135.538201 mGy.cm  COMPARISON: Chest x-ray dated 02/25/2019.     Radiation dose reduction techniques were utilized per ALARA protocol.  Automated exposure control was initiated through either or UpRace or  DoseRight software packages by  protocol.           PROCEDURE: High-resolution imaging of the chest was performed with axial  sections and the patient in both supine and prone positions.     FINDINGS: Today's study shows no significant interstitial lung disease.  No coarse reticulonodular pattern is present.     There are no pericardial or pleural effusions identified.     Patient is noted to have cardiomegaly.     Coronary artery calcifications are present.       Impression:       1. No coarse or fine reticulonodular pattern.  2. Cardiomegaly.  3. Coronary artery calcifications.      This report was finalized on 2/27/2019 3:59 PM by Dr. Kyle Garcia MD.       US Renal Bilateral [[id number]] Collected:  02/25/19 1622     Updated:  02/25/19 1648    Narrative:       EXAMINATION: US RENAL BILATERAL-      CLINICAL INDICATION: sunli; I50.9-Heart failure, unspecified; R07.9-Chest  pain, unspecified          COMPARISON: None  available     FINDINGS: Sonographic imaging of the kidneys show the right kidney  measuring 8.81 x 3.97 cm     The left kidney is 8.76 x 3.73 cm     There is no solid mass or hydronephrosis.       Impression:       Unremarkable sonographic appearance of the kidneys      This report was finalized on 2/25/2019 4:46 PM by Dr. Kyle Garcia MD.       XR Chest 1 View [[id number]] Collected:  02/25/19 1514     Updated:  02/25/19 1531    Narrative:       XR CHEST 1 VW-     CLINICAL INDICATION: CHF; I50.9-Heart failure, unspecified; R07.9-Chest  pain, unspecified          COMPARISON: 2/25/2019      TECHNIQUE: Single frontal view of the chest.     FINDINGS:     There is no focal alveolar infiltrate or effusion.  cardiomegaly  There is no evidence of an acute osseous abnormality.   There are no suspicious-appearing parenchymal soft tissue nodules.            Impression:       No evidence of active or acute cardiopulmonary disease on today's chest  radiograph.         This report was finalized on 2/25/2019 3:15 PM by Dr. Kyle Garcia MD.       XR Chest 1 View [[id number]] Collected:  02/25/19 1040     Updated:  02/25/19 1056    Narrative:       XR CHEST 1 VW-     CLINICAL INDICATION: soa          COMPARISON: 9/28/2015      TECHNIQUE: Single frontal view of the chest.     FINDINGS:     There is no focal alveolar infiltrate or effusion.  The heart is enlarged  There is no evidence of an acute osseous abnormality.   There are no suspicious-appearing parenchymal soft tissue nodules.            Impression:       Cardiomegaly         This report was finalized on 2/25/2019 10:40 AM by Dr. Kyle Garcia MD.           Transthoracic echocardiogram 02/25/2019      Nuclear stress test 02/27/19      Test Results Pending at Discharge:  None.     Discharge Disposition/Discharge Medications/Discharge Appointments     Discharge Disposition:   Home or Self Care    Condition at Discharge:  Stable    Discharge Diet:  Diet Instructions     Diet:  Regular, Cardiac      Discharge Diet:   Regular  Cardiac           Discharge Activity:  Activity Instructions     Activity as Tolerated          Code Status While Inpatient:  Code Status and Medical Interventions:   Ordered at: 02/25/19 9138     Code Status:    CPR     Medical Interventions (Level of Support Prior to Arrest):    Full     Discharge Medications:     Discharge Medications      New Medications      Instructions Start Date   aspirin 81 MG EC tablet   81 mg, Oral, Daily      budesonide-formoterol 160-4.5 MCG/ACT inhaler  Commonly known as:  SYMBICORT   2 puffs, Inhalation, 2 Times Daily - RT      hydrALAZINE 10 MG tablet  Commonly known as:  APRESOLINE   10 mg, Oral, Every 8 Hours Scheduled      ipratropium-albuterol 0.5-2.5 mg/3 ml nebulizer  Commonly known as:  DUO-NEB   3 mL, Nebulization, 4 Times Daily - RT      rosuvastatin 10 MG tablet  Commonly known as:  CRESTOR   10 mg, Oral, Nightly         Changes to Medications      Instructions Start Date   furosemide 20 MG tablet  Commonly known as:  LASIX  What changed:    · medication strength  · how much to take   20 mg, Oral, Daily         Continue These Medications      Instructions Start Date   carvedilol 6.25 MG tablet  Commonly known as:  COREG   TAKE 1 TABLET BY MOUTH TWICE DAILY WITH MEALS      ELIQUIS 5 MG tablet tablet  Generic drug:  apixaban   TAKE 1 TABLET BY MOUTH TWICE DAILY      gabapentin 100 MG capsule  Commonly known as:  NEURONTIN   200 mg, Oral, 2 Times Daily      isosorbide mononitrate 60 MG 24 hr tablet  Commonly known as:  IMDUR   TAKE 1 TABLET BY MOUTH DAILY      levothyroxine 50 MCG tablet  Commonly known as:  SYNTHROID, LEVOTHROID   TAKE 1 TABLET BY MOUTH DAILY      montelukast 10 MG tablet  Commonly known as:  SINGULAIR   TAKE 1 TABLET BY MOUTH EVERY NIGHT      nitroglycerin 0.4 MG SL tablet  Commonly known as:  NITROSTAT   0.4 mg, Sublingual, Every 5 Minutes PRN, Take no more than 3 doses in 15 minutes.       PROVENTIL   (90 Base) MCG/ACT inhaler  Generic drug:  albuterol sulfate HFA   INHALE 2 PUFFS BY MOUTH EVERY 4 HOURS AS NEEDED FOR WHEEZING           Discharge Appointments:  Your Scheduled Appointments    Mar 05, 2019 11:45 AM EST  Follow Up with Maria R Sanchez MD  [facility] CARDIOLOGY (--) [address] [id number] [phone]   Arrive 15 minutes prior to appointment.   Apr 26, 2019  1:20 PM EDT  Hospital Follow Up with Kiersten Mosqueda PA-C  [facility] PULMONOLOGY CRITICAL CARE (--) [address]  [place] [address]  [phone]   Apr 29, 2019  1:20 PM EDT  Follow Up with Maria R Sanchez MD  [facility] CARDIOLOGY (--) [address] [place] [address] [phone]   Arrive 15 minutes prior to appointment.    Additional instructions:      Pt  Has  And  Apt  With  Dr galicia  For may 7  At  10 am   At  [facility]   In  [place]                  Additional Instructions for the Follow-ups that You Need to Schedule     Discharge Follow-up with PCP   As directed       Currently Documented PCP:    Maria R Sanchez MD    PCP Phone Number:    [phone]     Follow Up Details:  1 WEEK         Discharge Follow-up with Specified Provider: cardiology; 1 Week   As directed      To:  cardiology    Follow Up:  1 Week         Discharge Follow-up with Specified Provider: pulmonology; 3 Weeks   As directed      To:  pulmonology    Follow Up:  3 Weeks           Follow-up Information     Maria R Sanchez MD .    Specialty:  Cardiology  Why:  1 WEEK  Contact information:  [address] [address]  [phone]                 Additional Instructions for the Follow-ups that You Need to Schedule     Discharge Follow-up with PCP   As directed       Currently Documented PCP:    Maria R Sanchez MD    PCP Phone Number:    [phone]     Follow Up Details:  1 WEEK         Discharge Follow-up with Specified  Provider: cardiology; 1 Week   As directed      To:  cardiology    Follow Up:  1 Week         Discharge Follow-up with Specified Provider: pulmonology; 3 Weeks   As directed      To:  pulmonology    Follow Up:  3 Weeks             Attestation: I personally discussed the patient's hospital course, disposition, discharge planning, and discharge medications with attending physician, Dr. Hester att. providers found, prior to time of discharge.     TRAMAINE Yates  03/01/19  4:56 PM    Time discharge orders placed: 1141.    Time to complete discharge: >30 minutes.     Please send a copy of this dictation to the following providers:  Maria R Sanchez MD

## 2019-03-01 NOTE — PROGRESS NOTES
Discharge Planning Assessment  [facility]     Patient Name: Ashely Feliciano  MRN: [id number]  Today's Date: 3/1/2019    Admit Date: 2/25/2019    Discharge Needs Assessment    No documentation.       Discharge Plan     Row Name 03/01/19 1213       Plan    Plan   follow up note: pt lying awake in bed, very pleasant and kind to speak with.  Orders noted to dc home today. She currently denies any needs or interventions. She reports having ambulated hallway and ilan without diff. Family will provide transportation home at dc. Follow up appts. noted.     Plan Comments  Pt requested dc meds be sent to her local pharm. , [facility] in [place]. CM [facility] in pharm. who will send Rx for dc meds to [facility].    Row Name 03/01/19 1144       Plan    Final Discharge Disposition Code  01 - home or self-care        Destination      No service coordination in this encounter.      Durable Medical Equipment      No service coordination in this encounter.      Dialysis/Infusion      No service coordination in this encounter.      Home Medical Care      No service coordination in this encounter.      Community Resources      No service coordination in this encounter.        Expected Discharge Date and Time     Expected Discharge Date Expected Discharge Time    Mar 1, 2019         Demographic Summary    No documentation.       Functional Status    No documentation.       Psychosocial    No documentation.       Abuse/Neglect    No documentation.       Legal    No documentation.       Substance Abuse    No documentation.       Patient Forms    No documentation.           Kadie Mcpherson RN

## 2019-03-02 ENCOUNTER — READMISSION MANAGEMENT (OUTPATIENT)
Dept: CALL CENTER | Facility: HOSPITAL | Age: 79
End: 2019-03-02

## 2019-03-02 NOTE — OUTREACH NOTE
Prep Survey      Responses   Facility patient discharged from?  [facility]   Is patient eligible?  Yes   Discharge diagnosis  Acute on chronic systolic congestive heart failure   Does the patient have one of the following disease processes/diagnoses(primary or secondary)?  CHF   Does the patient have Home health ordered?  No   Is there a DME ordered?  No   Medication alerts for this patient  ASA   Prep survey completed?  Yes          Ksenia Nelson RN

## 2019-03-04 ENCOUNTER — TELEPHONE (OUTPATIENT)
Dept: CARDIOLOGY | Facility: CLINIC | Age: 79
End: 2019-03-04

## 2019-03-04 ENCOUNTER — READMISSION MANAGEMENT (OUTPATIENT)
Dept: CALL CENTER | Facility: HOSPITAL | Age: 79
End: 2019-03-04

## 2019-03-04 ENCOUNTER — EPISODE CHANGES (OUTPATIENT)
Dept: CASE MANAGEMENT | Facility: OTHER | Age: 79
End: 2019-03-04

## 2019-03-04 RX ORDER — MONTELUKAST SODIUM 10 MG/1
TABLET ORAL
Qty: 30 TABLET | Refills: 0 | Status: SHIPPED | OUTPATIENT
Start: 2019-03-04 | End: 2019-04-03 | Stop reason: SDUPTHER

## 2019-03-04 NOTE — OUTREACH NOTE
CHF Week 1 Survey      Responses   Facility patient discharged from?  [facility]   Does the patient have one of the following disease processes/diagnoses(primary or secondary)?  CHF   Is there a successful TCM telephone encounter documented?  No   CHF Week 1 attempt successful?  Yes   Call start time  0822   Call end time  0827   Discharge diagnosis  Acute on chronic systolic congestive heart failure   Is patient permission given to speak with other caregiver?  Yes   List who call center can speak with  Vijay   Medication alerts for this patient  ASA   Meds reviewed with patient/caregiver?  Yes   Is the patient having any side effects they believe may be caused by any medication additions or changes?  No   Does the patient have all medications ordered at discharge?  Yes   Is the patient taking all medications as directed (includes completed medication regime)?  Yes   Does the patient have a primary care provider?   Yes   Does the patient have an appointment with their PCP within 7 days of discharge?  Yes   Has the patient kept scheduled appointments due by today?  N/A   Comments  Appt 3/5/19   Has home health visited the patient within 72 hours of discharge?  N/A   Psychosocial issues?  No   Did the patient receive a copy of their discharge instructions?  Yes   Nursing interventions  Reviewed instructions with patient   What is the patient's perception of their health status since discharge?  New symptoms unrelated to diagnosis   Nursing interventions  Nurse provided patient education   Is the patient weighing daily?  Yes   Does the patient have scales?  Yes   Daily weight interventions  Education provided on importance of daily weight   Is the patient able to teach back Heart Failure diet management?  Yes   Is the patient able to teach back Heart Failure Zones?  Yes   Is the patient able to teach back signs and symptoms of worsening condition? (i.e. weight gain, shortness of air, etc.)  Yes   Is the  patient/caregiver able to teach back the hierarchy of who to call/visit for symptoms/problems? PCP, Specialist, Home health nurse, Urgent Care, ED, 911  Yes   Additional teach back comments  Weight 180  lb, stable. Minimal pedal edema.     CHF Week 1 call completed?  Yes   Wrap up additional comments  Pt has been battling shingles outbreak since first of February. Was treated but still having pain. She is weighing daily, 180lb, stable. Very minimal edema to feet bilaterally. Breathing is good. Has good understanding of her instructions. Seeing Dr. Sanchez tomorrow.           Neelam Huggins RN

## 2019-03-04 NOTE — TELEPHONE ENCOUNTER
PATIENT WAS GIVEN SYMBICORT 160 IN HOSPITAL AND IT IS DOING SOMETHING TO HER MOUTH. SHE HAD CALLED [facility] TO SEE IF THEY COULD CALL US TO GET IT CHANGED TO SOMETHING ELSE OR IF SHE JUST NEEDS TO WAIT TO DISCUSS IT AT HER NEXT OFFICE VISIT.

## 2019-03-05 ENCOUNTER — OFFICE VISIT (OUTPATIENT)
Dept: CARDIOLOGY | Facility: CLINIC | Age: 79
End: 2019-03-05

## 2019-03-05 VITALS
HEIGHT: 65 IN | HEART RATE: 74 BPM | OXYGEN SATURATION: 98 % | DIASTOLIC BLOOD PRESSURE: 72 MMHG | WEIGHT: 176 LBS | SYSTOLIC BLOOD PRESSURE: 126 MMHG | BODY MASS INDEX: 29.32 KG/M2

## 2019-03-05 DIAGNOSIS — Z95.0 PACEMAKER: ICD-10-CM

## 2019-03-05 DIAGNOSIS — E78.2 MIXED HYPERLIPIDEMIA: ICD-10-CM

## 2019-03-05 DIAGNOSIS — Z79.01 CHRONIC ANTICOAGULATION: ICD-10-CM

## 2019-03-05 DIAGNOSIS — E03.9 HYPOTHYROIDISM, UNSPECIFIED TYPE: ICD-10-CM

## 2019-03-05 DIAGNOSIS — B02.9 HERPES ZOSTER WITHOUT COMPLICATION: ICD-10-CM

## 2019-03-05 DIAGNOSIS — I25.5 ISCHEMIC CARDIOMYOPATHY: ICD-10-CM

## 2019-03-05 DIAGNOSIS — I25.10 CORONARY ARTERY DISEASE INVOLVING NATIVE CORONARY ARTERY OF NATIVE HEART WITHOUT ANGINA PECTORIS: Primary | ICD-10-CM

## 2019-03-05 DIAGNOSIS — I48.20 CHRONIC A-FIB (HCC): ICD-10-CM

## 2019-03-05 DIAGNOSIS — I50.43 ACUTE ON CHRONIC COMBINED SYSTOLIC AND DIASTOLIC CONGESTIVE HEART FAILURE (HCC): ICD-10-CM

## 2019-03-05 PROCEDURE — 99214 OFFICE O/P EST MOD 30 MIN: CPT | Performed by: INTERNAL MEDICINE

## 2019-03-05 NOTE — PROGRESS NOTES
subjective     Chief Complaint   Patient presents with   • Coronary Artery Disease   • Hyperlipidemia   • Follow-up     History of Present Illness  Patient is 78 years old white female who was recently admitted to the hospital because of congestive heart failure.  Patient is known to have advanced ischemic cardiomyopathy with ejection fraction around 31-35%.  While in the hospital troponin was borderline elevated.  She underwent extensive cardiac workup including a Lexiscan stress test which was negative for exercise-induced myocardial ischemia.  Patient also had developed acute renal failure.  In the hospital patient had a pulmonary consultation.  BiPAP was attempted but patient absolutely refused.  She also refused right heart cath.  Patient also refused LifeVest and consideration of ICD.    Patient presents to the office today.  She is feeling better.  She is not taking Crestor which was started in the hospital.  She states that it causes her to have severe aches and pain all over.  He has taken Livalo in the past and is willing to go back to Livalo 1 mg daily.    Patient has chronic atrial fibrillation.  Rate is very well controlled.  Patient has ventricular pacemaker which is functioning normally.  Patient also has severe pulmonary hypertension.    Patient has appointment with pulmonary service in the next few weeks.  She is taking Singulair, Symbicort and DuoNeb.  Patient also has paresthesia from prior zoster and is taking Neurontin.  She does not think it is enough and wants to increase the medication which was declined.    Past Surgical History:   Procedure Laterality Date   • CARDIAC SURGERY  1997   • CARDIOVASCULAR STRESS TEST  06/2014   • CATARACT EXTRACTION, BILATERAL     • CHOLECYSTECTOMY  2002   • COLONOSCOPY  05/2012   • ECHO - CONVERTED  07/2014   • PACEMAKER IMPLANTATION  12/17/2015     Family History   Problem Relation Age of Onset   • Coronary artery disease Other    • Hypertension Other    •  Diabetes Other    • Heart attack Other    • Heart attack Mother 62        fatal MI   • Skin cancer Mother    • Diabetes type II Mother    • Melanoma Mother    • Heart attack Father 63        fatal MI   • Melanoma Sister 45   • Skin cancer Sister    • Heart attack Brother 62        fatal MI   • Heart attack Brother 80        Fatal MI   • Skin cancer Brother    • Heart disease Sister    • Heart disease Brother 75        pacemaker, CABG, Stents   • Osteoporosis Sister    • Pneumonia Brother    • Hypertension Brother    • Heart attack Sister 54        Fatal MI   • Heart attack Brother 59        Fatal MI   • Breast cancer Neg Hx      Past Medical History:   Diagnosis Date   • Atrial fibrillation (CMS/HCC)    • CAD (coronary artery disease)    • Chronic a-fib (CMS/HCC)    • Chronic anticoagulation    • Congestive heart failure (CMS/HCC)     compensated   • Disease of thyroid gland    • Hyperlipidemia    • Hypertension    • Hypoxemia    • Ischemic cardiomyopathy with severe LV Disfunction    • Myocardial infarction (CMS/HCC)    • Pacemaker     VVI     Patient Active Problem List   Diagnosis   • CAD (coronary artery disease) status post CABG  single-vessel*   • Ischemic cardiomyopathy with apical hypokinesis LV ejection fraction 31-35%   • Chronic systolic congestive heart failure*   • Hyperlipidemia*   • Chronic a-fib*   • Pacemaker dual-chamber pacemaker Biotronik 2015*   • Hypothyroidism*   • Hypertension*   • Chronic anticoagulation*   • Gastroesophageal reflux disease without esophagitis*   • URI (upper respiratory infection)   • Asthmatic bronchitis with acute exacerbation   • Stress incontinence   • Herpes zoster without complication   • Acute midline low back pain without sciatica   • CHF (congestive heart failure) (CMS/HCC)       Social History     Tobacco Use   • Smoking status: Former Smoker     Last attempt to quit:      Years since quittin.1   • Smokeless tobacco: Never Used   Substance  Use Topics   • Alcohol use: No   • Drug use: No       Allergies   Allergen Reactions   • Codeine Rash   • Eggs Or Egg-Derived Products Itching and Rash   • Grass Itching and Rash   • Lisinopril Rash       Current Outpatient Medications on File Prior to Visit   Medication Sig   • aspirin 81 MG EC tablet Take 1 tablet by mouth Daily.   • budesonide-formoterol (SYMBICORT) 160-4.5 MCG/ACT inhaler Inhale 2 puffs by mouth 2 (Two) Times a Day.   • carvedilol (COREG) 6.25 MG tablet TAKE 1 TABLET BY MOUTH TWICE DAILY WITH MEALS   • ELIQUIS 5 MG tablet tablet TAKE 1 TABLET BY MOUTH TWICE DAILY   • furosemide (LASIX) 20 MG tablet Take 1 tablet by mouth Daily.   • gabapentin (NEURONTIN) 100 MG capsule Take 2 capsules by mouth 2 (Two) Times a Day.   • hydrALAZINE (APRESOLINE) 10 MG tablet Take 1 tablet by mouth Every 8 (Eight) Hours.   • ipratropium-albuterol (DUO-NEB) 0.5-2.5 mg/3 ml nebulizer Take 3 mL by nebulization 4 (Four) Times a Day.   • isosorbide mononitrate (IMDUR) 60 MG 24 hr tablet TAKE 1 TABLET BY MOUTH DAILY   • levothyroxine (SYNTHROID, LEVOTHROID) 50 MCG tablet TAKE 1 TABLET BY MOUTH DAILY   • montelukast (SINGULAIR) 10 MG tablet TAKE 1 TABLET BY MOUTH EVERY NIGHT   • nitroglycerin (NITROSTAT) 0.4 MG SL tablet Place 1 tablet under the tongue Every 5 (Five) Minutes As Needed for Chest Pain. Take no more than 3 doses in 15 minutes.   • PROVENTIL  (90 Base) MCG/ACT inhaler INHALE 2 PUFFS BY MOUTH EVERY 4 HOURS AS NEEDED FOR WHEEZING   • [DISCONTINUED] rosuvastatin (CRESTOR) 10 MG tablet Take 1 tablet by mouth Every Night.     No current facility-administered medications on file prior to visit.          The following portions of the patient's history were reviewed and updated as appropriate: allergies, current medications, past family history, past medical history, past social history, past surgical history and problem list.    Review of Systems   Constitution: Positive for malaise/fatigue.   HENT: Negative.   "  Eyes: Negative.    Cardiovascular: Positive for dyspnea on exertion. Negative for leg swelling, orthopnea, palpitations, paroxysmal nocturnal dyspnea and syncope.   Respiratory: Positive for cough and shortness of breath. Negative for hemoptysis, sputum production and wheezing.    Endocrine: Negative.    Hematologic/Lymphatic: Negative.    Skin: Negative.    Musculoskeletal: Positive for myalgias and stiffness.   Gastrointestinal: Negative.    Genitourinary: Negative.    Neurological: Negative.    Psychiatric/Behavioral: Negative.    Allergic/Immunologic: Positive for environmental allergies.          Objective:     /72 (BP Location: Left arm, Patient Position: Sitting)   Pulse 74   Ht 165.1 cm (65\")   Wt 79.8 kg (176 lb)   SpO2 98%   BMI 29.29 kg/m²   Physical Exam   Constitutional: She appears well-developed and well-nourished. No distress.   HENT:   Head: Normocephalic and atraumatic.   Mouth/Throat: Oropharynx is clear and moist. No oropharyngeal exudate.   Eyes: Conjunctivae and EOM are normal. Pupils are equal, round, and reactive to light. No scleral icterus.   Neck: Normal range of motion. Neck supple. No JVD present. No tracheal deviation present. No thyromegaly present.   Cardiovascular: Normal rate, regular rhythm, normal heart sounds and intact distal pulses. PMI is not displaced. Exam reveals no gallop, no friction rub and no decreased pulses.   No murmur heard.  Pulses:       Carotid pulses are 3+ on the right side, and 3+ on the left side.       Radial pulses are 3+ on the right side, and 3+ on the left side.   Pulmonary/Chest: Effort normal and breath sounds normal. No respiratory distress. She has no wheezes. She has no rales. She exhibits no tenderness.   Abdominal: Soft. Bowel sounds are normal. She exhibits no distension, no abdominal bruit and no mass. There is no splenomegaly or hepatomegaly. There is no tenderness. There is no rebound and no guarding.   Musculoskeletal: Normal " range of motion. She exhibits no edema, tenderness or deformity.   Lymphadenopathy:     She has no cervical adenopathy.   Neurological: She is alert. She has normal reflexes. No cranial nerve deficit. She exhibits normal muscle tone. Coordination normal.   Skin: Skin is warm and dry. No rash noted. She is not diaphoretic. No erythema.   Psychiatric: She has a normal mood and affect. Her behavior is normal. Judgment and thought content normal.         Lab Review  Lab Results   Component Value Date     03/01/2019    K 4.1 03/01/2019     03/01/2019    BUN 20 03/01/2019    CREATININE 0.88 03/01/2019    GLUCOSE 91 03/01/2019    CALCIUM 9.2 03/01/2019    ALT 20 02/28/2019    ALKPHOS 84 02/28/2019    LABIL2 1.3 (L) 04/05/2016     Lab Results   Component Value Date    CKTOTAL 50 01/10/2019     Lab Results   Component Value Date    WBC 4.70 03/01/2019    HGB 12.1 03/01/2019    HCT 36.0 (L) 03/01/2019     03/01/2019     Lab Results   Component Value Date    INR 0.91 12/17/2015    INR 1.00 02/20/2014     Lab Results   Component Value Date    MG 2.2 02/25/2019     Lab Results   Component Value Date    TSH 3.131 02/26/2019     Lab Results   Component Value Date    .0 (H) 02/28/2019     Lab Results   Component Value Date    CHLPL 219 (H) 04/05/2016    CHOL 145 01/10/2019    TRIG 174 (H) 01/10/2019    HDL 48 (L) 01/10/2019    VLDL 34.8 01/10/2019    LDLHDL 1.30 01/10/2019     Lab Results   Component Value Date    LDL 62 01/10/2019    LDL 51 05/08/2018       Procedures       I personally viewed and interpreted the patient's LAB data         Assessment:     1. Coronary artery disease involving native coronary artery of native heart without angina pectoris    2. Acute on chronic combined systolic and diastolic congestive heart failure (CMS/HCC)    3. Chronic a-fib*    4. Mixed hyperlipidemia    5. Ischemic cardiomyopathy with apical hypokinesis LV ejection fraction 31-35%    6. Pacemaker dual-chamber  pacemaker Biotronik December 2015*    7. Chronic anticoagulation*    8. Hypothyroidism, unspecified type    9. Herpes zoster without complication          Plan:     Patient has known coronary artery disease status post CABG.  She has ischemic cardiomyopathy with LV ejection fraction 31-35%.  She seems to be doing better.  She refuses to have ICD in place our LifeVest.  Risks were explained.  Patient also has COPD and moderate to severe pulmonary hypertension.  Patient refused right heart catheter as recommended by pulmonologist.    She also refuses BiPAP.  Risks were again explained.  Patient also does not wish to take Crestor however she is willing to take Livalo 1 mg daily which was prescribed.  Hospital records were discussed with the patient in detail.  Stress test was negative for exercise-induced myocardial ischemia.  Other medications including thyroid MDR Neurontin Coreg Eliquis were continued.  Follow-up scheduled        No Follow-up on file.

## 2019-03-08 DIAGNOSIS — J43.2 CENTRILOBULAR EMPHYSEMA (HCC): Primary | ICD-10-CM

## 2019-03-08 RX ORDER — PITAVASTATIN CALCIUM 1.04 MG/1
1 TABLET, FILM COATED ORAL DAILY
Qty: 90 TABLET | Refills: 0 | OUTPATIENT
Start: 2019-03-08

## 2019-03-08 RX ORDER — LEVOTHYROXINE SODIUM 0.05 MG/1
TABLET ORAL
Qty: 90 TABLET | Refills: 0 | Status: SHIPPED | OUTPATIENT
Start: 2019-03-08 | End: 2019-05-24 | Stop reason: SDUPTHER

## 2019-03-08 NOTE — PROGRESS NOTES
Ordering nebulizer machine and supplies per patient request following hospital discharge, and was provided DuoNeb treatments without machine.

## 2019-03-12 ENCOUNTER — READMISSION MANAGEMENT (OUTPATIENT)
Dept: CALL CENTER | Facility: HOSPITAL | Age: 79
End: 2019-03-12

## 2019-03-12 NOTE — OUTREACH NOTE
CHF Week 2 Survey      Responses   Facility patient discharged from?  [facility]   Does the patient have one of the following disease processes/diagnoses(primary or secondary)?  CHF   Week 2 attempt successful?  No   Unsuccessful attempts  Attempt 1          Awilda Peterson RN

## 2019-03-13 ENCOUNTER — READMISSION MANAGEMENT (OUTPATIENT)
Dept: CALL CENTER | Facility: HOSPITAL | Age: 79
End: 2019-03-13

## 2019-03-13 NOTE — OUTREACH NOTE
CHF Week 2 Survey      Responses   Facility patient discharged from?  [facility]   Does the patient have one of the following disease processes/diagnoses(primary or secondary)?  CHF   Week 2 attempt successful?  No   Unsuccessful attempts  Attempt 2          Mariposa Rowell RN

## 2019-03-18 ENCOUNTER — READMISSION MANAGEMENT (OUTPATIENT)
Dept: CALL CENTER | Facility: HOSPITAL | Age: 79
End: 2019-03-18

## 2019-03-18 NOTE — OUTREACH NOTE
CHF Week 3 Survey      Responses   Facility patient discharged from?  [facility]   Does the patient have one of the following disease processes/diagnoses(primary or secondary)?  CHF   Week 3 attempt successful?  Yes   Call start time  0734   Call end time  0736   Meds reviewed with patient/caregiver?  Yes   Is the patient taking all medications as directed (includes completed medication regime)?  Yes   Has the patient kept scheduled appointments due by today?  Yes   What is the patient's perception of their health status since discharge?  Improving   Is the patient weighing daily?  Yes   Is the patient able to teach back Heart Failure Zones?  Yes   CHF Week 3 call completed?  Yes          Deja Sheth, RN

## 2019-03-25 ENCOUNTER — READMISSION MANAGEMENT (OUTPATIENT)
Dept: CALL CENTER | Facility: HOSPITAL | Age: 79
End: 2019-03-25

## 2019-03-25 NOTE — OUTREACH NOTE
CHF Week 4 Survey      Responses   Facility patient discharged from?  [facility]   Does the patient have one of the following disease processes/diagnoses(primary or secondary)?  CHF   Week 4 attempt successful?  Yes   Call start time  1443   Call end time  1445   Meds reviewed with patient/caregiver?  Yes   Is the patient taking all medications as directed (includes completed medication regime)?  Yes   Has the patient kept scheduled appointments due by today?  Yes   Is the patient still receiving Home Health Services?  No   What is the patient's perception of their health status since discharge?  Improving   Is the patient weighing daily?  Yes   Is the patient able to teach back Heart Failure Zones?  Yes [green zone]   Week 4 Call Completed?  Yes   Would the patient like one additional call?  No   Graduated  Yes   Did the patient feel the follow up calls were helpful during their recovery period?  Yes   Was the number of calls appropriate?  Yes   Wrap up additional comments  her shingles are better, no new issues today          Jolene Davey RN

## 2019-03-26 ENCOUNTER — EPISODE CHANGES (OUTPATIENT)
Dept: CASE MANAGEMENT | Facility: OTHER | Age: 79
End: 2019-03-26

## 2019-04-02 RX ORDER — NITROGLYCERIN 0.4 MG/1
TABLET SUBLINGUAL
Qty: 25 TABLET | Refills: 0 | Status: SHIPPED | OUTPATIENT
Start: 2019-04-02 | End: 2019-09-28 | Stop reason: SDUPTHER

## 2019-04-02 RX ORDER — NITROGLYCERIN 0.4 MG/1
TABLET SUBLINGUAL
Qty: 25 TABLET | Refills: 0 | OUTPATIENT
Start: 2019-04-02

## 2019-04-03 RX ORDER — MONTELUKAST SODIUM 10 MG/1
TABLET ORAL
Qty: 30 TABLET | Refills: 0 | Status: SHIPPED | OUTPATIENT
Start: 2019-04-03 | End: 2019-04-30 | Stop reason: SDUPTHER

## 2019-04-05 RX ORDER — ISOSORBIDE MONONITRATE 60 MG/1
TABLET, EXTENDED RELEASE ORAL
Qty: 90 TABLET | Refills: 0 | Status: SHIPPED | OUTPATIENT
Start: 2019-04-05 | End: 2019-06-23 | Stop reason: SDUPTHER

## 2019-04-11 RX ORDER — CARVEDILOL 6.25 MG/1
TABLET ORAL
Qty: 180 TABLET | Refills: 0 | Status: SHIPPED | OUTPATIENT
Start: 2019-04-11 | End: 2019-06-23 | Stop reason: SDUPTHER

## 2019-04-12 ENCOUNTER — OFFICE VISIT (OUTPATIENT)
Dept: CARDIOLOGY | Facility: CLINIC | Age: 79
End: 2019-04-12

## 2019-04-12 DIAGNOSIS — I25.5 ISCHEMIC CARDIOMYOPATHY: ICD-10-CM

## 2019-04-12 DIAGNOSIS — I50.22 CHRONIC SYSTOLIC CONGESTIVE HEART FAILURE (HCC): ICD-10-CM

## 2019-04-12 DIAGNOSIS — Z95.0 PACEMAKER: Primary | ICD-10-CM

## 2019-04-12 PROCEDURE — 93288 INTERROG EVL PM/LDLS PM IP: CPT | Performed by: INTERNAL MEDICINE

## 2019-04-26 ENCOUNTER — OFFICE VISIT (OUTPATIENT)
Dept: PULMONOLOGY | Facility: CLINIC | Age: 79
End: 2019-04-26

## 2019-04-26 VITALS
HEART RATE: 92 BPM | WEIGHT: 178.6 LBS | OXYGEN SATURATION: 95 % | BODY MASS INDEX: 29.76 KG/M2 | HEIGHT: 65 IN | TEMPERATURE: 98 F | DIASTOLIC BLOOD PRESSURE: 76 MMHG | SYSTOLIC BLOOD PRESSURE: 124 MMHG

## 2019-04-26 DIAGNOSIS — I27.20 PULMONARY HYPERTENSION (HCC): ICD-10-CM

## 2019-04-26 DIAGNOSIS — J43.2 CENTRILOBULAR EMPHYSEMA (HCC): ICD-10-CM

## 2019-04-26 DIAGNOSIS — J30.2 SEASONAL ALLERGIC RHINITIS, UNSPECIFIED TRIGGER: ICD-10-CM

## 2019-04-26 DIAGNOSIS — R06.00 PAROXYSMAL DYSPNEA: Primary | ICD-10-CM

## 2019-04-26 PROBLEM — J44.9 ASTHMATIC BRONCHITIS , CHRONIC: Status: ACTIVE | Noted: 2019-04-26

## 2019-04-26 PROBLEM — J44.89 ASTHMATIC BRONCHITIS , CHRONIC: Status: ACTIVE | Noted: 2019-04-26

## 2019-04-26 PROCEDURE — 99214 OFFICE O/P EST MOD 30 MIN: CPT | Performed by: PHYSICIAN ASSISTANT

## 2019-04-26 NOTE — PROGRESS NOTES
Interval history since last visit:    Recent hospitalizations:    Investigations (imaging, PFT's, labs, sleep study, record requests, etc.)    Have you had the Influenza Vaccine?No  Would you like to receive this Vaccine today? no    Have you had the Pneumonia Vaccine? No  Would you like to receive this Vaccine today? no    Subjective    Ashely Feliciano presents for the following No chief complaint on file.      History of Present Illness     Ms. Feliciano presents today for follow up after hospitalization for acute on chronic systolic heart failure.  During this time of assessment, she was noted for severe pulmonary hypertension on echo.  Centrilobular emphysema was also suspected by CT imaging.  Did not require segmental oxygen with daytime or nighttime use following this hospitalization.  She was encouraged to use the BiPAP machine during her stay, but preferred not to use this.   States that her  previously used a machine for sleep apnea, but she could not tolerate the noise.     Prior to hospitalization, she was previously taking 60 mg of Lasix daily.  Due to decreased renal function, she was discharged on 20 mg Lasix.  She reports that shortness of breath has been fairly controlled since then.  She intermittently notices shortness of breath upon exertion.  She reports that last week she was able to clean her house, and then noticed shortness of breath upon exertion the day after.  Shortness of breath is minimal today.  Admits to orthopnea and typically sleeps in an adjustable bed or her recliner.     She states that since her hospitalization, her memory has been impaired and finds herself continually writing things down.    Her last hospitalization prior to this episode was also for CHF exacerbation.  She states that she did require supplemental oxygen upon discharge.  This was approximately in 2001.  Since that hospitalization, she reports chronic cough that is nonproductive in nature.  Cough is  typically worse at nighttime.  It is improved with use of nebulizer treatment.  She has also noticed intermittent wheezing, typically noted with significant heat or moisture.  She reports difficulty sitting outside since the weather change.  She also has a history of seasonal allergies and takes Singulair nightly.  She uses Flonase as needed.  She reports a previous smoking history of approximately 30-35 years and quit in 1990.   Has noticed intermittent wheezing, usually notices with significant heat and moisture.     Currently uses Proventil inhaler as needed and Duoneb nebulizer treatment at home.  She reports intermittent palpitations and has a history of atrial fibrillation.  She notices occasional palpitations with nebulizer treatment but none with the Proventil inhaler.  She follows with Dr. Sanchez for cardiology.      Review of Systems   Constitutional: Negative for activity change, chills, fatigue and fever.   HENT: Negative for congestion, postnasal drip, rhinorrhea and sinus pressure.    Respiratory: Positive for cough, chest tightness, shortness of breath and wheezing.    Cardiovascular: Positive for palpitations. Negative for chest pain.   Gastrointestinal: Negative for abdominal pain and nausea.   Endocrine: Negative for cold intolerance and heat intolerance.   Musculoskeletal: Negative for arthralgias and gait problem.   Skin: Negative for color change and pallor.   Allergic/Immunologic: Positive for environmental allergies.   Neurological: Positive for headaches. Negative for dizziness and light-headedness.        Positive for memory changes   Psychiatric/Behavioral: Positive for confusion and sleep disturbance.       Active Problems:  Problem List Items Addressed This Visit        Cardiovascular and Mediastinum    Pulmonary hypertension (CMS/HCC)       Respiratory    Centrilobular emphysema (CMS/HCC)    Relevant Medications    ipratropium (ATROVENT HFA) 17 MCG/ACT inhaler    ipratropium (ATROVENT)  0.02 % nebulizer solution    tiotropium bromide monohydrate (SPIRIVA RESPIMAT) 2.5 MCG/ACT aerosol solution inhaler    Other Relevant Orders    Full Pulmonary Function Test With Bronchodilator    Seasonal allergic rhinitis      Other Visit Diagnoses     Paroxysmal dyspnea    -  Primary    Relevant Orders    Full Pulmonary Function Test With Bronchodilator    Vitamin B12    Intrinsic Factor Ab          Past Medical History:  Past Medical History:   Diagnosis Date   • Atrial fibrillation (CMS/HCC)    • CAD (coronary artery disease)    • Chronic a-fib (CMS/HCC)    • Chronic anticoagulation    • Congestive heart failure (CMS/HCC)     compensated   • Disease of thyroid gland    • Hyperlipidemia    • Hypertension    • Hypoxemia    • Ischemic cardiomyopathy with severe LV Disfunction    • Myocardial infarction (CMS/HCC)    • Pacemaker     VVI       Family History:  Family History   Problem Relation Age of Onset   • Coronary artery disease Other    • Hypertension Other    • Diabetes Other    • Heart attack Other    • Heart attack Mother 62        fatal MI   • Skin cancer Mother    • Diabetes type II Mother    • Melanoma Mother    • Heart attack Father 63        fatal MI   • Melanoma Sister 45   • Skin cancer Sister    • Heart attack Brother 62        fatal MI   • Heart attack Brother 80        Fatal MI   • Skin cancer Brother    • Heart disease Sister    • Heart disease Brother 75        pacemaker, CABG, Stents   • Osteoporosis Sister    • Pneumonia Brother    • Hypertension Brother    • Heart attack Sister 54        Fatal MI   • Heart attack Brother 59        Fatal MI   • Breast cancer Neg Hx        Social History:  Social History     Tobacco Use   • Smoking status: Former Smoker     Last attempt to quit: 1990     Years since quittin.3   • Smokeless tobacco: Never Used   Substance Use Topics   • Alcohol use: No       Current Medications:  Current Outpatient Medications   Medication Sig Dispense Refill   • aspirin 81  "MG EC tablet Take 1 tablet by mouth Daily. 30 tablet 0   • carvedilol (COREG) 6.25 MG tablet TAKE 1 TABLET BY MOUTH TWICE DAILY WITH MEALS 180 tablet 0   • ELIQUIS 5 MG tablet tablet TAKE 1 TABLET BY MOUTH TWICE DAILY 180 tablet 0   • furosemide (LASIX) 20 MG tablet Take 1 tablet by mouth Daily. 30 tablet 12   • gabapentin (NEURONTIN) 100 MG capsule Take 2 capsules by mouth 2 (Two) Times a Day. 60 capsule 0   • hydrALAZINE (APRESOLINE) 10 MG tablet Take 1 tablet by mouth Every 8 (Eight) Hours. 90 tablet 0   • ipratropium (ATROVENT HFA) 17 MCG/ACT inhaler Inhale 2 puffs by mouth 4 (Four) Times a Day. 12.9 g 11   • ipratropium (ATROVENT) 0.02 % nebulizer solution Take 2.5 mL by nebulization 4 (Four) Times a Day As Needed for Wheezing or Shortness of Air. 12.5 mL 11   • isosorbide mononitrate (IMDUR) 60 MG 24 hr tablet TAKE 1 TABLET BY MOUTH DAILY 90 tablet 0   • levothyroxine (SYNTHROID, LEVOTHROID) 50 MCG tablet TAKE 1 TABLET BY MOUTH DAILY 90 tablet 0   • montelukast (SINGULAIR) 10 MG tablet TAKE 1 TABLET BY MOUTH EVERY NIGHT 30 tablet 0   • nitroglycerin (NITROSTAT) 0.4 MG SL tablet DISSOLVE ONE TABLET UNDER TONGUE AS NEEDED FOR CHEST PAIN MAY REPEAT DOSE EVERY 5 MINUTES FOR UP TO 3 DOSES. IF PAIN PERSISTS CALL 911 25 tablet 0   • pitavastatin calcium (LIVALO) 1 MG tablet tablet Take 1 tablet by mouth Every Night. 90 tablet 1   • tiotropium bromide monohydrate (SPIRIVA RESPIMAT) 2.5 MCG/ACT aerosol solution inhaler Inhale 2 puffs by mouth Daily. 4 g 8     No current facility-administered medications for this visit.        Allergies:  Allergies   Allergen Reactions   • Codeine Rash   • Eggs Or Egg-Derived Products Itching and Rash   • Grass Itching and Rash   • Lisinopril Rash       Vitals:  /76   Pulse 92   Temp 98 °F (36.7 °C)   Ht 165.1 cm (65\")   Wt 81 kg (178 lb 9.6 oz)   SpO2 95%   BMI 29.72 kg/m²     Imaging:    Imaging Results (most recent)     None          Pulmonary Functions Testing " Results:    No results found for: FEV1, FVC, ETY3KDU, TLC, DLCO    Results for orders placed or performed during the hospital encounter of 02/25/19   Comprehensive Metabolic Panel   Result Value Ref Range    Glucose 101 70 - 110 mg/dL    BUN 29 (H) 7 - 21 mg/dL    Creatinine 1.45 (H) 0.43 - 1.29 mg/dL    Sodium 139 135 - 153 mmol/L    Potassium 4.3 3.5 - 5.3 mmol/L    Chloride 108 99 - 112 mmol/L    CO2 23.9 (L) 24.3 - 31.9 mmol/L    Calcium 9.0 7.7 - 10.0 mg/dL    Total Protein 7.0 6.0 - 8.0 g/dL    Albumin 3.90 3.40 - 4.80 g/dL    ALT (SGPT) 28 10 - 36 U/L    AST (SGOT) 28 10 - 30 U/L    Alkaline Phosphatase 97 35 - 104 U/L    Total Bilirubin 0.6 0.2 - 1.8 mg/dL    eGFR Non African Amer 35 (L) >60 mL/min/1.73    Globulin 3.1 gm/dL    A/G Ratio 1.3 (L) 1.5 - 2.5 g/dL    BUN/Creatinine Ratio 20.0 7.0 - 25.0    Anion Gap 7.1 3.6 - 11.2 mmol/L   Lipase   Result Value Ref Range    Lipase 52 13 - 60 U/L   Urinalysis With Microscopic If Indicated (No Culture) - Urine, Clean Catch   Result Value Ref Range    Color, UA Yellow Yellow, Straw    Appearance, UA Clear Clear    pH, UA 7.0 5.0 - 8.0    Specific Gravity, UA 1.009 1.005 - 1.030    Glucose, UA Negative Negative    Ketones, UA Negative Negative    Bilirubin, UA Negative Negative    Blood, UA Negative Negative    Protein, UA Negative Negative    Leuk Esterase, UA Negative Negative    Nitrite, UA Negative Negative    Urobilinogen, UA 0.2 E.U./dL 0.2 - 1.0 E.U./dL   BNP   Result Value Ref Range    .0 (H) 0.0 - 100.0 pg/mL   Troponin   Result Value Ref Range    Troponin I <0.006 <=0.040 ng/mL   Blood Gas, Arterial   Result Value Ref Range    Site Arterial: left brachial     Oswaldo's Test N/A     pH, Arterial 7.447 7.350 - 7.450 pH units    pCO2, Arterial 33.1 (L) 35.0 - 45.0 mm Hg    pO2, Arterial 71.3 (L) 80.0 - 100.0 mm Hg    HCO3, Arterial 22.3 22.0 - 26.0 mmol/L    Base Excess, Arterial -1.1 mmol/L    O2 Saturation, Arterial 94.5 90.0 - 100.0 %    Hemoglobin,  Blood Gas 12.0 12 - 16 g/dL    Hematocrit, Blood Gas 35.0 (L) 37.0 - 47.0 %    Oxyhemoglobin 93.1 85 - 100 %    Methemoglobin 0.10 0.00 - 3.00 %    Carboxyhemoglobin 1.4 0 - 5 %    A-a Gradiant 34.0 0.0 - 300.0 mmHg    Temperature 98.6 C    Barometric Pressure for Blood Gas 737 mmHg    Modality Room Air     FIO2 21 %   Magnesium   Result Value Ref Range    Magnesium 2.2 1.7 - 2.6 mg/dL   TSH   Result Value Ref Range    TSH 1.928 0.550 - 4.780 mIU/mL   T4, Free   Result Value Ref Range    Free T4 1.18 0.89 - 1.76 ng/dL   CBC Auto Differential   Result Value Ref Range    WBC 4.70 4.50 - 12.50 10*3/mm3    RBC 3.80 (L) 4.20 - 5.40 10*6/mm3    Hemoglobin 11.4 (L) 12.0 - 16.0 g/dL    Hematocrit 34.7 (L) 37.0 - 47.0 %    MCV 91.3 80.0 - 94.0 fL    MCH 30.0 27.0 - 33.0 pg    MCHC 32.9 (L) 33.0 - 37.0 g/dL    RDW 14.1 11.5 - 14.5 %    RDW-SD 46.4 37.0 - 54.0 fl    MPV 11.1 (H) 6.0 - 10.0 fL    Platelets 139 130 - 400 10*3/mm3    Neutrophil % 56.2 40.0 - 75.0 %    Lymphocyte % 24.5 16.0 - 46.0 %    Monocyte % 7.4 0.0 - 12.0 %    Eosinophil % 10.2 (H) 0.0 - 7.0 %    Basophil % 1.5 0.0 - 2.0 %    Immature Grans % 0.2 0.0 - 0.5 %    Neutrophils, Absolute 2.64 1.40 - 6.50 10*3/mm3    Lymphocytes, Absolute 1.15 1.00 - 3.00 10*3/mm3    Monocytes, Absolute 0.35 0.10 - 0.90 10*3/mm3    Eosinophils, Absolute 0.48 0.00 - 0.70 10*3/mm3    Basophils, Absolute 0.07 0.00 - 0.30 10*3/mm3    Immature Grans, Absolute 0.01 0.00 - 0.03 10*3/mm3   Osmolality, Calculated   Result Value Ref Range    Osmolality Calc 283.5 273.0 - 305.0 mOsm/kg   Troponin   Result Value Ref Range    Troponin I 0.018 <=0.040 ng/mL   Microalbumin / Creatinine Urine Ratio - Urine, Clean Catch   Result Value Ref Range    Microalbumin/Creatinine Ratio 12.3 mg/g    Creatinine, Urine 36.6 mg/dL    Microalbumin, Urine 4.5 mg/L   Protein / Creatinine Ratio, Urine - Urine, Clean Catch   Result Value Ref Range    Protein/Creatinine Ratio, Urine 109.3 0.0 - 200.0 mg/G Crea     Creatinine, Urine 36.6 mg/dL    Total Protein, Urine 4.0 mg/dL   Troponin   Result Value Ref Range    Troponin I 0.045 (H) <=0.040 ng/mL   BNP   Result Value Ref Range    .0 (H) 0.0 - 100.0 pg/mL   Comprehensive Metabolic Panel   Result Value Ref Range    Glucose 85 70 - 110 mg/dL    BUN 28 (H) 7 - 21 mg/dL    Creatinine 1.06 0.43 - 1.29 mg/dL    Sodium 139 135 - 153 mmol/L    Potassium 3.7 3.5 - 5.3 mmol/L    Chloride 107 99 - 112 mmol/L    CO2 24.3 24.3 - 31.9 mmol/L    Calcium 9.0 7.7 - 10.0 mg/dL    Total Protein 7.1 6.0 - 8.0 g/dL    Albumin 4.00 3.40 - 4.80 g/dL    ALT (SGPT) 21 10 - 36 U/L    AST (SGOT) 25 10 - 30 U/L    Alkaline Phosphatase 90 35 - 104 U/L    Total Bilirubin 0.8 0.2 - 1.8 mg/dL    eGFR Non African Amer 50 (L) >60 mL/min/1.73    Globulin 3.1 gm/dL    A/G Ratio 1.3 (L) 1.5 - 2.5 g/dL    BUN/Creatinine Ratio 26.4 (H) 7.0 - 25.0    Anion Gap 7.7 3.6 - 11.2 mmol/L   TSH   Result Value Ref Range    TSH 3.131 0.550 - 4.780 mIU/mL   Troponin   Result Value Ref Range    Troponin I 0.046 (H) <=0.040 ng/mL   C-reactive Protein   Result Value Ref Range    C-Reactive Protein <0.50 0.00 - 0.99 mg/dL   CBC Auto Differential   Result Value Ref Range    WBC 3.53 (L) 4.50 - 12.50 10*3/mm3    RBC 3.86 (L) 4.20 - 5.40 10*6/mm3    Hemoglobin 11.7 (L) 12.0 - 16.0 g/dL    Hematocrit 35.4 (L) 37.0 - 47.0 %    MCV 91.7 80.0 - 94.0 fL    MCH 30.3 27.0 - 33.0 pg    MCHC 33.1 33.0 - 37.0 g/dL    RDW 13.9 11.5 - 14.5 %    RDW-SD 45.1 37.0 - 54.0 fl    MPV 10.7 (H) 6.0 - 10.0 fL    Platelets 131 130 - 400 10*3/mm3    Neutrophil % 34.8 (L) 40.0 - 75.0 %    Lymphocyte % 42.2 16.0 - 46.0 %    Monocyte % 8.8 0.0 - 12.0 %    Eosinophil % 12.5 (H) 0.0 - 7.0 %    Basophil % 1.7 0.0 - 2.0 %    Immature Grans % 0.0 0.0 - 0.5 %    Neutrophils, Absolute 1.23 (L) 1.40 - 6.50 10*3/mm3    Lymphocytes, Absolute 1.49 1.00 - 3.00 10*3/mm3    Monocytes, Absolute 0.31 0.10 - 0.90 10*3/mm3    Eosinophils, Absolute 0.44 0.00 - 0.70  10*3/mm3    Basophils, Absolute 0.06 0.00 - 0.30 10*3/mm3    Immature Grans, Absolute 0.00 0.00 - 0.03 10*3/mm3   Osmolality, Calculated   Result Value Ref Range    Osmolality Calc 282.3 273.0 - 305.0 mOsm/kg   Troponin   Result Value Ref Range    Troponin I 0.032 <=0.040 ng/mL   Basic Metabolic Panel   Result Value Ref Range    Glucose 90 70 - 110 mg/dL    BUN 28 (H) 7 - 21 mg/dL    Creatinine 1.08 0.43 - 1.29 mg/dL    Sodium 138 135 - 153 mmol/L    Potassium 4.2 3.5 - 5.3 mmol/L    Chloride 107 99 - 112 mmol/L    CO2 24.3 24.3 - 31.9 mmol/L    Calcium 9.2 7.7 - 10.0 mg/dL    eGFR Non African Amer 49 (L) >60 mL/min/1.73    BUN/Creatinine Ratio 25.9 (H) 7.0 - 25.0    Anion Gap 6.7 3.6 - 11.2 mmol/L   C-reactive Protein   Result Value Ref Range    C-Reactive Protein <0.50 0.00 - 0.99 mg/dL   CBC Auto Differential   Result Value Ref Range    WBC 4.81 4.50 - 12.50 10*3/mm3    RBC 4.00 (L) 4.20 - 5.40 10*6/mm3    Hemoglobin 12.2 12.0 - 16.0 g/dL    Hematocrit 36.6 (L) 37.0 - 47.0 %    MCV 91.5 80.0 - 94.0 fL    MCH 30.5 27.0 - 33.0 pg    MCHC 33.3 33.0 - 37.0 g/dL    RDW 13.9 11.5 - 14.5 %    RDW-SD 45.8 37.0 - 54.0 fl    MPV 11.2 (H) 6.0 - 10.0 fL    Platelets 133 130 - 400 10*3/mm3    Neutrophil % 51.2 40.0 - 75.0 %    Lymphocyte % 27.2 16.0 - 46.0 %    Monocyte % 12.5 (H) 0.0 - 12.0 %    Eosinophil % 7.5 (H) 0.0 - 7.0 %    Basophil % 1.2 0.0 - 2.0 %    Immature Grans % 0.4 0.0 - 0.5 %    Neutrophils, Absolute 2.46 1.40 - 6.50 10*3/mm3    Lymphocytes, Absolute 1.31 1.00 - 3.00 10*3/mm3    Monocytes, Absolute 0.60 0.10 - 0.90 10*3/mm3    Eosinophils, Absolute 0.36 0.00 - 0.70 10*3/mm3    Basophils, Absolute 0.06 0.00 - 0.30 10*3/mm3    Immature Grans, Absolute 0.02 0.00 - 0.03 10*3/mm3   Osmolality, Calculated   Result Value Ref Range    Osmolality Calc 280.7 273.0 - 305.0 mOsm/kg   BNP   Result Value Ref Range    .0 (H) 0.0 - 100.0 pg/mL   Comprehensive Metabolic Panel   Result Value Ref Range    Glucose 81 70  - 110 mg/dL    BUN 17 7 - 21 mg/dL    Creatinine 0.99 0.43 - 1.29 mg/dL    Sodium 138 135 - 153 mmol/L    Potassium 4.2 3.5 - 5.3 mmol/L    Chloride 108 99 - 112 mmol/L    CO2 22.5 (L) 24.3 - 31.9 mmol/L    Calcium 9.0 7.7 - 10.0 mg/dL    Total Protein 7.0 6.0 - 8.0 g/dL    Albumin 3.70 3.40 - 4.80 g/dL    ALT (SGPT) 20 10 - 36 U/L    AST (SGOT) 24 10 - 30 U/L    Alkaline Phosphatase 84 35 - 104 U/L    Total Bilirubin 0.8 0.2 - 1.8 mg/dL    eGFR Non African Amer 55 (L) >60 mL/min/1.73    Globulin 3.3 gm/dL    A/G Ratio 1.1 (L) 1.5 - 2.5 g/dL    BUN/Creatinine Ratio 17.2 7.0 - 25.0    Anion Gap 7.5 3.6 - 11.2 mmol/L   C-reactive Protein   Result Value Ref Range    C-Reactive Protein <0.50 0.00 - 0.99 mg/dL   BNP   Result Value Ref Range    .0 (H) 0.0 - 100.0 pg/mL   CBC Auto Differential   Result Value Ref Range    WBC 4.24 (L) 4.50 - 12.50 10*3/mm3    RBC 4.01 (L) 4.20 - 5.40 10*6/mm3    Hemoglobin 12.2 12.0 - 16.0 g/dL    Hematocrit 36.9 (L) 37.0 - 47.0 %    MCV 92.0 80.0 - 94.0 fL    MCH 30.4 27.0 - 33.0 pg    MCHC 33.1 33.0 - 37.0 g/dL    RDW 13.9 11.5 - 14.5 %    RDW-SD 45.2 37.0 - 54.0 fl    MPV 10.7 (H) 6.0 - 10.0 fL    Platelets 135 130 - 400 10*3/mm3    Neutrophil % 36.1 (L) 40.0 - 75.0 %    Lymphocyte % 39.9 16.0 - 46.0 %    Monocyte % 11.6 0.0 - 12.0 %    Eosinophil % 10.1 (H) 0.0 - 7.0 %    Basophil % 2.1 (H) 0.0 - 2.0 %    Immature Grans % 0.2 0.0 - 0.5 %    Neutrophils, Absolute 1.53 1.40 - 6.50 10*3/mm3    Lymphocytes, Absolute 1.69 1.00 - 3.00 10*3/mm3    Monocytes, Absolute 0.49 0.10 - 0.90 10*3/mm3    Eosinophils, Absolute 0.43 0.00 - 0.70 10*3/mm3    Basophils, Absolute 0.09 0.00 - 0.30 10*3/mm3    Immature Grans, Absolute 0.01 0.00 - 0.03 10*3/mm3   Osmolality, Calculated   Result Value Ref Range    Osmolality Calc 276.3 273.0 - 305.0 mOsm/kg   Basic Metabolic Panel   Result Value Ref Range    Glucose 91 70 - 110 mg/dL    BUN 20 7 - 21 mg/dL    Creatinine 0.88 0.43 - 1.29 mg/dL    Sodium  138 135 - 153 mmol/L    Potassium 4.1 3.5 - 5.3 mmol/L    Chloride 109 99 - 112 mmol/L    CO2 22.3 (L) 24.3 - 31.9 mmol/L    Calcium 9.2 7.7 - 10.0 mg/dL    eGFR Non African Amer 62 >60 mL/min/1.73    BUN/Creatinine Ratio 22.7 7.0 - 25.0    Anion Gap 6.7 3.6 - 11.2 mmol/L   CBC Auto Differential   Result Value Ref Range    WBC 4.70 4.50 - 12.50 10*3/mm3    RBC 4.01 (L) 4.20 - 5.40 10*6/mm3    Hemoglobin 12.1 12.0 - 16.0 g/dL    Hematocrit 36.0 (L) 37.0 - 47.0 %    MCV 89.8 80.0 - 94.0 fL    MCH 30.2 27.0 - 33.0 pg    MCHC 33.6 33.0 - 37.0 g/dL    RDW 13.7 11.5 - 14.5 %    RDW-SD 44.0 37.0 - 54.0 fl    MPV 10.7 (H) 6.0 - 10.0 fL    Platelets 142 130 - 400 10*3/mm3    Neutrophil % 46.9 40.0 - 75.0 %    Lymphocyte % 33.8 16.0 - 46.0 %    Monocyte % 9.1 0.0 - 12.0 %    Eosinophil % 7.9 (H) 0.0 - 7.0 %    Basophil % 2.1 (H) 0.0 - 2.0 %    Immature Grans % 0.2 0.0 - 0.5 %    Neutrophils, Absolute 2.20 1.40 - 6.50 10*3/mm3    Lymphocytes, Absolute 1.59 1.00 - 3.00 10*3/mm3    Monocytes, Absolute 0.43 0.10 - 0.90 10*3/mm3    Eosinophils, Absolute 0.37 0.00 - 0.70 10*3/mm3    Basophils, Absolute 0.10 0.00 - 0.30 10*3/mm3    Immature Grans, Absolute 0.01 0.00 - 0.03 10*3/mm3   Osmolality, Calculated   Result Value Ref Range    Osmolality Calc 277.9 273.0 - 305.0 mOsm/kg   Stress Test With Myocardial Perfusion One Day   Result Value Ref Range    Nuclear Prior Study 3      CV STRESS PROTOCOL 1 Pharmacologic     Stage 1 1     HR Stage 1 68     BP Stage 1 160/77     Duration Min Stage 1 0     Duration Sec Stage 1 10     Stress Dose Regadenoson Stage 1 0.4     Stress Comments Stage 1 10 sec bolus injection     Stage 2 2     HR Stage 2 88     BP Stage 2 134/77     Duration Min Stage 2 4     Duration Sec Stage 2 0     Stress Comments Stage 2 recovery     Baseline HR 65 bpm    Baseline /77 mmHg    Peak HR 88 bpm    Percent Max Pred HR 61.11 %    Percent Target HR 72 %    Peak /78 mmHg    Recovery HR 65 bpm    Recovery  /70 mmHg    Target HR (85%) 122 bpm    Max. Pred. HR (100%) 144 bpm    Nuc Stress EF 48 %   Adult Transthoracic Echo Complete W/ Cont if Necessary Per Protocol   Result Value Ref Range    BSA 1.9 m^2    IVSd 1.1 cm    IVSs 0.92 cm    LVIDd 5.5 cm    LVIDs 4.2 cm    LVPWd 1.0 cm    BH CV ECHO RAUDEL - LVPWS 1.1 cm    IVS/LVPW 1.1     FS 22.6 %    EDV(Teich) 144.6 ml    ESV(Teich) 79.5 ml    EF(Teich) 45.1 %    EDV(cubed) 162.2 ml    ESV(cubed) 75.1 ml    EF(cubed) 53.7 %    % IVS thick -16.1 %    % LVPW thick 9.3 %    LV mass(C)d 224.1 grams    LV mass(C)dI 117.6 grams/m^2    LV mass(C)s 139.9 grams    LV mass(C)sI 73.4 grams/m^2    SV(Teich) 65.1 ml    SI(Teich) 34.2 ml/m^2    SV(cubed) 87.1 ml    SI(cubed) 45.7 ml/m^2    Ao root diam 3.0 cm    Ao root area 6.9 cm^2    ACS 2.1 cm    LA dimension 4.6 cm    LA/Ao 1.6     LVOT diam 1.9 cm    LVOT area 3.0 cm^2    LVOT area(traced) 2.8 cm^2    LVLd ap4 6.3 cm    EDV(MOD-sp4) 65.0 ml    LVLs ap4 4.7 cm    ESV(MOD-sp4) 26.0 ml    EF(MOD-sp4) 60.0 %    SV(MOD-sp4) 39.0 ml    SI(MOD-sp4) 20.5 ml/m^2    Ao root area (BSA corrected) 1.6     LV [place] Vol (BSA corrected) 34.1 ml/m^2    LV Sys Vol (BSA corrected) 13.6 ml/m^2    MV E max andrew 100.7 cm/sec    MV A max andrew 38.0 cm/sec    MV E/A 2.6     Ao pk andrew 114.3 cm/sec    Ao max PG 5.2 mmHg    Ao V2 mean 69.9 cm/sec    Ao mean PG 2.3 mmHg    Ao V2 VTI 23.4 cm    SV(Ao) 160.7 ml    SI(Ao) 84.4 ml/m^2    PA acc slope 1,623 cm/sec^2    PA acc time 0.06 sec    TR max andrew 402.0 cm/sec    RVSP(TR) 74.6 mmHg    RAP systole 10.0 mmHg    PA pr(Accel) 50.5 mmHg    BH CV ECHO RAUDEL - BZI_BMI 30.5 kilograms/m^2     CV ECHO RAUDEL - BSA(HAYCOCK) 2.0 m^2     CV ECHO RAUDEL - BZI_METRIC_WEIGHT 83.0 kg     CV ECHO RAUDEL - BZI_METRIC_HEIGHT 165.1 cm    Target HR (85%) 122 bpm    Max. Pred. HR (100%) 144 bpm   Light Blue Top   Result Value Ref Range    Extra Tube hold for add-on    Green Top (Gel)   Result Value Ref Range    Extra Tube Hold  for add-ons.    Lavender Top   Result Value Ref Range    Extra Tube hold for add-on    Gold Top - SST   Result Value Ref Range    Extra Tube Hold for add-ons.        Objective   Physical Exam    General - well developed.  Well-nourished.  No signs of acute distress.    HEENT - pupils equally reactive to light, normal in size, no scleral icterus    Neck - supple. No thyromegaly.     Respiratory - Normal rate and effort.  Positive and appreciated throughout. No wheezing, crackles or rales on auscultation.     Cardiovascular - Normal S1 and S2. No S3, S4 or murmurs. No edema, pulses normal bilaterally.     GI - nontender nondistended. Active bowel sounds.    CNS - strength and sensation equal bilaterally.     Musculoskeletal - no edema. No visible joint deformity.     Psychiatric - mood good, good eye contact. Alert, and oriented.        Assessment/Plan     I have reviewed the past medical history, family history, social history, surgical history, and allergies.     I have reviewed the previous labs.     I have reviewed the previous echo.     I have reviewed the previous stress test.     I have reviewed the previous CT scan.         ICD-10-CM ICD-9-CM   1. Paroxysmal dyspnea R06.00 786.09   2. Centrilobular emphysema (CMS/[facility]) J43.2 492.8   3. Pulmonary hypertension (CMS/[facility]) I27.20 416.8   4. Seasonal allergic rhinitis, unspecified trigger J30.2 477.9         -Ordered Atrovent nebulizer treatments to use instead of DuoNeb with history of atrial fibrillation.  Can be used up to 4 times daily.  -Ordered Atrovent inhaler to use 4 hours as needed for worsening shortness of breath, wheezing.  -Provided samples of Spiriva Respimat 2.5 mcg.  Reviewed inhaler technique.  Directed to use 2 times daily.  -Ordered pulmonary function test.  -Continue follow-up with cardiology for management of chronic systolic heart failure, likely resulting in pulmonary edema.  Continue on current medications of Lasix 20 mg.   -Offered overnight  sleep oximetry test.  Patient preferred to wait at this time  -Completed 6-minute walk test.  Patient was negative for desaturation of 88% or below, and does not qualify for supplemental oxygen at this time.      Severe pulmonary hypertension  -Need cardiology follow-up and management as it is likely group 2 related.  -Ordered PFT and started on inhalers/nebulizer treatments for possible group 3 involvement COPD.  Emphysema is suspected by previous CT scan.        Allergic rhinitis  -Singulair  -Continue as needed Flonase  -Discussed that she can also use an antihistamine as needed      Return in about 6 months (around 10/26/2019).

## 2019-04-29 ENCOUNTER — LAB (OUTPATIENT)
Dept: LAB | Facility: HOSPITAL | Age: 79
End: 2019-04-29

## 2019-04-29 ENCOUNTER — OFFICE VISIT (OUTPATIENT)
Dept: CARDIOLOGY | Facility: CLINIC | Age: 79
End: 2019-04-29

## 2019-04-29 VITALS
BODY MASS INDEX: 28.32 KG/M2 | DIASTOLIC BLOOD PRESSURE: 70 MMHG | WEIGHT: 170 LBS | HEIGHT: 65 IN | HEART RATE: 75 BPM | OXYGEN SATURATION: 96 % | SYSTOLIC BLOOD PRESSURE: 124 MMHG

## 2019-04-29 DIAGNOSIS — I48.20 CHRONIC A-FIB (HCC): ICD-10-CM

## 2019-04-29 DIAGNOSIS — E78.2 MIXED HYPERLIPIDEMIA: ICD-10-CM

## 2019-04-29 DIAGNOSIS — I10 ESSENTIAL HYPERTENSION: ICD-10-CM

## 2019-04-29 DIAGNOSIS — I27.20 PULMONARY HYPERTENSION (HCC): ICD-10-CM

## 2019-04-29 DIAGNOSIS — I25.5 ISCHEMIC CARDIOMYOPATHY: ICD-10-CM

## 2019-04-29 DIAGNOSIS — B02.29 POST ZOSTER NEURALGIA: Primary | ICD-10-CM

## 2019-04-29 DIAGNOSIS — I25.10 CORONARY ARTERY DISEASE INVOLVING NATIVE CORONARY ARTERY OF NATIVE HEART WITHOUT ANGINA PECTORIS: ICD-10-CM

## 2019-04-29 DIAGNOSIS — Z95.0 PACEMAKER: ICD-10-CM

## 2019-04-29 DIAGNOSIS — R06.00 PAROXYSMAL DYSPNEA: ICD-10-CM

## 2019-04-29 LAB — VIT B12 BLD-MCNC: 428 PG/ML (ref 211–946)

## 2019-04-29 PROCEDURE — 86340 INTRINSIC FACTOR ANTIBODY: CPT

## 2019-04-29 PROCEDURE — 99214 OFFICE O/P EST MOD 30 MIN: CPT | Performed by: INTERNAL MEDICINE

## 2019-04-29 PROCEDURE — 36415 COLL VENOUS BLD VENIPUNCTURE: CPT

## 2019-04-29 PROCEDURE — 82607 VITAMIN B-12: CPT

## 2019-04-29 RX ORDER — GABAPENTIN 100 MG/1
200 CAPSULE ORAL 2 TIMES DAILY
Qty: 120 CAPSULE | Refills: 2 | Status: SHIPPED | OUTPATIENT
Start: 2019-04-29 | End: 2019-07-29 | Stop reason: SDUPTHER

## 2019-04-29 NOTE — PROGRESS NOTES
subjective     Chief Complaint   Patient presents with   • Coronary Artery Disease   • Follow-up     History of Present Illness  Patient is 78 years old white female who is here for follow-up of multiple chronic medical problems.  Patient had herpes zoster in the right buttock area.  It has been around 4 months but she is still having a lot of pain in the upper thigh and buttock area.  It gets better with the Neurontin.  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    Patient has known coronary artery disease status post CABG in 1997.  She denies any chest pain or palpitations.  She is taking aspirin and Imdur.    Patient also has hyperlipidemia and is taking Livalo 1 mg.  She is not able to tolerate other statin therapy.    She also has chronic atrial fibrillation.  Rate is very well controlled and she is on Eliquis 5 mg twice daily.  Rate is controlled with Coreg.    Recent echocardiogram showed LV ejection fraction around 31 to 35%.  Pacemaker also is functioning normally.    She has hypothyroidism on Synthroid replacement therapy.    Past Surgical History:   Procedure Laterality Date   • CARDIAC SURGERY  1997   • CARDIOVASCULAR STRESS TEST  06/2014   • CATARACT EXTRACTION, BILATERAL     • CHOLECYSTECTOMY  2002   • COLONOSCOPY  05/2012   • ECHO - CONVERTED  07/2014   • PACEMAKER IMPLANTATION  12/17/2015     Family History   Problem Relation Age of Onset   • Coronary artery disease Other    • Hypertension Other    • Diabetes Other    • Heart attack Other    • Heart attack Mother 62        fatal MI   • Skin cancer Mother    • Diabetes type II Mother    • Melanoma Mother    • Heart attack Father 63        fatal MI   • Melanoma Sister 45   • Skin cancer Sister    • Heart attack Brother 62        fatal MI   • Heart attack Brother 80        Fatal MI   • Skin cancer Brother    • Heart disease Sister    • Heart disease Brother 75        pacemaker, CABG, Stents   • Osteoporosis Sister    • Pneumonia Brother     • Hypertension Brother    • Heart attack Sister 54        Fatal MI   • Heart attack Brother 59        Fatal MI   • Breast cancer Neg Hx      Past Medical History:   Diagnosis Date   • Atrial fibrillation (CMS/HCC)    • CAD (coronary artery disease)    • Chronic a-fib (CMS/HCC)    • Chronic anticoagulation    • Congestive heart failure (CMS/HCC)     compensated   • Disease of thyroid gland    • Hyperlipidemia    • Hypertension    • Hypoxemia    • Ischemic cardiomyopathy with severe LV Disfunction    • Myocardial infarction (CMS/HCC)    • Pacemaker     VVI     Patient Active Problem List   Diagnosis   • CAD (coronary artery disease) status post CABG  single-vessel*   • Ischemic cardiomyopathy with apical hypokinesis LV ejection fraction 31-35%   • Chronic systolic congestive heart failure*   • Hyperlipidemia*   • Chronic a-fib*   • Pacemaker dual-chamber pacemaker Bi[employer]ron[employer] 2015*   • Hypothyroidism*   • Hypertension*   • Chronic anticoagulation*   • Gastroesophageal reflux disease without esophagitis*   • URI (upper respiratory infection)   • Asthmatic bronchitis with acute exacerbation   • Stress incontinence   • Herpes zoster without complication   • Acute midline low back pain without sciatica   • CHF (congestive heart failure) (CMS/HCC)   • Centrilobular emphysema (CMS/HCC)   • Asthmatic bronchitis , chronic (CMS/HCC)   • Moderate pulmonary hypertension (CMS/HCC), 46 mmHg   • Seasonal allergic rhinitis   • Post zoster neuralgia       Social History     Tobacco Use   • Smoking status: Former Smoker     Last attempt to quit: 1990     Years since quittin.3   • Smokeless tobacco: Never Used   Substance Use Topics   • Alcohol use: No   • Drug use: No       Allergies   Allergen Reactions   • Codeine Rash   • Eggs Or Egg-Derived Products Itching and Rash   • Grass Itching and Rash   • Lisinopril Rash       Current Outpatient Medications on File Prior to Visit   Medication Sig   • aspirin 81 MG EC  tablet Take 1 tablet by mouth Daily.   • carvedilol (COREG) 6.25 MG tablet TAKE 1 TABLET BY MOUTH TWICE DAILY WITH MEALS   • ELIQUIS 5 MG tablet tablet TAKE 1 TABLET BY MOUTH TWICE DAILY   • furosemide (LASIX) 20 MG tablet Take 1 tablet by mouth Daily.   • hydrALAZINE (APRESOLINE) 10 MG tablet Take 1 tablet by mouth Every 8 (Eight) Hours.   • ipratropium (ATROVENT HFA) 17 MCG/ACT inhaler Inhale 2 puffs by mouth 4 (Four) Times a Day.   • ipratropium (ATROVENT) 0.02 % nebulizer solution Take 2.5 mL by nebulization 4 (Four) Times a Day As Needed for Wheezing or Shortness of Air.   • isosorbide mononitrate (IMDUR) 60 MG 24 hr tablet TAKE 1 TABLET BY MOUTH DAILY   • levothyroxine (SYNTHROID, LEVOTHROID) 50 MCG tablet TAKE 1 TABLET BY MOUTH DAILY   • montelukast (SINGULAIR) 10 MG tablet TAKE 1 TABLET BY MOUTH EVERY NIGHT   • nitroglycerin (NITROSTAT) 0.4 MG SL tablet DISSOLVE ONE TABLET UNDER TONGUE AS NEEDED FOR CHEST PAIN MAY REPEAT DOSE EVERY 5 MINUTES FOR UP TO 3 DOSES. IF PAIN PERSISTS CALL 911   • pitavastatin calcium (LIVALO) 1 MG tablet tablet Take 1 tablet by mouth Every Night.   • tiotropium bromide monohydrate (SPIRIVA RESPIMAT) 2.5 MCG/ACT aerosol solution inhaler Inhale 2 puffs by mouth Daily.   • [DISCONTINUED] gabapentin (NEURONTIN) 100 MG capsule Take 2 capsules by mouth 2 (Two) Times a Day.     No current facility-administered medications on file prior to visit.          The following portions of the patient's history were reviewed and updated as appropriate: allergies, current medications, past family history, past medical history, past social history, past surgical history and problem list.    Review of Systems   Constitution: Positive for weakness.   HENT: Negative.  Negative for congestion.    Eyes: Negative.    Cardiovascular: Negative.  Negative for chest pain, cyanosis, dyspnea on exertion, irregular heartbeat, leg swelling, near-syncope, orthopnea, palpitations, paroxysmal nocturnal dyspnea and  "syncope.   Respiratory: Negative.  Negative for shortness of breath.    Hematologic/Lymphatic: Negative.    Skin: Negative.    Musculoskeletal: Negative.    Gastrointestinal: Negative.    Neurological: Positive for numbness and paresthesias. Negative for headaches.          Objective:     /70 (BP Location: Left arm, Patient Position: Sitting)   Pulse 75   Ht 165.1 cm (65\")   Wt 77.1 kg (170 lb)   SpO2 96%   BMI 28.29 kg/m²   Physical Exam   Constitutional: She appears well-developed and well-nourished. No distress.   HENT:   Head: Normocephalic and atraumatic.   Mouth/Throat: Oropharynx is clear and moist. No oropharyngeal exudate.   Eyes: Conjunctivae and EOM are normal. Pupils are equal, round, and reactive to light. No scleral icterus.   Neck: Normal range of motion. Neck supple. No JVD present. No tracheal deviation present. No thyromegaly present.   Cardiovascular: Normal rate, regular rhythm, normal heart sounds and intact distal pulses. PMI is not displaced. Exam reveals no gallop, no friction rub and no decreased pulses.   No murmur heard.  Pulses:       Carotid pulses are 3+ on the right side, and 3+ on the left side.       Radial pulses are 3+ on the right side, and 3+ on the left side.   Pulmonary/Chest: Effort normal and breath sounds normal. No respiratory distress. She has no wheezes. She has no rales. She exhibits no tenderness.   Abdominal: Soft. Bowel sounds are normal. She exhibits no distension, no abdominal bruit and no mass. There is no splenomegaly or hepatomegaly. There is no tenderness. There is no rebound and no guarding.   Musculoskeletal: Normal range of motion. She exhibits no edema, tenderness or deformity.   Lymphadenopathy:     She has no cervical adenopathy.   Neurological: She is alert. She has normal reflexes. No cranial nerve deficit. She exhibits normal muscle tone. Coordination normal.   Skin: Skin is warm and dry. No rash noted. She is not diaphoretic. No erythema. "   Psychiatric: She has a normal mood and affect. Her behavior is normal. Judgment and thought content normal.         Lab Review  Lab Results   Component Value Date     03/01/2019    K 4.1 03/01/2019     03/01/2019    BUN 20 03/01/2019    CREATININE 0.88 03/01/2019    GLUCOSE 91 03/01/2019    CALCIUM 9.2 03/01/2019    ALT 20 02/28/2019    ALKPHOS 84 02/28/2019    LABIL2 1.3 (L) 04/05/2016     Lab Results   Component Value Date    CKTOTAL 50 01/10/2019     Lab Results   Component Value Date    WBC 4.70 03/01/2019    HGB 12.1 03/01/2019    HCT 36.0 (L) 03/01/2019     03/01/2019     Lab Results   Component Value Date    INR 0.91 12/17/2015    INR 1.00 02/20/2014     Lab Results   Component Value Date    MG 2.2 02/25/2019     Lab Results   Component Value Date    TSH 3.131 02/26/2019     Lab Results   Component Value Date    .0 (H) 02/28/2019     Lab Results   Component Value Date    CHLPL 219 (H) 04/05/2016    CHOL 145 01/10/2019    TRIG 174 (H) 01/10/2019    HDL 48 (L) 01/10/2019    VLDL 34.8 01/10/2019    LDLHDL 1.30 01/10/2019     Lab Results   Component Value Date    LDL 62 01/10/2019    LDL 51 05/08/2018       Procedures       I personally viewed and interpreted the patient's LAB data         Assessment:     1. Post zoster neuralgia    2. Moderate pulmonary hypertension (CMS/HCC), 46 mmHg    3. Coronary artery disease involving native coronary artery of native heart without angina pectoris    4. Ischemic cardiomyopathy with apical hypokinesis LV ejection fraction 31-35%    5. Mixed hyperlipidemia    6. Chronic a-fib*    7. Pacemaker dual-chamber pacemaker Biotronik December 2015*    8. Essential hypertension          Plan:     Patient has post zoster neuralgia.  She was given Neurontin 200 mg twice a day on as needed basis.  Patient also has moderate pulmonary hypertension around 46 mmHg.  This is probably related to heart failure.  LV ejection fraction is around 35% by echo and around  48% by stress test.  There is no evidence of exercise-induced myocardial ischemia.  Current medications were continued.  Including Lasix.  Blood pressure is also very well controlled.    Patient has hyperlipidemia Livalo was continued.  Eliquis will be continued because of chronic atrial fibrillation.  Heart rate is controlled with Coreg which was continued.  Follow-up scheduled        No Follow-up on file.

## 2019-04-30 RX ORDER — MONTELUKAST SODIUM 10 MG/1
TABLET ORAL
Qty: 30 TABLET | Refills: 0 | Status: SHIPPED | OUTPATIENT
Start: 2019-04-30 | End: 2019-05-24 | Stop reason: SDUPTHER

## 2019-05-01 LAB — IF BLOCK AB SER-ACNC: 1 AU/ML (ref 0–1.1)

## 2019-05-09 ENCOUNTER — TELEPHONE (OUTPATIENT)
Dept: PULMONOLOGY | Facility: CLINIC | Age: 79
End: 2019-05-09

## 2019-05-09 NOTE — TELEPHONE ENCOUNTER
Called to inform that B12 level was within the normal range, at the middle area of normal limits. Patient had received B12 injection from another provider and reported improvement in her memory and fatigue.

## 2019-05-15 ENCOUNTER — HOSPITAL ENCOUNTER (OUTPATIENT)
Dept: RESPIRATORY THERAPY | Facility: HOSPITAL | Age: 79
Discharge: HOME | End: 2019-05-15
Admitting: PHYSICIAN ASSISTANT

## 2019-05-15 DIAGNOSIS — J43.2 CENTRILOBULAR EMPHYSEMA (HCC): ICD-10-CM

## 2019-05-15 DIAGNOSIS — R06.00 PAROXYSMAL DYSPNEA: ICD-10-CM

## 2019-05-15 PROCEDURE — 94060 EVALUATION OF WHEEZING: CPT | Performed by: INTERNAL MEDICINE

## 2019-05-15 PROCEDURE — 94060 EVALUATION OF WHEEZING: CPT

## 2019-05-15 PROCEDURE — 94727 GAS DIL/WSHOT DETER LNG VOL: CPT | Performed by: INTERNAL MEDICINE

## 2019-05-15 PROCEDURE — 94729 DIFFUSING CAPACITY: CPT

## 2019-05-15 PROCEDURE — 94727 GAS DIL/WSHOT DETER LNG VOL: CPT

## 2019-05-15 PROCEDURE — 94729 DIFFUSING CAPACITY: CPT | Performed by: INTERNAL MEDICINE

## 2019-05-15 RX ORDER — ALBUTEROL SULFATE 2.5 MG/3ML
2.5 SOLUTION RESPIRATORY (INHALATION) ONCE
Status: DISCONTINUED | OUTPATIENT
Start: 2019-05-15 | End: 2019-05-16 | Stop reason: HOSPADM

## 2019-05-23 ENCOUNTER — TELEPHONE (OUTPATIENT)
Dept: PULMONOLOGY | Facility: CLINIC | Age: 79
End: 2019-05-23

## 2019-05-23 RX ORDER — ALBUTEROL SULFATE 90 UG/1
2 AEROSOL, METERED RESPIRATORY (INHALATION) EVERY 4 HOURS PRN
Qty: 18 G | Refills: 11 | Status: SHIPPED | OUTPATIENT
Start: 2019-05-23 | End: 2019-10-10

## 2019-05-23 NOTE — TELEPHONE ENCOUNTER
Called patient to review PFT results.  Test did not show any obstruction or air trapping.  No significant bronchodilator response.  Lung volumes showed mild restrictive ventilatory defect.  Previous CT of the chest was not suggestive of any interstitial processes, and this may be weight related.  Discussed that shortness of breath is likely cardiac related, as she has chronic systolic heart failure.     Discussed that she can discontinue Spiriva if no benefit is noted from use.  She has used an albuterol inhaler previously and noticed more improvement with it and with ipratropium short acting inhaler.  She asked to switch to Proventil.  Discussed that albuterol may agitate atrial fibrillation.  As she does not use it very often (only when she is out of the home), she is accepting of this risk, and currently takes Eliquis for clot prevention.   She has noticed improvement atrovent nebulizer medication, and requested refills.

## 2019-05-24 RX ORDER — MONTELUKAST SODIUM 10 MG/1
TABLET ORAL
Qty: 30 TABLET | Refills: 0 | Status: SHIPPED | OUTPATIENT
Start: 2019-05-24 | End: 2019-06-23 | Stop reason: SDUPTHER

## 2019-05-24 RX ORDER — APIXABAN 5 MG/1
TABLET, FILM COATED ORAL
Qty: 180 TABLET | Refills: 0 | Status: SHIPPED | OUTPATIENT
Start: 2019-05-24 | End: 2019-08-22 | Stop reason: SDUPTHER

## 2019-05-24 RX ORDER — LEVOTHYROXINE SODIUM 0.05 MG/1
TABLET ORAL
Qty: 90 TABLET | Refills: 0 | Status: SHIPPED | OUTPATIENT
Start: 2019-05-24 | End: 2019-08-22 | Stop reason: SDUPTHER

## 2019-06-24 RX ORDER — ISOSORBIDE MONONITRATE 60 MG/1
TABLET, EXTENDED RELEASE ORAL
Qty: 90 TABLET | Refills: 0 | Status: SHIPPED | OUTPATIENT
Start: 2019-06-24 | End: 2019-09-21 | Stop reason: SDUPTHER

## 2019-06-24 RX ORDER — MONTELUKAST SODIUM 10 MG/1
TABLET ORAL
Qty: 30 TABLET | Refills: 0 | Status: SHIPPED | OUTPATIENT
Start: 2019-06-24 | End: 2019-07-23 | Stop reason: SDUPTHER

## 2019-06-24 RX ORDER — CARVEDILOL 6.25 MG/1
TABLET ORAL
Qty: 180 TABLET | Refills: 0 | Status: SHIPPED | OUTPATIENT
Start: 2019-06-24 | End: 2019-09-21 | Stop reason: SDUPTHER

## 2019-06-28 ENCOUNTER — HOSPITAL ENCOUNTER (OUTPATIENT)
Dept: GENERAL RADIOLOGY | Facility: HOSPITAL | Age: 79
Discharge: HOME | End: 2019-06-28
Admitting: INTERNAL MEDICINE

## 2019-06-28 ENCOUNTER — LAB (OUTPATIENT)
Dept: LAB | Facility: HOSPITAL | Age: 79
End: 2019-06-28

## 2019-06-28 ENCOUNTER — OFFICE VISIT (OUTPATIENT)
Dept: CARDIOLOGY | Facility: CLINIC | Age: 79
End: 2019-06-28

## 2019-06-28 ENCOUNTER — TELEPHONE (OUTPATIENT)
Dept: CARDIOLOGY | Facility: CLINIC | Age: 79
End: 2019-06-28

## 2019-06-28 VITALS
HEIGHT: 65 IN | DIASTOLIC BLOOD PRESSURE: 72 MMHG | HEART RATE: 81 BPM | SYSTOLIC BLOOD PRESSURE: 126 MMHG | BODY MASS INDEX: 29.82 KG/M2 | OXYGEN SATURATION: 95 % | WEIGHT: 179 LBS

## 2019-06-28 DIAGNOSIS — J40 BRONCHITIS: ICD-10-CM

## 2019-06-28 DIAGNOSIS — J44.9 ASTHMATIC BRONCHITIS , CHRONIC (HCC): ICD-10-CM

## 2019-06-28 DIAGNOSIS — Z95.0 PACEMAKER: ICD-10-CM

## 2019-06-28 DIAGNOSIS — E03.9 HYPOTHYROIDISM, UNSPECIFIED TYPE: ICD-10-CM

## 2019-06-28 DIAGNOSIS — B02.29 POST ZOSTER NEURALGIA: ICD-10-CM

## 2019-06-28 DIAGNOSIS — I25.5 ISCHEMIC CARDIOMYOPATHY: Primary | ICD-10-CM

## 2019-06-28 DIAGNOSIS — I10 ESSENTIAL HYPERTENSION: ICD-10-CM

## 2019-06-28 DIAGNOSIS — J02.9 SORE THROAT: ICD-10-CM

## 2019-06-28 DIAGNOSIS — I48.20 CHRONIC A-FIB (HCC): ICD-10-CM

## 2019-06-28 DIAGNOSIS — I25.10 CORONARY ARTERY DISEASE INVOLVING NATIVE CORONARY ARTERY OF NATIVE HEART WITHOUT ANGINA PECTORIS: ICD-10-CM

## 2019-06-28 DIAGNOSIS — I50.43 ACUTE ON CHRONIC COMBINED SYSTOLIC AND DIASTOLIC CONGESTIVE HEART FAILURE (HCC): ICD-10-CM

## 2019-06-28 DIAGNOSIS — E78.2 MIXED HYPERLIPIDEMIA: ICD-10-CM

## 2019-06-28 LAB
BASOPHILS # BLD AUTO: 0.14 10*3/MM3 (ref 0–0.2)
BASOPHILS NFR BLD AUTO: 2.2 % (ref 0–1.5)
DEPRECATED RDW RBC AUTO: 47.4 FL (ref 37–54)
EOSINOPHIL # BLD AUTO: 0.18 10*3/MM3 (ref 0–0.4)
EOSINOPHIL NFR BLD AUTO: 2.9 % (ref 0.3–6.2)
ERYTHROCYTE [DISTWIDTH] IN BLOOD BY AUTOMATED COUNT: 13.8 % (ref 12.3–15.4)
HCT VFR BLD AUTO: 39.9 % (ref 34–46.6)
HGB BLD-MCNC: 12.4 G/DL (ref 12–15.9)
IMM GRANULOCYTES # BLD AUTO: 0.03 10*3/MM3 (ref 0–0.05)
IMM GRANULOCYTES NFR BLD AUTO: 0.5 % (ref 0–0.5)
LYMPHOCYTES # BLD AUTO: 0.92 10*3/MM3 (ref 0.7–3.1)
LYMPHOCYTES NFR BLD AUTO: 14.6 % (ref 19.6–45.3)
MCH RBC QN AUTO: 29.4 PG (ref 26.6–33)
MCHC RBC AUTO-ENTMCNC: 31.1 G/DL (ref 31.5–35.7)
MCV RBC AUTO: 94.5 FL (ref 79–97)
MONOCYTES # BLD AUTO: 0.57 10*3/MM3 (ref 0.1–0.9)
MONOCYTES NFR BLD AUTO: 9.1 % (ref 5–12)
NEUTROPHILS # BLD AUTO: 4.44 10*3/MM3 (ref 1.7–7)
NEUTROPHILS NFR BLD AUTO: 70.7 % (ref 42.7–76)
NRBC BLD AUTO-RTO: 0 /100 WBC (ref 0–0.2)
PLATELET # BLD AUTO: 148 10*3/MM3 (ref 140–450)
PMV BLD AUTO: 12.7 FL (ref 6–12)
RBC # BLD AUTO: 4.22 10*6/MM3 (ref 3.77–5.28)
S PYO AG THROAT QL: NEGATIVE
WBC NRBC COR # BLD: 6.28 10*3/MM3 (ref 3.4–10.8)

## 2019-06-28 PROCEDURE — 71046 X-RAY EXAM CHEST 2 VIEWS: CPT | Performed by: RADIOLOGY

## 2019-06-28 PROCEDURE — 36415 COLL VENOUS BLD VENIPUNCTURE: CPT

## 2019-06-28 PROCEDURE — 99214 OFFICE O/P EST MOD 30 MIN: CPT | Performed by: INTERNAL MEDICINE

## 2019-06-28 PROCEDURE — 71046 X-RAY EXAM CHEST 2 VIEWS: CPT

## 2019-06-28 PROCEDURE — 85025 COMPLETE CBC W/AUTO DIFF WBC: CPT

## 2019-06-28 PROCEDURE — 87081 CULTURE SCREEN ONLY: CPT

## 2019-06-28 PROCEDURE — 87880 STREP A ASSAY W/OPTIC: CPT

## 2019-06-28 RX ORDER — TRIAMCINOLONE ACETONIDE 40 MG/ML
40 INJECTION, SUSPENSION INTRA-ARTICULAR; INTRAMUSCULAR ONCE
Status: DISCONTINUED | OUTPATIENT
Start: 2019-06-28 | End: 2019-06-28

## 2019-06-28 NOTE — PROGRESS NOTES
subjective     Chief Complaint   Patient presents with   • Sore Throat   • Memory Loss     History of Present Illness  Patient is 78 years old white female who is here for follow-up.  She has some new complaints also.  Patient states her memory is getting quite worse and she wants to start Aricept.  Namzaric was discussed with the patient  Patient also states that he has been having throat irritation in his weight if it is sore throat or if it is due to allergies and chronic bronchitis.  She is taking Spiriva and other inhaler therapy.    Patient has coronary artery disease status post CABG in 1987.  Lately she has done very well denies any chest pain.  Patient does have heart failure with decreased LV ejection fraction of around 35% patient refuses any upgrade of pacemaker to ICD.  Patient has chronic atrial fibrillation.  She is chronically anticoagulated with Eliquis  Blood pressure is very well controlled.  She is taking Coreg, hydralazine.  Hypothyroidism is being controlled with the Synthroid  She also has peripheral neuropathy on Neurontin therapy    Past Surgical History:   Procedure Laterality Date   • CARDIAC SURGERY  1997   • CARDIOVASCULAR STRESS TEST  06/2014   • CATARACT EXTRACTION, BILATERAL     • CHOLECYSTECTOMY  2002   • COLONOSCOPY  05/2012   • ECHO - CONVERTED  07/2014   • PACEMAKER IMPLANTATION  12/17/2015     Family History   Problem Relation Age of Onset   • Coronary artery disease Other    • Hypertension Other    • Diabetes Other    • Heart attack Other    • Heart attack Mother 62        fatal MI   • Skin cancer Mother    • Diabetes type II Mother    • Melanoma Mother    • Heart attack Father 63        fatal MI   • Melanoma Sister 45   • Skin cancer Sister    • Heart attack Brother 62        fatal MI   • Heart attack Brother 80        Fatal MI   • Skin cancer Brother    • Heart disease Sister    • Heart disease Brother 75        pacemaker, CABG, Stents   • Osteoporosis Sister    • Pneumonia  Brother    • Hypertension Brother    • Heart attack Sister 54        Fatal MI   • Heart attack Brother 59        Fatal MI   • Breast cancer Neg Hx      Past Medical History:   Diagnosis Date   • Atrial fibrillation (CMS/HCC)    • CAD (coronary artery disease)    • Chronic a-fib (CMS/HCC)    • Chronic anticoagulation    • Congestive heart failure (CMS/HCC)     compensated   • Disease of thyroid gland    • Hyperlipidemia    • Hypertension    • Hypoxemia    • Ischemic cardiomyopathy with severe LV Disfunction    • Myocardial infarction (CMS/HCC)    • Pacemaker     VVI     Patient Active Problem List   Diagnosis   • CAD (coronary artery disease) status post CABG  single-vessel*   • Ischemic cardiomyopathy with apical hypokinesis LV ejection fraction 31-35%   • Chronic systolic congestive heart failure*   • Hyperlipidemia*   • Chronic a-fib*   • Pacemaker dual-chamber pacemaker [employer] 2015*   • Hypothyroidism*   • Hypertension*   • Chronic anticoagulation*   • Gastroesophageal reflux disease without esophagitis*   • URI (upper respiratory infection)   • Asthmatic bronchitis with acute exacerbation   • Stress incontinence   • Herpes zoster without complication   • Acute midline low back pain without sciatica   • CHF (congestive heart failure) (CMS/[facility])   • Centrilobular emphysema (CMS/HCC)   • Asthmatic bronchitis , chronic (CMS/HCC)   • Moderate pulmonary hypertension (CMS/HCC), 46 mmHg   • Seasonal allergic rhinitis   • Post zoster neuralgia       Social History     Tobacco Use   • Smoking status: Former Smoker     Last attempt to quit: 1990     Years since quittin.5   • Smokeless tobacco: Never Used   Substance Use Topics   • Alcohol use: No   • Drug use: No       Allergies   Allergen Reactions   • Codeine Rash   • Eggs Or Egg-Derived Products Itching and Rash   • Grass Itching and Rash   • Lisinopril Rash       Current Outpatient Medications on File Prior to Visit   Medication Sig   • albuterol  sulfate  (90 Base) MCG/ACT inhaler Inhale 2 puffs Every 4 (Four) Hours As Needed for Wheezing.   • aspirin 81 MG EC tablet Take 1 tablet by mouth Daily.   • carvedilol (COREG) 6.25 MG tablet TAKE 1 TABLET BY MOUTH TWICE DAILY WITH MEALS   • ELIQUIS 5 MG tablet tablet TAKE 1 TABLET BY MOUTH TWICE DAILY   • furosemide (LASIX) 20 MG tablet Take 1 tablet by mouth Daily.   • gabapentin (NEURONTIN) 100 MG capsule Take 2 capsules by mouth 2 (Two) Times a Day.   • hydrALAZINE (APRESOLINE) 10 MG tablet Take 1 tablet by mouth Every 8 (Eight) Hours.   • ipratropium (ATROVENT) 0.02 % nebulizer solution Take 2.5 mL by nebulization 4 (Four) Times a Day As Needed for Wheezing or Shortness of Air.   • isosorbide mononitrate (IMDUR) 60 MG 24 hr tablet TAKE 1 TABLET BY MOUTH DAILY   • levothyroxine (SYNTHROID, LEVOTHROID) 50 MCG tablet TAKE 1 TABLET BY MOUTH DAILY   • montelukast (SINGULAIR) 10 MG tablet TAKE 1 TABLET BY MOUTH EVERY NIGHT   • nitroglycerin (NITROSTAT) 0.4 MG SL tablet DISSOLVE ONE TABLET UNDER TONGUE AS NEEDED FOR CHEST PAIN MAY REPEAT DOSE EVERY 5 MINUTES FOR UP TO 3 DOSES. IF PAIN PERSISTS CALL 911   • pitavastatin calcium (LIVALO) 1 MG tablet tablet Take 1 tablet by mouth Every Night.   • tiotropium bromide monohydrate (SPIRIVA RESPIMAT) 2.5 MCG/ACT aerosol solution inhaler Inhale 2 puffs by mouth Daily.     No current facility-administered medications on file prior to visit.          The following portions of the patient's history were reviewed and updated as appropriate: allergies, current medications, past family history, past medical history, past social history, past surgical history and problem list.    Review of Systems   Constitution: Negative.   HENT: Positive for congestion, hoarse voice and sore throat.    Eyes: Negative.    Cardiovascular: Negative.  Negative for chest pain, cyanosis, dyspnea on exertion, irregular heartbeat, leg swelling, near-syncope, orthopnea, palpitations, paroxysmal  "nocturnal dyspnea and syncope.   Respiratory: Negative.  Negative for shortness of breath.    Hematologic/Lymphatic: Negative.    Musculoskeletal: Negative.    Gastrointestinal: Negative.    Neurological: Positive for numbness and paresthesias. Negative for headaches.   Psychiatric/Behavioral: Positive for memory loss.          Objective:     /72 (BP Location: Left arm, Patient Position: Sitting)   Pulse 81   Ht 165.1 cm (65\")   Wt 81.2 kg (179 lb)   SpO2 95%   BMI 29.79 kg/m²   Physical Exam   Constitutional: She appears well-developed and well-nourished. No distress.   HENT:   Head: Normocephalic and atraumatic.   Mouth/Throat: Oropharynx is clear and moist. No oropharyngeal exudate.   Throat examination is normal   Eyes: Conjunctivae and EOM are normal. Pupils are equal, round, and reactive to light. No scleral icterus.   Neck: Normal range of motion. Neck supple. No JVD present. No tracheal deviation present. No thyromegaly present.   Cardiovascular: Normal rate, regular rhythm, normal heart sounds and intact distal pulses. PMI is not displaced. Exam reveals no gallop, no friction rub and no decreased pulses.   No murmur heard.  Pulses:       Carotid pulses are 3+ on the right side, and 3+ on the left side.       Radial pulses are 3+ on the right side, and 3+ on the left side.   Pulmonary/Chest: Effort normal and breath sounds normal. No respiratory distress. She has no wheezes. She has no rales. She exhibits no tenderness.   Abdominal: Soft. Bowel sounds are normal. She exhibits no distension, no abdominal bruit and no mass. There is no splenomegaly or hepatomegaly. There is no tenderness. There is no rebound and no guarding.   Musculoskeletal: Normal range of motion. She exhibits no edema, tenderness or deformity.   Lymphadenopathy:     She has no cervical adenopathy.   Neurological: She is alert. She has normal reflexes. No cranial nerve deficit. She exhibits normal muscle tone. Coordination " normal.   Skin: Skin is warm and dry. No rash noted. She is not diaphoretic. No erythema.   Psychiatric: She has a normal mood and affect. Her behavior is normal. Judgment and thought content normal.         Lab Review  Lab Results   Component Value Date     03/01/2019    K 4.1 03/01/2019     03/01/2019    BUN 20 03/01/2019    CREATININE 0.88 03/01/2019    GLUCOSE 91 03/01/2019    CALCIUM 9.2 03/01/2019    ALT 20 02/28/2019    ALKPHOS 84 02/28/2019    LABIL2 1.3 (L) 04/05/2016     Lab Results   Component Value Date    CKTOTAL 50 01/10/2019     Lab Results   Component Value Date    WBC 4.70 03/01/2019    HGB 12.1 03/01/2019    HCT 36.0 (L) 03/01/2019     03/01/2019     Lab Results   Component Value Date    INR 0.91 12/17/2015    INR 1.00 02/20/2014     Lab Results   Component Value Date    MG 2.2 02/25/2019     Lab Results   Component Value Date    TSH 3.131 02/26/2019     Lab Results   Component Value Date    .0 (H) 02/28/2019     Lab Results   Component Value Date    CHLPL 219 (H) 04/05/2016    CHOL 145 01/10/2019    TRIG 174 (H) 01/10/2019    HDL 48 (L) 01/10/2019    VLDL 34.8 01/10/2019    LDLHDL 1.30 01/10/2019     Lab Results   Component Value Date    LDL 62 01/10/2019    LDL 51 05/08/2018       Procedures       I personally viewed and interpreted the patient's LAB data         Assessment:     1. Ischemic cardiomyopathy with apical hypokinesis LV ejection fraction 31-35%    2. Sore throat    3. Bronchitis    4. Coronary artery disease involving native coronary artery of native heart without angina pectoris    5. Acute on chronic combined systolic and diastolic congestive heart failure (CMS/HCC)    6. Chronic a-fib*    7. Mixed hyperlipidemia    8. Essential hypertension    9. Pacemaker dual-chamber pacemaker Bi[employer]ronik December 2015*    10. Asthmatic bronchitis , chronic (CMS/HCC)    11. Hypothyroidism, unspecified type    12. Post zoster neuralgia          Plan:     Patient complains  of bronchitis and possible sore throat.  She is afebrile but she is coughing.  It may be related to COPD.  She was encouraged to use her inhaler therapy  Strep screen and chest x-ray along with CBC was scheduled  Blood pressure is very well controlled she will continue current medication  Lipid control is good also.  Patient will continue Livalo  Because of memory loss namzaric 7/10.  Synthroid was continued.  Refills were given.  Follow-up scheduled        No Follow-up on file.

## 2019-06-28 NOTE — TELEPHONE ENCOUNTER
----- Message from Maria R Sanchez MD sent at 6/28/2019 12:44 PM EDT -----  Chest x-ray and strep screen is negative

## 2019-06-30 LAB — BACTERIA SPEC AEROBE CULT: NORMAL

## 2019-07-23 RX ORDER — GABAPENTIN 100 MG/1
CAPSULE ORAL
Qty: 120 CAPSULE | Refills: 0 | OUTPATIENT
Start: 2019-07-23

## 2019-07-23 RX ORDER — MONTELUKAST SODIUM 10 MG/1
TABLET ORAL
Qty: 30 TABLET | Refills: 0 | Status: SHIPPED | OUTPATIENT
Start: 2019-07-23 | End: 2019-08-22 | Stop reason: SDUPTHER

## 2019-07-29 ENCOUNTER — OFFICE VISIT (OUTPATIENT)
Dept: CARDIOLOGY | Facility: CLINIC | Age: 79
End: 2019-07-29

## 2019-07-29 VITALS
BODY MASS INDEX: 29.99 KG/M2 | DIASTOLIC BLOOD PRESSURE: 68 MMHG | SYSTOLIC BLOOD PRESSURE: 140 MMHG | HEART RATE: 71 BPM | WEIGHT: 180 LBS | OXYGEN SATURATION: 96 % | HEIGHT: 65 IN

## 2019-07-29 DIAGNOSIS — I48.20 CHRONIC A-FIB (HCC): ICD-10-CM

## 2019-07-29 DIAGNOSIS — Z79.01 CHRONIC ANTICOAGULATION: ICD-10-CM

## 2019-07-29 DIAGNOSIS — B02.29 POST ZOSTER NEURALGIA: ICD-10-CM

## 2019-07-29 DIAGNOSIS — I10 ESSENTIAL HYPERTENSION: ICD-10-CM

## 2019-07-29 DIAGNOSIS — Z95.0 PACEMAKER: ICD-10-CM

## 2019-07-29 DIAGNOSIS — I25.10 CORONARY ARTERY DISEASE INVOLVING NATIVE CORONARY ARTERY OF NATIVE HEART WITHOUT ANGINA PECTORIS: Primary | ICD-10-CM

## 2019-07-29 DIAGNOSIS — I50.43 ACUTE ON CHRONIC COMBINED SYSTOLIC AND DIASTOLIC CONGESTIVE HEART FAILURE (HCC): ICD-10-CM

## 2019-07-29 DIAGNOSIS — E78.2 MIXED HYPERLIPIDEMIA: ICD-10-CM

## 2019-07-29 DIAGNOSIS — E03.9 HYPOTHYROIDISM, UNSPECIFIED TYPE: ICD-10-CM

## 2019-07-29 PROCEDURE — 99214 OFFICE O/P EST MOD 30 MIN: CPT | Performed by: INTERNAL MEDICINE

## 2019-07-29 RX ORDER — GABAPENTIN 100 MG/1
100 CAPSULE ORAL 2 TIMES DAILY
Qty: 60 CAPSULE | Refills: 2 | Status: SHIPPED | OUTPATIENT
Start: 2019-07-29 | End: 2019-10-28 | Stop reason: SDUPTHER

## 2019-07-29 RX ORDER — MOMETASONE FUROATE 50 UG/1
2 SPRAY, METERED NASAL DAILY
Qty: 17 G | Refills: 2 | Status: SHIPPED | OUTPATIENT
Start: 2019-07-29 | End: 2019-07-31 | Stop reason: SDUPTHER

## 2019-07-29 NOTE — PROGRESS NOTES
subjective     Chief Complaint   Patient presents with   • Coronary Artery Disease   • Follow-up     History of Present Illness  Patient complains of runny nose, allergies.  She is taking Singulair and wants to know if she can take 2.  Patient has known coronary artery disease status post CABG.  She denies any chest pain or palpitations.  She has chronic atrial fibrillation.  Heart rate has been controlled with medications.  Patient is anticoagulated with Eliquis.    Blood pressure is well controlled with hydralazine, Coreg  Patient has compensated heart failure    She has hyperlipidemia.  She is taking Livalo 1 mg daily.  She cannot take any other statin therapy.    She also has memory loss which is better with the Namzaric  She also has hypothyroidism on Synthroid 50 mcg daily.    She also has herpetic neuralgia and is taking Neurontin.  Patient has multiple chronic medical problems including dual-chamber pacemaker.  She has ischemic cardiomyopathy with LV ejection fraction 31 to 35%.  Patient does not wish to have ICD.    Past Surgical History:   Procedure Laterality Date   • CARDIAC SURGERY  1997   • CARDIOVASCULAR STRESS TEST  06/2014   • CATARACT EXTRACTION, BILATERAL     • CHOLECYSTECTOMY  2002   • COLONOSCOPY  05/2012   • ECHO - CONVERTED  07/2014   • PACEMAKER IMPLANTATION  12/17/2015     Family History   Problem Relation Age of Onset   • Coronary artery disease Other    • Hypertension Other    • Diabetes Other    • Heart attack Other    • Heart attack Mother 62        fatal MI   • Skin cancer Mother    • Diabetes type II Mother    • Melanoma Mother    • Heart attack Father 63        fatal MI   • Melanoma Sister 45   • Skin cancer Sister    • Heart attack Brother 62        fatal MI   • Heart attack Brother 80        Fatal MI   • Skin cancer Brother    • Heart disease Sister    • Heart disease Brother 75        pacemaker, CABG, Stents   • Osteoporosis Sister    • Pneumonia Brother    • Hypertension Brother     • Heart attack Sister 54        Fatal MI   • Heart attack Brother 59        Fatal MI   • Breast cancer Neg Hx      Past Medical History:   Diagnosis Date   • Atrial fibrillation (CMS/HCC)    • CAD (coronary artery disease)    • Chronic a-fib (CMS/HCC)    • Chronic anticoagulation    • Congestive heart failure (CMS/HCC)     compensated   • Disease of thyroid gland    • Hyperlipidemia    • Hypertension    • Hypoxemia    • Ischemic cardiomyopathy with severe LV Disfunction    • Myocardial infarction (CMS/HCC)    • Pacemaker     VVI     Patient Active Problem List   Diagnosis   • CAD (coronary artery disease) status post CABG  single-vessel*   • Ischemic cardiomyopathy with apical hypokinesis LV ejection fraction 31-35%   • Chronic systolic congestive heart failure*   • Hyperlipidemia*   • Chronic a-fib*   • Pacemaker dual-chamber pacemaker [employer] 2015*   • Hypothyroidism*   • Hypertension*   • Chronic anticoagulation*   • Gastroesophageal reflux disease without esophagitis*   • URI (upper respiratory infection)   • Asthmatic bronchitis with acute exacerbation   • Stress incontinence   • Herpes zoster without complication   • Acute midline low back pain without sciatica   • CHF (congestive heart failure) (CMS/[facility])   • Centrilobular emphysema (CMS/[facility])   • Asthmatic bronchitis , chronic (CMS/[facility])   • Moderate pulmonary hypertension (CMS/[facility]), 46 mmHg   • Seasonal allergic rhinitis   • Post zoster neuralgia       Social History     Tobacco Use   • Smoking status: Former Smoker     Last attempt to quit: 1990     Years since quittin.5   • Smokeless tobacco: Never Used   Substance Use Topics   • Alcohol use: No   • Drug use: No       Allergies   Allergen Reactions   • Codeine Rash   • Eggs Or Egg-Derived Products Itching and Rash   • Grass Itching and Rash   • Lisinopril Rash       Current Outpatient Medications on File Prior to Visit   Medication Sig   • albuterol sulfate  (90 Base) MCG/ACT  inhaler Inhale 2 puffs Every 4 (Four) Hours As Needed for Wheezing.   • aspirin 81 MG EC tablet Take 1 tablet by mouth Daily.   • carvedilol (COREG) 6.25 MG tablet TAKE 1 TABLET BY MOUTH TWICE DAILY WITH MEALS   • ELIQUIS 5 MG tablet tablet TAKE 1 TABLET BY MOUTH TWICE DAILY   • furosemide (LASIX) 20 MG tablet Take 1 tablet by mouth Daily.   • hydrALAZINE (APRESOLINE) 10 MG tablet Take 1 tablet by mouth Every 8 (Eight) Hours.   • ipratropium (ATROVENT) 0.02 % nebulizer solution Take 2.5 mL by nebulization 4 (Four) Times a Day As Needed for Wheezing or Shortness of Air.   • isosorbide mononitrate (IMDUR) 60 MG 24 hr tablet TAKE 1 TABLET BY MOUTH DAILY   • levothyroxine (SYNTHROID, LEVOTHROID) 50 MCG tablet TAKE 1 TABLET BY MOUTH DAILY   • Memantine HCl-Donepezil HCl 7-10 MG capsule sustained-release 24 hr Take 1 capsule by mouth Daily.   • montelukast (SINGULAIR) 10 MG tablet TAKE 1 TABLET BY MOUTH EVERY NIGHT   • nitroglycerin (NITROSTAT) 0.4 MG SL tablet DISSOLVE ONE TABLET UNDER TONGUE AS NEEDED FOR CHEST PAIN MAY REPEAT DOSE EVERY 5 MINUTES FOR UP TO 3 DOSES. IF PAIN PERSISTS CALL 911   • pitavastatin calcium (LIVALO) 1 MG tablet tablet Take 1 tablet by mouth Every Night.   • tiotropium bromide monohydrate (SPIRIVA RESPIMAT) 2.5 MCG/ACT aerosol solution inhaler Inhale 2 puffs by mouth Daily.   • [DISCONTINUED] gabapentin (NEURONTIN) 100 MG capsule Take 2 capsules by mouth 2 (Two) Times a Day.     No current facility-administered medications on file prior to visit.          The following portions of the patient's history were reviewed and updated as appropriate: allergies, current medications, past family history, past medical history, past social history, past surgical history and problem list.    Review of Systems   Constitution: Negative.   HENT: Positive for congestion.    Eyes: Negative.    Cardiovascular: Negative.  Negative for chest pain, cyanosis, dyspnea on exertion, irregular heartbeat, leg swelling,  "near-syncope, orthopnea, palpitations, paroxysmal nocturnal dyspnea and syncope.   Respiratory: Negative.  Negative for shortness of breath.    Hematologic/Lymphatic: Negative.    Musculoskeletal: Negative.    Gastrointestinal: Negative.    Neurological: Negative.  Negative for headaches.   Psychiatric/Behavioral: Positive for memory loss. The patient has insomnia and is nervous/anxious.           Objective:     /68 (BP Location: Left arm, Patient Position: Sitting)   Pulse 71   Ht 165.1 cm (65\")   Wt 81.6 kg (180 lb)   SpO2 96%   BMI 29.95 kg/m²   Physical Exam   Constitutional: She appears well-developed and well-nourished. No distress.   HENT:   Head: Normocephalic and atraumatic.   Mouth/Throat: Oropharynx is clear and moist. No oropharyngeal exudate.   Eyes: Conjunctivae and EOM are normal. Pupils are equal, round, and reactive to light. No scleral icterus.   Neck: Normal range of motion. Neck supple. No JVD present. No tracheal deviation present. No thyromegaly present.   Cardiovascular: Normal rate, regular rhythm, normal heart sounds and intact distal pulses. PMI is not displaced. Exam reveals no gallop, no friction rub and no decreased pulses.   No murmur heard.  Pulses:       Carotid pulses are 3+ on the right side, and 3+ on the left side.       Radial pulses are 3+ on the right side, and 3+ on the left side.   Pulmonary/Chest: Effort normal and breath sounds normal. No respiratory distress. She has no wheezes. She has no rales. She exhibits no tenderness.   Abdominal: Soft. Bowel sounds are normal. She exhibits no distension, no abdominal bruit and no mass. There is no splenomegaly or hepatomegaly. There is no tenderness. There is no rebound and no guarding.   Musculoskeletal: Normal range of motion. She exhibits no edema, tenderness or deformity.   Lymphadenopathy:     She has no cervical adenopathy.   Neurological: She is alert. She has normal reflexes. No cranial nerve deficit. She exhibits " normal muscle tone. Coordination normal.   Skin: Skin is warm and dry. No rash noted. She is not diaphoretic. No erythema.   Psychiatric: She has a normal mood and affect. Her behavior is normal. Judgment and thought content normal.         Lab Review  Lab Results   Component Value Date     03/01/2019    K 4.1 03/01/2019     03/01/2019    BUN 20 03/01/2019    CREATININE 0.88 03/01/2019    GLUCOSE 91 03/01/2019    CALCIUM 9.2 03/01/2019    ALT 20 02/28/2019    ALKPHOS 84 02/28/2019    LABIL2 1.3 (L) 04/05/2016     Lab Results   Component Value Date    CKTOTAL 50 01/10/2019     Lab Results   Component Value Date    WBC 6.28 06/28/2019    HGB 12.4 06/28/2019    HCT 39.9 06/28/2019     06/28/2019     Lab Results   Component Value Date    INR 0.91 12/17/2015    INR 1.00 02/20/2014     Lab Results   Component Value Date    MG 2.2 02/25/2019     Lab Results   Component Value Date    TSH 3.131 02/26/2019     Lab Results   Component Value Date    .0 (H) 02/28/2019     Lab Results   Component Value Date    CHLPL 219 (H) 04/05/2016    CHOL 145 01/10/2019    TRIG 174 (H) 01/10/2019    HDL 48 (L) 01/10/2019    VLDL 34.8 01/10/2019    LDLHDL 1.30 01/10/2019     Lab Results   Component Value Date    LDL 62 01/10/2019    LDL 51 05/08/2018       Procedures       I personally viewed and interpreted the patient's LAB data         Assessment:     1. Coronary artery disease involving native coronary artery of native heart without angina pectoris    2. Acute on chronic combined systolic and diastolic congestive heart failure (CMS/HCC)    3. Chronic a-fib*    4. Mixed hyperlipidemia    5. Essential hypertension    6. Pacemaker dual-chamber pacemaker Biotronik December 2015*    7. Chronic anticoagulation*    8. Hypothyroidism, unspecified type    9. Post zoster neuralgia          Plan:     Patient complains of allergies and runny nose.  She is doing better with the Singulair.  Will add Nasonex.  Neurontin  prescription was given.  She will take it for herpetic neuralgia.  Namzaric was continued.  Patient does not wish to increase the dose at this time.  Patient has chronic atrial fibrillation rate is controlled with beta-blocker therapy.  Pacemaker is functioning normally.  Anticoagulation with Eliquis was continued.  Hyperlipidemia is very well controlled with Livalo which was continued.  Lab work scheduled for next visit.    Synthroid was also continued and current dose.  Follow-up in 3 months with labs        No Follow-up on file.

## 2019-07-31 ENCOUNTER — TELEPHONE (OUTPATIENT)
Dept: CARDIOLOGY | Facility: CLINIC | Age: 79
End: 2019-07-31

## 2019-07-31 RX ORDER — MOMETASONE FUROATE 50 UG/1
2 SPRAY, METERED NASAL DAILY
Qty: 17 G | Refills: 2 | Status: SHIPPED | OUTPATIENT
Start: 2019-07-31 | End: 2020-04-23 | Stop reason: ALTCHOICE

## 2019-07-31 NOTE — TELEPHONE ENCOUNTER
PT CALLED AND WANTS THE NURSE TO CALL HER BACK , SHE HAS QUESTIONS ABOUT HER DISCHARGE INFORMATION THAT SHE WAS GIVEN ON HER AVS

## 2019-08-22 RX ORDER — LEVOTHYROXINE SODIUM 0.05 MG/1
TABLET ORAL
Qty: 90 TABLET | Refills: 0 | Status: SHIPPED | OUTPATIENT
Start: 2019-08-22 | End: 2019-11-20 | Stop reason: SDUPTHER

## 2019-08-22 RX ORDER — PITAVASTATIN CALCIUM 1.04 MG/1
TABLET, FILM COATED ORAL
Qty: 90 TABLET | Refills: 0 | Status: SHIPPED | OUTPATIENT
Start: 2019-08-22 | End: 2020-02-18

## 2019-08-22 RX ORDER — MONTELUKAST SODIUM 10 MG/1
TABLET ORAL
Qty: 30 TABLET | Refills: 0 | Status: SHIPPED | OUTPATIENT
Start: 2019-08-22 | End: 2019-09-21 | Stop reason: SDUPTHER

## 2019-08-22 RX ORDER — APIXABAN 5 MG/1
TABLET, FILM COATED ORAL
Qty: 180 TABLET | Refills: 0 | Status: SHIPPED | OUTPATIENT
Start: 2019-08-22 | End: 2019-11-20 | Stop reason: SDUPTHER

## 2019-09-23 RX ORDER — CARVEDILOL 6.25 MG/1
TABLET ORAL
Qty: 180 TABLET | Refills: 0 | Status: SHIPPED | OUTPATIENT
Start: 2019-09-23 | End: 2019-12-20

## 2019-09-23 RX ORDER — MONTELUKAST SODIUM 10 MG/1
TABLET ORAL
Qty: 30 TABLET | Refills: 0 | Status: SHIPPED | OUTPATIENT
Start: 2019-09-23 | End: 2019-10-21 | Stop reason: SDUPTHER

## 2019-09-23 RX ORDER — ISOSORBIDE MONONITRATE 60 MG/1
TABLET, EXTENDED RELEASE ORAL
Qty: 90 TABLET | Refills: 0 | Status: SHIPPED | OUTPATIENT
Start: 2019-09-23 | End: 2019-12-20

## 2019-09-30 RX ORDER — NITROGLYCERIN 0.4 MG/1
TABLET SUBLINGUAL
Qty: 25 TABLET | Refills: 0 | Status: SHIPPED | OUTPATIENT
Start: 2019-09-30 | End: 2020-04-23 | Stop reason: SDUPTHER

## 2019-10-01 ENCOUNTER — TRANSCRIBE ORDERS (OUTPATIENT)
Dept: ADMINISTRATIVE | Facility: HOSPITAL | Age: 79
End: 2019-10-01

## 2019-10-01 DIAGNOSIS — R13.10 DYSPHAGIA, UNSPECIFIED TYPE: Primary | ICD-10-CM

## 2019-10-07 ENCOUNTER — HOSPITAL ENCOUNTER (OUTPATIENT)
Dept: GENERAL RADIOLOGY | Facility: HOSPITAL | Age: 79
Discharge: HOME | End: 2019-10-07
Admitting: NURSE PRACTITIONER

## 2019-10-07 DIAGNOSIS — R13.10 DYSPHAGIA, UNSPECIFIED TYPE: ICD-10-CM

## 2019-10-07 PROCEDURE — 74220 X-RAY XM ESOPHAGUS 1CNTRST: CPT | Performed by: RADIOLOGY

## 2019-10-07 PROCEDURE — 74220 X-RAY XM ESOPHAGUS 1CNTRST: CPT

## 2019-10-09 RX ORDER — ALBUTEROL SULFATE 90 UG/1
AEROSOL, METERED RESPIRATORY (INHALATION)
Qty: 6.7 G | Refills: 0 | OUTPATIENT
Start: 2019-10-09

## 2019-10-10 RX ORDER — ALBUTEROL SULFATE 90 UG/1
2 AEROSOL, METERED RESPIRATORY (INHALATION) EVERY 4 HOURS PRN
Qty: 1 INHALER | Refills: 11 | Status: SHIPPED | OUTPATIENT
Start: 2019-10-10 | End: 2020-01-23 | Stop reason: SDUPTHER

## 2019-10-10 NOTE — PROGRESS NOTES
Patient called today requesting to resume Proventil in place of ipratropium in the setting of atrial fibrillation, as ipratropium is not as effective.   I called her to confirm the changes.   We discussed again and she is understanding that albuterol can induce tachycardia and could increase risk of atrial fibrillation. Current medications include eliquis for clot prevention and carvedilol. She knows to seek ED evaluation is tachycardia persists and she becomes symptomatic (ex. lightheaded, weakness)    I have reordered the proventil inhaler and discontinued the short acting ipratropium.   Called francoise to confirm the changes.     She stated that she only recently had to increase use to twice a day on most days with the weather change. During the spring/summer, she was not requiring use as often. Discussed that she was no longer using Spiriva respimat, but was willing to try this again as maintenance therapy and can be used with Proventil as needed.   I have reordered spiriva respimat. Discussed to use this 2 puffs, twice daily.  She stated that she would try this therapy again.

## 2019-10-10 NOTE — TELEPHONE ENCOUNTER
Called patient and told her we could not fill this prescription for her inhaler. She would need to see her pulmonologist

## 2019-10-22 ENCOUNTER — CLINICAL SUPPORT (OUTPATIENT)
Dept: CARDIOLOGY | Facility: CLINIC | Age: 79
End: 2019-10-22

## 2019-10-22 ENCOUNTER — LAB (OUTPATIENT)
Dept: LAB | Facility: HOSPITAL | Age: 79
End: 2019-10-22

## 2019-10-22 DIAGNOSIS — Z95.0 PACEMAKER: ICD-10-CM

## 2019-10-22 DIAGNOSIS — I48.20 CHRONIC A-FIB (HCC): ICD-10-CM

## 2019-10-22 DIAGNOSIS — E03.9 HYPOTHYROIDISM, UNSPECIFIED TYPE: ICD-10-CM

## 2019-10-22 DIAGNOSIS — E78.2 MIXED HYPERLIPIDEMIA: ICD-10-CM

## 2019-10-22 LAB
ALBUMIN SERPL-MCNC: 4.4 G/DL (ref 3.5–5.2)
ALBUMIN/GLOB SERPL: 1.3 G/DL
ALP SERPL-CCNC: 77 U/L (ref 39–117)
ALT SERPL W P-5'-P-CCNC: 11 U/L (ref 1–33)
ANION GAP SERPL CALCULATED.3IONS-SCNC: 10.3 MMOL/L (ref 5–15)
AST SERPL-CCNC: 21 U/L (ref 1–32)
BASOPHILS # BLD AUTO: 0.09 10*3/MM3 (ref 0–0.2)
BASOPHILS NFR BLD AUTO: 1.4 % (ref 0–1.5)
BILIRUB SERPL-MCNC: 0.8 MG/DL (ref 0.2–1.2)
BUN BLD-MCNC: 12 MG/DL (ref 8–23)
BUN/CREAT SERPL: 12.5 (ref 7–25)
CALCIUM SPEC-SCNC: 9.3 MG/DL (ref 8.6–10.5)
CHLORIDE SERPL-SCNC: 100 MMOL/L (ref 98–107)
CHOLEST SERPL-MCNC: 126 MG/DL (ref 0–200)
CK SERPL-CCNC: 53 U/L (ref 20–180)
CO2 SERPL-SCNC: 22.7 MMOL/L (ref 22–29)
CREAT BLD-MCNC: 0.96 MG/DL (ref 0.57–1)
DEPRECATED RDW RBC AUTO: 44.3 FL (ref 37–54)
EOSINOPHIL # BLD AUTO: 0.22 10*3/MM3 (ref 0–0.4)
EOSINOPHIL NFR BLD AUTO: 3.4 % (ref 0.3–6.2)
ERYTHROCYTE [DISTWIDTH] IN BLOOD BY AUTOMATED COUNT: 13.7 % (ref 12.3–15.4)
GFR SERPL CREATININE-BSD FRML MDRD: 57 ML/MIN/1.73
GLOBULIN UR ELPH-MCNC: 3.5 GM/DL
GLUCOSE BLD-MCNC: 96 MG/DL (ref 65–99)
HCT VFR BLD AUTO: 39.1 % (ref 34–46.6)
HDLC SERPL-MCNC: 44 MG/DL (ref 40–60)
HGB BLD-MCNC: 13.2 G/DL (ref 12–15.9)
IMM GRANULOCYTES # BLD AUTO: 0.03 10*3/MM3 (ref 0–0.05)
IMM GRANULOCYTES NFR BLD AUTO: 0.5 % (ref 0–0.5)
LDLC SERPL CALC-MCNC: 53 MG/DL (ref 0–100)
LDLC/HDLC SERPL: 1.2 {RATIO}
LYMPHOCYTES # BLD AUTO: 1.04 10*3/MM3 (ref 0.7–3.1)
LYMPHOCYTES NFR BLD AUTO: 15.9 % (ref 19.6–45.3)
MCH RBC QN AUTO: 30 PG (ref 26.6–33)
MCHC RBC AUTO-ENTMCNC: 33.8 G/DL (ref 31.5–35.7)
MCV RBC AUTO: 88.9 FL (ref 79–97)
MONOCYTES # BLD AUTO: 0.45 10*3/MM3 (ref 0.1–0.9)
MONOCYTES NFR BLD AUTO: 6.9 % (ref 5–12)
NEUTROPHILS # BLD AUTO: 4.72 10*3/MM3 (ref 1.7–7)
NEUTROPHILS NFR BLD AUTO: 71.9 % (ref 42.7–76)
NRBC BLD AUTO-RTO: 0 /100 WBC (ref 0–0.2)
PLATELET # BLD AUTO: 198 10*3/MM3 (ref 140–450)
PMV BLD AUTO: 11.9 FL (ref 6–12)
POTASSIUM BLD-SCNC: 4.1 MMOL/L (ref 3.5–5.2)
PROT SERPL-MCNC: 7.9 G/DL (ref 6–8.5)
RBC # BLD AUTO: 4.4 10*6/MM3 (ref 3.77–5.28)
SODIUM BLD-SCNC: 133 MMOL/L (ref 136–145)
T4 FREE SERPL-MCNC: 1.4 NG/DL (ref 0.93–1.7)
TRIGL SERPL-MCNC: 145 MG/DL (ref 0–150)
TSH SERPL DL<=0.05 MIU/L-ACNC: 2.98 UIU/ML (ref 0.27–4.2)
VLDLC SERPL-MCNC: 29 MG/DL (ref 5–40)
WBC NRBC COR # BLD: 6.55 10*3/MM3 (ref 3.4–10.8)

## 2019-10-22 PROCEDURE — 80061 LIPID PANEL: CPT

## 2019-10-22 PROCEDURE — 84443 ASSAY THYROID STIM HORMONE: CPT

## 2019-10-22 PROCEDURE — 36415 COLL VENOUS BLD VENIPUNCTURE: CPT

## 2019-10-22 PROCEDURE — 82550 ASSAY OF CK (CPK): CPT

## 2019-10-22 PROCEDURE — 84439 ASSAY OF FREE THYROXINE: CPT

## 2019-10-22 PROCEDURE — 85025 COMPLETE CBC W/AUTO DIFF WBC: CPT

## 2019-10-22 PROCEDURE — 80053 COMPREHEN METABOLIC PANEL: CPT

## 2019-10-22 RX ORDER — MONTELUKAST SODIUM 10 MG/1
TABLET ORAL
Qty: 30 TABLET | Refills: 0 | Status: SHIPPED | OUTPATIENT
Start: 2019-10-22 | End: 2019-11-20 | Stop reason: SDUPTHER

## 2019-10-28 ENCOUNTER — OFFICE VISIT (OUTPATIENT)
Dept: CARDIOLOGY | Facility: CLINIC | Age: 79
End: 2019-10-28

## 2019-10-28 VITALS
RESPIRATION RATE: 16 BRPM | SYSTOLIC BLOOD PRESSURE: 138 MMHG | BODY MASS INDEX: 28.99 KG/M2 | HEIGHT: 65 IN | WEIGHT: 174 LBS | OXYGEN SATURATION: 99 % | DIASTOLIC BLOOD PRESSURE: 80 MMHG | HEART RATE: 74 BPM

## 2019-10-28 DIAGNOSIS — I25.10 CORONARY ARTERY DISEASE INVOLVING NATIVE CORONARY ARTERY OF NATIVE HEART WITHOUT ANGINA PECTORIS: Primary | ICD-10-CM

## 2019-10-28 DIAGNOSIS — I10 ESSENTIAL HYPERTENSION: ICD-10-CM

## 2019-10-28 DIAGNOSIS — E03.9 HYPOTHYROIDISM, UNSPECIFIED TYPE: ICD-10-CM

## 2019-10-28 DIAGNOSIS — Z95.0 PACEMAKER: ICD-10-CM

## 2019-10-28 DIAGNOSIS — Z79.01 CHRONIC ANTICOAGULATION: ICD-10-CM

## 2019-10-28 DIAGNOSIS — I48.20 CHRONIC A-FIB (HCC): ICD-10-CM

## 2019-10-28 DIAGNOSIS — I50.22 CHRONIC SYSTOLIC CONGESTIVE HEART FAILURE (HCC): ICD-10-CM

## 2019-10-28 DIAGNOSIS — E78.2 MIXED HYPERLIPIDEMIA: ICD-10-CM

## 2019-10-28 DIAGNOSIS — I25.5 ISCHEMIC CARDIOMYOPATHY: ICD-10-CM

## 2019-10-28 PROCEDURE — 99214 OFFICE O/P EST MOD 30 MIN: CPT | Performed by: INTERNAL MEDICINE

## 2019-10-28 PROCEDURE — 93288 INTERROG EVL PM/LDLS PM IP: CPT | Performed by: INTERNAL MEDICINE

## 2019-10-28 RX ORDER — GABAPENTIN 100 MG/1
100 CAPSULE ORAL 2 TIMES DAILY
Qty: 60 CAPSULE | Refills: 2 | Status: SHIPPED | OUTPATIENT
Start: 2019-10-28 | End: 2020-01-23 | Stop reason: SDUPTHER

## 2019-10-28 NOTE — PROGRESS NOTES
subjective     Chief Complaint   Patient presents with   • Coronary Artery Disease   • Atrial Fibrillation   • Rash     History of Present Illness  Patient is 78 years old white female with multiple chronic medical problems.  She has known coronary artery disease status post single-vessel CABG.  She denies any chest tightness pain or pressure sensation.  She has ischemic cardiomyopathy with LV ejection fraction around 31 to 35%  She denies any orthopnea PND or ankle edema.  Exercise tolerance is good.  Patient also has chronic atrial fibrillation, rate is controlled with Coreg.  She is anticoagulated with Eliquis 5 mg twice daily without any drug side effects.    Blood pressure has been difficult to control it is doing much better now with current medications.  She also has hyperlipidemia on Livalo therapy.  Lab work will be checked.  Memory loss is better with Namzaric 710 daily.  Patient does not feel that increased dose is needed at this time.  Hypothyroidism on Synthroid 50 mcg daily.    Patient recently developed skin rash.  She borrowed skin cream from her  and wants a refill on that.  She does not wish to have dermatology consult which was strongly recommended.  Because of skin rash is not clear.  She wants Bactroban 2% ointment which will be given.  Side effects explained.    Past Surgical History:   Procedure Laterality Date   • CARDIAC SURGERY  1997   • CARDIOVASCULAR STRESS TEST  06/2014   • CATARACT EXTRACTION, BILATERAL     • CHOLECYSTECTOMY  2002   • COLONOSCOPY  05/2012   • ECHO - CONVERTED  07/2014   • PACEMAKER IMPLANTATION  12/17/2015     Family History   Problem Relation Age of Onset   • Coronary artery disease Other    • Hypertension Other    • Diabetes Other    • Heart attack Other    • Heart attack Mother 62        fatal MI   • Skin cancer Mother    • Diabetes type II Mother    • Melanoma Mother    • Heart attack Father 63        fatal MI   • Melanoma Sister 45   • Skin cancer Sister     • Heart attack Brother 62        fatal MI   • Heart attack Brother 80        Fatal MI   • Skin cancer Brother    • Heart disease Sister    • Heart disease Brother 75        pacemaker, CABG, Stents   • Osteoporosis Sister    • Pneumonia Brother    • Hypertension Brother    • Heart attack Sister 54        Fatal MI   • Heart attack Brother 59        Fatal MI   • Breast cancer Neg Hx      Past Medical History:   Diagnosis Date   • Atrial fibrillation (CMS/HCC)    • CAD (coronary artery disease)    • Chronic a-fib    • Chronic anticoagulation    • Congestive heart failure (CMS/HCC)     compensated   • Disease of thyroid gland    • Hyperlipidemia    • Hypertension    • Hypoxemia    • Ischemic cardiomyopathy with severe LV Disfunction    • Myocardial infarction (CMS/HCC)    • Pacemaker     VVI     Patient Active Problem List   Diagnosis   • CAD (coronary artery disease) status post CABG  single-vessel*   • Ischemic cardiomyopathy with apical hypokinesis LV ejection fraction 31-35%   • Chronic systolic congestive heart failure*   • Hyperlipidemia*   • Chronic a-fib*   • Pacemaker dual-chamber pacemaker [employer] 2015*   • Hypothyroidism*   • Hypertension*   • Chronic anticoagulation*   • Gastroesophageal reflux disease without esophagitis*   • URI (upper respiratory infection)   • Asthmatic bronchitis with acute exacerbation   • Stress incontinence   • Herpes zoster without complication   • Acute midline low back pain without sciatica   • CHF (congestive heart failure) (CMS/HCC)   • Centrilobular emphysema (CMS/HCC)   • Asthmatic bronchitis , chronic (CMS/HCC)   • Moderate pulmonary hypertension (CMS/HCC), 46 mmHg   • Seasonal allergic rhinitis   • Post zoster neuralgia       Social History     Tobacco Use   • Smoking status: Former Smoker     Last attempt to quit: 1990     Years since quittin.8   • Smokeless tobacco: Never Used   Substance Use Topics   • Alcohol use: No   • Drug use: No       Allergies    Allergen Reactions   • Codeine Rash   • Eggs Or Egg-Derived Products Itching and Rash   • Grass Itching and Rash   • Lisinopril Rash       Current Outpatient Medications on File Prior to Visit   Medication Sig   • albuterol sulfate HFA (PROVENTIL HFA) 108 (90 Base) MCG/ACT inhaler Inhale 2 puffs Every 4 (Four) Hours As Needed for Wheezing.   • aspirin 81 MG EC tablet Take 1 tablet by mouth Daily.   • carvedilol (COREG) 6.25 MG tablet TAKE 1 TABLET BY MOUTH TWICE DAILY WITH MEALS   • ELIQUIS 5 MG tablet tablet TAKE 1 TABLET BY MOUTH TWICE DAILY   • furosemide (LASIX) 20 MG tablet Take 1 tablet by mouth Daily.   • ipratropium (ATROVENT) 0.02 % nebulizer solution Take 2.5 mL by nebulization 4 (Four) Times a Day As Needed for Wheezing or Shortness of Air.   • isosorbide mononitrate (IMDUR) 60 MG 24 hr tablet TAKE 1 TABLET BY MOUTH DAILY   • levothyroxine (SYNTHROID, LEVOTHROID) 50 MCG tablet TAKE 1 TABLET BY MOUTH DAILY   • LIVALO 1 MG tablet tablet TAKE 1 TABLET BY MOUTH EVERY NIGHT   • Memantine HCl-Donepezil HCl 7-10 MG capsule sustained-release 24 hr Take 1 capsule by mouth Daily.   • mometasone (NASONEX) 50 MCG/ACT nasal spray Use 2 sprays in each nostril daily as directed by provider.   • montelukast (SINGULAIR) 10 MG tablet TAKE 1 TABLET BY MOUTH EVERY NIGHT   • nitroglycerin (NITROSTAT) 0.4 MG SL tablet DISSOLVE ONE TABLET UNDER TONGUE AS NEEDED FOR CHEST PAIN MAY REPEAT DOSE EVERY 5 MINUTES FOR UP TO 3 DOSES. IF PAIN PERSISTS CALL 911   • tiotropium bromide monohydrate (SPIRIVA RESPIMAT) 2.5 MCG/ACT aerosol solution inhaler Inhale 2 puffs by mouth Daily.   • [DISCONTINUED] gabapentin (NEURONTIN) 100 MG capsule Take 1 capsule by mouth 2 (Two) Times a Day.   • [DISCONTINUED] hydrALAZINE (APRESOLINE) 10 MG tablet Take 1 tablet by mouth Every 8 (Eight) Hours.     No current facility-administered medications on file prior to visit.          The following portions of the patient's history were reviewed and updated  "as appropriate: allergies, current medications, past family history, past medical history, past social history, past surgical history and problem list.    Review of Systems   Constitution: Negative.   HENT: Negative.  Negative for congestion.    Eyes: Negative.    Cardiovascular: Negative.  Negative for chest pain, cyanosis, dyspnea on exertion, irregular heartbeat, leg swelling, near-syncope, orthopnea, palpitations, paroxysmal nocturnal dyspnea and syncope.   Respiratory: Negative.  Negative for shortness of breath.    Hematologic/Lymphatic: Negative.    Skin: Positive for itching and rash.   Musculoskeletal: Negative.    Gastrointestinal: Negative.    Neurological: Negative.  Negative for headaches.          Objective:     /80 (BP Location: Left arm)   Pulse 74   Resp 16   Ht 165.1 cm (65\")   Wt 78.9 kg (174 lb)   SpO2 99%   BMI 28.96 kg/m²   Physical Exam   Constitutional: She appears well-developed and well-nourished. No distress.   HENT:   Head: Normocephalic and atraumatic.   Mouth/Throat: Oropharynx is clear and moist. No oropharyngeal exudate.   Eyes: Conjunctivae and EOM are normal. Pupils are equal, round, and reactive to light. No scleral icterus.   Neck: Normal range of motion. Neck supple. No JVD present. No tracheal deviation present. No thyromegaly present.   Cardiovascular: Normal rate, normal heart sounds and intact distal pulses. An irregularly irregular rhythm present. PMI is not displaced. Exam reveals no gallop, no friction rub and no decreased pulses.   No murmur heard.  Pulses:       Carotid pulses are 3+ on the right side, and 3+ on the left side.       Radial pulses are 3+ on the right side, and 3+ on the left side.   Pulmonary/Chest: Effort normal and breath sounds normal. No respiratory distress. She has no wheezes. She has no rales. She exhibits no tenderness.   Abdominal: Soft. Bowel sounds are normal. She exhibits no distension, no abdominal bruit and no mass. There is no " splenomegaly or hepatomegaly. There is no tenderness. There is no rebound and no guarding.   Musculoskeletal: Normal range of motion. She exhibits no edema, tenderness or deformity.   Lymphadenopathy:     She has no cervical adenopathy.   Neurological: She is alert. She has normal reflexes. No cranial nerve deficit. She exhibits normal muscle tone. Coordination normal.   Skin: Skin is warm and dry. Rash noted. She is not diaphoretic. No erythema.   Psychiatric: She has a normal mood and affect. Her behavior is normal. Judgment and thought content normal.         Lab Review  Lab Results   Component Value Date     (L) 10/22/2019    K 4.1 10/22/2019     10/22/2019    BUN 12 10/22/2019    CREATININE 0.96 10/22/2019    GLUCOSE 96 10/22/2019    CALCIUM 9.3 10/22/2019    ALT 11 10/22/2019    ALKPHOS 77 10/22/2019    LABIL2 1.3 (L) 04/05/2016     Lab Results   Component Value Date    CKTOTAL 53 10/22/2019     Lab Results   Component Value Date    WBC 6.55 10/22/2019    HGB 13.2 10/22/2019    HCT 39.1 10/22/2019     10/22/2019     Lab Results   Component Value Date    INR 0.91 12/17/2015    INR 1.00 02/20/2014     Lab Results   Component Value Date    MG 2.2 02/25/2019     Lab Results   Component Value Date    TSH 2.980 10/22/2019     Lab Results   Component Value Date    .0 (H) 02/28/2019     Lab Results   Component Value Date    CHLPL 219 (H) 04/05/2016    CHOL 126 10/22/2019    TRIG 145 10/22/2019    HDL 44 10/22/2019    VLDL 29 10/22/2019    LDLHDL 1.20 10/22/2019     Lab Results   Component Value Date    LDL 53 10/22/2019    LDL 62 01/10/2019       Procedures       I personally viewed and interpreted the patient's LAB data         Assessment:     1. Coronary artery disease involving native coronary artery of native heart without angina pectoris    2. Ischemic cardiomyopathy with apical hypokinesis LV ejection fraction 31-35%    3. Chronic systolic congestive heart failure*    4. Mixed  hyperlipidemia    5. Chronic a-fib*    6. Pacemaker dual-chamber pacemaker [employer]ron[employer] December 2015*    7. Essential hypertension    8. Hypothyroidism, unspecified type    9. Chronic anticoagulation*          Plan:     Lab work from the hospital reviewed.  CBC CMP lipid panel and thyroid functions are normal  Bactroban was given per patient's request.  Dermatology consultation was recommended for skin rash patient declined.  Namzaric was continued.  Livalo 1 mg daily will be continued lipid panel is normal and liver functions are normal.  Synthroid 50 mcg daily was also continued.  Patient has chronic atrial fibrillation.  Rate is very well controlled with Coreg which was continued.  Anticoagulation with Eliquis was also continued.  Refills were given.  Follow-up scheduled        No Follow-up on file.

## 2019-10-29 ENCOUNTER — TELEPHONE (OUTPATIENT)
Dept: CARDIOLOGY | Facility: CLINIC | Age: 79
End: 2019-10-29

## 2019-10-29 NOTE — TELEPHONE ENCOUNTER
[facility] were suppose to call in and they told her they have not received it yet, according to the chart it was sent to the hospital pharmacy.

## 2019-11-05 ENCOUNTER — OFFICE VISIT (OUTPATIENT)
Dept: PULMONOLOGY | Facility: CLINIC | Age: 79
End: 2019-11-05

## 2019-11-05 VITALS
WEIGHT: 178 LBS | DIASTOLIC BLOOD PRESSURE: 78 MMHG | BODY MASS INDEX: 27.94 KG/M2 | SYSTOLIC BLOOD PRESSURE: 145 MMHG | OXYGEN SATURATION: 96 % | HEIGHT: 67 IN

## 2019-11-05 DIAGNOSIS — J43.2 CENTRILOBULAR EMPHYSEMA (HCC): Primary | ICD-10-CM

## 2019-11-05 DIAGNOSIS — I27.20 PULMONARY HYPERTENSION (HCC): ICD-10-CM

## 2019-11-05 PROCEDURE — 99213 OFFICE O/P EST LOW 20 MIN: CPT | Performed by: INTERNAL MEDICINE

## 2019-11-05 RX ORDER — BUDESONIDE AND FORMOTEROL FUMARATE DIHYDRATE 160; 4.5 UG/1; UG/1
2 AEROSOL RESPIRATORY (INHALATION) 2 TIMES DAILY
Qty: 1 INHALER | Refills: 11 | Status: SHIPPED | OUTPATIENT
Start: 2019-11-05 | End: 2020-01-23 | Stop reason: SDUPTHER

## 2019-11-10 NOTE — PROGRESS NOTES
Subjective    Ashely Feliciano presents for the following PAROXYSMAL DYSPNEA  .    History of Present Illness     Ms. Feliciano is a 78-year-old female with a past medical history of COPD and pulmonary hypertension due to group 2 and group 3.  She presents to pulmonary outpatient clinic as a regular follow-up.  She is currently on Proventil HFA and Spiriva inhaler.  She is also on Eliquis, Coreg, Lasix for her heart failure and levothyroxine for her hypothyroidism.  She reports feeling shortness of breath from last 4 months.  Her shortness of breath started gradually.  Symptoms of shortness of breath are intermittent.  As per her change in weather affects her the most.  She does complain of intermittent wheezing which responds to bronchodilators..  She does complain of a dry cough.  She denies any blood in the cough.  She denies any fever or chills.  Her shortness of breath increases with activity and degrees on rest.  She does complain of discomfort by laying down flat and she does complain of severe shortness of breath and coughing at nighttime.  She denies any night sweats or weight loss.      Review of system  ROS  General: Negative for fatigue or fever  Psychological: Negative for anxiety or depression  Ophthalmic: Negative for blurry vision or double vision  ENT: Negative for epistaxis or hearing changes  Allergy and immunology: Negative for hives or nasal congestion  Hematological and lymphatic: Negative for jaundice or night sweats  Endocrine: Negative for lethargy, temperature intolerance  Respiratory: Positive for shortness of breath.  Positive for cough.  Positive for wheezing.  Cardiovascular: Negative for chest pain.  Positive for dyspnea on exertion  Gastrointestinal: No abdominal pain, no change in bowel habits  Musculoskeletal: Negative for joint swelling and joint pain  Neurological: No TIA or stroke symptoms  Dermatological: Negative for acne or eczema      Past Medical History:  Past  Medical History:   Diagnosis Date   • Atrial fibrillation (CMS/HCC)    • CAD (coronary artery disease)    • Chronic a-fib    • Chronic anticoagulation    • Congestive heart failure (CMS/HCC)     compensated   • Disease of thyroid gland    • Hyperlipidemia    • Hypertension    • Hypoxemia    • Ischemic cardiomyopathy with severe LV Disfunction    • Myocardial infarction (CMS/HCC)    • Pacemaker     VVI       Family History:  Family History   Problem Relation Age of Onset   • Coronary artery disease Other    • Hypertension Other    • Diabetes Other    • Heart attack Other    • Heart attack Mother 62        fatal MI   • Skin cancer Mother    • Diabetes type II Mother    • Melanoma Mother    • Heart attack Father 63        fatal MI   • Melanoma Sister 45   • Skin cancer Sister    • Heart attack Brother 62        fatal MI   • Heart attack Brother 80        Fatal MI   • Skin cancer Brother    • Heart disease Sister    • Heart disease Brother 75        pacemaker, CABG, Stents   • Osteoporosis Sister    • Pneumonia Brother    • Hypertension Brother    • Heart attack Sister 54        Fatal MI   • Heart attack Brother 59        Fatal MI   • Breast cancer Neg Hx        Social History:  Social History     Tobacco Use   • Smoking status: Former Smoker     Last attempt to quit:      Years since quittin.8   • Smokeless tobacco: Never Used   Substance Use Topics   • Alcohol use: No       Current Medications:  Current Outpatient Medications   Medication Sig Dispense Refill   • albuterol sulfate HFA (PROVENTIL HFA) 108 (90 Base) MCG/ACT inhaler Inhale 2 puffs Every 4 (Four) Hours As Needed for Wheezing. 1 inhaler 11   • aspirin 81 MG EC tablet Take 1 tablet by mouth Daily. 30 tablet 0   • carvedilol (COREG) 6.25 MG tablet TAKE 1 TABLET BY MOUTH TWICE DAILY WITH MEALS 180 tablet 0   • ELIQUIS 5 MG tablet tablet TAKE 1 TABLET BY MOUTH TWICE DAILY 180 tablet 0   • furosemide (LASIX) 20 MG tablet Take 1 tablet by mouth Daily. 30  "tablet 12   • gabapentin (NEURONTIN) 100 MG capsule Take 1 capsule by mouth 2 (Two) Times a Day. 60 capsule 2   • ipratropium (ATROVENT) 0.02 % nebulizer solution Take 2.5 mL by nebulization 4 (Four) Times a Day As Needed for Wheezing or Shortness of Air. 12.5 mL 11   • isosorbide mononitrate (IMDUR) 60 MG 24 hr tablet TAKE 1 TABLET BY MOUTH DAILY 90 tablet 0   • levothyroxine (SYNTHROID, LEVOTHROID) 50 MCG tablet TAKE 1 TABLET BY MOUTH DAILY 90 tablet 0   • LIVALO 1 MG tablet tablet TAKE 1 TABLET BY MOUTH EVERY NIGHT 90 tablet 0   • Memantine HCl-Donepezil HCl 7-10 MG capsule sustained-release 24 hr Take 1 capsule by mouth Daily. 90 capsule 2   • mometasone (NASONEX) 50 MCG/ACT nasal spray Use 2 sprays in each nostril daily as directed by provider. 17 g 2   • montelukast (SINGULAIR) 10 MG tablet TAKE 1 TABLET BY MOUTH EVERY NIGHT 30 tablet 0   • mupirocin (BACTROBAN) 2 % ointment Apply  topically to the appropriate area as directed 2 (Two) Times a Day. 22 g 0   • nitroglycerin (NITROSTAT) 0.4 MG SL tablet DISSOLVE ONE TABLET UNDER TONGUE AS NEEDED FOR CHEST PAIN MAY REPEAT DOSE EVERY 5 MINUTES FOR UP TO 3 DOSES. IF PAIN PERSISTS CALL 911 25 tablet 0   • tiotropium bromide monohydrate (SPIRIVA RESPIMAT) 2.5 MCG/ACT aerosol solution inhaler Inhale 2 puffs by mouth Daily. 4 g 8   • budesonide-formoterol (SYMBICORT) 160-4.5 MCG/ACT inhaler Inhale 2 puffs 2 (Two) Times a Day. 1 inhaler 11     No current facility-administered medications for this visit.        Allergies:  Allergies   Allergen Reactions   • Codeine Rash   • Eggs Or Egg-Derived Products Itching and Rash   • Grass Itching and Rash   • Lisinopril Rash       Vitals:  /78   Ht 170.2 cm (67\")   Wt 80.7 kg (178 lb)   SpO2 96%   BMI 27.88 kg/m²     Results for orders placed or performed in visit on 10/22/19   CK   Result Value Ref Range    Creatine Kinase 53 20 - 180 U/L   Comprehensive Metabolic Panel   Result Value Ref Range    Glucose 96 65 - 99 " mg/dL    BUN 12 8 - 23 mg/dL    Creatinine 0.96 0.57 - 1.00 mg/dL    Sodium 133 (L) 136 - 145 mmol/L    Potassium 4.1 3.5 - 5.2 mmol/L    Chloride 100 98 - 107 mmol/L    CO2 22.7 22.0 - 29.0 mmol/L    Calcium 9.3 8.6 - 10.5 mg/dL    Total Protein 7.9 6.0 - 8.5 g/dL    Albumin 4.40 3.50 - 5.20 g/dL    ALT (SGPT) 11 1 - 33 U/L    AST (SGOT) 21 1 - 32 U/L    Alkaline Phosphatase 77 39 - 117 U/L    Total Bilirubin 0.8 0.2 - 1.2 mg/dL    eGFR Non African Amer 57 (L) >60 mL/min/1.73    Globulin 3.5 gm/dL    A/G Ratio 1.3 g/dL    BUN/Creatinine Ratio 12.5 7.0 - 25.0    Anion Gap 10.3 5.0 - 15.0 mmol/L   Lipid Panel   Result Value Ref Range    Total Cholesterol 126 0 - 200 mg/dL    Triglycerides 145 0 - 150 mg/dL    HDL Cholesterol 44 40 - 60 mg/dL    LDL Cholesterol  53 0 - 100 mg/dL    VLDL Cholesterol 29 5 - 40 mg/dL    LDL/HDL Ratio 1.20    T4, Free   Result Value Ref Range    Free T4 1.40 0.93 - 1.70 ng/dL   TSH   Result Value Ref Range    TSH 2.980 0.270 - 4.200 uIU/mL   CBC Auto Differential   Result Value Ref Range    WBC 6.55 3.40 - 10.80 10*3/mm3    RBC 4.40 3.77 - 5.28 10*6/mm3    Hemoglobin 13.2 12.0 - 15.9 g/dL    Hematocrit 39.1 34.0 - 46.6 %    MCV 88.9 79.0 - 97.0 fL    MCH 30.0 26.6 - 33.0 pg    MCHC 33.8 31.5 - 35.7 g/dL    RDW 13.7 12.3 - 15.4 %    RDW-SD 44.3 37.0 - 54.0 fl    MPV 11.9 6.0 - 12.0 fL    Platelets 198 140 - 450 10*3/mm3    Neutrophil % 71.9 42.7 - 76.0 %    Lymphocyte % 15.9 (L) 19.6 - 45.3 %    Monocyte % 6.9 5.0 - 12.0 %    Eosinophil % 3.4 0.3 - 6.2 %    Basophil % 1.4 0.0 - 1.5 %    Immature Grans % 0.5 0.0 - 0.5 %    Neutrophils, Absolute 4.72 1.70 - 7.00 10*3/mm3    Lymphocytes, Absolute 1.04 0.70 - 3.10 10*3/mm3    Monocytes, Absolute 0.45 0.10 - 0.90 10*3/mm3    Eosinophils, Absolute 0.22 0.00 - 0.40 10*3/mm3    Basophils, Absolute 0.09 0.00 - 0.20 10*3/mm3    Immature Grans, Absolute 0.03 0.00 - 0.05 10*3/mm3    nRBC 0.0 0.0 - 0.2 /100 WBC       Objective   Physical  Exam:  Physical Exam  General Appearance:  In no acute distress and comfortable.    HEENT: Normocephalic, atraumatic, normal right and the left external ear.  Normal nose.  Lungs:  Normal effort and normal respiratory rate.  Positive for rales.  Negative for wheezes.  Negative for rhonchi.    Heart: Normal rate.  Regular rhythm.  S1 normal and S2 normal.    Abdomen: Abdomen is soft.  Bowel sounds are normal.   There is no abdominal tenderness.     Extremities: Normal range of motion.  Positive for dependent edema    Pulses: Distal pulses are intact.  There are no decreased pulses.    Neurological: Patient is alert and oriented to person, place and time.  Normal strength.    Pupils:  Pupils are equal, round, and reactive to light.    Skin:  Warm and dry.        I have reviewed the past medical history, past surgical history, family history and social history of the patient.        Labs:  Lab Results   Component Value Date    PHART 7.447 02/25/2019    NOB1YZK 33.1 (L) 02/25/2019    PO2ART 71.3 (L) 02/25/2019    BFH4FSJ 22.3 02/25/2019    BASEEXCESS -1.1 02/25/2019    F7YVDPAL 94.5 02/25/2019     Lab Results   Component Value Date    GLUCOSE 96 10/22/2019    CALCIUM 9.3 10/22/2019     (L) 10/22/2019    K 4.1 10/22/2019    CO2 22.7 10/22/2019     10/22/2019    BUN 12 10/22/2019    CREATININE 0.96 10/22/2019    EGFRIFNONA 57 (L) 10/22/2019    BCR 12.5 10/22/2019    ANIONGAP 10.3 10/22/2019     Lab Results   Component Value Date    WBC 6.55 10/22/2019    HGB 13.2 10/22/2019    HCT 39.1 10/22/2019    MCV 88.9 10/22/2019     10/22/2019     Assessment/Plan   1. Centrilobular emphysema (CMS/HCC)  Continue with albuterol rescue inhaler  Continue with Spiriva inhaler  Started the patient on Symbicort inhaler    2. Pulmonary hypertension (CMS/HCC)  Pulmonary hypertension due to group 2 and group 3.  Optimizing heart failure and COPD will affect the pulmonary pressures.    Follow-up in 6-month and as needed

## 2019-11-20 RX ORDER — MONTELUKAST SODIUM 10 MG/1
TABLET ORAL
Qty: 30 TABLET | Refills: 0 | Status: SHIPPED | OUTPATIENT
Start: 2019-11-20 | End: 2019-12-20

## 2019-11-20 RX ORDER — LEVOTHYROXINE SODIUM 0.05 MG/1
TABLET ORAL
Qty: 90 TABLET | Refills: 0 | Status: SHIPPED | OUTPATIENT
Start: 2019-11-20 | End: 2020-02-18

## 2019-11-20 RX ORDER — APIXABAN 5 MG/1
TABLET, FILM COATED ORAL
Qty: 180 TABLET | Refills: 0 | Status: SHIPPED | OUTPATIENT
Start: 2019-11-20 | End: 2020-02-18

## 2019-11-20 RX ORDER — PITAVASTATIN CALCIUM 1.04 MG/1
TABLET, FILM COATED ORAL
Qty: 90 TABLET | Refills: 0 | Status: SHIPPED | OUTPATIENT
Start: 2019-11-20 | End: 2020-01-23 | Stop reason: SDUPTHER

## 2019-12-05 ENCOUNTER — TELEPHONE (OUTPATIENT)
Dept: CARDIOLOGY | Facility: CLINIC | Age: 79
End: 2019-12-05

## 2019-12-05 DIAGNOSIS — R06.02 SHORTNESS OF BREATH: Primary | ICD-10-CM

## 2019-12-05 NOTE — TELEPHONE ENCOUNTER
BINH:  Patient called and said she was having trouble breathing, couldn't sleep and feels like her heart is jerking, it has been going on for three or four days I advised the patient to go to the ER and let them check her out and see what is going on, patient stated she does not want to go to the ER  She will just wait a few days and see what happens she hung up before I could say anything else.

## 2019-12-06 DIAGNOSIS — I27.20 PULMONARY HYPERTENSION (HCC): ICD-10-CM

## 2019-12-06 DIAGNOSIS — J43.2 CENTRILOBULAR EMPHYSEMA (HCC): Primary | ICD-10-CM

## 2019-12-06 DIAGNOSIS — J44.9 ASTHMATIC BRONCHITIS , CHRONIC (HCC): ICD-10-CM

## 2019-12-09 DIAGNOSIS — J44.9 ASTHMATIC BRONCHITIS , CHRONIC (HCC): ICD-10-CM

## 2019-12-09 DIAGNOSIS — J43.2 CENTRILOBULAR EMPHYSEMA (HCC): ICD-10-CM

## 2019-12-09 DIAGNOSIS — I27.20 PULMONARY HYPERTENSION (HCC): ICD-10-CM

## 2019-12-20 RX ORDER — CARVEDILOL 6.25 MG/1
TABLET ORAL
Qty: 180 TABLET | Refills: 0 | Status: SHIPPED | OUTPATIENT
Start: 2019-12-20 | End: 2020-03-19

## 2019-12-20 RX ORDER — ISOSORBIDE MONONITRATE 60 MG/1
TABLET, EXTENDED RELEASE ORAL
Qty: 90 TABLET | Refills: 0 | Status: SHIPPED | OUTPATIENT
Start: 2019-12-20 | End: 2020-03-19

## 2019-12-20 RX ORDER — MONTELUKAST SODIUM 10 MG/1
TABLET ORAL
Qty: 30 TABLET | Refills: 0 | Status: SHIPPED | OUTPATIENT
Start: 2019-12-20 | End: 2020-01-20

## 2020-01-14 ENCOUNTER — TELEPHONE (OUTPATIENT)
Dept: CARDIOLOGY | Facility: CLINIC | Age: 80
End: 2020-01-14

## 2020-01-14 NOTE — TELEPHONE ENCOUNTER
Dr Diaz [phone] fax [phone]  Needs to know if pt can be off blood thinner for 2 to 3 days prior to her extraction?

## 2020-01-15 NOTE — TELEPHONE ENCOUNTER
Per Dr. Sanchez advised patient to take Eliquis 2.5mg bid.  Call if continues to have nose bleeds.  Also ok to hold 2-3 days prior to dental procedure.

## 2020-01-16 ENCOUNTER — TELEPHONE (OUTPATIENT)
Dept: CARDIOLOGY | Facility: CLINIC | Age: 80
End: 2020-01-16

## 2020-01-16 NOTE — TELEPHONE ENCOUNTER
[facility] called and asked if the patient needed lab work put in or if she is okay not to have them done. Last office visit was 10/28/2019 she had labs drawn on 10/22/2019.

## 2020-01-20 RX ORDER — MONTELUKAST SODIUM 10 MG/1
TABLET ORAL
Qty: 30 TABLET | Refills: 0 | Status: SHIPPED | OUTPATIENT
Start: 2020-01-20 | End: 2020-02-18

## 2020-01-23 ENCOUNTER — TELEPHONE (OUTPATIENT)
Dept: PULMONOLOGY | Facility: CLINIC | Age: 80
End: 2020-01-23

## 2020-01-23 ENCOUNTER — OFFICE VISIT (OUTPATIENT)
Dept: CARDIOLOGY | Facility: CLINIC | Age: 80
End: 2020-01-23

## 2020-01-23 VITALS
WEIGHT: 173.6 LBS | OXYGEN SATURATION: 96 % | DIASTOLIC BLOOD PRESSURE: 68 MMHG | HEIGHT: 65 IN | HEART RATE: 68 BPM | SYSTOLIC BLOOD PRESSURE: 114 MMHG | BODY MASS INDEX: 28.92 KG/M2

## 2020-01-23 DIAGNOSIS — E78.2 MIXED HYPERLIPIDEMIA: ICD-10-CM

## 2020-01-23 DIAGNOSIS — I27.20 PULMONARY HYPERTENSION (HCC): ICD-10-CM

## 2020-01-23 DIAGNOSIS — E03.9 HYPOTHYROIDISM, UNSPECIFIED TYPE: ICD-10-CM

## 2020-01-23 DIAGNOSIS — I25.5 ISCHEMIC CARDIOMYOPATHY: Primary | ICD-10-CM

## 2020-01-23 DIAGNOSIS — J43.2 CENTRILOBULAR EMPHYSEMA (HCC): ICD-10-CM

## 2020-01-23 DIAGNOSIS — Z79.01 CHRONIC ANTICOAGULATION: ICD-10-CM

## 2020-01-23 DIAGNOSIS — I48.20 CHRONIC A-FIB (HCC): ICD-10-CM

## 2020-01-23 DIAGNOSIS — I10 ESSENTIAL HYPERTENSION: ICD-10-CM

## 2020-01-23 DIAGNOSIS — I25.10 CORONARY ARTERY DISEASE INVOLVING NATIVE CORONARY ARTERY OF NATIVE HEART WITHOUT ANGINA PECTORIS: ICD-10-CM

## 2020-01-23 DIAGNOSIS — I50.22 CHRONIC SYSTOLIC CONGESTIVE HEART FAILURE (HCC): ICD-10-CM

## 2020-01-23 DIAGNOSIS — J44.9 ASTHMATIC BRONCHITIS , CHRONIC (HCC): ICD-10-CM

## 2020-01-23 PROCEDURE — 99214 OFFICE O/P EST MOD 30 MIN: CPT | Performed by: INTERNAL MEDICINE

## 2020-01-23 RX ORDER — GABAPENTIN 100 MG/1
100 CAPSULE ORAL NIGHTLY PRN
Qty: 30 CAPSULE | Refills: 2 | Status: SHIPPED | OUTPATIENT
Start: 2020-01-23 | End: 2020-04-23 | Stop reason: ALTCHOICE

## 2020-01-23 RX ORDER — BUDESONIDE AND FORMOTEROL FUMARATE DIHYDRATE 160; 4.5 UG/1; UG/1
2 AEROSOL RESPIRATORY (INHALATION) 2 TIMES DAILY
Qty: 3 INHALER | Refills: 11 | Status: SHIPPED | OUTPATIENT
Start: 2020-01-23 | End: 2020-05-05 | Stop reason: SDUPTHER

## 2020-01-23 RX ORDER — ALBUTEROL SULFATE 90 UG/1
2 AEROSOL, METERED RESPIRATORY (INHALATION) EVERY 4 HOURS PRN
Qty: 1 INHALER | Refills: 11 | Status: SHIPPED | OUTPATIENT
Start: 2020-01-23 | End: 2020-05-05 | Stop reason: SDUPTHER

## 2020-01-23 NOTE — TELEPHONE ENCOUNTER
Patient called today as she believes that Spiriva is causing nosebleeds.  As symptoms have recently worsened with the weather change, she notes nosebleeds soon after using Spiriva.  She is also continue using Symbicort as well.  She states that she tries not to use the rescue medications of Atrovent nebs and Proventil often, but notices significant improvement with use.    She request to discontinue Spiriva at this time.  She is willing to continue to try Symbicort instead of switching medications to see if Symbicort is  also factor, and continue with current as needed treatments.  I am agreeable to this plan as she will call if bleeding continues with use of Symbicort and wants to try an alternate therapy.    Patient request refills now be sent to [facility].  I placed refills for Symbicort inhaler, Proventil, Atrovent nebs  Spiriva Respimat 2.5 mcg was discontinued.

## 2020-01-23 NOTE — PROGRESS NOTES
subjective     Chief Complaint   Patient presents with   • Asthma     inhalers causes nose to bleed   • Coronary Artery Disease   • Memory Loss     stopped the medication due to nose bleeds      History of Present Illness  Patient is 79 years old white female who is here for follow-up with multiple different problems.  Patient has known coronary artery disease status post CABG.  She has not been taking aspirin because of nosebleed.  She was advised to resume baby aspirin daily.  She denies any chest pain or palpitations.  She has congestive heart failure which is compensated.  She denies orthopnea PND or ankle edema.  Has chronic atrial fibrillation and has been taking Eliquis but because of nosebleeds she has decrease the dose.  Patient has senile dementia minimal.  She was given Namzaric but she discontinued that now she wants to resume it will call in new prescription.  Patient also complains of fibromyalgia and chronic generalized aches and pains.  She wants refill of Neurontin.      Past Surgical History:   Procedure Laterality Date   • CARDIAC SURGERY  1997   • CARDIOVASCULAR STRESS TEST  06/2014   • CATARACT EXTRACTION, BILATERAL     • CHOLECYSTECTOMY  2002   • COLONOSCOPY  05/2012   • ECHO - CONVERTED  07/2014   • PACEMAKER IMPLANTATION  12/17/2015     Family History   Problem Relation Age of Onset   • Coronary artery disease Other    • Hypertension Other    • Diabetes Other    • Heart attack Other    • Heart attack Mother 62        fatal MI   • Skin cancer Mother    • Diabetes type II Mother    • Melanoma Mother    • Heart attack Father 63        fatal MI   • Melanoma Sister 45   • Skin cancer Sister    • Heart attack Brother 62        fatal MI   • Heart attack Brother 80        Fatal MI   • Skin cancer Brother    • Heart disease Sister    • Heart disease Brother 75        pacemaker, CABG, Stents   • Osteoporosis Sister    • Pneumonia Brother    • Hypertension Brother    • Heart attack Sister 54         Fatal MI   • Heart attack Brother 59        Fatal MI   • Breast cancer Neg Hx      Past Medical History:   Diagnosis Date   • Atrial fibrillation (CMS/HCC)    • CAD (coronary artery disease)    • Chronic a-fib    • Chronic anticoagulation    • Congestive heart failure (CMS/HCC)     compensated   • Disease of thyroid gland    • Hyperlipidemia    • Hypertension    • Hypoxemia    • Ischemic cardiomyopathy with severe LV Disfunction    • Myocardial infarction (CMS/HCC)    • Pacemaker     VVI     Patient Active Problem List   Diagnosis   • CAD (coronary artery disease) status post CABG  single-vessel*   • Ischemic cardiomyopathy with apical hypokinesis LV ejection fraction 31-35%   • Chronic systolic congestive heart failure*   • Hyperlipidemia*   • Chronic a-fib*   • Pacemaker dual-chamber pacemaker [employer] 2015*   • Hypothyroidism*   • Hypertension*   • Chronic anticoagulation*   • Gastroesophageal reflux disease without esophagitis*   • URI (upper respiratory infection)   • Asthmatic bronchitis with acute exacerbation   • Stress incontinence   • Herpes zoster without complication   • Acute midline low back pain without sciatica   • CHF (congestive heart failure) (CMS/HCC)   • Centrilobular emphysema (CMS/HCC)   • Asthmatic bronchitis , chronic (CMS/HCC)   • Moderate pulmonary hypertension (CMS/HCC), 46 mmHg   • Seasonal allergic rhinitis   • Post zoster neuralgia       Social History     Tobacco Use   • Smoking status: Former Smoker     Last attempt to quit: 1990     Years since quittin.0   • Smokeless tobacco: Never Used   Substance Use Topics   • Alcohol use: No   • Drug use: No       Allergies   Allergen Reactions   • Codeine Rash   • Eggs Or Egg-Derived Products Itching and Rash   • Grass Itching and Rash   • Lisinopril Rash       Current Outpatient Medications on File Prior to Visit   Medication Sig   • albuterol sulfate HFA (PROVENTIL HFA) 108 (90 Base) MCG/ACT inhaler Inhale 2 puffs Every 4  (Four) Hours As Needed for Wheezing.   • carvedilol (COREG) 6.25 MG tablet TAKE 1 TABLET BY MOUTH TWICE DAILY WITH MEALS   • ELIQUIS 5 MG tablet tablet TAKE 1 TABLET BY MOUTH TWICE DAILY   • furosemide (LASIX) 20 MG tablet Take 1 tablet by mouth Daily.   • ipratropium (ATROVENT) 0.02 % nebulizer solution USE 1 VIAL VIA NEBULIZER FOUR TIMES DAILY AS NEEDED FOR WHEEZING/ SHORTNESS OF BREATH   • isosorbide mononitrate (IMDUR) 60 MG 24 hr tablet TAKE 1 TABLET BY MOUTH DAILY   • levothyroxine (SYNTHROID, LEVOTHROID) 50 MCG tablet TAKE 1 TABLET BY MOUTH DAILY   • LIVALO 1 MG tablet tablet TAKE 1 TABLET BY MOUTH EVERY NIGHT   • mometasone (NASONEX) 50 MCG/ACT nasal spray Use 2 sprays in each nostril daily as directed by provider.   • montelukast (SINGULAIR) 10 MG tablet TAKE 1 TABLET BY MOUTH EVERY NIGHT   • mupirocin (BACTROBAN) 2 % ointment Apply  topically to the appropriate area as directed 2 (Two) Times a Day.   • nitroglycerin (NITROSTAT) 0.4 MG SL tablet DISSOLVE ONE TABLET UNDER TONGUE AS NEEDED FOR CHEST PAIN MAY REPEAT DOSE EVERY 5 MINUTES FOR UP TO 3 DOSES. IF PAIN PERSISTS CALL 911   • [DISCONTINUED] gabapentin (NEURONTIN) 100 MG capsule Take 1 capsule by mouth 2 (Two) Times a Day.   • aspirin 81 MG EC tablet Take 1 tablet by mouth Daily.   • budesonide-formoterol (SYMBICORT) 160-4.5 MCG/ACT inhaler Inhale 2 puffs 2 (Two) Times a Day.   • LIVALO 1 MG tablet tablet TAKE 1 TABLET BY MOUTH EVERY NIGHT   • tiotropium bromide monohydrate (SPIRIVA RESPIMAT) 2.5 MCG/ACT aerosol solution inhaler Inhale 2 puffs by mouth Daily.   • [DISCONTINUED] Memantine HCl-Donepezil HCl 7-10 MG capsule sustained-release 24 hr Take 1 capsule by mouth Daily.     No current facility-administered medications on file prior to visit.          The following portions of the patient's history were reviewed and updated as appropriate: allergies, current medications, past family history, past medical history, past social history, past surgical  "history and problem list.    Review of Systems   Constitution: Positive for malaise/fatigue.   HENT: Negative.  Negative for congestion.    Eyes: Negative.    Cardiovascular: Negative.  Negative for chest pain, cyanosis, dyspnea on exertion, irregular heartbeat, leg swelling, near-syncope, orthopnea, palpitations, paroxysmal nocturnal dyspnea and syncope.   Respiratory: Positive for shortness of breath.    Hematologic/Lymphatic: Negative.    Musculoskeletal: Positive for myalgias.   Gastrointestinal: Negative.    Neurological: Negative.  Negative for headaches.          Objective:     /68 (BP Location: Left arm, Patient Position: Sitting, Cuff Size: Adult)   Pulse 68   Ht 165.1 cm (65\")   Wt 78.7 kg (173 lb 9.6 oz)   SpO2 96%   BMI 28.89 kg/m²   Physical Exam   Constitutional: She appears well-developed and well-nourished. No distress.   HENT:   Head: Normocephalic and atraumatic.   Mouth/Throat: Oropharynx is clear and moist. No oropharyngeal exudate.   Eyes: Pupils are equal, round, and reactive to light. Conjunctivae and EOM are normal. No scleral icterus.   Neck: Normal range of motion. Neck supple. No JVD present. No tracheal deviation present. No thyromegaly present.   Cardiovascular: Normal rate, regular rhythm, normal heart sounds and intact distal pulses. PMI is not displaced. Exam reveals no gallop, no friction rub and no decreased pulses.   No murmur heard.  Pulses:       Carotid pulses are 3+ on the right side, and 3+ on the left side.       Radial pulses are 3+ on the right side, and 3+ on the left side.   Pulmonary/Chest: Effort normal and breath sounds normal. No respiratory distress. She has no wheezes. She has no rales. She exhibits no tenderness.   Abdominal: Soft. Bowel sounds are normal. She exhibits no distension, no abdominal bruit and no mass. There is no splenomegaly or hepatomegaly. There is no tenderness. There is no rebound and no guarding.   Musculoskeletal: Normal range of " motion. She exhibits no edema, tenderness or deformity.   Lymphadenopathy:     She has no cervical adenopathy.   Neurological: She is alert. She has normal reflexes. No cranial nerve deficit. She exhibits normal muscle tone. Coordination normal.   Skin: Skin is warm and dry. No rash noted. She is not diaphoretic. No erythema.   Psychiatric: She has a normal mood and affect. Her behavior is normal. Judgment and thought content normal.         Lab Review  Lab Results   Component Value Date     (L) 10/22/2019    K 4.1 10/22/2019     10/22/2019    BUN 12 10/22/2019    CREATININE 0.96 10/22/2019    GLUCOSE 96 10/22/2019    CALCIUM 9.3 10/22/2019    ALT 11 10/22/2019    ALKPHOS 77 10/22/2019    LABIL2 1.3 (L) 04/05/2016     Lab Results   Component Value Date    CKTOTAL 53 10/22/2019     Lab Results   Component Value Date    WBC 6.55 10/22/2019    HGB 13.2 10/22/2019    HCT 39.1 10/22/2019     10/22/2019     Lab Results   Component Value Date    INR 0.91 12/17/2015    INR 1.00 02/20/2014     Lab Results   Component Value Date    MG 2.2 02/25/2019     Lab Results   Component Value Date    TSH 2.980 10/22/2019     Lab Results   Component Value Date    .0 (H) 02/28/2019     Lab Results   Component Value Date    CHLPL 219 (H) 04/05/2016    CHOL 126 10/22/2019    TRIG 145 10/22/2019    HDL 44 10/22/2019    VLDL 29 10/22/2019    LDLHDL 1.20 10/22/2019     Lab Results   Component Value Date    LDL 53 10/22/2019    LDL 62 01/10/2019       Procedures       I personally viewed and interpreted the patient's LAB data         Assessment:     1. Ischemic cardiomyopathy with apical hypokinesis LV ejection fraction 31-35%    2. Essential hypertension    3. Mixed hyperlipidemia    4. Chronic systolic congestive heart failure*    5. Chronic a-fib*    6. Coronary artery disease involving native coronary artery of native heart without angina pectoris    7. Hypothyroidism, unspecified type    8. Chronic  anticoagulation*          Plan:     Patient was advised to restart baby aspirin daily.  Eliquis was continued and will decrease dose depending on nosebleed.  Namzaric was restarted.  Refill of Neurontin was given.  Patient will continue other medications.  Last LDL was 53.  Lab work scheduled for next visit she will continue Livalo.  Thyroid was also continued.  Overall she is doing much better.        No follow-ups on file.

## 2020-02-18 RX ORDER — LEVOTHYROXINE SODIUM 0.05 MG/1
TABLET ORAL
Qty: 90 TABLET | Refills: 0 | Status: SHIPPED | OUTPATIENT
Start: 2020-02-18 | End: 2020-05-18

## 2020-02-18 RX ORDER — MONTELUKAST SODIUM 10 MG/1
TABLET ORAL
Qty: 30 TABLET | Refills: 0 | Status: SHIPPED | OUTPATIENT
Start: 2020-02-18 | End: 2020-03-19

## 2020-02-18 RX ORDER — PITAVASTATIN CALCIUM 1.04 MG/1
TABLET, FILM COATED ORAL
Qty: 90 TABLET | Refills: 0 | Status: SHIPPED | OUTPATIENT
Start: 2020-02-18 | End: 2020-08-17

## 2020-02-18 RX ORDER — APIXABAN 5 MG/1
TABLET, FILM COATED ORAL
Qty: 180 TABLET | Refills: 0 | Status: SHIPPED | OUTPATIENT
Start: 2020-02-18 | End: 2020-04-23 | Stop reason: SDUPTHER

## 2020-03-19 ENCOUNTER — TELEPHONE (OUTPATIENT)
Dept: PULMONOLOGY | Facility: CLINIC | Age: 80
End: 2020-03-19

## 2020-03-19 DIAGNOSIS — J44.9 ASTHMATIC BRONCHITIS , CHRONIC (HCC): ICD-10-CM

## 2020-03-19 DIAGNOSIS — I27.20 PULMONARY HYPERTENSION (HCC): ICD-10-CM

## 2020-03-19 DIAGNOSIS — J43.2 CENTRILOBULAR EMPHYSEMA (HCC): ICD-10-CM

## 2020-03-19 RX ORDER — MONTELUKAST SODIUM 10 MG/1
TABLET ORAL
Qty: 30 TABLET | Refills: 0 | Status: SHIPPED | OUTPATIENT
Start: 2020-03-19 | End: 2020-04-20

## 2020-03-19 RX ORDER — CARVEDILOL 6.25 MG/1
TABLET ORAL
Qty: 180 TABLET | Refills: 0 | Status: SHIPPED | OUTPATIENT
Start: 2020-03-19 | End: 2020-06-17

## 2020-03-19 RX ORDER — ISOSORBIDE MONONITRATE 60 MG/1
TABLET, EXTENDED RELEASE ORAL
Qty: 90 TABLET | Refills: 0 | Status: SHIPPED | OUTPATIENT
Start: 2020-03-19 | End: 2020-06-17

## 2020-03-19 NOTE — TELEPHONE ENCOUNTER
Patient called back and wants [facility]. I also needed to include the Dx code to get it covered.

## 2020-04-20 RX ORDER — MONTELUKAST SODIUM 10 MG/1
TABLET ORAL
Qty: 30 TABLET | Refills: 0 | Status: SHIPPED | OUTPATIENT
Start: 2020-04-20 | End: 2020-05-18

## 2020-04-23 ENCOUNTER — OFFICE VISIT (OUTPATIENT)
Dept: CARDIOLOGY | Facility: CLINIC | Age: 80
End: 2020-04-23

## 2020-04-23 DIAGNOSIS — I25.5 ISCHEMIC CARDIOMYOPATHY: ICD-10-CM

## 2020-04-23 DIAGNOSIS — E03.9 HYPOTHYROIDISM, UNSPECIFIED TYPE: ICD-10-CM

## 2020-04-23 DIAGNOSIS — E78.2 MIXED HYPERLIPIDEMIA: ICD-10-CM

## 2020-04-23 DIAGNOSIS — Z79.01 CHRONIC ANTICOAGULATION: ICD-10-CM

## 2020-04-23 DIAGNOSIS — I50.22 CHRONIC SYSTOLIC CONGESTIVE HEART FAILURE (HCC): ICD-10-CM

## 2020-04-23 DIAGNOSIS — I27.20 PULMONARY HYPERTENSION (HCC): ICD-10-CM

## 2020-04-23 DIAGNOSIS — I25.10 CORONARY ARTERY DISEASE INVOLVING NATIVE CORONARY ARTERY OF NATIVE HEART WITHOUT ANGINA PECTORIS: ICD-10-CM

## 2020-04-23 DIAGNOSIS — I10 ESSENTIAL HYPERTENSION: ICD-10-CM

## 2020-04-23 DIAGNOSIS — I48.20 CHRONIC A-FIB (HCC): Primary | ICD-10-CM

## 2020-04-23 DIAGNOSIS — K21.9 GASTROESOPHAGEAL REFLUX DISEASE WITHOUT ESOPHAGITIS: ICD-10-CM

## 2020-04-23 DIAGNOSIS — Z95.0 PACEMAKER: ICD-10-CM

## 2020-04-23 PROCEDURE — 99442 PR PHYS/QHP TELEPHONE EVALUATION 11-20 MIN: CPT | Performed by: INTERNAL MEDICINE

## 2020-04-23 RX ORDER — NITROGLYCERIN 0.4 MG/1
0.4 TABLET SUBLINGUAL SEE ADMIN INSTRUCTIONS
Qty: 25 TABLET | Refills: 0 | Status: SHIPPED | OUTPATIENT
Start: 2020-04-23 | End: 2021-03-08

## 2020-04-23 NOTE — PROGRESS NOTES
"subjective     Chief Complaint   Patient presents with   • Coronary Artery Disease     Folllow up, pt doing ok   • Atrial Fibrillation     Follow up   • Palpitations     Follow up, \"flutters at times\" nothing new\",    • Med Refill     pending     History of Present Illness      You have chosen to receive care through a telephone visit. Do you consent to use a telephone visit for your medical care today? Yes    Patient is being evaluated via telephone visit.  She states that she is doing very well has no specific complaints today.  She has history of coronary artery disease status post CABG denies any angina.  She also has congestive heart failure with ischemic cardiomyopathy.  She is clinically compensated with no orthopnea PND or ankle edema.  Because of atrial fibrillation she is anticoagulated, she is taking Eliquis regularly.  She has not had nosebleeds lately .  Patient quit baby aspirin due to nosebleed and decrease the Eliquis to 2.5 twice daily.  Patient also has pacemaker in place which is being monitored.  COPD very well under control under care of pulmonologist.    Hyperlipidemia is controlled with Livalo which she is taking regularly no drug side effects.  Hypothyroidism on Synthroid 50 mcg daily.  She was supposed to have lab work which she did not have due to coronavirus precautions.  But she has no specific symptoms today.  She needs some refills which will be called in and lab work was scheduled again    Past Surgical History:   Procedure Laterality Date   • CARDIAC SURGERY  1997   • CARDIOVASCULAR STRESS TEST  06/2014   • CATARACT EXTRACTION, BILATERAL     • CHOLECYSTECTOMY  2002   • COLONOSCOPY  05/2012   • ECHO - CONVERTED  07/2014   • PACEMAKER IMPLANTATION  12/17/2015     Family History   Problem Relation Age of Onset   • Coronary artery disease Other    • Hypertension Other    • Diabetes Other    • Heart attack Other    • Heart attack Mother 62        fatal MI   • Skin cancer Mother    • " Diabetes type II Mother    • Melanoma Mother    • Heart attack Father 63        fatal MI   • Melanoma Sister 45   • Skin cancer Sister    • Heart attack Brother 62        fatal MI   • Heart attack Brother 80        Fatal MI   • Skin cancer Brother    • Heart disease Sister    • Heart disease Brother 75        pacemaker, CABG, Stents   • Osteoporosis Sister    • Pneumonia Brother    • Hypertension Brother    • Heart attack Sister 54        Fatal MI   • Heart attack Brother 59        Fatal MI   • Breast cancer Neg Hx      Past Medical History:   Diagnosis Date   • Atrial fibrillation (CMS/HCC)    • CAD (coronary artery disease)    • Chronic a-fib    • Chronic anticoagulation    • Congestive heart failure (CMS/HCC)     compensated   • Disease of thyroid gland    • Hyperlipidemia    • Hypertension    • Hypoxemia    • Ischemic cardiomyopathy with severe LV Disfunction    • Myocardial infarction (CMS/HCC)    • Pacemaker     VVI     Patient Active Problem List   Diagnosis   • CAD (coronary artery disease) status post CABG 1997 single-vessel*   • Ischemic cardiomyopathy with apical hypokinesis LV ejection fraction 31-35%   • Chronic systolic congestive heart failure*   • Hyperlipidemia*   • Chronic a-fib*   • Pacemaker dual-chamber pacemaker Biotron[employer] December 2015*   • Hypothyroidism*   • Hypertension*   • Chronic anticoagulation*   • Gastroesophageal reflux disease without esophagitis*   • URI (upper respiratory infection)   • Asthmatic bronchitis with acute exacerbation   • Stress incontinence   • Herpes zoster without complication   • Acute midline low back pain without sciatica   • CHF (congestive heart failure) (CMS/HCC)   • Centrilobular emphysema (CMS/HCC)   • Asthmatic bronchitis , chronic (CMS/HCC)   • Moderate pulmonary hypertension (CMS/HCC), 46 mmHg   • Seasonal allergic rhinitis   • Post zoster neuralgia       Social History     Tobacco Use   • Smoking status: Former Smoker     Last attempt to quit: 1990      Years since quittin.3   • Smokeless tobacco: Never Used   Substance Use Topics   • Alcohol use: No   • Drug use: No       Allergies   Allergen Reactions   • Codeine Rash   • Eggs Or Egg-Derived Products Itching and Rash   • Grass Itching and Rash   • Lisinopril Rash       Current Outpatient Medications on File Prior to Visit   Medication Sig   • albuterol sulfate HFA (PROVENTIL HFA) 108 (90 Base) MCG/ACT inhaler Inhale 2 puffs Every 4 (Four) Hours As Needed for Wheezing.   • budesonide-formoterol (SYMBICORT) 160-4.5 MCG/ACT inhaler Inhale 2 puffs 2 (Two) Times a Day.   • carvedilol (COREG) 6.25 MG tablet TAKE 1 TABLET BY MOUTH TWICE DAILY WITH MEALS   • ELIQUIS 5 MG tablet tablet TAKE 1 TABLET BY MOUTH TWICE DAILY (Patient taking differently: Take 2.5 mg by mouth Every 12 (Twelve) Hours.)   • furosemide (LASIX) 20 MG tablet Take 1 tablet by mouth Daily.   • ipratropium (ATROVENT) 0.02 % nebulizer solution Take 2.5 mL by nebulization 4 (Four) Times a Day.   • isosorbide mononitrate (IMDUR) 60 MG 24 hr tablet TAKE 1 TABLET BY MOUTH DAILY   • levothyroxine (SYNTHROID, LEVOTHROID) 50 MCG tablet TAKE 1 TABLET BY MOUTH DAILY   • LIVALO 1 MG tablet tablet TAKE 1 TABLET BY MOUTH EVERY NIGHT   • montelukast (SINGULAIR) 10 MG tablet TAKE 1 TABLET BY MOUTH EVERY NIGHT   • [DISCONTINUED] nitroglycerin (NITROSTAT) 0.4 MG SL tablet DISSOLVE ONE TABLET UNDER TONGUE AS NEEDED FOR CHEST PAIN MAY REPEAT DOSE EVERY 5 MINUTES FOR UP TO 3 DOSES. IF PAIN PERSISTS CALL 911   • aspirin 81 MG EC tablet Take 1 tablet by mouth Daily.   • gabapentin (NEURONTIN) 100 MG capsule Take 1 capsule by mouth At Night As Needed (pain).   • Memantine HCl-Donepezil HCl 7-10 MG capsule sustained-release 24 hr Take 1 capsule by mouth Daily.   • mometasone (NASONEX) 50 MCG/ACT nasal spray Use 2 sprays in each nostril daily as directed by provider.   • [DISCONTINUED] mupirocin (BACTROBAN) 2 % ointment Apply  topically to the appropriate area as directed  2 (Two) Times a Day.     No current facility-administered medications on file prior to visit.          The following portions of the patient's history were reviewed and updated as appropriate: allergies, current medications, past family history, past medical history, past social history, past surgical history and problem list.    Review of Systems   Constitution: Negative.   HENT: Negative.  Negative for congestion.    Eyes: Negative.    Cardiovascular: Negative.  Negative for chest pain, cyanosis, dyspnea on exertion, irregular heartbeat, leg swelling, near-syncope, orthopnea, palpitations, paroxysmal nocturnal dyspnea and syncope.   Respiratory: Negative.  Negative for shortness of breath.    Hematologic/Lymphatic: Negative.    Musculoskeletal: Negative.    Gastrointestinal: Negative.    Neurological: Negative.  Negative for headaches.          Objective:     There were no vitals taken for this visit.  Physical Exam   Constitutional:   Not done         Lab Review  Lab Results   Component Value Date     (L) 10/22/2019    K 4.1 10/22/2019     10/22/2019    BUN 12 10/22/2019    CREATININE 0.96 10/22/2019    GLUCOSE 96 10/22/2019    CALCIUM 9.3 10/22/2019    ALT 11 10/22/2019    ALKPHOS 77 10/22/2019    LABIL2 1.3 (L) 04/05/2016     Lab Results   Component Value Date    CKTOTAL 53 10/22/2019     Lab Results   Component Value Date    WBC 6.55 10/22/2019    HGB 13.2 10/22/2019    HCT 39.1 10/22/2019     10/22/2019     Lab Results   Component Value Date    INR 0.91 12/17/2015    INR 1.00 02/20/2014     Lab Results   Component Value Date    MG 2.2 02/25/2019     Lab Results   Component Value Date    TSH 2.980 10/22/2019     Lab Results   Component Value Date    .0 (H) 02/28/2019     Lab Results   Component Value Date    CHLPL 219 (H) 04/05/2016    CHOL 126 10/22/2019    TRIG 145 10/22/2019    HDL 44 10/22/2019    VLDL 29 10/22/2019    LDLHDL 1.20 10/22/2019     Lab Results   Component Value Date     LDL 53 10/22/2019    LDL 62 01/10/2019       Procedures       I personally viewed and interpreted the patient's LAB data         Assessment:     1. Chronic a-fib*    2. Chronic anticoagulation*    3. Ischemic cardiomyopathy with apical hypokinesis LV ejection fraction 31-35%    4. Essential hypertension    5. Mixed hyperlipidemia    6. Chronic systolic congestive heart failure*    7. Coronary artery disease involving native coronary artery of native heart without angina pectoris    8. Hypothyroidism, unspecified type    9. Gastroesophageal reflux disease without esophagitis*    10. Pacemaker dual-chamber pacemaker Biotronik December 2015*    11. Moderate pulmonary hypertension (CMS/HCC), 46 mmHg          Plan:     Patient has chronic atrial fibrillation and is taking Eliquis 2.5 twice daily heart rate is controlled with Coreg.  She also has pacemaker which has been functioning normally.  She was advised to continue Eliquis.  Baby aspirin was again recommended because of coronary artery disease but patient had nosebleed and does not wish to try that.  She will continue Lasix and Coreg for heart failure.  Synthroid was continued.  Patient has no chest pain but Imdur was continued.  Livalo was also continued.  Refills were given healthy lifestyle discussed.  Follow-up scheduled with lab work.  Coronavirus precautions discussed  This visit has been rescheduled as a phone visit to comply with patient safety concerns in accordance with CDC recommendations. Total time of discussion was 15 minutes.          No follow-ups on file.

## 2020-05-05 ENCOUNTER — OFFICE VISIT (OUTPATIENT)
Dept: PULMONOLOGY | Facility: CLINIC | Age: 80
End: 2020-05-05

## 2020-05-05 DIAGNOSIS — J30.2 SEASONAL ALLERGIC RHINITIS, UNSPECIFIED TRIGGER: ICD-10-CM

## 2020-05-05 DIAGNOSIS — I27.20 PULMONARY HYPERTENSION (HCC): ICD-10-CM

## 2020-05-05 DIAGNOSIS — I48.20 CHRONIC A-FIB (HCC): ICD-10-CM

## 2020-05-05 DIAGNOSIS — J43.2 CENTRILOBULAR EMPHYSEMA (HCC): Primary | ICD-10-CM

## 2020-05-05 PROCEDURE — 99442 PR PHYS/QHP TELEPHONE EVALUATION 11-20 MIN: CPT | Performed by: PHYSICIAN ASSISTANT

## 2020-05-05 RX ORDER — BUDESONIDE AND FORMOTEROL FUMARATE DIHYDRATE 160; 4.5 UG/1; UG/1
2 AEROSOL RESPIRATORY (INHALATION) 2 TIMES DAILY
Qty: 3 INHALER | Refills: 11 | Status: SHIPPED | OUTPATIENT
Start: 2020-05-05 | End: 2020-11-11 | Stop reason: SDUPTHER

## 2020-05-05 RX ORDER — ALBUTEROL SULFATE 90 UG/1
2 AEROSOL, METERED RESPIRATORY (INHALATION) EVERY 4 HOURS PRN
Qty: 1 INHALER | Refills: 11 | Status: SHIPPED | OUTPATIENT
Start: 2020-05-05 | End: 2020-11-11 | Stop reason: SDUPTHER

## 2020-05-05 NOTE — PROGRESS NOTES
You have chosen to receive care through a telephone visit. Do you consent to use a telephone visit for your medical care today? Yes          Have you had the Influenza Vaccine? no allergy    Have you had the Pneumonia Vaccine?  yes     Subjective    Ashely Feliciano presents for the following Emphysema      History of Present Illness     Follow-up was completed today via telephone encounter for centrilobular emphysema, pulmonary hypertension, seasonal allergic rhinitis.  Patient history is also significant for chronic systolic heart failure with previous echocardiogram showing EF of 31 to 35%, atrial fibrillation on anticoagulation with Eliquis.  Since the previous visit, she reports that symptoms have overall been stable.  She has good and bad days with occasional worsening and improvement of dyspnea.  She states that shortness of breath and cough typically occurs at nighttime, and will occasionally awaken her from sleep.  Shortness of breath is noted upon exertion but at baseline currently.  She has not required frequent use of her Proventil inhaler as she continues on Symbicort as directed.  She will also occasionally use her Atrovent nebulizer and likes that it does not cause tachycardia and palpitations like albuterol occasionally can.  She was previously tried on Spiriva Respimat but noticed epistaxis.  No further epistaxis has been noted since discontinuation.  She states that she had also discontinued Namenda around that time.   She denies any worsening of lower extremity edema at this time.  She monitors her fluid intake and weight closely due to CHF.  She believes that occasional cough has slightly increased, likely related to the weather changes as she has history of allergic rhinitis.  She reports continued sensitivity to cigarette smoke inhalation.  She has previously reported increased breathing sensitivity to heat and moisture.  She was a former smoker herself for approximately 30 to 35 years and  quit in 1990.    No productiveness of cough noted.  No recent fever, chills.  No recent pneumonia or other acute illness.  She has been staying at home as much as possible due to COVID-19 concerns.    She is unable to receive the influenza vaccinations due to egg allergy.  She reports that she received a pneumonia vaccination while hospitalized in 2012, but was unsure if this was the 13 or 23.    Review of Systems   Constitutional: Negative for activity change, chills and fever.   HENT: Negative for congestion, nosebleeds and postnasal drip.    Respiratory: Positive for cough (Intermittent, mostly nocturnal) and shortness of breath (intermittent, pneumonia). Negative for chest tightness and wheezing.    Cardiovascular: Negative for chest pain, palpitations and leg swelling.          Active Problems:  Problem List Items Addressed This Visit     None          Past Medical History:  Past Medical History:   Diagnosis Date   • Atrial fibrillation (CMS/HCC)    • CAD (coronary artery disease)    • Chronic a-fib    • Chronic anticoagulation    • Congestive heart failure (CMS/HCC)     compensated   • Disease of thyroid gland    • Hyperlipidemia    • Hypertension    • Hypoxemia    • Ischemic cardiomyopathy with severe LV Disfunction    • Myocardial infarction (CMS/HCC)    • Pacemaker     VVI       Family History:  Family History   Problem Relation Age of Onset   • Coronary artery disease Other    • Hypertension Other    • Diabetes Other    • Heart attack Other    • Heart attack Mother 62        fatal MI   • Skin cancer Mother    • Diabetes type II Mother    • Melanoma Mother    • Heart attack Father 63        fatal MI   • Melanoma Sister 45   • Skin cancer Sister    • Heart attack Brother 62        fatal MI   • Heart attack Brother 80        Fatal MI   • Skin cancer Brother    • Heart disease Sister    • Heart disease Brother 75        pacemaker, CABG, Stents   • Osteoporosis Sister    • Pneumonia Brother    • Hypertension  Brother    • Heart attack Sister 54        Fatal MI   • Heart attack Brother 59        Fatal MI   • Breast cancer Neg Hx        Social History:  Social History     Tobacco Use   • Smoking status: Former Smoker     Last attempt to quit: 1990     Years since quittin.3   • Smokeless tobacco: Never Used   Substance Use Topics   • Alcohol use: No       Current Medications:  Current Outpatient Medications   Medication Sig Dispense Refill   • albuterol sulfate HFA (PROVENTIL HFA) 108 (90 Base) MCG/ACT inhaler Inhale 2 puffs Every 4 (Four) Hours As Needed for Wheezing. 1 inhaler 11   • apixaban (ELIQUIS) 2.5 MG tablet tablet Take 1 tablet by mouth Every 12 (Twelve) Hours. 180 tablet 2   • aspirin 81 MG EC tablet Take 1 tablet by mouth Daily. 30 tablet 0   • budesonide-formoterol (SYMBICORT) 160-4.5 MCG/ACT inhaler Inhale 2 puffs 2 (Two) Times a Day. 3 inhaler 11   • carvedilol (COREG) 6.25 MG tablet TAKE 1 TABLET BY MOUTH TWICE DAILY WITH MEALS 180 tablet 0   • furosemide (LASIX) 20 MG tablet Take 1 tablet by mouth Daily. 30 tablet 12   • ipratropium (ATROVENT) 0.02 % nebulizer solution Take 2.5 mL by nebulization 4 (Four) Times a Day. 1125 mL 11   • isosorbide mononitrate (IMDUR) 60 MG 24 hr tablet TAKE 1 TABLET BY MOUTH DAILY 90 tablet 0   • levothyroxine (SYNTHROID, LEVOTHROID) 50 MCG tablet TAKE 1 TABLET BY MOUTH DAILY 90 tablet 0   • LIVALO 1 MG tablet tablet TAKE 1 TABLET BY MOUTH EVERY NIGHT 90 tablet 0   • montelukast (SINGULAIR) 10 MG tablet TAKE 1 TABLET BY MOUTH EVERY NIGHT 30 tablet 0   • nitroglycerin (NITROSTAT) 0.4 MG SL tablet Place 1 tablet under the tongue See Admin Instructions. 1 tablet under tongue  for chest pain. may repeat dose every 5 minutes x 3. if pain persists call 911 25 tablet 0     No current facility-administered medications for this visit.        Allergies:  Allergies   Allergen Reactions   • Codeine Rash   • Eggs Or Egg-Derived Products Itching and Rash   • Grass Itching and Rash   •  Lisinopril Rash       Vitals:  There were no vitals taken for this visit.    Imaging:    Imaging Results (Most Recent)     None          Pulmonary Functions Testing Results:    No results found for: FEV1, FVC, DAL9PZX, TLC, DLCO    Results for orders placed or performed in visit on 10/22/19   CK   Result Value Ref Range    Creatine Kinase 53 20 - 180 U/L   Comprehensive Metabolic Panel   Result Value Ref Range    Glucose 96 65 - 99 mg/dL    BUN 12 8 - 23 mg/dL    Creatinine 0.96 0.57 - 1.00 mg/dL    Sodium 133 (L) 136 - 145 mmol/L    Potassium 4.1 3.5 - 5.2 mmol/L    Chloride 100 98 - 107 mmol/L    CO2 22.7 22.0 - 29.0 mmol/L    Calcium 9.3 8.6 - 10.5 mg/dL    Total Protein 7.9 6.0 - 8.5 g/dL    Albumin 4.40 3.50 - 5.20 g/dL    ALT (SGPT) 11 1 - 33 U/L    AST (SGOT) 21 1 - 32 U/L    Alkaline Phosphatase 77 39 - 117 U/L    Total Bilirubin 0.8 0.2 - 1.2 mg/dL    eGFR Non African Amer 57 (L) >60 mL/min/1.73    Globulin 3.5 gm/dL    A/G Ratio 1.3 g/dL    BUN/Creatinine Ratio 12.5 7.0 - 25.0    Anion Gap 10.3 5.0 - 15.0 mmol/L   Lipid Panel   Result Value Ref Range    Total Cholesterol 126 0 - 200 mg/dL    Triglycerides 145 0 - 150 mg/dL    HDL Cholesterol 44 40 - 60 mg/dL    LDL Cholesterol  53 0 - 100 mg/dL    VLDL Cholesterol 29 5 - 40 mg/dL    LDL/HDL Ratio 1.20    T4, Free   Result Value Ref Range    Free T4 1.40 0.93 - 1.70 ng/dL   TSH   Result Value Ref Range    TSH 2.980 0.270 - 4.200 uIU/mL   CBC Auto Differential   Result Value Ref Range    WBC 6.55 3.40 - 10.80 10*3/mm3    RBC 4.40 3.77 - 5.28 10*6/mm3    Hemoglobin 13.2 12.0 - 15.9 g/dL    Hematocrit 39.1 34.0 - 46.6 %    MCV 88.9 79.0 - 97.0 fL    MCH 30.0 26.6 - 33.0 pg    MCHC 33.8 31.5 - 35.7 g/dL    RDW 13.7 12.3 - 15.4 %    RDW-SD 44.3 37.0 - 54.0 fl    MPV 11.9 6.0 - 12.0 fL    Platelets 198 140 - 450 10*3/mm3    Neutrophil % 71.9 42.7 - 76.0 %    Lymphocyte % 15.9 (L) 19.6 - 45.3 %    Monocyte % 6.9 5.0 - 12.0 %    Eosinophil % 3.4 0.3 - 6.2 %     Basophil % 1.4 0.0 - 1.5 %    Immature Grans % 0.5 0.0 - 0.5 %    Neutrophils, Absolute 4.72 1.70 - 7.00 10*3/mm3    Lymphocytes, Absolute 1.04 0.70 - 3.10 10*3/mm3    Monocytes, Absolute 0.45 0.10 - 0.90 10*3/mm3    Eosinophils, Absolute 0.22 0.00 - 0.40 10*3/mm3    Basophils, Absolute 0.09 0.00 - 0.20 10*3/mm3    Immature Grans, Absolute 0.03 0.00 - 0.05 10*3/mm3    nRBC 0.0 0.0 - 0.2 /100 WBC       Objective   Physical Exam    Physical exam not completed today as visit was completed via telephone encounter.    Assessment/Plan      I have reviewed the past medical history, family history, social history, surgical history, and allergies.     PFT completed in May 2019 notable for no true obstruction.  No significant bronchodilator response.  Mild restriction noted with moderately reduced DLCO.    I reviewed the imaging of the previous CT chest completed on 2/27/2019.  This was nonsuggestive of interstitial lung disease.  Cardiomegaly present with coronary calcifications.  No pleural effusion or pericardial effusion.    Reviewed the echocardiogram from February 2019.  Notable for EF of 31 to 35%, mild aortic valve regurgitation, mild MAC and mild/moderate valve regurgitation.  Moderate to severe tricuspid regurgitation with estimated RV systolic pressure greater than 55 mmHg.  Severe pulmonary hypertension suggested.  No pericardial effusion.        ICD-10-CM ICD-9-CM   1. Centrilobular emphysema (CMS/[facility]) J43.2 492.8   2. Pulmonary hypertension (CMS/[facility]) I27.20 416.8   3. Seasonal allergic rhinitis, unspecified trigger J30.2 477.9   4. Chronic a-fib* I48.20 427.31          Centrilobular emphysema:   · Continue Proventil inhaler 2 puffs every 4 hours as needed  · Continue Atrovent neb treatments as needed.  Recommended treatment before bedtime as she has reported shortness of breath during the midline.  This may also be related to chronic systolic heart failure.    Atrovent chosen instead of albuterol to give her a  secondary option that would not cause palpitations/tachycardia in the setting of atrial fibrillation.  She remains on Eliquis and is following with cardiology.   When refills are needed, patient like these to be sent to [facility] pharmacy rather than [facility].  · Continue Symbicort 2 puffs twice daily  · Awaiting LAMA agent for now she previously noted nosebleeds following use of Spiriva  · We will discuss PFT order at the next visit  · Ordered overnight pulse oximetry test  · We will complete walk oximetry test at the next visit.        Pulmonary hypertension:  · Group 2, group 3 in the setting of chronic systolic heart failure with EF of 31 to 35%, multiple valvular abnormalities, and group 3 in the setting of centrilobular emphysema.  · Optimizing centrilobular emphysema therapy  · Performing overnight pulse oximetry test.  Patient otherwise does not require continuous supplemental oxygen therapy.  · Following with cardiology for chronic systolic heart failure management.  Continues on daily Lasix therapy, carvedilol, isosorbide        Seasonal allergic rhinitis:  · On montelukast nightly  · Recommended oral antihistamine as needed with sinus drainage/congestion        Vaccinations: Unable to receive the influenza vaccination due to egg allergy.  She has previously received a pneumonia vaccination, but was unsure if this was PCV13 or PPSV23.      BMI was not calculated today as this was completed via telephone encounter.   Most recently calculated BMI was 28.9.         Return in about 6 months (around 11/5/2020), or as needed.         This visit has been rescheduled as a phone visit to comply with patient safety concerns in accordance with CDC recommendations. Total time of discussion was 16 minutes.

## 2020-05-14 ENCOUNTER — TELEPHONE (OUTPATIENT)
Dept: PULMONOLOGY | Facility: CLINIC | Age: 80
End: 2020-05-14

## 2020-05-14 NOTE — TELEPHONE ENCOUNTER
Called patient with overnight pulse oximetry test results.     Test showed that she did not qualify for supplemental oxygen at this time.   I mentioned that we can reassess at a later time. As her saturations were maintaining appropriately, there is no need for urgent repeat testing.   Discussed that she could call the office anytime for further questions.         Addendum:   Called again on 5/15 at 2:59 PM. Left a message as there was no answer. Again stated that overnight oxygen test was negative and to please call the office with any further questions/concerns.

## 2020-05-18 RX ORDER — LEVOTHYROXINE SODIUM 0.05 MG/1
TABLET ORAL
Qty: 90 TABLET | Refills: 0 | Status: SHIPPED | OUTPATIENT
Start: 2020-05-18 | End: 2020-08-17

## 2020-05-18 RX ORDER — MONTELUKAST SODIUM 10 MG/1
TABLET ORAL
Qty: 30 TABLET | Refills: 0 | Status: SHIPPED | OUTPATIENT
Start: 2020-05-18 | End: 2020-06-17

## 2020-05-19 ENCOUNTER — CLINICAL SUPPORT (OUTPATIENT)
Dept: CARDIOLOGY | Facility: CLINIC | Age: 80
End: 2020-05-19

## 2020-05-19 DIAGNOSIS — Z95.0 PACEMAKER: ICD-10-CM

## 2020-05-19 DIAGNOSIS — I49.8 ATRIAL ARRHYTHMIA: ICD-10-CM

## 2020-05-19 PROCEDURE — 93288 INTERROG EVL PM/LDLS PM IP: CPT | Performed by: INTERNAL MEDICINE

## 2020-06-17 RX ORDER — CARVEDILOL 6.25 MG/1
TABLET ORAL
Qty: 180 TABLET | Refills: 0 | Status: SHIPPED | OUTPATIENT
Start: 2020-06-17 | End: 2020-09-15 | Stop reason: SDUPTHER

## 2020-06-17 RX ORDER — ISOSORBIDE MONONITRATE 60 MG/1
TABLET, EXTENDED RELEASE ORAL
Qty: 90 TABLET | Refills: 0 | Status: SHIPPED | OUTPATIENT
Start: 2020-06-17 | End: 2020-09-15 | Stop reason: SDUPTHER

## 2020-06-17 RX ORDER — MONTELUKAST SODIUM 10 MG/1
TABLET ORAL
Qty: 30 TABLET | Refills: 0 | Status: SHIPPED | OUTPATIENT
Start: 2020-06-17 | End: 2020-07-17

## 2020-07-14 ENCOUNTER — LAB (OUTPATIENT)
Dept: LAB | Facility: HOSPITAL | Age: 80
End: 2020-07-14

## 2020-07-14 DIAGNOSIS — E78.2 MIXED HYPERLIPIDEMIA: ICD-10-CM

## 2020-07-14 DIAGNOSIS — E03.9 HYPOTHYROIDISM, UNSPECIFIED TYPE: ICD-10-CM

## 2020-07-14 LAB
ALBUMIN SERPL-MCNC: 4.1 G/DL (ref 3.5–5.2)
ALBUMIN/GLOB SERPL: 1.2 G/DL
ALP SERPL-CCNC: 74 U/L (ref 39–117)
ALT SERPL W P-5'-P-CCNC: 15 U/L (ref 1–33)
ANION GAP SERPL CALCULATED.3IONS-SCNC: 13.5 MMOL/L (ref 5–15)
AST SERPL-CCNC: 18 U/L (ref 1–32)
BASOPHILS # BLD AUTO: 0.15 10*3/MM3 (ref 0–0.2)
BASOPHILS NFR BLD AUTO: 2.2 % (ref 0–1.5)
BILIRUB SERPL-MCNC: 0.8 MG/DL (ref 0–1.2)
BUN SERPL-MCNC: 20 MG/DL (ref 8–23)
BUN/CREAT SERPL: 19.4 (ref 7–25)
CALCIUM SPEC-SCNC: 9.7 MG/DL (ref 8.6–10.5)
CHLORIDE SERPL-SCNC: 103 MMOL/L (ref 98–107)
CHOLEST SERPL-MCNC: 141 MG/DL (ref 0–200)
CK SERPL-CCNC: 53 U/L (ref 20–180)
CO2 SERPL-SCNC: 23.5 MMOL/L (ref 22–29)
CREAT SERPL-MCNC: 1.03 MG/DL (ref 0.57–1)
DEPRECATED RDW RBC AUTO: 44.2 FL (ref 37–54)
EOSINOPHIL # BLD AUTO: 0.16 10*3/MM3 (ref 0–0.4)
EOSINOPHIL NFR BLD AUTO: 2.3 % (ref 0.3–6.2)
ERYTHROCYTE [DISTWIDTH] IN BLOOD BY AUTOMATED COUNT: 13.7 % (ref 12.3–15.4)
GFR SERPL CREATININE-BSD FRML MDRD: 52 ML/MIN/1.73
GLOBULIN UR ELPH-MCNC: 3.4 GM/DL
GLUCOSE SERPL-MCNC: 84 MG/DL (ref 65–99)
HCT VFR BLD AUTO: 37.8 % (ref 34–46.6)
HDLC SERPL-MCNC: 60 MG/DL (ref 40–60)
HGB BLD-MCNC: 12.8 G/DL (ref 12–15.9)
IMM GRANULOCYTES # BLD AUTO: 0.03 10*3/MM3 (ref 0–0.05)
IMM GRANULOCYTES NFR BLD AUTO: 0.4 % (ref 0–0.5)
LDLC SERPL CALC-MCNC: 56 MG/DL (ref 0–100)
LDLC/HDLC SERPL: 0.93 {RATIO}
LYMPHOCYTES # BLD AUTO: 1.16 10*3/MM3 (ref 0.7–3.1)
LYMPHOCYTES NFR BLD AUTO: 17 % (ref 19.6–45.3)
MCH RBC QN AUTO: 30 PG (ref 26.6–33)
MCHC RBC AUTO-ENTMCNC: 33.9 G/DL (ref 31.5–35.7)
MCV RBC AUTO: 88.7 FL (ref 79–97)
MONOCYTES # BLD AUTO: 0.46 10*3/MM3 (ref 0.1–0.9)
MONOCYTES NFR BLD AUTO: 6.7 % (ref 5–12)
NEUTROPHILS NFR BLD AUTO: 4.86 10*3/MM3 (ref 1.7–7)
NEUTROPHILS NFR BLD AUTO: 71.4 % (ref 42.7–76)
NRBC BLD AUTO-RTO: 0 /100 WBC (ref 0–0.2)
PLATELET # BLD AUTO: 170 10*3/MM3 (ref 140–450)
PMV BLD AUTO: 11.8 FL (ref 6–12)
POTASSIUM SERPL-SCNC: 4.5 MMOL/L (ref 3.5–5.2)
PROT SERPL-MCNC: 7.5 G/DL (ref 6–8.5)
RBC # BLD AUTO: 4.26 10*6/MM3 (ref 3.77–5.28)
SODIUM SERPL-SCNC: 140 MMOL/L (ref 136–145)
T4 FREE SERPL-MCNC: 1.41 NG/DL (ref 0.93–1.7)
TRIGL SERPL-MCNC: 125 MG/DL (ref 0–150)
TSH SERPL DL<=0.05 MIU/L-ACNC: 2.51 UIU/ML (ref 0.27–4.2)
VLDLC SERPL-MCNC: 25 MG/DL (ref 5–40)
WBC # BLD AUTO: 6.82 10*3/MM3 (ref 3.4–10.8)

## 2020-07-14 PROCEDURE — 80053 COMPREHEN METABOLIC PANEL: CPT

## 2020-07-14 PROCEDURE — 82550 ASSAY OF CK (CPK): CPT

## 2020-07-14 PROCEDURE — 84439 ASSAY OF FREE THYROXINE: CPT

## 2020-07-14 PROCEDURE — 85025 COMPLETE CBC W/AUTO DIFF WBC: CPT

## 2020-07-14 PROCEDURE — 84443 ASSAY THYROID STIM HORMONE: CPT

## 2020-07-14 PROCEDURE — 80061 LIPID PANEL: CPT

## 2020-07-14 PROCEDURE — 36415 COLL VENOUS BLD VENIPUNCTURE: CPT

## 2020-07-17 RX ORDER — MONTELUKAST SODIUM 10 MG/1
TABLET ORAL
Qty: 30 TABLET | Refills: 0 | Status: SHIPPED | OUTPATIENT
Start: 2020-07-17 | End: 2020-08-17

## 2020-07-21 ENCOUNTER — OFFICE VISIT (OUTPATIENT)
Dept: CARDIOLOGY | Facility: CLINIC | Age: 80
End: 2020-07-21

## 2020-07-21 VITALS
HEART RATE: 60 BPM | BODY MASS INDEX: 28.66 KG/M2 | OXYGEN SATURATION: 98 % | DIASTOLIC BLOOD PRESSURE: 86 MMHG | WEIGHT: 172 LBS | HEIGHT: 65 IN | SYSTOLIC BLOOD PRESSURE: 150 MMHG | TEMPERATURE: 99.5 F

## 2020-07-21 DIAGNOSIS — Z95.0 PACEMAKER: ICD-10-CM

## 2020-07-21 DIAGNOSIS — I25.5 ISCHEMIC CARDIOMYOPATHY: ICD-10-CM

## 2020-07-21 DIAGNOSIS — I50.22 CHRONIC SYSTOLIC CONGESTIVE HEART FAILURE (HCC): ICD-10-CM

## 2020-07-21 DIAGNOSIS — E78.2 MIXED HYPERLIPIDEMIA: ICD-10-CM

## 2020-07-21 DIAGNOSIS — I48.20 CHRONIC A-FIB (HCC): ICD-10-CM

## 2020-07-21 DIAGNOSIS — Z23 NEED FOR VIRAL IMMUNIZATION: ICD-10-CM

## 2020-07-21 DIAGNOSIS — I10 ESSENTIAL HYPERTENSION: Primary | ICD-10-CM

## 2020-07-21 DIAGNOSIS — E03.9 HYPOTHYROIDISM, UNSPECIFIED TYPE: ICD-10-CM

## 2020-07-21 PROCEDURE — 99214 OFFICE O/P EST MOD 30 MIN: CPT | Performed by: INTERNAL MEDICINE

## 2020-07-21 PROCEDURE — 90732 PPSV23 VACC 2 YRS+ SUBQ/IM: CPT | Performed by: INTERNAL MEDICINE

## 2020-07-21 PROCEDURE — G0009 ADMIN PNEUMOCOCCAL VACCINE: HCPCS | Performed by: INTERNAL MEDICINE

## 2020-07-21 RX ORDER — LOSARTAN POTASSIUM 25 MG/1
25 TABLET ORAL DAILY
Qty: 90 TABLET | Refills: 2 | Status: SHIPPED | OUTPATIENT
Start: 2020-07-21 | End: 2021-04-19

## 2020-07-21 NOTE — PROGRESS NOTES
"subjective     Chief Complaint   Patient presents with   • Atrial Fibrillation     follow up   • Extremity Weakness     \"feeling weaker than usual lately\"   • Coronary Artery Disease   • Results     labs     History of Present Illness    Patient is 79 years old white female who is here for follow-up.  She complains of being very weak and tired.  She denies any headache fever cough or shortness of breath.    Patient has known coronary artery disease with ischemic cardiomyopathy she denies any chest pain or palpitations.  She is taking Coreg , Imdur and Livalo.    She is anticoagulated with low-dose Eliquis and has no drug side effect.  She also has hypothyroidism on Synthroid replacement therapy  As mentioned earlier hyperlipidemia is controlled with Lipitor.  Blood pressure is running high  Past Surgical History:   Procedure Laterality Date   • CARDIAC SURGERY  1997   • CARDIOVASCULAR STRESS TEST  06/2014   • CATARACT EXTRACTION, BILATERAL     • CHOLECYSTECTOMY  2002   • COLONOSCOPY  05/2012   • ECHO - CONVERTED  07/2014   • PACEMAKER IMPLANTATION  12/17/2015     Family History   Problem Relation Age of Onset   • Coronary artery disease Other    • Hypertension Other    • Diabetes Other    • Heart attack Other    • Heart attack Mother 62        fatal MI   • Skin cancer Mother    • Diabetes type II Mother    • Melanoma Mother    • Heart attack Father 63        fatal MI   • Melanoma Sister 45   • Skin cancer Sister    • Heart attack Brother 62        fatal MI   • Heart attack Brother 80        Fatal MI   • Skin cancer Brother    • Heart disease Sister    • Heart disease Brother 75        pacemaker, CABG, Stents   • Osteoporosis Sister    • Pneumonia Brother    • Hypertension Brother    • Heart attack Sister 54        Fatal MI   • Heart attack Brother 59        Fatal MI   • Breast cancer Neg Hx      Past Medical History:   Diagnosis Date   • Atrial fibrillation (CMS/HCC)    • CAD (coronary artery disease)    • Chronic " a-fib (CMS/[facility])    • Chronic anticoagulation    • Congestive heart failure (CMS/[facility])     compensated   • Disease of thyroid gland    • Hyperlipidemia    • Hypertension    • Hypoxemia    • Ischemic cardiomyopathy with severe LV Disfunction    • Myocardial infarction (CMS/[facility])    • Pacemaker     VVI     Patient Active Problem List   Diagnosis   • CAD (coronary artery disease) status post CABG  single-vessel*   • Ischemic cardiomyopathy with apical hypokinesis LV ejection fraction 31-35%   • Chronic systolic congestive heart failure*   • Hyperlipidemia*   • Chronic a-fib*   • Pacemaker dual-chamber pacemaker Biotronik 2015*   • Hypothyroidism*   • Hypertension*   • Chronic anticoagulation*   • Gastroesophageal reflux disease without esophagitis*   • URI (upper respiratory infection)   • Asthmatic bronchitis with acute exacerbation   • Stress incontinence   • Herpes zoster without complication   • Acute midline low back pain without sciatica   • CHF (congestive heart failure) (CMS/[facility])   • Centrilobular emphysema (CMS/[facility])   • Asthmatic bronchitis , chronic (CMS/[facility])   • Moderate pulmonary hypertension (CMS/[facility]), 46 mmHg   • Seasonal allergic rhinitis   • Post zoster neuralgia       Social History     Tobacco Use   • Smoking status: Former Smoker     Last attempt to quit: 1990     Years since quittin.   • Smokeless tobacco: Never Used   Substance Use Topics   • Alcohol use: No   • Drug use: No       Allergies   Allergen Reactions   • Codeine Rash   • Eggs Or Egg-Derived Products Itching and Rash   • Grass Itching and Rash   • Lisinopril Rash       Current Outpatient Medications on File Prior to Visit   Medication Sig   • albuterol sulfate HFA (Proventil HFA) 108 (90 Base) MCG/ACT inhaler Inhale 2 puffs Every 4 (Four) Hours As Needed for Wheezing.   • apixaban (ELIQUIS) 2.5 MG tablet tablet Take 1 tablet by mouth Every 12 (Twelve) Hours.   • budesonide-formoterol (SYMBICORT) 160-4.5 MCG/ACT inhaler  "Inhale 2 puffs 2 (Two) Times a Day.   • carvedilol (COREG) 6.25 MG tablet TAKE 1 TABLET BY MOUTH TWICE DAILY WITH MEALS   • furosemide (LASIX) 20 MG tablet Take 1 tablet by mouth Daily.   • ipratropium (ATROVENT) 0.02 % nebulizer solution Take 2.5 mL by nebulization 4 (Four) Times a Day.   • isosorbide mononitrate (IMDUR) 60 MG 24 hr tablet TAKE 1 TABLET BY MOUTH DAILY   • levothyroxine (SYNTHROID, LEVOTHROID) 50 MCG tablet TAKE 1 TABLET BY MOUTH DAILY   • LIVALO 1 MG tablet tablet TAKE 1 TABLET BY MOUTH EVERY NIGHT   • montelukast (SINGULAIR) 10 MG tablet TAKE 1 TABLET BY MOUTH EVERY NIGHT   • nitroglycerin (NITROSTAT) 0.4 MG SL tablet Place 1 tablet under the tongue See Admin Instructions. 1 tablet under tongue  for chest pain. may repeat dose every 5 minutes x 3. if pain persists call 911   • [DISCONTINUED] apixaban (ELIQUIS) 2.5 MG tablet tablet Take 1 tablet by mouth Every 12 (Twelve) Hours.     No current facility-administered medications on file prior to visit.          The following portions of the patient's history were reviewed and updated as appropriate: allergies, current medications, past family history, past medical history, past social history, past surgical history and problem list.    Review of Systems   Constitution: Positive for malaise/fatigue.   HENT: Negative.  Negative for congestion.    Eyes: Negative.    Cardiovascular: Negative.  Negative for chest pain, cyanosis, dyspnea on exertion, irregular heartbeat, leg swelling, near-syncope, orthopnea, palpitations, paroxysmal nocturnal dyspnea and syncope.        High blood pressure   Respiratory: Negative.  Negative for shortness of breath.    Hematologic/Lymphatic: Negative.    Musculoskeletal: Negative.    Gastrointestinal: Negative.    Neurological: Negative.  Negative for headaches.          Objective:     /86 (BP Location: Left arm, Patient Position: Sitting, Cuff Size: Adult)   Pulse 60   Temp 99.5 °F (37.5 °C)   Ht 165.1 cm (65\")  "  Wt 78 kg (172 lb)   SpO2 98%   BMI 28.62 kg/m²   Physical Exam   Constitutional: She appears well-developed and well-nourished. No distress.   HENT:   Head: Normocephalic and atraumatic.   Mouth/Throat: Oropharynx is clear and moist. No oropharyngeal exudate.   Eyes: Pupils are equal, round, and reactive to light. Conjunctivae and EOM are normal. No scleral icterus.   Neck: Normal range of motion. Neck supple. No JVD present. No tracheal deviation present. No thyromegaly present.   Cardiovascular: Normal rate, normal heart sounds and intact distal pulses. An irregular rhythm present. PMI is not displaced. Exam reveals no gallop, no friction rub and no decreased pulses.   No murmur heard.  Pulses:       Carotid pulses are 3+ on the right side, and 3+ on the left side.       Radial pulses are 3+ on the right side, and 3+ on the left side.   Pulmonary/Chest: Effort normal and breath sounds normal. No respiratory distress. She has no wheezes. She has no rales. She exhibits no tenderness.   Abdominal: Soft. Bowel sounds are normal. She exhibits no distension, no abdominal bruit and no mass. There is no splenomegaly or hepatomegaly. There is no tenderness. There is no rebound and no guarding.   Musculoskeletal: Normal range of motion. She exhibits no edema, tenderness or deformity.   Lymphadenopathy:     She has no cervical adenopathy.   Neurological: She is alert. She has normal reflexes. No cranial nerve deficit. She exhibits normal muscle tone. Coordination normal.   Skin: Skin is warm and dry. No rash noted. She is not diaphoretic. No erythema.   Psychiatric: She has a normal mood and affect. Her behavior is normal. Judgment and thought content normal.         Lab Review  Lab Results   Component Value Date     07/14/2020    K 4.5 07/14/2020     07/14/2020    BUN 20 07/14/2020    CREATININE 1.03 (H) 07/14/2020    GLUCOSE 84 07/14/2020    CALCIUM 9.7 07/14/2020    ALT 15 07/14/2020    ALKPHOS 74  07/14/2020    LABIL2 1.3 (L) 04/05/2016     Lab Results   Component Value Date    CKTOTAL 53 07/14/2020     Lab Results   Component Value Date    WBC 6.82 07/14/2020    HGB 12.8 07/14/2020    HCT 37.8 07/14/2020     07/14/2020     Lab Results   Component Value Date    INR 0.91 12/17/2015    INR 1.00 02/20/2014     Lab Results   Component Value Date    MG 2.2 02/25/2019     Lab Results   Component Value Date    TSH 2.510 07/14/2020     Lab Results   Component Value Date    .0 (H) 02/28/2019     Lab Results   Component Value Date    CHLPL 219 (H) 04/05/2016    CHOL 141 07/14/2020    TRIG 125 07/14/2020    HDL 60 07/14/2020    VLDL 25 07/14/2020    LDLHDL 0.93 07/14/2020     Lab Results   Component Value Date    LDL 56 07/14/2020    LDL 53 10/22/2019       Procedures       I personally viewed and interpreted the patient's LAB data         Assessment:     1. Essential hypertension    2. Need for viral immunization    3. Pacemaker dual-chamber pacemaker Biotronik December 2015*    4. Ischemic cardiomyopathy with apical hypokinesis LV ejection fraction 31-35%    5. Mixed hyperlipidemia    6. Chronic systolic congestive heart failure*    7. Hypothyroidism, unspecified type    8. Chronic a-fib*          Plan:   Lab work reviewed and discussed with the patient  Blood pressure is elevated patient was advised to start losartan 25 mg daily.    Hyperlipidemia is well controlled she will continue Livalo 1 mg daily.  Patient has ischemic cardiomyopathy however she refuses device therapy she is doing very well she was advised to continue Coreg losartan, Lasix and Livalo.  Anticoagulation with Eliquis was continued.  Patient also wanted  pneumonia shot which was given.  Refills were given  Patient will start losartan and if there are any side effects she will let me know.   follow-up scheduled.          No follow-ups on file.

## 2020-08-17 RX ORDER — MONTELUKAST SODIUM 10 MG/1
TABLET ORAL
Qty: 30 TABLET | Refills: 0 | Status: SHIPPED | OUTPATIENT
Start: 2020-08-17 | End: 2020-09-15 | Stop reason: SDUPTHER

## 2020-08-17 RX ORDER — PITAVASTATIN CALCIUM 1.04 MG/1
TABLET, FILM COATED ORAL
Qty: 90 TABLET | Refills: 0 | Status: SHIPPED | OUTPATIENT
Start: 2020-08-17 | End: 2021-03-10 | Stop reason: SDUPTHER

## 2020-08-17 RX ORDER — LEVOTHYROXINE SODIUM 0.05 MG/1
TABLET ORAL
Qty: 90 TABLET | Refills: 0 | Status: SHIPPED | OUTPATIENT
Start: 2020-08-17 | End: 2020-11-16

## 2020-09-03 ENCOUNTER — TELEPHONE (OUTPATIENT)
Dept: CARDIOLOGY | Facility: CLINIC | Age: 80
End: 2020-09-03

## 2020-09-03 NOTE — TELEPHONE ENCOUNTER
Called to remind patient she has an appt for her pacemaker interrogation on Oct 20th @ 1:00.  She confirmed date and time.

## 2020-09-15 RX ORDER — ISOSORBIDE MONONITRATE 60 MG/1
60 TABLET, EXTENDED RELEASE ORAL DAILY
Qty: 90 TABLET | Refills: 0 | Status: SHIPPED | OUTPATIENT
Start: 2020-09-15 | End: 2020-12-14

## 2020-09-15 RX ORDER — CARVEDILOL 6.25 MG/1
6.25 TABLET ORAL 2 TIMES DAILY WITH MEALS
Qty: 180 TABLET | Refills: 0 | Status: SHIPPED | OUTPATIENT
Start: 2020-09-15 | End: 2020-11-06

## 2020-09-15 RX ORDER — MONTELUKAST SODIUM 10 MG/1
10 TABLET ORAL NIGHTLY
Qty: 30 TABLET | Refills: 0 | Status: SHIPPED | OUTPATIENT
Start: 2020-09-15 | End: 2020-10-16

## 2020-10-16 RX ORDER — MONTELUKAST SODIUM 10 MG/1
10 TABLET ORAL NIGHTLY
Qty: 30 TABLET | Refills: 0 | Status: SHIPPED | OUTPATIENT
Start: 2020-10-16 | End: 2020-11-11 | Stop reason: SDUPTHER

## 2020-10-20 ENCOUNTER — CLINICAL SUPPORT (OUTPATIENT)
Dept: CARDIOLOGY | Facility: CLINIC | Age: 80
End: 2020-10-20

## 2020-10-20 DIAGNOSIS — Z95.0 PACEMAKER: ICD-10-CM

## 2020-10-20 PROCEDURE — 93288 INTERROG EVL PM/LDLS PM IP: CPT | Performed by: INTERNAL MEDICINE

## 2020-11-06 RX ORDER — CARVEDILOL 6.25 MG/1
TABLET ORAL
Qty: 180 TABLET | Refills: 0 | Status: SHIPPED | OUTPATIENT
Start: 2020-11-06 | End: 2020-12-14

## 2020-11-09 RX ORDER — FUROSEMIDE 20 MG/1
TABLET ORAL
Qty: 90 TABLET | Refills: 1 | Status: SHIPPED | OUTPATIENT
Start: 2020-11-09 | End: 2021-02-12

## 2020-11-11 ENCOUNTER — OFFICE VISIT (OUTPATIENT)
Dept: PULMONOLOGY | Facility: CLINIC | Age: 80
End: 2020-11-11

## 2020-11-11 VITALS
SYSTOLIC BLOOD PRESSURE: 136 MMHG | TEMPERATURE: 97.8 F | HEIGHT: 65 IN | OXYGEN SATURATION: 98 % | WEIGHT: 185 LBS | DIASTOLIC BLOOD PRESSURE: 86 MMHG | HEART RATE: 62 BPM | BODY MASS INDEX: 30.82 KG/M2

## 2020-11-11 DIAGNOSIS — J30.2 SEASONAL ALLERGIC RHINITIS, UNSPECIFIED TRIGGER: ICD-10-CM

## 2020-11-11 DIAGNOSIS — H66.90 ACUTE OTITIS MEDIA, UNSPECIFIED OTITIS MEDIA TYPE: ICD-10-CM

## 2020-11-11 DIAGNOSIS — J43.2 CENTRILOBULAR EMPHYSEMA (HCC): Primary | ICD-10-CM

## 2020-11-11 DIAGNOSIS — J44.9 ASTHMATIC BRONCHITIS , CHRONIC (HCC): ICD-10-CM

## 2020-11-11 DIAGNOSIS — I27.20 PULMONARY HYPERTENSION (HCC): ICD-10-CM

## 2020-11-11 PROBLEM — J45.901 ASTHMATIC BRONCHITIS WITH ACUTE EXACERBATION: Status: RESOLVED | Noted: 2017-05-11 | Resolved: 2020-11-11

## 2020-11-11 PROCEDURE — 99214 OFFICE O/P EST MOD 30 MIN: CPT | Performed by: PHYSICIAN ASSISTANT

## 2020-11-11 RX ORDER — ALBUTEROL SULFATE 90 UG/1
2 AEROSOL, METERED RESPIRATORY (INHALATION) EVERY 4 HOURS PRN
Qty: 18 G | Refills: 8 | Status: SHIPPED | OUTPATIENT
Start: 2020-11-11 | End: 2021-10-04 | Stop reason: SDUPTHER

## 2020-11-11 RX ORDER — AMOXICILLIN AND CLAVULANATE POTASSIUM 875; 125 MG/1; MG/1
1 TABLET, FILM COATED ORAL 2 TIMES DAILY
Qty: 14 TABLET | Refills: 0 | Status: SHIPPED | OUTPATIENT
Start: 2020-11-11 | End: 2020-11-18

## 2020-11-11 RX ORDER — MONTELUKAST SODIUM 10 MG/1
10 TABLET ORAL NIGHTLY
Qty: 30 TABLET | Refills: 0 | Status: SHIPPED | OUTPATIENT
Start: 2020-11-11 | End: 2020-12-14

## 2020-11-11 RX ORDER — BUDESONIDE AND FORMOTEROL FUMARATE DIHYDRATE 160; 4.5 UG/1; UG/1
2 AEROSOL RESPIRATORY (INHALATION) 2 TIMES DAILY
Qty: 3 INHALER | Refills: 11 | Status: SHIPPED | OUTPATIENT
Start: 2020-11-11 | End: 2021-06-08

## 2020-11-11 RX ORDER — CETIRIZINE HYDROCHLORIDE 10 MG/1
10 TABLET ORAL DAILY
Qty: 15 TABLET | Refills: 0 | Status: SHIPPED | OUTPATIENT
Start: 2020-11-11 | End: 2020-11-12

## 2020-11-11 NOTE — PROGRESS NOTES
"Have you had the Influenza Vaccine? no   Would you like to receive this Vaccine today? no    Have you had the Pneumonia Vaccine?  yes   Would you like to receive this Vaccine today? no    Are you a current smoker? no  Quit date? 1990    Subjective    Ashely Feliciano presents for the following Emphysema      History of Present Illness     Patient presents today for follow-up of emphysema suspected asthmatic bronchitis component, allergic rhinitis.  She states that overall, her breathing symptoms have been well controlled with use of Symbicort.  She also continues on Singulair nightly and is not requiring a oral antihistamine for allergy symptoms at this time.  She states that over the last several days, she did develop some ear congestion, drainage, and is now having some ringing or \"cricket like\" sounds constantly. She states that family history is notable for deafness/hearing impairments but she has not had any difficulties in the past. She not qualify for supplemental oxygen overnight.  She states that she has overall noticed a much better symptomatic control since switching from Breo to Symbicort in the past.  No significant phlegm production.  No fever, chills.      Review of Systems   Constitutional: Negative for activity change, fatigue and unexpected weight change.   HENT: Positive for ear discharge and tinnitus. Negative for congestion, ear pain, postnasal drip and rhinorrhea.    Respiratory: Negative for apnea, cough, chest tightness, shortness of breath and wheezing.    Cardiovascular: Negative for chest pain and palpitations.   Gastrointestinal: Negative for nausea.   Allergic/Immunologic: Negative for environmental allergies.   Psychiatric/Behavioral: Negative for agitation and confusion.       Active Problems:  Problem List Items Addressed This Visit     None          Past Medical History:  Past Medical History:   Diagnosis Date   • Atrial fibrillation (CMS/HCC)    • CAD (coronary artery disease)  "   • Chronic a-fib (CMS/HCC)    • Chronic anticoagulation    • Congestive heart failure (CMS/HCC)     compensated   • Disease of thyroid gland    • Hyperlipidemia    • Hypertension    • Hypoxemia    • Ischemic cardiomyopathy with severe LV Disfunction    • Myocardial infarction (CMS/HCC)    • Pacemaker     VVI       Family History:  Family History   Problem Relation Age of Onset   • Coronary artery disease Other    • Hypertension Other    • Diabetes Other    • Heart attack Other    • Heart attack Mother 62        fatal MI   • Skin cancer Mother    • Diabetes type II Mother    • Melanoma Mother    • Heart attack Father 63        fatal MI   • Melanoma Sister 45   • Skin cancer Sister    • Heart attack Brother 62        fatal MI   • Heart attack Brother 80        Fatal MI   • Skin cancer Brother    • Heart disease Sister    • Heart disease Brother 75        pacemaker, CABG, Stents   • Osteoporosis Sister    • Pneumonia Brother    • Hypertension Brother    • Heart attack Sister 54        Fatal MI   • Heart attack Brother 59        Fatal MI   • Breast cancer Neg Hx        Social History:  Social History     Tobacco Use   • Smoking status: Former Smoker     Quit date:      Years since quittin.8   • Smokeless tobacco: Never Used   Substance Use Topics   • Alcohol use: No       Current Medications:  Current Outpatient Medications   Medication Sig Dispense Refill   • albuterol sulfate HFA (Proventil HFA) 108 (90 Base) MCG/ACT inhaler Inhale 2 puffs Every 4 (Four) Hours As Needed for Wheezing. 1 inhaler 11   • apixaban (ELIQUIS) 2.5 MG tablet tablet Take 1 tablet by mouth Every 12 (Twelve) Hours. 60 tablet 3   • budesonide-formoterol (SYMBICORT) 160-4.5 MCG/ACT inhaler Inhale 2 puffs 2 (Two) Times a Day. 3 inhaler 11   • carvedilol (COREG) 6.25 MG tablet TAKE 1 TABLET BY MOUTH TWICE DAILY WITH MEALS 180 tablet 0   • furosemide (LASIX) 20 MG tablet TAKE 1 TABLET BY MOUTH EVERY DAY 90 tablet 1   • ipratropium (ATROVENT)  "0.02 % nebulizer solution Take 2.5 mL by nebulization 4 (Four) Times a Day. 1125 mL 11   • isosorbide mononitrate (IMDUR) 60 MG 24 hr tablet Take 1 tablet by mouth Daily. 90 tablet 0   • levothyroxine (SYNTHROID, LEVOTHROID) 50 MCG tablet TAKE 1 TABLET BY MOUTH EVERY DAY 90 tablet 0   • LIVALO 1 MG tablet tablet TAKE 1 TABLET BY MOUTH EVERY NIGHT 90 tablet 0   • losartan (COZAAR) 25 MG tablet Take 1 tablet by mouth Daily. 90 tablet 2   • montelukast (SINGULAIR) 10 MG tablet TAKE 1 TABLET BY MOUTH EVERY NIGHT 30 tablet 0   • nitroglycerin (NITROSTAT) 0.4 MG SL tablet Place 1 tablet under the tongue See Admin Instructions. 1 tablet under tongue  for chest pain. may repeat dose every 5 minutes x 3. if pain persists call 911 25 tablet 0   • amoxicillin-clavulanate (Augmentin) 875-125 MG per tablet Take 1 tablet by mouth 2 (Two) Times a Day for 7 days. 14 tablet 0     No current facility-administered medications for this visit.        Allergies:  Allergies   Allergen Reactions   • Codeine Rash   • Eggs Or Egg-Derived Products Itching and Rash   • Grass Itching and Rash   • Lisinopril Rash       Vitals:  /86   Pulse 62   Temp 97.8 °F (36.6 °C) (Temporal)   Ht 165.1 cm (65\")   Wt 83.9 kg (185 lb)   SpO2 98%   BMI 30.79 kg/m²     Imaging:    Imaging Results (Most Recent)     None          Pulmonary Functions Testing Results:    No results found for: FEV1, FVC, [id number], TLC, DLCO    Results for orders placed or performed in visit on 07/14/20   Lipid Panel    Specimen: Blood   Result Value Ref Range    Total Cholesterol 141 0 - 200 mg/dL    Triglycerides 125 0 - 150 mg/dL    HDL Cholesterol 60 40 - 60 mg/dL    LDL Cholesterol  56 0 - 100 mg/dL    VLDL Cholesterol 25 5 - 40 mg/dL    LDL/HDL Ratio 0.93    CK    Specimen: Blood   Result Value Ref Range    Creatine Kinase 53 20 - 180 U/L   Comprehensive Metabolic Panel    Specimen: Blood   Result Value Ref Range    Glucose 84 65 - 99 mg/dL    BUN 20 8 - 23 mg/dL    " Creatinine 1.03 (H) 0.57 - 1.00 mg/dL    Sodium 140 136 - 145 mmol/L    Potassium 4.5 3.5 - 5.2 mmol/L    Chloride 103 98 - 107 mmol/L    CO2 23.5 22.0 - 29.0 mmol/L    Calcium 9.7 8.6 - 10.5 mg/dL    Total Protein 7.5 6.0 - 8.5 g/dL    Albumin 4.10 3.50 - 5.20 g/dL    ALT (SGPT) 15 1 - 33 U/L    AST (SGOT) 18 1 - 32 U/L    Alkaline Phosphatase 74 39 - 117 U/L    Total Bilirubin 0.8 0.0 - 1.2 mg/dL    eGFR Non African Amer 52 (L) >60 mL/min/1.73    Globulin 3.4 gm/dL    A/G Ratio 1.2 g/dL    BUN/Creatinine Ratio 19.4 7.0 - 25.0    Anion Gap 13.5 5.0 - 15.0 mmol/L   TSH    Specimen: Blood   Result Value Ref Range    TSH 2.510 0.270 - 4.200 uIU/mL   T4, Free    Specimen: Blood   Result Value Ref Range    Free T4 1.41 0.93 - 1.70 ng/dL   CBC Auto Differential    Specimen: Blood   Result Value Ref Range    WBC 6.82 3.40 - 10.80 10*3/mm3    RBC 4.26 3.77 - 5.28 10*6/mm3    Hemoglobin 12.8 12.0 - 15.9 g/dL    Hematocrit 37.8 34.0 - 46.6 %    MCV 88.7 79.0 - 97.0 fL    MCH 30.0 26.6 - 33.0 pg    MCHC 33.9 31.5 - 35.7 g/dL    RDW 13.7 12.3 - 15.4 %    RDW-SD 44.2 37.0 - 54.0 fl    MPV 11.8 6.0 - 12.0 fL    Platelets 170 140 - 450 10*3/mm3    Neutrophil % 71.4 42.7 - 76.0 %    Lymphocyte % 17.0 (L) 19.6 - 45.3 %    Monocyte % 6.7 5.0 - 12.0 %    Eosinophil % 2.3 0.3 - 6.2 %    Basophil % 2.2 (H) 0.0 - 1.5 %    Immature Grans % 0.4 0.0 - 0.5 %    Neutrophils, Absolute 4.86 1.70 - 7.00 10*3/mm3    Lymphocytes, Absolute 1.16 0.70 - 3.10 10*3/mm3    Monocytes, Absolute 0.46 0.10 - 0.90 10*3/mm3    Eosinophils, Absolute 0.16 0.00 - 0.40 10*3/mm3    Basophils, Absolute 0.15 0.00 - 0.20 10*3/mm3    Immature Grans, Absolute 0.03 0.00 - 0.05 10*3/mm3    nRBC 0.0 0.0 - 0.2 /100 WBC       Objective   Physical Exam  Vitals signs reviewed.   Constitutional:       General: She is not in acute distress.     Appearance: She is well-developed. She is not diaphoretic.   HENT:      Head: Normocephalic and atraumatic.      Right Ear: External  ear normal.      Left Ear: External ear normal.   Eyes:      Pupils: Pupils are equal, round, and reactive to light.   Neck:      Musculoskeletal: Normal range of motion.      Thyroid: No thyromegaly.   Cardiovascular:      Rate and Rhythm: Normal rate and regular rhythm.      Heart sounds: Normal heart sounds, S1 normal and S2 normal.   Pulmonary:      Effort: Pulmonary effort is normal.      Breath sounds: No wheezing, rhonchi or rales.   Musculoskeletal: Normal range of motion.         General: No swelling.   Skin:     General: Skin is warm and dry.   Neurological:      Mental Status: She is alert and oriented to person, place, and time.   Psychiatric:         Behavior: Behavior normal.         Assessment/Plan      I have reviewed the past medical history, family history, social history, surgical history, and allergies.     Reviewed the previous CT chest from 2019. Suggested no significant interstitial lung disease.  Coronary artery calcifications were notable.    Overnight pulse oximetry test on 5/12/2020 did not qualify her for supplemental oxygen.    PFT in May 2019 suggested moderately reduced DLCO and mild restriction.  No significant bronchodilator response was noted at that time.     Previous echocardiogram from February 2019 was notable for severe pulmonary hypertension, reduced EF of 31 to 35%, mild aortic regurgitation, mild MAC with moderate mitral regurgitation, moderate-severe tricuspid regurgitation.        ICD-10-CM ICD-9-CM   1. Asthmatic bronchitis , chronic (CMS/[facility])  J44.9 493.20   2. Seasonal allergic rhinitis, unspecified trigger  J30.2 477.9   3. Centrilobular emphysema (CMS/[facility])  J43.2 492.8   4. Pulmonary hypertension (CMS/[facility])  I27.20 416.8   5. Acute otitis media, unspecified otitis media type  H66.90 382.9          Centrilobular emphysema, concern for mixed asthmatic bronchitis:   · Continue proventil inhaler as needed  · Continue atrovent as needed (selected over albuterol for  secondary option to reduce palpitations/tachycardia in the setting of atrial fibrillation.  · Continue Symbicort 160, 2 puffs twice daily.  · Awaiting LAMA, previously noted nosebleeds following use of Spiriva  · Previous overnight oximetry test was negative.   · Awaiting walk test today saturation is noted at 98% on room air.  · We will consider repeat with PFT upon any symptomatic changes.        Pulmonary hypertension:   · Likely Group 2, group 3 in the setting of chronic systolic heart failure with EF of 31 to 35%, multiple valvular abnormalities, and group 3 in the setting of centrilobular emphysema.  · Recommended repeat echocardiogram.  She is following with Dr. Sanchez tomorrow and will see if he recommends a repeat study  · Did not qualify for overnight supplemental oxygen.  Walk test awaited as saturation was noted at 98% on room air today.  Will repeat walk test upon any symptomatic changes.        Seasonal allergic rhinitis:   · On singulair nightly  · Recommend oral antihistamine as needed for congestion/drainage.       Concern for otitis media:   · Ordered 7 days of Augmentin for concern of otitis media.  · She agreed if symptoms do not improve following treatment, that she would contact us as needed for possible ENT referral.  · Ordered several day course of Zyrtec as this may help reduce any middle ear fluid/sinus congestion that may be contributing to symptoms.           Vaccinations: Unable to received the influenzal vaccination due to egg allergy. Previously received the pneumonia vaccinations.     Patient's Body mass index is 30.79 kg/m². BMI is above normal parameters. Recommendations include: nutrition counseling.      Return in about 6 months (around 5/11/2021), or as needed.

## 2020-11-12 ENCOUNTER — OFFICE VISIT (OUTPATIENT)
Dept: CARDIOLOGY | Facility: CLINIC | Age: 80
End: 2020-11-12

## 2020-11-12 VITALS
DIASTOLIC BLOOD PRESSURE: 72 MMHG | HEART RATE: 69 BPM | TEMPERATURE: 97.3 F | OXYGEN SATURATION: 98 % | BODY MASS INDEX: 29.76 KG/M2 | WEIGHT: 178.6 LBS | SYSTOLIC BLOOD PRESSURE: 128 MMHG | HEIGHT: 65 IN

## 2020-11-12 DIAGNOSIS — E03.9 ACQUIRED HYPOTHYROIDISM: ICD-10-CM

## 2020-11-12 DIAGNOSIS — I25.5 ISCHEMIC CARDIOMYOPATHY: Primary | ICD-10-CM

## 2020-11-12 DIAGNOSIS — I25.10 CORONARY ARTERY DISEASE INVOLVING NATIVE CORONARY ARTERY OF NATIVE HEART WITHOUT ANGINA PECTORIS: ICD-10-CM

## 2020-11-12 DIAGNOSIS — E78.2 MIXED HYPERLIPIDEMIA: ICD-10-CM

## 2020-11-12 DIAGNOSIS — I48.20 CHRONIC A-FIB (HCC): ICD-10-CM

## 2020-11-12 DIAGNOSIS — Z79.01 CHRONIC ANTICOAGULATION: ICD-10-CM

## 2020-11-12 DIAGNOSIS — I10 ESSENTIAL HYPERTENSION: ICD-10-CM

## 2020-11-12 DIAGNOSIS — I50.22 CHRONIC SYSTOLIC CONGESTIVE HEART FAILURE (HCC): ICD-10-CM

## 2020-11-12 PROCEDURE — 99214 OFFICE O/P EST MOD 30 MIN: CPT | Performed by: INTERNAL MEDICINE

## 2020-11-12 NOTE — PROGRESS NOTES
subjective     Chief Complaint   Patient presents with   • Hypertension     Follow up   • Atrial Fibrillation     Follow up     History of Present Illness  Patient is 79 years old white female who is here for follow-up.  She has multiple chronic medical problems.  She states that she is doing fairly well.  Blood pressure is very well controlled with current medications  Patient has known coronary artery disease status post CABG in 1997 with ischemic cardiomyopathy she refused device therapy.  LV ejection fraction was noted to be around 31 to 35% on echocardiogram dated February 25, 2019 .    she has a dual-chamber pacemaker but did not wish to upgrade     she is doing very well without clinical heart failure.  She is taking Coreg, Lasix, Imdur and losartan.  She denies orthopnea or PND.    She has no chest pain but she is taking Imdur 60 mg daily.  Hyperlipidemia on Livalo therapy.  Patient also has chronic atrial fibrillation and anticoagulated with Eliquis.    She also has multiple allergies and COPD she is following with pulmonologist and is taking albuterol Symbicort and Atrovent.    Past Surgical History:   Procedure Laterality Date   • CARDIAC SURGERY  1997   • CARDIOVASCULAR STRESS TEST  06/2014   • CATARACT EXTRACTION, BILATERAL     • CHOLECYSTECTOMY  2002   • COLONOSCOPY  05/2012   • ECHO - CONVERTED  07/2014   • PACEMAKER IMPLANTATION  12/17/2015     Family History   Problem Relation Age of Onset   • Coronary artery disease Other    • Hypertension Other    • Diabetes Other    • Heart attack Other    • Heart attack Mother 62        fatal MI   • Skin cancer Mother    • Diabetes type II Mother    • Melanoma Mother    • Heart attack Father 63        fatal MI   • Melanoma Sister 45   • Skin cancer Sister    • Heart attack Brother 62        fatal MI   • Heart attack Brother 80        Fatal MI   • Skin cancer Brother    • Heart disease Sister    • Heart disease Brother 75        pacemaker, CABG, Stents   •  Osteoporosis Sister    • Pneumonia Brother    • Hypertension Brother    • Heart attack Sister 54        Fatal MI   • Heart attack Brother 59        Fatal MI   • Breast cancer Neg Hx      Past Medical History:   Diagnosis Date   • Atrial fibrillation (CMS/HCC)    • CAD (coronary artery disease)    • Chronic a-fib (CMS/HCC)    • Chronic anticoagulation    • Congestive heart failure (CMS/HCC)     compensated   • Disease of thyroid gland    • Hyperlipidemia    • Hypertension    • Hypoxemia    • Ischemic cardiomyopathy with severe LV Disfunction    • Myocardial infarction (CMS/HCC)    • Pacemaker     VVI     Patient Active Problem List   Diagnosis   • CAD (coronary artery disease) status post CABG  single-vessel*   • Ischemic cardiomyopathy with apical hypokinesis LV ejection fraction 31-35%   • Chronic systolic congestive heart failure*   • Hyperlipidemia*   • Chronic a-fib*   • Pacemaker dual-chamber pacemaker Biotronik 2015*   • Hypothyroidism*   • Hypertension*   • Chronic anticoagulation*   • Gastroesophageal reflux disease without esophagitis*   • URI (upper respiratory infection)   • Stress incontinence   • Herpes zoster without complication   • Acute midline low back pain without sciatica   • CHF (congestive heart failure) (CMS/HCC)   • Centrilobular emphysema (CMS/HCC)   • Asthmatic bronchitis , chronic (CMS/HCC)   • Moderate pulmonary hypertension (CMS/HCC), 46 mmHg   • Seasonal allergic rhinitis   • Post zoster neuralgia       Social History     Tobacco Use   • Smoking status: Former Smoker     Quit date:      Years since quittin.8   • Smokeless tobacco: Never Used   Substance Use Topics   • Alcohol use: No   • Drug use: No       Allergies   Allergen Reactions   • Codeine Rash   • Eggs Or Egg-Derived Products Itching and Rash   • Grass Itching and Rash   • Lisinopril Rash       Current Outpatient Medications on File Prior to Visit   Medication Sig   • albuterol sulfate HFA (Proventil HFA)  108 (90 Base) MCG/ACT inhaler Inhale 2 puffs Every 4 (Four) Hours As Needed for Wheezing.   • amoxicillin-clavulanate (Augmentin) 875-125 MG per tablet Take 1 tablet by mouth 2 (Two) Times a Day for 7 days.   • apixaban (ELIQUIS) 2.5 MG tablet tablet Take 1 tablet by mouth Every 12 (Twelve) Hours.   • budesonide-formoterol (SYMBICORT) 160-4.5 MCG/ACT inhaler Inhale 2 puffs 2 (Two) Times a Day.   • carvedilol (COREG) 6.25 MG tablet TAKE 1 TABLET BY MOUTH TWICE DAILY WITH MEALS   • furosemide (LASIX) 20 MG tablet TAKE 1 TABLET BY MOUTH EVERY DAY   • ipratropium (ATROVENT) 0.02 % nebulizer solution Take 2.5 mL by nebulization 4 (Four) Times a Day.   • isosorbide mononitrate (IMDUR) 60 MG 24 hr tablet Take 1 tablet by mouth Daily.   • levothyroxine (SYNTHROID, LEVOTHROID) 50 MCG tablet TAKE 1 TABLET BY MOUTH EVERY DAY   • LIVALO 1 MG tablet tablet TAKE 1 TABLET BY MOUTH EVERY NIGHT   • losartan (COZAAR) 25 MG tablet Take 1 tablet by mouth Daily.   • montelukast (SINGULAIR) 10 MG tablet Take 1 tablet by mouth Every Night.   • nitroglycerin (NITROSTAT) 0.4 MG SL tablet Place 1 tablet under the tongue See Admin Instructions. 1 tablet under tongue  for chest pain. may repeat dose every 5 minutes x 3. if pain persists call 911   • [DISCONTINUED] cetirizine (zyrTEC) 10 MG tablet Take 1 tablet by mouth Daily for 15 days.     No current facility-administered medications on file prior to visit.          The following portions of the patient's history were reviewed and updated as appropriate: allergies, current medications, past family history, past medical history, past social history, past surgical history and problem list.    Review of Systems   Constitution: Negative.   HENT: Negative.  Negative for congestion.    Eyes: Negative.    Cardiovascular: Negative.  Negative for chest pain, cyanosis, dyspnea on exertion, irregular heartbeat, leg swelling, near-syncope, orthopnea, palpitations, paroxysmal nocturnal dyspnea and  "syncope.   Respiratory: Negative.  Negative for shortness of breath.    Hematologic/Lymphatic: Negative.    Musculoskeletal: Negative.    Gastrointestinal: Negative.    Neurological: Negative.  Negative for headaches.          Objective:     /72 (BP Location: Left arm, Patient Position: Sitting, Cuff Size: Adult)   Pulse 69   Temp 97.3 °F (36.3 °C)   Ht 165.1 cm (65\")   Wt 81 kg (178 lb 9.6 oz)   SpO2 98%   BMI 29.72 kg/m²   Vitals signs and nursing note reviewed.   Constitutional:       General: Not in acute distress.     Appearance: Healthy appearance. Well-developed and not in distress. Not diaphoretic.   Eyes:      General: No scleral icterus.     Conjunctiva/sclera: Conjunctivae normal.      Pupils: Pupils are equal, round, and reactive to light.   HENT:      Head: Normocephalic and atraumatic.   Neck:      Musculoskeletal: Normal range of motion and neck supple.      Thyroid: Thyroid normal. No thyromegaly.      Vascular: No JVD. JVD normal.      Trachea: No tracheal deviation.      Lymphadenopathy: No cervical adenopathy.   Pulmonary:      Effort: Pulmonary effort is normal. No respiratory distress.      Breath sounds: Normal breath sounds and air entry. No wheezing. No rhonchi. No rales.   Chest:      Chest wall: Not tender to palpatation.   Cardiovascular:      PMI at left midclavicular line. Normal rate. Regular rhythm.      No gallop.   Pulses:     Intact distal pulses. No decreased pulses.   Edema:     Peripheral edema absent.   Abdominal:      General: Bowel sounds are normal. There is no distension or abdominal bruit.      Palpations: Abdomen is soft. There is no hepatomegaly, splenomegaly or abdominal mass.      Tenderness: There is no abdominal tenderness. There is no guarding or rebound.   Skin:     General: Skin is warm and dry. There is no cyanosis.      Coloration: Skin is not jaundiced or pale.      Findings: No erythema or rash.   Neurological:      Mental Status: Alert, oriented to " person, place, and time and oriented to person, place and time.   Psychiatric:         Attention and Perception: Attention normal.         Mood and Affect: Mood and affect normal.         Speech: Speech normal.         Behavior: Behavior is cooperative.           Lab Review  Lab Results   Component Value Date     07/14/2020    K 4.5 07/14/2020     07/14/2020    BUN 20 07/14/2020    CREATININE 1.03 (H) 07/14/2020    GLUCOSE 84 07/14/2020    CALCIUM 9.7 07/14/2020    ALT 15 07/14/2020    ALKPHOS 74 07/14/2020    LABIL2 1.3 (L) 04/05/2016     Lab Results   Component Value Date    CKTOTAL 53 07/14/2020     Lab Results   Component Value Date    WBC 6.82 07/14/2020    HGB 12.8 07/14/2020    HCT 37.8 07/14/2020     07/14/2020     Lab Results   Component Value Date    INR 0.91 12/17/2015    INR 1.00 02/20/2014     Lab Results   Component Value Date    MG 2.2 02/25/2019     Lab Results   Component Value Date    TSH 2.510 07/14/2020     Lab Results   Component Value Date    .0 (H) 02/28/2019     Lab Results   Component Value Date    CHLPL 219 (H) 04/05/2016    CHOL 141 07/14/2020    TRIG 125 07/14/2020    HDL 60 07/14/2020    VLDL 25 07/14/2020    LDLHDL 0.93 07/14/2020     Lab Results   Component Value Date    LDL 56 07/14/2020    LDL 53 10/22/2019       Procedures       I personally viewed and interpreted the patient's LAB data         Assessment:     1. Ischemic cardiomyopathy with apical hypokinesis LV ejection fraction 31-35%    2. Essential hypertension    3. Mixed hyperlipidemia    4. Chronic a-fib*    5. Chronic systolic congestive heart failure*    6. Coronary artery disease involving native coronary artery of native heart without angina pectoris    7. Chronic anticoagulation*    8. Acquired hypothyroidism          Plan:     Patient is doing very well she has known coronary artery disease status post CABG and ischemic cardiomyopathy.  She was advised to continue Coreg 6.25 twice daily, Lasix  20 mg daily, losartan 25 mg daily.    Patient has chronic atrial fibrillation, rate is controlled with Coreg.  She will continue anticoagulation with Eliquis.    Hyperlipidemia on Livalo therapy which was continued.  Hypothyroidism on Synthroid 50 mcg was continued.  She will also continue Imdur and will carry nitroglycerin.  Lab work scheduled for next visit.  Healthy lifestyle emphasized  Plan COVID-19 precautions discussed.  Follow-up with lab work scheduled        No follow-ups on file.

## 2020-11-16 RX ORDER — LEVOTHYROXINE SODIUM 0.05 MG/1
TABLET ORAL
Qty: 90 TABLET | Refills: 0 | Status: SHIPPED | OUTPATIENT
Start: 2020-11-16 | End: 2021-02-12

## 2020-12-14 RX ORDER — CARVEDILOL 6.25 MG/1
TABLET ORAL
Qty: 180 TABLET | Refills: 0 | Status: SHIPPED | OUTPATIENT
Start: 2020-12-14 | End: 2021-03-15

## 2020-12-14 RX ORDER — ISOSORBIDE MONONITRATE 60 MG/1
60 TABLET, EXTENDED RELEASE ORAL DAILY
Qty: 90 TABLET | Refills: 0 | Status: SHIPPED | OUTPATIENT
Start: 2020-12-14 | End: 2021-03-15

## 2020-12-14 RX ORDER — MONTELUKAST SODIUM 10 MG/1
10 TABLET ORAL NIGHTLY
Qty: 30 TABLET | Refills: 0 | Status: SHIPPED | OUTPATIENT
Start: 2020-12-14 | End: 2021-01-15

## 2021-01-15 RX ORDER — MONTELUKAST SODIUM 10 MG/1
10 TABLET ORAL NIGHTLY
Qty: 30 TABLET | Refills: 6 | Status: SHIPPED | OUTPATIENT
Start: 2021-01-15 | End: 2021-03-10 | Stop reason: SDUPTHER

## 2021-02-08 ENCOUNTER — LAB (OUTPATIENT)
Dept: LAB | Facility: HOSPITAL | Age: 81
End: 2021-02-08

## 2021-02-08 DIAGNOSIS — E03.9 ACQUIRED HYPOTHYROIDISM: ICD-10-CM

## 2021-02-08 DIAGNOSIS — E78.2 MIXED HYPERLIPIDEMIA: ICD-10-CM

## 2021-02-08 LAB
ALBUMIN SERPL-MCNC: 4.1 G/DL (ref 3.5–5.2)
ALBUMIN/GLOB SERPL: 1.2 G/DL
ALP SERPL-CCNC: 69 U/L (ref 39–117)
ALT SERPL W P-5'-P-CCNC: 26 U/L (ref 1–33)
ANION GAP SERPL CALCULATED.3IONS-SCNC: 9.6 MMOL/L (ref 5–15)
AST SERPL-CCNC: 39 U/L (ref 1–32)
BASOPHILS # BLD AUTO: 0.11 10*3/MM3 (ref 0–0.2)
BASOPHILS NFR BLD AUTO: 1.8 % (ref 0–1.5)
BILIRUB SERPL-MCNC: 0.6 MG/DL (ref 0–1.2)
BUN SERPL-MCNC: 17 MG/DL (ref 8–23)
BUN/CREAT SERPL: 17.9 (ref 7–25)
CALCIUM SPEC-SCNC: 9.2 MG/DL (ref 8.6–10.5)
CHLORIDE SERPL-SCNC: 107 MMOL/L (ref 98–107)
CHOLEST SERPL-MCNC: 163 MG/DL (ref 0–200)
CK SERPL-CCNC: 56 U/L (ref 20–180)
CO2 SERPL-SCNC: 24.4 MMOL/L (ref 22–29)
CREAT SERPL-MCNC: 0.95 MG/DL (ref 0.57–1)
DEPRECATED RDW RBC AUTO: 41.8 FL (ref 37–54)
EOSINOPHIL # BLD AUTO: 0.12 10*3/MM3 (ref 0–0.4)
EOSINOPHIL NFR BLD AUTO: 1.9 % (ref 0.3–6.2)
ERYTHROCYTE [DISTWIDTH] IN BLOOD BY AUTOMATED COUNT: 13 % (ref 12.3–15.4)
GFR SERPL CREATININE-BSD FRML MDRD: 57 ML/MIN/1.73
GLOBULIN UR ELPH-MCNC: 3.4 GM/DL
GLUCOSE SERPL-MCNC: 94 MG/DL (ref 65–99)
HCT VFR BLD AUTO: 38 % (ref 34–46.6)
HDLC SERPL-MCNC: 55 MG/DL (ref 40–60)
HGB BLD-MCNC: 12.8 G/DL (ref 12–15.9)
IMM GRANULOCYTES # BLD AUTO: 0.03 10*3/MM3 (ref 0–0.05)
IMM GRANULOCYTES NFR BLD AUTO: 0.5 % (ref 0–0.5)
LDLC SERPL CALC-MCNC: 82 MG/DL (ref 0–100)
LDLC/HDLC SERPL: 1.43 {RATIO}
LYMPHOCYTES # BLD AUTO: 0.9 10*3/MM3 (ref 0.7–3.1)
LYMPHOCYTES NFR BLD AUTO: 14.6 % (ref 19.6–45.3)
MCH RBC QN AUTO: 30.4 PG (ref 26.6–33)
MCHC RBC AUTO-ENTMCNC: 33.7 G/DL (ref 31.5–35.7)
MCV RBC AUTO: 90.3 FL (ref 79–97)
MONOCYTES # BLD AUTO: 0.39 10*3/MM3 (ref 0.1–0.9)
MONOCYTES NFR BLD AUTO: 6.3 % (ref 5–12)
NEUTROPHILS NFR BLD AUTO: 4.62 10*3/MM3 (ref 1.7–7)
NEUTROPHILS NFR BLD AUTO: 74.9 % (ref 42.7–76)
NRBC BLD AUTO-RTO: 0 /100 WBC (ref 0–0.2)
PLATELET # BLD AUTO: 169 10*3/MM3 (ref 140–450)
PMV BLD AUTO: 12.4 FL (ref 6–12)
POTASSIUM SERPL-SCNC: 4.6 MMOL/L (ref 3.5–5.2)
PROT SERPL-MCNC: 7.5 G/DL (ref 6–8.5)
RBC # BLD AUTO: 4.21 10*6/MM3 (ref 3.77–5.28)
SODIUM SERPL-SCNC: 141 MMOL/L (ref 136–145)
T4 FREE SERPL-MCNC: 1.46 NG/DL (ref 0.93–1.7)
TRIGL SERPL-MCNC: 148 MG/DL (ref 0–150)
TSH SERPL DL<=0.05 MIU/L-ACNC: 3.02 UIU/ML (ref 0.27–4.2)
VLDLC SERPL-MCNC: 26 MG/DL (ref 5–40)
WBC # BLD AUTO: 6.17 10*3/MM3 (ref 3.4–10.8)

## 2021-02-08 PROCEDURE — 36415 COLL VENOUS BLD VENIPUNCTURE: CPT

## 2021-02-08 PROCEDURE — 84439 ASSAY OF FREE THYROXINE: CPT

## 2021-02-08 PROCEDURE — 85025 COMPLETE CBC W/AUTO DIFF WBC: CPT

## 2021-02-08 PROCEDURE — 80061 LIPID PANEL: CPT

## 2021-02-08 PROCEDURE — 84443 ASSAY THYROID STIM HORMONE: CPT

## 2021-02-08 PROCEDURE — 82550 ASSAY OF CK (CPK): CPT

## 2021-02-08 PROCEDURE — 80053 COMPREHEN METABOLIC PANEL: CPT

## 2021-02-12 DIAGNOSIS — Z23 IMMUNIZATION DUE: ICD-10-CM

## 2021-02-12 RX ORDER — FUROSEMIDE 20 MG/1
TABLET ORAL
Qty: 90 TABLET | Refills: 1 | Status: SHIPPED | OUTPATIENT
Start: 2021-02-12 | End: 2021-08-03

## 2021-02-12 RX ORDER — LEVOTHYROXINE SODIUM 0.05 MG/1
TABLET ORAL
Qty: 90 TABLET | Refills: 0 | Status: SHIPPED | OUTPATIENT
Start: 2021-02-12 | End: 2021-05-13

## 2021-02-17 ENCOUNTER — IMMUNIZATION (OUTPATIENT)
Dept: VACCINE CLINIC | Facility: HOSPITAL | Age: 81
End: 2021-02-17

## 2021-02-17 PROCEDURE — 0001A: CPT | Performed by: INTERNAL MEDICINE

## 2021-02-17 PROCEDURE — 91300 HC SARSCOV02 VAC 30MCG/0.3ML IM: CPT | Performed by: INTERNAL MEDICINE

## 2021-03-08 RX ORDER — NITROGLYCERIN 0.4 MG/1
TABLET SUBLINGUAL
Qty: 25 TABLET | Refills: 0 | Status: SHIPPED | OUTPATIENT
Start: 2021-03-08

## 2021-03-10 ENCOUNTER — OFFICE VISIT (OUTPATIENT)
Dept: CARDIOLOGY | Facility: CLINIC | Age: 81
End: 2021-03-10

## 2021-03-10 VITALS
DIASTOLIC BLOOD PRESSURE: 76 MMHG | WEIGHT: 179.4 LBS | BODY MASS INDEX: 29.89 KG/M2 | HEART RATE: 63 BPM | HEIGHT: 65 IN | OXYGEN SATURATION: 98 % | SYSTOLIC BLOOD PRESSURE: 140 MMHG | RESPIRATION RATE: 16 BRPM | TEMPERATURE: 97.5 F

## 2021-03-10 DIAGNOSIS — J30.2 SEASONAL ALLERGIC RHINITIS, UNSPECIFIED TRIGGER: ICD-10-CM

## 2021-03-10 DIAGNOSIS — Z79.01 CHRONIC ANTICOAGULATION: ICD-10-CM

## 2021-03-10 DIAGNOSIS — I50.22 CHRONIC SYSTOLIC CONGESTIVE HEART FAILURE (HCC): ICD-10-CM

## 2021-03-10 DIAGNOSIS — I10 ESSENTIAL HYPERTENSION: ICD-10-CM

## 2021-03-10 DIAGNOSIS — E78.2 MIXED HYPERLIPIDEMIA: ICD-10-CM

## 2021-03-10 DIAGNOSIS — I25.10 CORONARY ARTERY DISEASE INVOLVING NATIVE CORONARY ARTERY OF NATIVE HEART WITHOUT ANGINA PECTORIS: Primary | ICD-10-CM

## 2021-03-10 DIAGNOSIS — I48.20 CHRONIC A-FIB (HCC): ICD-10-CM

## 2021-03-10 DIAGNOSIS — I25.5 ISCHEMIC CARDIOMYOPATHY: ICD-10-CM

## 2021-03-10 DIAGNOSIS — E03.9 ACQUIRED HYPOTHYROIDISM: ICD-10-CM

## 2021-03-10 PROCEDURE — 93000 ELECTROCARDIOGRAM COMPLETE: CPT | Performed by: INTERNAL MEDICINE

## 2021-03-10 PROCEDURE — 99214 OFFICE O/P EST MOD 30 MIN: CPT | Performed by: INTERNAL MEDICINE

## 2021-03-10 RX ORDER — PANTOPRAZOLE SODIUM 40 MG/1
40 TABLET, DELAYED RELEASE ORAL DAILY
Qty: 90 TABLET | Refills: 1 | Status: SHIPPED | OUTPATIENT
Start: 2021-03-10 | End: 2021-07-06

## 2021-03-10 RX ORDER — MONTELUKAST SODIUM 10 MG/1
10 TABLET ORAL NIGHTLY
Qty: 30 TABLET | Refills: 0 | Status: SHIPPED | OUTPATIENT
Start: 2021-03-10 | End: 2021-04-13

## 2021-03-10 NOTE — PROGRESS NOTES
subjective     Chief Complaint   Patient presents with   • Coronary Artery Disease     patient had chest pain last week but thinks it was due to coughing from the allergies,  EKG done    • Hyperlipidemia     follow up   • Hypertension     follow up     History of Present Illness  Sabiha is 80 years old white female who is here for follow-up of multiple chronic medical problems.  Patient has known coronary artery disease status post CABG with ischemic cardiomyopathy with LV dysfunction.  She has dual-chamber Biotronik pacemaker  Patient states that she is doing very well denies any chest pain palpitations or shortness of breath.  Last week she had one episode of sharp chest pain which was after coughing and she blames it on the allergies.  She is not willing to have another stress test done at this time.  She is physically quite active and besides allergies she has no other complaints today.  Patient has hyperlipidemia and has been on Livalo.  She cannot tolerate other statins but she has been able to use Livalo.    Blood pressure is well controlled she is taking Coreg, losartan and Lasix.    Patient has hypothyroidism on Synthroid replacement therapy.    She wants refill on Protonix which she does not take all the times but wants to have available she also has a lot of environmental allergies and has been taking Singulair from pulmonologist but she ran out and wants refill.      Past Surgical History:   Procedure Laterality Date   • CARDIAC SURGERY  1997   • CARDIOVASCULAR STRESS TEST  06/2014   • CATARACT EXTRACTION, BILATERAL     • CHOLECYSTECTOMY  2002   • COLONOSCOPY  05/2012   • ECHO - CONVERTED  07/2014   • PACEMAKER IMPLANTATION  12/17/2015     Family History   Problem Relation Age of Onset   • Coronary artery disease Other    • Hypertension Other    • Diabetes Other    • Heart attack Other    • Heart attack Mother 62        fatal MI   • Skin cancer Mother    • Diabetes type II Mother    • Melanoma Mother     • Heart attack Father 63        fatal MI   • Melanoma Sister 45   • Skin cancer Sister    • Heart attack Brother 62        fatal MI   • Heart attack Brother 80        Fatal MI   • Skin cancer Brother    • Heart disease Sister    • Heart disease Brother 75        pacemaker, CABG, Stents   • Osteoporosis Sister    • Pneumonia Brother    • Hypertension Brother    • Heart attack Sister 54        Fatal MI   • Heart attack Brother 59        Fatal MI   • Breast cancer Neg Hx      Past Medical History:   Diagnosis Date   • Atrial fibrillation (CMS/HCC)    • CAD (coronary artery disease)    • Chronic a-fib (CMS/HCC)    • Chronic anticoagulation    • Congestive heart failure (CMS/HCC)     compensated   • Disease of thyroid gland    • Hyperlipidemia    • Hypertension    • Hypoxemia    • Ischemic cardiomyopathy with severe LV Disfunction    • Myocardial infarction (CMS/HCC)    • Pacemaker     VVI     Patient Active Problem List   Diagnosis   • CAD (coronary artery disease) status post CABG  single-vessel*   • Ischemic cardiomyopathy with apical hypokinesis LV ejection fraction 31-35%   • Chronic systolic congestive heart failure*   • Hyperlipidemia*   • Chronic a-fib*   • Pacemaker dual-chamber pacemaker Bi[employer]ronik 2015*   • Hypothyroidism*   • Hypertension*   • Chronic anticoagulation*   • Gastroesophageal reflux disease without esophagitis*   • URI (upper respiratory infection)   • Stress incontinence   • Herpes zoster without complication   • Acute midline low back pain without sciatica   • CHF (congestive heart failure) (CMS/HCC)   • Centrilobular emphysema (CMS/HCC)   • Asthmatic bronchitis , chronic (CMS/HCC)   • Moderate pulmonary hypertension (CMS/HCC), 46 mmHg   • Seasonal allergic rhinitis   • Post zoster neuralgia       Social History     Tobacco Use   • Smoking status: Former Smoker     Quit date:      Years since quittin.2   • Smokeless tobacco: Never Used   Vaping Use   • Vaping Use: Never  used   Substance Use Topics   • Alcohol use: No   • Drug use: No       Allergies   Allergen Reactions   • Codeine Rash   • Eggs Or Egg-Derived Products Itching and Rash   • Grass Itching and Rash   • Lisinopril Rash       Current Outpatient Medications on File Prior to Visit   Medication Sig   • albuterol sulfate HFA (Proventil HFA) 108 (90 Base) MCG/ACT inhaler Inhale 2 puffs Every 4 (Four) Hours As Needed for Wheezing.   • apixaban (ELIQUIS) 2.5 MG tablet tablet Take 1 tablet by mouth Every 12 (Twelve) Hours.   • budesonide-formoterol (SYMBICORT) 160-4.5 MCG/ACT inhaler Inhale 2 puffs 2 (Two) Times a Day.   • carvedilol (COREG) 6.25 MG tablet TAKE 1 TABLET BY MOUTH TWICE DAILY WITH MEALS   • furosemide (LASIX) 20 MG tablet TAKE 1 TABLET BY MOUTH EVERY DAY   • ipratropium (ATROVENT) 0.02 % nebulizer solution Take 2.5 mL by nebulization 4 (Four) Times a Day.   • isosorbide mononitrate (IMDUR) 60 MG 24 hr tablet TAKE 1 TABLET BY MOUTH DAILY   • levothyroxine (SYNTHROID, LEVOTHROID) 50 MCG tablet TAKE 1 TABLET BY MOUTH EVERY DAY   • losartan (COZAAR) 25 MG tablet Take 1 tablet by mouth Daily.   • nitroglycerin (NITROSTAT) 0.4 MG SL tablet DISSOLVE ONE TABLET UNDER TONGUE AS NEEDED FOR CHEST PAIN. MAY REPEAT DOSE EVERY 5 MINUTES UP TO 3 DOSES. IF NO RELIEF DIAL 911   • [DISCONTINUED] LIVALO 1 MG tablet tablet TAKE 1 TABLET BY MOUTH EVERY NIGHT   • [DISCONTINUED] montelukast (SINGULAIR) 10 MG tablet TAKE 1 TABLET BY MOUTH EVERY NIGHT     No current facility-administered medications on file prior to visit.         The following portions of the patient's history were reviewed and updated as appropriate: allergies, current medications, past family history, past medical history, past social history, past surgical history and problem list.    Review of Systems   Constitutional: Negative.   HENT: Negative.  Negative for congestion.    Eyes: Negative.    Cardiovascular: Negative.  Negative for chest pain, cyanosis, dyspnea on  "exertion, irregular heartbeat, leg swelling, near-syncope, orthopnea, palpitations, paroxysmal nocturnal dyspnea and syncope.   Respiratory: Negative.  Negative for shortness of breath.    Hematologic/Lymphatic: Negative.    Musculoskeletal: Negative.    Gastrointestinal: Negative.    Neurological: Negative.  Negative for headaches.   Allergic/Immunologic: Positive for environmental allergies.          Objective:     /76   Pulse 63   Temp 97.5 °F (36.4 °C) (Temporal)   Resp 16   Ht 165.1 cm (65\")   Wt 81.4 kg (179 lb 6.4 oz)   SpO2 98%   BMI 29.85 kg/m²   Vitals and nursing note reviewed.   Constitutional:       General: Not in acute distress.     Appearance: Healthy appearance. Well-developed and not in distress. Not diaphoretic.   Eyes:      General: No scleral icterus.     Conjunctiva/sclera: Conjunctivae normal.      Pupils: Pupils are equal, round, and reactive to light.   HENT:      Head: Normocephalic and atraumatic.   Neck:      Thyroid: Thyroid normal. No thyromegaly.      Vascular: No JVD. JVD normal.      Trachea: No tracheal deviation.      Lymphadenopathy: No cervical adenopathy.   Pulmonary:      Effort: Pulmonary effort is normal. No respiratory distress.      Breath sounds: Normal breath sounds and air entry.   Chest:      Chest wall: Not tender to palpatation.   Cardiovascular:      PMI at left midclavicular line. Normal rate. Regular rhythm.      No gallop.   Pulses:     Intact distal pulses. No decreased pulses.   Abdominal:      General: Bowel sounds are normal. There is no distension or abdominal bruit.      Palpations: Abdomen is soft. There is no hepatomegaly, splenomegaly or abdominal mass.      Tenderness: There is no abdominal tenderness. There is no guarding or rebound.   Musculoskeletal:      Cervical back: Normal range of motion and neck supple. Skin:     General: Skin is warm and dry. There is no cyanosis.      Coloration: Skin is not jaundiced or pale.      Findings: No " erythema or rash.   Neurological:      Mental Status: Alert, oriented to person, place, and time and oriented to person, place and time.   Psychiatric:         Attention and Perception: Attention normal.         Mood and Affect: Mood and affect normal.         Speech: Speech normal.         Behavior: Behavior is cooperative.           Lab Review  Lab Results   Component Value Date     02/08/2021    K 4.6 02/08/2021     02/08/2021    BUN 17 02/08/2021    CREATININE 0.95 02/08/2021    GLUCOSE 94 02/08/2021    CALCIUM 9.2 02/08/2021    ALT 26 02/08/2021    ALKPHOS 69 02/08/2021    LABIL2 1.3 (L) 04/05/2016     Lab Results   Component Value Date    CKTOTAL 56 02/08/2021     Lab Results   Component Value Date    WBC 6.17 02/08/2021    HGB 12.8 02/08/2021    HCT 38.0 02/08/2021     02/08/2021     Lab Results   Component Value Date    INR 0.91 12/17/2015    INR 1.00 02/20/2014     Lab Results   Component Value Date    MG 2.2 02/25/2019     Lab Results   Component Value Date    TSH 3.020 02/08/2021     Lab Results   Component Value Date    .0 (H) 02/28/2019     Lab Results   Component Value Date    CHLPL 219 (H) 04/05/2016    CHOL 163 02/08/2021    TRIG 148 02/08/2021    HDL 55 02/08/2021    VLDL 26 02/08/2021    LDLHDL 1.43 02/08/2021     Lab Results   Component Value Date    LDL 82 02/08/2021    LDL 56 07/14/2020         ECG 12 Lead    Date/Time: 3/10/2021 4:12 PM  Performed by: Maria R Sanchez MD  Authorized by: Maria R Sanchez MD   Comparison: compared with previous ECG from 2/26/2019  Comparison to previous ECG: T wave inversion in all leads is new but cannot be compared with old EKG because old EKG is paced rhythm  Rhythm: atrial fibrillation and paced  Rate: normal  BPM: 72  Conduction: conduction normal  T inversion: all  QRS axis: normal  Pacing: dual chamber pacing  Clinical impression: abnormal EKG               I personally viewed and interpreted the patient's LAB data          Assessment:     1. Coronary artery disease involving native coronary artery of native heart without angina pectoris    2. Mixed hyperlipidemia    3. Essential hypertension    4. Chronic systolic congestive heart failure*    5. Chronic a-fib*    6. Ischemic cardiomyopathy with apical hypokinesis LV ejection fraction 31-35%    7. Seasonal allergic rhinitis, unspecified trigger    8. Acquired hypothyroidism    9. Chronic anticoagulation*          Plan:     Patient is doing very well and has no new complaints however EKG showed deep inverted T waves but currently compared with old EKG because old EKG is paced rhythm.  Patient is asymptomatic and does not wish to have a stress test done at this time.  She actually wants to discontinue Imdur.  She was advised to continue Imdur at this time.  Blood pressure is normal.  She says that her blood pressure is better at home.  Clinically no evidence of heart failure.  She wanted refill of Livalo which was given lab work will be checked next visit for hyperlipidemia.  Refill of Singulair was given along with Protonix.  Synthroid was continued  Eliquis 2.5 twice daily was also continued.  Follow-up scheduled        No follow-ups on file.

## 2021-03-11 ENCOUNTER — IMMUNIZATION (OUTPATIENT)
Dept: VACCINE CLINIC | Facility: HOSPITAL | Age: 81
End: 2021-03-11

## 2021-03-11 PROCEDURE — 91300 HC SARSCOV02 VAC 30MCG/0.3ML IM: CPT | Performed by: INTERNAL MEDICINE

## 2021-03-11 PROCEDURE — 0002A: CPT | Performed by: INTERNAL MEDICINE

## 2021-03-15 RX ORDER — ISOSORBIDE MONONITRATE 60 MG/1
60 TABLET, EXTENDED RELEASE ORAL DAILY
Qty: 90 TABLET | Refills: 0 | Status: SHIPPED | OUTPATIENT
Start: 2021-03-15 | End: 2021-06-07 | Stop reason: SDUPTHER

## 2021-03-15 RX ORDER — CARVEDILOL 6.25 MG/1
TABLET ORAL
Qty: 180 TABLET | Refills: 0 | Status: SHIPPED | OUTPATIENT
Start: 2021-03-15 | End: 2021-06-07 | Stop reason: SDUPTHER

## 2021-04-07 DIAGNOSIS — R30.0 DYSURIA: Primary | ICD-10-CM

## 2021-04-07 RX ORDER — AMOXICILLIN 500 MG/1
500 CAPSULE ORAL 3 TIMES DAILY
Qty: 21 CAPSULE | Refills: 0 | Status: SHIPPED | OUTPATIENT
Start: 2021-04-07 | End: 2021-04-14

## 2021-04-07 NOTE — TELEPHONE ENCOUNTER
Patient c/o UTI wants antibiotic called in to [facility] [place] I told her to get a UA done at the hospital we will put the orders in.

## 2021-04-09 ENCOUNTER — LAB (OUTPATIENT)
Dept: LAB | Facility: HOSPITAL | Age: 81
End: 2021-04-09

## 2021-04-09 DIAGNOSIS — R30.0 DYSURIA: Primary | ICD-10-CM

## 2021-04-09 DIAGNOSIS — R30.0 DYSURIA: ICD-10-CM

## 2021-04-09 LAB
BACTERIA UR QL AUTO: ABNORMAL /HPF
BILIRUB UR QL STRIP: NEGATIVE
CLARITY UR: CLEAR
COLOR UR: YELLOW
GLUCOSE UR STRIP-MCNC: NEGATIVE MG/DL
HGB UR QL STRIP.AUTO: NEGATIVE
HYALINE CASTS UR QL AUTO: ABNORMAL /LPF
KETONES UR QL STRIP: NEGATIVE
LEUKOCYTE ESTERASE UR QL STRIP.AUTO: ABNORMAL
NITRITE UR QL STRIP: NEGATIVE
PH UR STRIP.AUTO: 5.5 [PH] (ref 5–8)
PROT UR QL STRIP: NEGATIVE
RBC # UR: ABNORMAL /HPF
REF LAB TEST METHOD: ABNORMAL
SP GR UR STRIP: 1.01 (ref 1–1.03)
SQUAMOUS #/AREA URNS HPF: ABNORMAL /HPF
UROBILINOGEN UR QL STRIP: ABNORMAL
WBC UR QL AUTO: ABNORMAL /HPF

## 2021-04-09 PROCEDURE — 81001 URINALYSIS AUTO W/SCOPE: CPT

## 2021-04-09 PROCEDURE — 87086 URINE CULTURE/COLONY COUNT: CPT

## 2021-04-10 LAB — BACTERIA SPEC AEROBE CULT: NO GROWTH

## 2021-04-12 ENCOUNTER — TELEPHONE (OUTPATIENT)
Dept: CARDIOLOGY | Facility: CLINIC | Age: 81
End: 2021-04-12

## 2021-04-12 NOTE — TELEPHONE ENCOUNTER
----- Message from Maria R Sanchez MD sent at 4/12/2021 10:18 AM EDT -----  Urinalysis does not show any infectionIf she still having symptoms let me know

## 2021-04-13 RX ORDER — MONTELUKAST SODIUM 10 MG/1
10 TABLET ORAL NIGHTLY
Qty: 30 TABLET | Refills: 0 | Status: SHIPPED | OUTPATIENT
Start: 2021-04-13 | End: 2021-06-07 | Stop reason: SDUPTHER

## 2021-04-19 RX ORDER — LOSARTAN POTASSIUM 25 MG/1
25 TABLET ORAL DAILY
Qty: 90 TABLET | Refills: 2 | Status: SHIPPED | OUTPATIENT
Start: 2021-04-19 | End: 2021-11-30 | Stop reason: SDUPTHER

## 2021-04-20 ENCOUNTER — CLINICAL SUPPORT (OUTPATIENT)
Dept: CARDIOLOGY | Facility: CLINIC | Age: 81
End: 2021-04-20

## 2021-04-20 DIAGNOSIS — J43.2 CENTRILOBULAR EMPHYSEMA (HCC): ICD-10-CM

## 2021-04-20 DIAGNOSIS — Z95.0 PACEMAKER: ICD-10-CM

## 2021-04-20 PROCEDURE — 93288 INTERROG EVL PM/LDLS PM IP: CPT | Performed by: INTERNAL MEDICINE

## 2021-04-21 NOTE — PROGRESS NOTES
subjective   No chief complaint on file.    History of Present Illness      Past Surgical History:   Procedure Laterality Date   • CARDIAC SURGERY  1997   • CARDIOVASCULAR STRESS TEST  06/2014   • CATARACT EXTRACTION, BILATERAL     • CHOLECYSTECTOMY  2002   • COLONOSCOPY  05/2012   • ECHO - CONVERTED  07/2014   • PACEMAKER IMPLANTATION  12/17/2015     Family History   Problem Relation Age of Onset   • Coronary artery disease Other    • Hypertension Other    • Diabetes Other    • Heart attack Other    • Heart attack Mother 62        fatal MI   • Skin cancer Mother    • Diabetes type II Mother    • Melanoma Mother    • Heart attack Father 63        fatal MI   • Melanoma Sister 45   • Skin cancer Sister    • Heart attack Brother 62        fatal MI   • Heart attack Brother 80        Fatal MI   • Skin cancer Brother    • Heart disease Sister    • Heart disease Brother 75        pacemaker, CABG, Stents   • Osteoporosis Sister    • Pneumonia Brother    • Hypertension Brother    • Heart attack Sister 54        Fatal MI   • Heart attack Brother 59        Fatal MI   • Breast cancer Neg Hx      Past Medical History:   Diagnosis Date   • Atrial fibrillation (CMS/HCC)    • CAD (coronary artery disease)    • Chronic a-fib (CMS/HCC)    • Chronic anticoagulation    • Congestive heart failure (CMS/HCC)     compensated   • Disease of thyroid gland    • Hyperlipidemia    • Hypertension    • Hypoxemia    • Ischemic cardiomyopathy with severe LV Disfunction    • Myocardial infarction (CMS/HCC)    • Pacemaker     VVI     Patient Active Problem List   Diagnosis   • CAD (coronary artery disease) status post CABG 1997 single-vessel*   • Ischemic cardiomyopathy with apical hypokinesis LV ejection fraction 31-35%   • Chronic systolic congestive heart failure*   • Hyperlipidemia*   • Chronic a-fib*   • Pacemaker dual-chamber pacemaker Biotronik December 2015*   • Hypothyroidism*   • Hypertension*   • Chronic anticoagulation*   •  Gastroesophageal reflux disease without esophagitis*   • URI (upper respiratory infection)   • Stress incontinence   • Herpes zoster without complication   • Acute midline low back pain without sciatica   • CHF (congestive heart failure) (CMS/HCC)   • Centrilobular emphysema (CMS/HCC)   • Asthmatic bronchitis , chronic (CMS/HCC)   • Moderate pulmonary hypertension (CMS/HCC), 46 mmHg   • Seasonal allergic rhinitis   • Post zoster neuralgia       Social History     Tobacco Use   • Smoking status: Former Smoker     Quit date:      Years since quittin.3   • Smokeless tobacco: Never Used   Vaping Use   • Vaping Use: Never used   Substance Use Topics   • Alcohol use: No   • Drug use: No       Allergies   Allergen Reactions   • Codeine Rash   • Eggs Or Egg-Derived Products Itching and Rash   • Grass Itching and Rash   • Lisinopril Rash       Current Outpatient Medications on File Prior to Visit   Medication Sig   • albuterol sulfate HFA (Proventil HFA) 108 (90 Base) MCG/ACT inhaler Inhale 2 puffs Every 4 (Four) Hours As Needed for Wheezing.   • apixaban (ELIQUIS) 2.5 MG tablet tablet Take 1 tablet by mouth Every 12 (Twelve) Hours.   • budesonide-formoterol (SYMBICORT) 160-4.5 MCG/ACT inhaler Inhale 2 puffs 2 (Two) Times a Day.   • carvedilol (COREG) 6.25 MG tablet TAKE 1 TABLET BY MOUTH TWICE DAILY WITH MEALS   • furosemide (LASIX) 20 MG tablet TAKE 1 TABLET BY MOUTH EVERY DAY   • ipratropium (ATROVENT) 0.02 % nebulizer solution Take 2.5 mL by nebulization 4 (Four) Times a Day.   • isosorbide mononitrate (IMDUR) 60 MG 24 hr tablet TAKE 1 TABLET BY MOUTH DAILY   • levothyroxine (SYNTHROID, LEVOTHROID) 50 MCG tablet TAKE 1 TABLET BY MOUTH EVERY DAY   • losartan (COZAAR) 25 MG tablet TAKE 1 TABLET BY MOUTH DAILY   • montelukast (SINGULAIR) 10 MG tablet TAKE 1 TABLET BY MOUTH EVERY NIGHT   • nitroglycerin (NITROSTAT) 0.4 MG SL tablet DISSOLVE ONE TABLET UNDER TONGUE AS NEEDED FOR CHEST PAIN. MAY REPEAT DOSE EVERY 5  MINUTES UP TO 3 DOSES. IF NO RELIEF DIAL 911   • pantoprazole (Protonix) 40 MG EC tablet Take 1 tablet by mouth Daily.   • pitavastatin calcium (Livalo) 1 MG tablet tablet Take 1 tablet by mouth Every Night.     No current facility-administered medications on file prior to visit.         The following portions of the patient's history were reviewed and updated as appropriate: allergies, current medications, past family history, past medical history, past social history, past surgical history and problem list.    ROS       Objective:     There were no vitals taken for this visit.  Physical Exam      Lab Review  Lab Results   Component Value Date     02/08/2021    K 4.6 02/08/2021     02/08/2021    BUN 17 02/08/2021    CREATININE 0.95 02/08/2021    GLUCOSE 94 02/08/2021    CALCIUM 9.2 02/08/2021    ALT 26 02/08/2021    ALKPHOS 69 02/08/2021    LABIL2 1.3 (L) 04/05/2016     Lab Results   Component Value Date    CKTOTAL 56 02/08/2021     Lab Results   Component Value Date    WBC 6.17 02/08/2021    HGB 12.8 02/08/2021    HCT 38.0 02/08/2021     02/08/2021     Lab Results   Component Value Date    INR 0.91 12/17/2015    INR 1.00 02/20/2014     Lab Results   Component Value Date    MG 2.2 02/25/2019     Lab Results   Component Value Date    TSH 3.020 02/08/2021     Lab Results   Component Value Date    .0 (H) 02/28/2019     Lab Results   Component Value Date    CHLPL 219 (H) 04/05/2016    CHOL 163 02/08/2021    TRIG 148 02/08/2021    HDL 55 02/08/2021    VLDL 26 02/08/2021    LDLHDL 1.43 02/08/2021     Lab Results   Component Value Date    LDL 82 02/08/2021    LDL 56 07/14/2020       Procedures       I personally viewed and interpreted the patient's LAB data         Assessment:   No diagnosis found.      Plan:              No follow-ups on file.

## 2021-05-13 RX ORDER — LEVOTHYROXINE SODIUM 0.05 MG/1
TABLET ORAL
Qty: 90 TABLET | Refills: 0 | Status: SHIPPED | OUTPATIENT
Start: 2021-05-13 | End: 2021-06-07 | Stop reason: SDUPTHER

## 2021-06-07 ENCOUNTER — OFFICE VISIT (OUTPATIENT)
Dept: CARDIOLOGY | Facility: CLINIC | Age: 81
End: 2021-06-07

## 2021-06-07 VITALS
HEIGHT: 65 IN | OXYGEN SATURATION: 98 % | DIASTOLIC BLOOD PRESSURE: 74 MMHG | BODY MASS INDEX: 30.89 KG/M2 | SYSTOLIC BLOOD PRESSURE: 126 MMHG | TEMPERATURE: 98.3 F | WEIGHT: 185.4 LBS | HEART RATE: 71 BPM

## 2021-06-07 DIAGNOSIS — I25.5 ISCHEMIC CARDIOMYOPATHY: ICD-10-CM

## 2021-06-07 DIAGNOSIS — Z95.0 PACEMAKER: ICD-10-CM

## 2021-06-07 DIAGNOSIS — I25.10 CORONARY ARTERY DISEASE INVOLVING NATIVE CORONARY ARTERY OF NATIVE HEART WITHOUT ANGINA PECTORIS: Primary | ICD-10-CM

## 2021-06-07 DIAGNOSIS — E78.2 MIXED HYPERLIPIDEMIA: ICD-10-CM

## 2021-06-07 DIAGNOSIS — E03.9 ACQUIRED HYPOTHYROIDISM: ICD-10-CM

## 2021-06-07 PROCEDURE — 99214 OFFICE O/P EST MOD 30 MIN: CPT | Performed by: INTERNAL MEDICINE

## 2021-06-07 RX ORDER — ISOSORBIDE MONONITRATE 60 MG/1
60 TABLET, EXTENDED RELEASE ORAL DAILY
Qty: 90 TABLET | Refills: 0 | Status: SHIPPED | OUTPATIENT
Start: 2021-06-07 | End: 2021-09-02

## 2021-06-07 RX ORDER — LEVOTHYROXINE SODIUM 0.05 MG/1
50 TABLET ORAL DAILY
Qty: 90 TABLET | Refills: 0 | Status: SHIPPED | OUTPATIENT
Start: 2021-06-07 | End: 2021-09-02

## 2021-06-07 RX ORDER — CARVEDILOL 6.25 MG/1
6.25 TABLET ORAL 2 TIMES DAILY WITH MEALS
Qty: 180 TABLET | Refills: 0 | Status: SHIPPED | OUTPATIENT
Start: 2021-06-07 | End: 2021-09-02

## 2021-06-07 RX ORDER — MONTELUKAST SODIUM 10 MG/1
10 TABLET ORAL NIGHTLY
Qty: 30 TABLET | Refills: 0 | Status: SHIPPED | OUTPATIENT
Start: 2021-06-07 | End: 2021-07-06

## 2021-06-07 NOTE — PROGRESS NOTES
subjective     Chief Complaint   Patient presents with   • Coronary Artery Disease     3 mon f/u     History of Present Illness  Sabiha is 80 years old white female who is here for follow-up.  She has known coronary artery disease status post single-vessel CABG.  She denies any chest pain palpitations or shortness of breath.  She also has ischemic cardiomyopathy with LV ejection fraction around 31 to 35%.  Compensated heart failure no orthopnea PND or ankle edema.    Hyperlipidemia  Patient is taking Livalo 1 mg daily.  She is tolerating it very well.  GERD controlled with Protonix.  Hypertension patient is doing very well with losartan 25 mg daily and Coreg 6.25 twice daily.    Chronic atrial fibrillation  Rate is very well controlled.  She is also anticoagulated with Eliquis no drug side effects.    Past Surgical History:   Procedure Laterality Date   • CARDIAC SURGERY  1997   • CARDIOVASCULAR STRESS TEST  06/2014   • CATARACT EXTRACTION, BILATERAL     • CHOLECYSTECTOMY  2002   • COLONOSCOPY  05/2012   • ECHO - CONVERTED  07/2014   • PACEMAKER IMPLANTATION  12/17/2015     Family History   Problem Relation Age of Onset   • Coronary artery disease Other    • Hypertension Other    • Diabetes Other    • Heart attack Other    • Heart attack Mother 62        fatal MI   • Skin cancer Mother    • Diabetes type II Mother    • Melanoma Mother    • Heart attack Father 63        fatal MI   • Melanoma Sister 45   • Skin cancer Sister    • Heart attack Brother 62        fatal MI   • Heart attack Brother 80        Fatal MI   • Skin cancer Brother    • Heart disease Sister    • Heart disease Brother 75        pacemaker, CABG, Stents   • Osteoporosis Sister    • Pneumonia Brother    • Hypertension Brother    • Heart attack Sister 54        Fatal MI   • Heart attack Brother 59        Fatal MI   • Breast cancer Neg Hx      Past Medical History:   Diagnosis Date   • Atrial fibrillation (CMS/HCC)    • CAD (coronary artery disease)     • Chronic a-fib (CMS/HCC)    • Chronic anticoagulation    • Congestive heart failure (CMS/HCC)     compensated   • Disease of thyroid gland    • Hyperlipidemia    • Hypertension    • Hypoxemia    • Ischemic cardiomyopathy with severe LV Disfunction    • Myocardial infarction (CMS/HCC)    • Pacemaker     VVI     Patient Active Problem List   Diagnosis   • CAD (coronary artery disease) status post CABG  single-vessel*   • Ischemic cardiomyopathy with apical hypokinesis LV ejection fraction 31-35%   • Chronic systolic congestive heart failure*   • Hyperlipidemia*   • Chronic a-fib*   • Pacemaker dual-chamber pacemaker Biotronik 2015*   • Hypothyroidism*   • Hypertension*   • Chronic anticoagulation*   • Gastroesophageal reflux disease without esophagitis*   • URI (upper respiratory infection)   • Stress incontinence   • Herpes zoster without complication   • Acute midline low back pain without sciatica   • CHF (congestive heart failure) (CMS/[facility])   • Centrilobular emphysema (CMS/[facility])   • Asthmatic bronchitis , chronic (CMS/[facility])   • Moderate pulmonary hypertension (CMS/[facility]), 46 mmHg   • Seasonal allergic rhinitis   • Post zoster neuralgia       Social History     Tobacco Use   • Smoking status: Former Smoker     Quit date:      Years since quittin.4   • Smokeless tobacco: Never Used   Vaping Use   • Vaping Use: Never used   Substance Use Topics   • Alcohol use: No   • Drug use: No       Allergies   Allergen Reactions   • Codeine Rash   • Eggs Or Egg-Derived Products Itching and Rash   • Grass Itching and Rash   • Lisinopril Rash       Current Outpatient Medications on File Prior to Visit   Medication Sig   • albuterol sulfate HFA (Proventil HFA) 108 (90 Base) MCG/ACT inhaler Inhale 2 puffs Every 4 (Four) Hours As Needed for Wheezing.   • apixaban (ELIQUIS) 2.5 MG tablet tablet Take 1 tablet by mouth Every 12 (Twelve) Hours.   • budesonide-formoterol (SYMBICORT) 160-4.5 MCG/ACT inhaler Inhale 2  puffs 2 (Two) Times a Day.   • furosemide (LASIX) 20 MG tablet TAKE 1 TABLET BY MOUTH EVERY DAY   • ipratropium (ATROVENT) 0.02 % nebulizer solution Take 2.5 mL by nebulization 4 (Four) Times a Day.   • losartan (COZAAR) 25 MG tablet TAKE 1 TABLET BY MOUTH DAILY   • nitroglycerin (NITROSTAT) 0.4 MG SL tablet DISSOLVE ONE TABLET UNDER TONGUE AS NEEDED FOR CHEST PAIN. MAY REPEAT DOSE EVERY 5 MINUTES UP TO 3 DOSES. IF NO RELIEF DIAL 911   • pantoprazole (Protonix) 40 MG EC tablet Take 1 tablet by mouth Daily.   • [DISCONTINUED] carvedilol (COREG) 6.25 MG tablet TAKE 1 TABLET BY MOUTH TWICE DAILY WITH MEALS   • [DISCONTINUED] isosorbide mononitrate (IMDUR) 60 MG 24 hr tablet TAKE 1 TABLET BY MOUTH DAILY   • [DISCONTINUED] levothyroxine (SYNTHROID, LEVOTHROID) 50 MCG tablet TAKE 1 TABLET BY MOUTH EVERY DAY   • [DISCONTINUED] montelukast (SINGULAIR) 10 MG tablet TAKE 1 TABLET BY MOUTH EVERY NIGHT   • [DISCONTINUED] pitavastatin calcium (Livalo) 1 MG tablet tablet Take 1 tablet by mouth Every Night.     No current facility-administered medications on file prior to visit.         The following portions of the patient's history were reviewed and updated as appropriate: allergies, current medications, past family history, past medical history, past social history, past surgical history and problem list.    Review of Systems   Constitutional: Negative.   HENT: Negative.  Negative for congestion.    Eyes: Negative.    Cardiovascular: Negative.  Negative for chest pain, cyanosis, dyspnea on exertion, irregular heartbeat, leg swelling, near-syncope, orthopnea, palpitations, paroxysmal nocturnal dyspnea and syncope.   Respiratory: Negative.  Negative for shortness of breath.    Hematologic/Lymphatic: Negative.    Musculoskeletal: Negative.    Gastrointestinal: Negative.    Neurological: Negative.  Negative for headaches.          Objective:     /74 (BP Location: Left arm, Patient Position: Sitting, Cuff Size: Adult)   Pulse  "71   Temp 98.3 °F (36.8 °C) (Infrared)   Ht 165.1 cm (65\")   Wt 84.1 kg (185 lb 6.4 oz)   SpO2 98%   BMI 30.85 kg/m²   Vitals and nursing note reviewed.   Pulmonary:      Effort: Pulmonary effort is normal.      Breath sounds: Normal breath sounds. No stridor. No wheezing. No rhonchi. No rales.   Cardiovascular:      PMI at left midclavicular line. Normal rate. Regular rhythm. Normal S1. Normal S2.      Murmurs: There is no murmur.      No gallop. No click. No rub.   Pulses:     Intact distal pulses.   Edema:     Peripheral edema absent.           Lab Review  Lab Results   Component Value Date     02/08/2021    K 4.6 02/08/2021     02/08/2021    BUN 17 02/08/2021    CREATININE 0.95 02/08/2021    GLUCOSE 94 02/08/2021    CALCIUM 9.2 02/08/2021    ALT 26 02/08/2021    ALKPHOS 69 02/08/2021    LABIL2 1.3 (L) 04/05/2016     Lab Results   Component Value Date    CKTOTAL 56 02/08/2021     Lab Results   Component Value Date    WBC 6.17 02/08/2021    HGB 12.8 02/08/2021    HCT 38.0 02/08/2021     02/08/2021     Lab Results   Component Value Date    INR 0.91 12/17/2015    INR 1.00 02/20/2014     Lab Results   Component Value Date    MG 2.2 02/25/2019     Lab Results   Component Value Date    TSH 3.020 02/08/2021     Lab Results   Component Value Date    .0 (H) 02/28/2019     Lab Results   Component Value Date    CHLPL 219 (H) 04/05/2016    CHOL 163 02/08/2021    TRIG 148 02/08/2021    HDL 55 02/08/2021    VLDL 26 02/08/2021    LDLHDL 1.43 02/08/2021     Lab Results   Component Value Date    LDL 82 02/08/2021    LDL 56 07/14/2020       Procedures       I personally viewed and interpreted the patient's LAB data         Assessment:     1. Coronary artery disease involving native coronary artery of native heart without angina pectoris    2. Ischemic cardiomyopathy with apical hypokinesis LV ejection fraction 31-35%    3. Mixed hyperlipidemia    4. Pacemaker dual-chamber pacemaker Biotronik December " 2015*    5. Acquired hypothyroidism          Plan:     Patient has known coronary artery disease status post CABG with ischemic cardiomyopathy.  She is doing very well denies any chest pain.  No orthopnea PND or ankle edema.  She was advised to continue Coreg along with lisinopril.  She will continue Livalo for hyperlipidemia  Lasix and Imdur was also continued.    Patient has chronic atrial fibrillation Eliquis was continued.  Pacemaker has been functioning normally.    Hypothyroidism Synthroid 50 mcg daily was continued.  Protonix for GERD was continued.  Follow-up scheduled with lab work next visit.        No follow-ups on file.

## 2021-06-08 ENCOUNTER — OFFICE VISIT (OUTPATIENT)
Dept: PULMONOLOGY | Facility: CLINIC | Age: 81
End: 2021-06-08

## 2021-06-08 VITALS
HEART RATE: 70 BPM | TEMPERATURE: 97.3 F | BODY MASS INDEX: 30.82 KG/M2 | HEIGHT: 65 IN | OXYGEN SATURATION: 96 % | WEIGHT: 185 LBS | SYSTOLIC BLOOD PRESSURE: 136 MMHG | DIASTOLIC BLOOD PRESSURE: 76 MMHG

## 2021-06-08 DIAGNOSIS — J44.9 CHRONIC OBSTRUCTIVE PULMONARY DISEASE, UNSPECIFIED COPD TYPE (HCC): Primary | ICD-10-CM

## 2021-06-08 DIAGNOSIS — I27.20 PULMONARY HYPERTENSION (HCC): ICD-10-CM

## 2021-06-08 PROCEDURE — 99213 OFFICE O/P EST LOW 20 MIN: CPT | Performed by: INTERNAL MEDICINE

## 2021-06-08 NOTE — PROGRESS NOTES
Chief complaint:   Chief Complaint   Patient presents with   • Emphysema       History of present illness:   Ms. Feliciano is a very pleasant 80-year-old female with a past medical history of COPD and severe pulmonary hypertension likely due to group 2 and group 3.  She presents troponin outpatient clinic as a regular follow-up.  She reports gradual worsening of her shortness of breath on exertion from last couple of months.  Denies any chest pain, wheezing or coughing.    Review of Systems:   General ROS: negative for chills, fatigue or fever  Psychological ROS: negative for anxiety or depression  ENT ROS: negative for headaches, visual changes or vocal changes  Respiratory ROS: Negative for cough.  Positive shortness of breath  Cardiovascular ROS: Negative for chest pain.  Positive for dyspnea on exertion  Gastrointestinal ROS: no abdominal pain, change in bowel habits, or black or bloody stools  Musculoskeletal ROS: negative for joint pain, joint stiffness or joint swelling  Neurological ROS: no TIA or stroke symptoms  Heme- no bleeding, no lumps and bumps in armpit  Skin- no bruises, no rash    Past medical history, past surgical history, social history and family history:  •  has a past medical history of Atrial fibrillation (CMS/HCC), CAD (coronary artery disease), Chronic a-fib (CMS/HCC), Chronic anticoagulation, Congestive heart failure (CMS/HCC), Disease of thyroid gland, Hyperlipidemia, Hypertension, Hypoxemia, Ischemic cardiomyopathy with severe LV Disfunction, Myocardial infarction (CMS/HCC), and Pacemaker.  •  has a past surgical history that includes Pacemaker Implantation (12/17/2015); Cholecystectomy (2002); Cataract extraction, bilateral; Colonoscopy (05/2012); Echo - Converted (07/2014); Cardiovascular stress test (06/2014); and Cardiac surgery (1997).  •  reports that she quit smoking about 31 years ago. She has never used smokeless tobacco. She reports that she does not drink alcohol and does not  use drugs.  • family history includes Coronary artery disease in an other family member; Diabetes in an other family member; Diabetes type II in her mother; Heart attack in an other family member; Heart attack (age of onset: 54) in her sister; Heart attack (age of onset: 59) in her brother; Heart attack (age of onset: 62) in her brother and mother; Heart attack (age of onset: 63) in her father; Heart attack (age of onset: 80) in her brother; Heart disease in her sister; Heart disease (age of onset: 75) in her brother; Hypertension in her brother and another family member; Melanoma in her mother; Melanoma (age of onset: 45) in her sister; Osteoporosis in her sister; Pneumonia in her brother; Skin cancer in her brother, mother, and sister.     Medication and allergies:  • Active medication:  Current Outpatient Medications on File Prior to Visit   Medication Sig   • albuterol sulfate HFA (Proventil HFA) 108 (90 Base) MCG/ACT inhaler Inhale 2 puffs Every 4 (Four) Hours As Needed for Wheezing.   • apixaban (ELIQUIS) 2.5 MG tablet tablet Take 1 tablet by mouth Every 12 (Twelve) Hours.   • carvedilol (COREG) 6.25 MG tablet Take 1 tablet by mouth 2 (Two) Times a Day With Meals.   • furosemide (LASIX) 20 MG tablet TAKE 1 TABLET BY MOUTH EVERY DAY   • ipratropium (ATROVENT) 0.02 % nebulizer solution Take 2.5 mL by nebulization 4 (Four) Times a Day.   • isosorbide mononitrate (IMDUR) 60 MG 24 hr tablet Take 1 tablet by mouth Daily.   • levothyroxine (SYNTHROID, LEVOTHROID) 50 MCG tablet Take 1 tablet by mouth Daily.   • losartan (COZAAR) 25 MG tablet TAKE 1 TABLET BY MOUTH DAILY   • montelukast (SINGULAIR) 10 MG tablet Take 1 tablet by mouth Every Night.   • nitroglycerin (NITROSTAT) 0.4 MG SL tablet DISSOLVE ONE TABLET UNDER TONGUE AS NEEDED FOR CHEST PAIN. MAY REPEAT DOSE EVERY 5 MINUTES UP TO 3 DOSES. IF NO RELIEF DIAL 911   • pantoprazole (Protonix) 40 MG EC tablet Take 1 tablet by mouth Daily.   • pitavastatin calcium  "(Livalo) 1 MG tablet tablet Take 1 tablet by mouth Every Night.   • [DISCONTINUED] budesonide-formoterol (SYMBICORT) 160-4.5 MCG/ACT inhaler Inhale 2 puffs 2 (Two) Times a Day.     No current facility-administered medications on file prior to visit.        Allergies:    Codeine, Eggs or egg-derived products, Grass, and Lisinopril      Vital Signs    height is 165.1 cm (65\") and weight is 83.9 kg (185 lb). Her temporal temperature is 97.3 °F (36.3 °C). Her blood pressure is 136/76 and her pulse is 70. Her oxygen saturation is 96%.      Physical Exam:  Constitutional:  oriented to person, place, and time. No distress.   Head: Normocephalic and atraumatic.   Right Ear and left Ear : External ear normal.   Nose: Nose normal.   Eyes: Pupils are equal, round, and reactive to light.  No scleral icterus.   Neck: Normal range of motion. Neck supple.    Cardiovascular: Normal rate, regular rhythm, normal heart sounds. No murmur heard.  Pulmonary: Bilateral air entry equal.  No wheezing.  No crackles.  Abdominal: Soft. Bowel sounds are normal. There is no tenderness.   Musculoskeletal: Normal range of motion. Exhibits no deformity.   Neurological: alert and oriented to person, place, and time. No cranial nerve deficit. Coordination normal.   Skin: Skin is warm and dry. No erythema.   Psychiatric:Normal mood and affect.   behavior is normal.     Result review:      Results for orders placed during the hospital encounter of 02/25/19    Adult Transthoracic Echo Complete W/ Cont if Necessary Per Protocol    Interpretation Summary  · The left ventricular cavity is mildly dilated.  · The following left ventricular wall segments are dyskinetic: basal anterior, mid anterior, apical anterior, apical lateral, apical inferior, apical septal, basal inferoseptal, mid inferoseptal, basal anteroseptal, mid anteroseptal and apex.  · Estimated EF appears to be in the range of 31 - 35%.  · The aortic valve appears trileaflet. The aortic valve " is abnormal in structure. The valve exhibits sclerosis. Mild aortic valve regurgitation is present. No aortic valve stenosis is present.  · The mitral valve is abnormal in structure. Mild MAC is present. Moderate mitral valve regurgitation is present. No significant mitral valve stenosis is present.  · The tricuspid valve is grossly normal. Moderate to severe tricuspid valve regurgitation is present. Estimated right ventricular systolic pressure from tricuspid regurgitation is markedly elevated (>55 mmHg).  · Severe pulmonary hypertension is present.  · There is no evidence of pericardial effusion.  · Comments: LV systolic function has decreased since 7/23/18.          Assessment and plan:   Diagnosis Plan   1. Chronic obstructive pulmonary disease, unspecified COPD type (CMS/HCC)  tiotropium bromide monohydrate (SPIRIVA RESPIMAT) 2.5 MCG/ACT aerosol solution inhaler  On rescue inhaler.  Discontinued Symbicort inhaler   2. Pulmonary hypertension (CMS/HCC)   likely due to group 2 and group 3.  No indication for pulmonary vasodilator therapy.        Return in about 3 months (around 9/8/2021) for Next scheduled follow up.      Thank you for involving us in the care of the patient.  Jericho Galvez MD  Pulmonary and Critical Care Medicine

## 2021-07-06 RX ORDER — PANTOPRAZOLE SODIUM 40 MG/1
40 TABLET, DELAYED RELEASE ORAL DAILY
Qty: 90 TABLET | Refills: 1 | Status: SHIPPED | OUTPATIENT
Start: 2021-07-06 | End: 2023-01-11 | Stop reason: CLARIF

## 2021-07-06 RX ORDER — MONTELUKAST SODIUM 10 MG/1
10 TABLET ORAL NIGHTLY
Qty: 30 TABLET | Refills: 0 | Status: SHIPPED | OUTPATIENT
Start: 2021-07-06 | End: 2021-08-03

## 2021-08-03 RX ORDER — MONTELUKAST SODIUM 10 MG/1
10 TABLET ORAL NIGHTLY
Qty: 30 TABLET | Refills: 0 | Status: SHIPPED | OUTPATIENT
Start: 2021-08-03 | End: 2021-09-02

## 2021-08-03 RX ORDER — FUROSEMIDE 20 MG/1
TABLET ORAL
Qty: 90 TABLET | Refills: 1 | Status: SHIPPED | OUTPATIENT
Start: 2021-08-03 | End: 2022-01-12

## 2021-08-03 RX ORDER — APIXABAN 2.5 MG/1
TABLET, FILM COATED ORAL
Qty: 60 TABLET | Refills: 3 | Status: SHIPPED | OUTPATIENT
Start: 2021-08-03 | End: 2021-12-17

## 2021-08-26 ENCOUNTER — LAB (OUTPATIENT)
Dept: LAB | Facility: HOSPITAL | Age: 81
End: 2021-08-26

## 2021-08-26 DIAGNOSIS — E03.9 ACQUIRED HYPOTHYROIDISM: ICD-10-CM

## 2021-08-26 DIAGNOSIS — E78.2 MIXED HYPERLIPIDEMIA: ICD-10-CM

## 2021-08-26 LAB
ALBUMIN SERPL-MCNC: 4.7 G/DL (ref 3.5–5.2)
ALBUMIN/GLOB SERPL: 1.5 G/DL
ALP SERPL-CCNC: 77 U/L (ref 39–117)
ALT SERPL W P-5'-P-CCNC: 16 U/L (ref 1–33)
ANION GAP SERPL CALCULATED.3IONS-SCNC: 12.8 MMOL/L (ref 5–15)
AST SERPL-CCNC: 24 U/L (ref 1–32)
BASOPHILS # BLD AUTO: 0.13 10*3/MM3 (ref 0–0.2)
BASOPHILS NFR BLD AUTO: 2.1 % (ref 0–1.5)
BILIRUB SERPL-MCNC: 1 MG/DL (ref 0–1.2)
BUN SERPL-MCNC: 16 MG/DL (ref 8–23)
BUN/CREAT SERPL: 14.3 (ref 7–25)
CALCIUM SPEC-SCNC: 9.8 MG/DL (ref 8.6–10.5)
CHLORIDE SERPL-SCNC: 103 MMOL/L (ref 98–107)
CHOLEST SERPL-MCNC: 128 MG/DL (ref 0–200)
CK SERPL-CCNC: 55 U/L (ref 20–180)
CO2 SERPL-SCNC: 25.2 MMOL/L (ref 22–29)
CREAT SERPL-MCNC: 1.12 MG/DL (ref 0.57–1)
DEPRECATED RDW RBC AUTO: 42.6 FL (ref 37–54)
EOSINOPHIL # BLD AUTO: 0.11 10*3/MM3 (ref 0–0.4)
EOSINOPHIL NFR BLD AUTO: 1.8 % (ref 0.3–6.2)
ERYTHROCYTE [DISTWIDTH] IN BLOOD BY AUTOMATED COUNT: 13.2 % (ref 12.3–15.4)
GFR SERPL CREATININE-BSD FRML MDRD: 47 ML/MIN/1.73
GLOBULIN UR ELPH-MCNC: 3.1 GM/DL
GLUCOSE SERPL-MCNC: 104 MG/DL (ref 65–99)
HCT VFR BLD AUTO: 40.1 % (ref 34–46.6)
HDLC SERPL-MCNC: 59 MG/DL (ref 40–60)
HGB BLD-MCNC: 13.6 G/DL (ref 12–15.9)
IMM GRANULOCYTES # BLD AUTO: 0.03 10*3/MM3 (ref 0–0.05)
IMM GRANULOCYTES NFR BLD AUTO: 0.5 % (ref 0–0.5)
LDLC SERPL CALC-MCNC: 47 MG/DL (ref 0–100)
LDLC/HDLC SERPL: 0.75 {RATIO}
LYMPHOCYTES # BLD AUTO: 0.89 10*3/MM3 (ref 0.7–3.1)
LYMPHOCYTES NFR BLD AUTO: 14.4 % (ref 19.6–45.3)
MCH RBC QN AUTO: 30.2 PG (ref 26.6–33)
MCHC RBC AUTO-ENTMCNC: 33.9 G/DL (ref 31.5–35.7)
MCV RBC AUTO: 88.9 FL (ref 79–97)
MONOCYTES # BLD AUTO: 0.42 10*3/MM3 (ref 0.1–0.9)
MONOCYTES NFR BLD AUTO: 6.8 % (ref 5–12)
NEUTROPHILS NFR BLD AUTO: 4.58 10*3/MM3 (ref 1.7–7)
NEUTROPHILS NFR BLD AUTO: 74.4 % (ref 42.7–76)
NRBC BLD AUTO-RTO: 0 /100 WBC (ref 0–0.2)
PLATELET # BLD AUTO: 185 10*3/MM3 (ref 140–450)
PMV BLD AUTO: 12 FL (ref 6–12)
POTASSIUM SERPL-SCNC: 4.3 MMOL/L (ref 3.5–5.2)
PROT SERPL-MCNC: 7.8 G/DL (ref 6–8.5)
RBC # BLD AUTO: 4.51 10*6/MM3 (ref 3.77–5.28)
SODIUM SERPL-SCNC: 141 MMOL/L (ref 136–145)
T4 FREE SERPL-MCNC: 1.75 NG/DL (ref 0.93–1.7)
TRIGL SERPL-MCNC: 123 MG/DL (ref 0–150)
TSH SERPL DL<=0.05 MIU/L-ACNC: 2.04 UIU/ML (ref 0.27–4.2)
VLDLC SERPL-MCNC: 22 MG/DL (ref 5–40)
WBC # BLD AUTO: 6.16 10*3/MM3 (ref 3.4–10.8)

## 2021-08-26 PROCEDURE — 36415 COLL VENOUS BLD VENIPUNCTURE: CPT

## 2021-08-26 PROCEDURE — 85025 COMPLETE CBC W/AUTO DIFF WBC: CPT

## 2021-08-26 PROCEDURE — 82550 ASSAY OF CK (CPK): CPT

## 2021-08-26 PROCEDURE — 80061 LIPID PANEL: CPT

## 2021-08-26 PROCEDURE — 80053 COMPREHEN METABOLIC PANEL: CPT

## 2021-08-26 PROCEDURE — 84439 ASSAY OF FREE THYROXINE: CPT

## 2021-08-26 PROCEDURE — 84443 ASSAY THYROID STIM HORMONE: CPT

## 2021-09-01 ENCOUNTER — OFFICE VISIT (OUTPATIENT)
Dept: CARDIOLOGY | Facility: CLINIC | Age: 81
End: 2021-09-01

## 2021-09-01 VITALS
OXYGEN SATURATION: 98 % | WEIGHT: 179 LBS | DIASTOLIC BLOOD PRESSURE: 84 MMHG | BODY MASS INDEX: 29.82 KG/M2 | SYSTOLIC BLOOD PRESSURE: 144 MMHG | TEMPERATURE: 98 F | HEIGHT: 65 IN | HEART RATE: 59 BPM

## 2021-09-01 DIAGNOSIS — I25.10 CORONARY ARTERY DISEASE INVOLVING NATIVE CORONARY ARTERY OF NATIVE HEART WITHOUT ANGINA PECTORIS: Primary | ICD-10-CM

## 2021-09-01 DIAGNOSIS — E78.2 MIXED HYPERLIPIDEMIA: ICD-10-CM

## 2021-09-01 DIAGNOSIS — I25.5 ISCHEMIC CARDIOMYOPATHY: ICD-10-CM

## 2021-09-01 DIAGNOSIS — E03.9 ACQUIRED HYPOTHYROIDISM: ICD-10-CM

## 2021-09-01 DIAGNOSIS — I50.22 CHRONIC SYSTOLIC CONGESTIVE HEART FAILURE (HCC): ICD-10-CM

## 2021-09-01 DIAGNOSIS — I48.20 CHRONIC A-FIB (HCC): ICD-10-CM

## 2021-09-01 PROCEDURE — 93000 ELECTROCARDIOGRAM COMPLETE: CPT | Performed by: INTERNAL MEDICINE

## 2021-09-01 PROCEDURE — 99214 OFFICE O/P EST MOD 30 MIN: CPT | Performed by: INTERNAL MEDICINE

## 2021-09-01 NOTE — PROGRESS NOTES
subjective     Chief Complaint   Patient presents with   • Coronary Artery Disease     Follow up   • Atrial Fibrillation     Follow up     History of Present Illness  Patient is 80 years old white male with known coronary artery disease status post CABG and ischemic cardiomyopathy, she complains of being tired otherwise she is doing good.  She denies any chest pain palpitations or shortness of breath.  No orthopnea PND or ankle edema.  She feels fatigued tired anxious and has difficulty sleeping.  She is taking Coreg 6.25 twice daily along with Lasix 20 mg daily, losartan 25 and Imdur 60 mg daily.    Patient has chronic atrial fibrillation and is anticoagulated with Eliquis 2.5 twice daily    Hypertension is controlled with losartan.    Hyperlipidemia on Livalo which she is tolerating it very well.    GERD controlled with Protonix.  She also has hypothyroidism and is taking Synthroid 50 mcg daily.    Patient also has COPD and is following with pulmonology service.  She is taking Singulair Spiriva Atrovent nebulizer and albuterol HFA    Past Surgical History:   Procedure Laterality Date   • CARDIAC SURGERY  1997   • CARDIOVASCULAR STRESS TEST  06/2014   • CATARACT EXTRACTION, BILATERAL     • CHOLECYSTECTOMY  2002   • COLONOSCOPY  05/2012   • ECHO - CONVERTED  07/2014   • PACEMAKER IMPLANTATION  12/17/2015     Family History   Problem Relation Age of Onset   • Coronary artery disease Other    • Hypertension Other    • Diabetes Other    • Heart attack Other    • Heart attack Mother 62        fatal MI   • Skin cancer Mother    • Diabetes type II Mother    • Melanoma Mother    • Heart attack Father 63        fatal MI   • Melanoma Sister 45   • Skin cancer Sister    • Heart attack Brother 62        fatal MI   • Heart attack Brother 80        Fatal MI   • Skin cancer Brother    • Heart disease Sister    • Heart disease Brother 75        pacemaker, CABG, Stents   • Osteoporosis Sister    • Pneumonia Brother    •  Hypertension Brother    • Heart attack Sister 54        Fatal MI   • Heart attack Brother 59        Fatal MI   • Breast cancer Neg Hx      Past Medical History:   Diagnosis Date   • Atrial fibrillation (CMS/HCC)    • CAD (coronary artery disease)    • Chronic a-fib (CMS/HCC)    • Chronic anticoagulation    • Congestive heart failure (CMS/HCC)     compensated   • Disease of thyroid gland    • Hyperlipidemia    • Hypertension    • Hypoxemia    • Ischemic cardiomyopathy with severe LV Disfunction    • Myocardial infarction (CMS/HCC)    • Pacemaker     VVI     Patient Active Problem List   Diagnosis   • CAD (coronary artery disease) status post CABG  single-vessel*   • Ischemic cardiomyopathy with apical hypokinesis LV ejection fraction 31-35%   • Chronic systolic congestive heart failure*   • Hyperlipidemia*   • Chronic a-fib*   • Pacemaker dual-chamber pacemaker Biotronik 2015*   • Hypothyroidism*   • Hypertension*   • Chronic anticoagulation*   • Gastroesophageal reflux disease without esophagitis*   • URI (upper respiratory infection)   • Stress incontinence   • Herpes zoster without complication   • Acute midline low back pain without sciatica   • CHF (congestive heart failure) (CMS/HCC)   • Centrilobular emphysema (CMS/HCC)   • Asthmatic bronchitis , chronic (CMS/HCC)   • Moderate pulmonary hypertension (CMS/HCC), 46 mmHg   • Seasonal allergic rhinitis   • Post zoster neuralgia       Social History     Tobacco Use   • Smoking status: Former Smoker     Quit date:      Years since quittin.   • Smokeless tobacco: Never Used   Vaping Use   • Vaping Use: Never used   Substance Use Topics   • Alcohol use: No   • Drug use: No       Allergies   Allergen Reactions   • Codeine Rash   • Eggs Or Egg-Derived Products Itching and Rash   • Grass Itching and Rash   • Lisinopril Rash       Current Outpatient Medications on File Prior to Visit   Medication Sig   • albuterol sulfate HFA (Proventil HFA) 108 (90  Base) MCG/ACT inhaler Inhale 2 puffs Every 4 (Four) Hours As Needed for Wheezing.   • carvedilol (COREG) 6.25 MG tablet Take 1 tablet by mouth 2 (Two) Times a Day With Meals.   • Eliquis 2.5 MG tablet tablet TAKE 1 TABLET BY MOUTH EVERY 12 HOURS   • furosemide (LASIX) 20 MG tablet TAKE 1 TABLET BY MOUTH EVERY DAY   • ipratropium (ATROVENT) 0.02 % nebulizer solution Take 2.5 mL by nebulization 4 (Four) Times a Day.   • isosorbide mononitrate (IMDUR) 60 MG 24 hr tablet Take 1 tablet by mouth Daily.   • levothyroxine (SYNTHROID, LEVOTHROID) 50 MCG tablet Take 1 tablet by mouth Daily.   • losartan (COZAAR) 25 MG tablet TAKE 1 TABLET BY MOUTH DAILY   • montelukast (SINGULAIR) 10 MG tablet TAKE 1 TABLET BY MOUTH EVERY NIGHT   • nitroglycerin (NITROSTAT) 0.4 MG SL tablet DISSOLVE ONE TABLET UNDER TONGUE AS NEEDED FOR CHEST PAIN. MAY REPEAT DOSE EVERY 5 MINUTES UP TO 3 DOSES. IF NO RELIEF DIAL 911   • pantoprazole (PROTONIX) 40 MG EC tablet TAKE 1 TABLET BY MOUTH DAILY   • pitavastatin calcium (Livalo) 1 MG tablet tablet Take 1 tablet by mouth Every Night.   • tiotropium bromide monohydrate (SPIRIVA RESPIMAT) 2.5 MCG/ACT aerosol solution inhaler Inhale 2 puffs Daily.     No current facility-administered medications on file prior to visit.         The following portions of the patient's history were reviewed and updated as appropriate: allergies, current medications, past family history, past medical history, past social history, past surgical history and problem list.    Review of Systems   Constitutional: Positive for malaise/fatigue.   HENT: Negative.  Negative for congestion.    Eyes: Negative.    Cardiovascular: Negative.  Negative for chest pain, cyanosis, dyspnea on exertion, irregular heartbeat, leg swelling, near-syncope, orthopnea, palpitations, paroxysmal nocturnal dyspnea and syncope.   Respiratory: Negative.  Negative for shortness of breath.    Hematologic/Lymphatic: Negative.    Musculoskeletal: Negative.   "  Gastrointestinal: Negative.    Neurological: Negative.  Negative for headaches.   Psychiatric/Behavioral: The patient has insomnia and is nervous/anxious.           Objective:     /84 (BP Location: Left arm, Patient Position: Sitting, Cuff Size: Adult)   Pulse 59   Temp 98 °F (36.7 °C)   Ht 165.1 cm (65\")   Wt 81.2 kg (179 lb)   SpO2 98%   BMI 29.79 kg/m²   Pulmonary:      Effort: Pulmonary effort is normal.      Breath sounds: Normal breath sounds. No stridor. No wheezing. No rhonchi. No rales.   Cardiovascular:      PMI at left midclavicular line. Normal rate. Regular rhythm. Normal S1. Normal S2.      Murmurs: There is no murmur.      No gallop. No click. No rub.   Pulses:     Intact distal pulses.   Edema:     Peripheral edema absent.           Lab Review  Lab Results   Component Value Date     08/26/2021    K 4.3 08/26/2021     08/26/2021    BUN 16 08/26/2021    CREATININE 1.12 (H) 08/26/2021    GLUCOSE 104 (H) 08/26/2021    CALCIUM 9.8 08/26/2021    ALT 16 08/26/2021    ALKPHOS 77 08/26/2021    LABIL2 1.3 (L) 04/05/2016     Lab Results   Component Value Date    CKTOTAL 55 08/26/2021     Lab Results   Component Value Date    WBC 6.16 08/26/2021    HGB 13.6 08/26/2021    HCT 40.1 08/26/2021     08/26/2021     Lab Results   Component Value Date    INR 0.91 12/17/2015    INR 1.00 02/20/2014     Lab Results   Component Value Date    MG 2.2 02/25/2019     Lab Results   Component Value Date    TSH 2.040 08/26/2021     Lab Results   Component Value Date    .0 (H) 02/28/2019     Lab Results   Component Value Date    CHLPL 219 (H) 04/05/2016    CHOL 128 08/26/2021    TRIG 123 08/26/2021    HDL 59 08/26/2021    VLDL 22 08/26/2021    LDLHDL 0.75 08/26/2021     Lab Results   Component Value Date    LDL 47 08/26/2021    LDL 82 02/08/2021         ECG 12 Lead    Date/Time: 9/1/2021 2:46 PM  Performed by: Maria R Sanchez MD  Authorized by: Maria R Sanchez MD   Comparison: " compared with previous ECG from 3/10/2021  Comparison to previous ECG: Atrial fibrillation with occasional paced rhythm and now 100% regular paced rhythm otherwise no significant change  Rhythm: paced  Rate: normal  BPM: 64  QRS axis: left  Other findings: non-specific ST-T wave changes  Pacing: ventricular paced rhythm and 100% capture  Clinical impression: abnormal EKG               I personally viewed and interpreted the patient's LAB data         Assessment:     1. Coronary artery disease involving native coronary artery of native heart without angina pectoris    2. Ischemic cardiomyopathy with apical hypokinesis LV ejection fraction 31-35%    3. Chronic systolic congestive heart failure*    4. Mixed hyperlipidemia    5. Chronic a-fib*    6. Acquired hypothyroidism          Plan:     Coronary artery disease status post CABG  Patient is doing well denies any chest pain palpitations or shortness of breath.    Ischemic cardiomyopathy with LV dysfunction.  Heart failure is compensated patient denies any orthopnea PND or ankle edema.  Exercise tolerance is poor but overall she is doing very well.    Chronic atrial fibrillation  Rate is very well controlled with Coreg, Eliquis was also continued.    Hyperlipidemia  She is tolerating Livalo very well without any drug side effects.  Livalo was continued.  GERD symptoms are controlled with Protonix which was continued  Hypothyroidism Synthroid 50 mcg daily was continued.  She will continue Coreg Lasix Imdur and losartan.  Follow-up scheduled            No follow-ups on file.

## 2021-09-02 RX ORDER — PITAVASTATIN CALCIUM 1.04 MG/1
TABLET, FILM COATED ORAL
Qty: 90 TABLET | Refills: 0 | Status: SHIPPED | OUTPATIENT
Start: 2021-09-02 | End: 2021-09-13

## 2021-09-02 RX ORDER — LEVOTHYROXINE SODIUM 0.05 MG/1
50 TABLET ORAL DAILY
Qty: 90 TABLET | Refills: 0 | Status: SHIPPED | OUTPATIENT
Start: 2021-09-02 | End: 2022-01-12

## 2021-09-02 RX ORDER — CARVEDILOL 6.25 MG/1
TABLET ORAL
Qty: 180 TABLET | Refills: 0 | Status: SHIPPED | OUTPATIENT
Start: 2021-09-02 | End: 2022-01-12

## 2021-09-02 RX ORDER — MONTELUKAST SODIUM 10 MG/1
10 TABLET ORAL NIGHTLY
Qty: 30 TABLET | Refills: 0 | Status: SHIPPED | OUTPATIENT
Start: 2021-09-02 | End: 2021-10-04

## 2021-09-02 RX ORDER — ISOSORBIDE MONONITRATE 60 MG/1
60 TABLET, EXTENDED RELEASE ORAL DAILY
Qty: 90 TABLET | Refills: 0 | Status: SHIPPED | OUTPATIENT
Start: 2021-09-02 | End: 2022-01-12

## 2021-09-13 RX ORDER — PITAVASTATIN CALCIUM 1.04 MG/1
TABLET, FILM COATED ORAL
Qty: 90 TABLET | Refills: 0 | Status: SHIPPED | OUTPATIENT
Start: 2021-09-13 | End: 2021-12-17

## 2021-10-04 ENCOUNTER — OFFICE VISIT (OUTPATIENT)
Dept: PULMONOLOGY | Facility: CLINIC | Age: 81
End: 2021-10-04

## 2021-10-04 VITALS
TEMPERATURE: 97.8 F | SYSTOLIC BLOOD PRESSURE: 172 MMHG | WEIGHT: 178 LBS | HEIGHT: 65 IN | DIASTOLIC BLOOD PRESSURE: 92 MMHG | HEART RATE: 64 BPM | BODY MASS INDEX: 29.66 KG/M2 | OXYGEN SATURATION: 98 %

## 2021-10-04 DIAGNOSIS — J30.2 SEASONAL ALLERGIC RHINITIS, UNSPECIFIED TRIGGER: ICD-10-CM

## 2021-10-04 DIAGNOSIS — I27.20 PULMONARY HYPERTENSION (HCC): ICD-10-CM

## 2021-10-04 DIAGNOSIS — J44.9 ASTHMATIC BRONCHITIS , CHRONIC (HCC): ICD-10-CM

## 2021-10-04 DIAGNOSIS — J43.2 CENTRILOBULAR EMPHYSEMA (HCC): Primary | ICD-10-CM

## 2021-10-04 PROCEDURE — 99214 OFFICE O/P EST MOD 30 MIN: CPT | Performed by: PHYSICIAN ASSISTANT

## 2021-10-04 RX ORDER — MONTELUKAST SODIUM 10 MG/1
10 TABLET ORAL NIGHTLY
Qty: 30 TABLET | Refills: 0 | Status: SHIPPED | OUTPATIENT
Start: 2021-10-04 | End: 2021-10-04 | Stop reason: SDUPTHER

## 2021-10-04 RX ORDER — MONTELUKAST SODIUM 10 MG/1
10 TABLET ORAL NIGHTLY
Qty: 30 TABLET | Refills: 6 | Status: SHIPPED | OUTPATIENT
Start: 2021-10-04 | End: 2021-11-03 | Stop reason: SDUPTHER

## 2021-10-04 RX ORDER — BUDESONIDE AND FORMOTEROL FUMARATE DIHYDRATE 80; 4.5 UG/1; UG/1
2 AEROSOL RESPIRATORY (INHALATION)
Qty: 3 EACH | Refills: 6 | Status: SHIPPED | OUTPATIENT
Start: 2021-10-04 | End: 2022-03-04 | Stop reason: SDUPTHER

## 2021-10-04 RX ORDER — ALBUTEROL SULFATE 90 UG/1
2 AEROSOL, METERED RESPIRATORY (INHALATION) EVERY 4 HOURS PRN
Qty: 18 G | Refills: 8 | Status: SHIPPED | OUTPATIENT
Start: 2021-10-04 | End: 2022-03-04 | Stop reason: SDUPTHER

## 2021-10-04 NOTE — PROGRESS NOTES
"Chief Complaint  Emphysema    Subjective          Ashely Feliciano presents to [facility]  History of Present Illness    Patient presents today for follow up of emphysema with concern for mixed asthmatic bronchitis component, and previously noted pulmonary hypertension. She states that symptoms are intermittently noted but seemed to be improved with use of her current inhalers. At the last visit, she admitted to an overall increase in symptoms. Dr. Galvez had switched her inhaler therapy from Symbicort to Spiriva. She was notable for worsened symptoms without Symbicort use, and has since resumed use.   Symptoms are stable at times, but notable for intermittent worsening at nighttime.   She most recently attempted use of Spiriva respimat 1 puff daily (noted epistaxis episodes when 2 puffs were used previously and currently remains on Eliquis), but has still required intermittent use of Symbicort when symptoms worsen. This typically occurs at nighttime, and typically develops cough, shortness of breath, and chest pain. Symptoms improve following symbicort use and albuterol as needed (most improvement noted with Symbicort).   She continues to follow with cardiology.   She admits to orthopnea, improves with head elevation at nighttime (has obtained an adjustable bed and occasionally sleeps in a recliner. Symptoms typically worsen during the winter months.       Objective   Vital Signs:   /92   Pulse 64   Temp 97.8 °F (36.6 °C) (Temporal)   Ht 165.1 cm (65\")   Wt 80.7 kg (178 lb)   SpO2 98%   BMI 29.62 kg/m²     Physical Exam  Vitals reviewed.   Constitutional:       General: She is not in acute distress.     Appearance: She is well-developed. She is not diaphoretic.   HENT:      Head: Normocephalic and atraumatic.   Neck:      Thyroid: No thyromegaly.   Cardiovascular:      Rate and Rhythm: Normal rate and regular rhythm.      Heart sounds: Normal " heart sounds, S1 normal and S2 normal.   Pulmonary:      Effort: Pulmonary effort is normal.      Breath sounds: No wheezing, rhonchi or rales.   Musculoskeletal:         General: No swelling. Normal range of motion.   Neurological:      Mental Status: She is alert and oriented to person, place, and time.   Psychiatric:         Behavior: Behavior normal.          Result Review :   The following data was reviewed by: Kiersten Mosqueda PA-C on 10/04/2021:  Data reviewed: Radiologic studies previous CT chest, and Cardiology studies previous echocardiogram, previous PFT.    Assessment and Plan        (J43.2) Centrilobular emphysema (HCC) - Plan: albuterol sulfate HFA (Proventil HFA) 108 (90 Base) MCG/ACT inhaler, tiotropium bromide monohydrate (SPIRIVA RESPIMAT) 2.5 MCG/ACT aerosol solution inhaler, budesonide-formoterol (Symbicort) 80-4.5 MCG/ACT inhaler, ipratropium (ATROVENT HFA) 17 MCG/ACT inhaler    (J44.9) Asthmatic bronchitis , chronic (HCC) - Plan: albuterol sulfate HFA (Proventil HFA) 108 (90 Base) MCG/ACT inhaler, tiotropium bromide monohydrate (SPIRIVA RESPIMAT) 2.5 MCG/ACT aerosol solution inhaler, budesonide-formoterol (Symbicort) 80-4.5 MCG/ACT inhaler, ipratropium (ATROVENT HFA) 17 MCG/ACT inhaler    (I27.20) Moderate pulmonary hypertension (CMS/HCC), 46 mmHg    (J30.2) Seasonal allergic rhinitis, unspecified trigger        Centrilobular emphysema, concern for mixed asthmatic bronchitis:   · Continue proventil inhaler as needed  · Continue atrovent inhaler as needed (additinoal option provided, selected over albuterol for secondary option to reduce palpitations/tachycardia in the setting of atrial fibrillation.  · Continue Spiriva respimat 2.5 mcg once daily (Nosebleeds noted following 2 puffs daily use)  · Resuming Symbicort, but will restart with the lower dose 80-4.5 mcg.   Discussed to try 2 puffs daily and increase up to 4 times daily if symptoms exacerbations remains frequent.   She wished to reduce  her steroid intake.   If not tolerated, she agreed to contact our office and we will increase therapy back to the Symbicort 160 mcg.        Respiratory medications above refilled through [facility].   She will contact us as needed if neb solution refill needed. Preferred inhalers at this time.       Pulmonary hypertension:   · Likely Group 2, group 3 in the setting of chronic systolic heart failure multiple valvular abnormalities, and group 3 in the setting of centrilobular emphysema.  · Optimizing COPD therapies as appropriate  · Continues to follow with cardiology.         Seasonal allergic rhinitis:   · On singulair nightly  · Recommend oral antihistamine as needed        Follow Up   Return in about 5 months (around 3/4/2022), or as needed, for Next scheduled follow up.  Patient was given instructions and counseling regarding her condition or for health maintenance advice. Please see specific information pulled into the AVS if appropriate.

## 2021-10-19 ENCOUNTER — CLINICAL SUPPORT (OUTPATIENT)
Dept: CARDIOLOGY | Facility: CLINIC | Age: 81
End: 2021-10-19

## 2021-10-19 DIAGNOSIS — Z95.0 PACEMAKER: ICD-10-CM

## 2021-10-19 PROCEDURE — 93288 INTERROG EVL PM/LDLS PM IP: CPT | Performed by: INTERNAL MEDICINE

## 2021-11-01 RX ORDER — MONTELUKAST SODIUM 10 MG/1
10 TABLET ORAL NIGHTLY
Qty: 30 TABLET | Refills: 6 | OUTPATIENT
Start: 2021-11-01

## 2021-11-03 DIAGNOSIS — J30.2 SEASONAL ALLERGIC RHINITIS, UNSPECIFIED TRIGGER: Primary | ICD-10-CM

## 2021-11-03 RX ORDER — MONTELUKAST SODIUM 10 MG/1
10 TABLET ORAL NIGHTLY
Qty: 30 TABLET | Refills: 6 | Status: SHIPPED | OUTPATIENT
Start: 2021-11-03 | End: 2022-10-19

## 2021-11-30 ENCOUNTER — OFFICE VISIT (OUTPATIENT)
Dept: CARDIOLOGY | Facility: CLINIC | Age: 81
End: 2021-11-30

## 2021-11-30 VITALS
TEMPERATURE: 97.8 F | WEIGHT: 178 LBS | DIASTOLIC BLOOD PRESSURE: 76 MMHG | OXYGEN SATURATION: 98 % | BODY MASS INDEX: 29.66 KG/M2 | HEIGHT: 65 IN | HEART RATE: 60 BPM | SYSTOLIC BLOOD PRESSURE: 146 MMHG

## 2021-11-30 DIAGNOSIS — I25.10 CORONARY ARTERY DISEASE INVOLVING NATIVE CORONARY ARTERY OF NATIVE HEART WITHOUT ANGINA PECTORIS: Primary | ICD-10-CM

## 2021-11-30 DIAGNOSIS — E03.9 ACQUIRED HYPOTHYROIDISM: ICD-10-CM

## 2021-11-30 DIAGNOSIS — I10 PRIMARY HYPERTENSION: ICD-10-CM

## 2021-11-30 DIAGNOSIS — E78.2 MIXED HYPERLIPIDEMIA: ICD-10-CM

## 2021-11-30 DIAGNOSIS — I25.5 ISCHEMIC CARDIOMYOPATHY: ICD-10-CM

## 2021-11-30 DIAGNOSIS — Z95.0 PACEMAKER: ICD-10-CM

## 2021-11-30 DIAGNOSIS — I50.22 CHRONIC SYSTOLIC CONGESTIVE HEART FAILURE (HCC): ICD-10-CM

## 2021-11-30 DIAGNOSIS — Z79.01 CHRONIC ANTICOAGULATION: ICD-10-CM

## 2021-11-30 DIAGNOSIS — I48.20 CHRONIC A-FIB (HCC): ICD-10-CM

## 2021-11-30 PROCEDURE — 99214 OFFICE O/P EST MOD 30 MIN: CPT | Performed by: INTERNAL MEDICINE

## 2021-11-30 RX ORDER — LOSARTAN POTASSIUM 50 MG/1
50 TABLET ORAL DAILY
Qty: 90 TABLET | Refills: 1 | Status: SHIPPED | OUTPATIENT
Start: 2021-11-30 | End: 2022-01-03 | Stop reason: SDUPTHER

## 2021-11-30 NOTE — PROGRESS NOTES
subjective     Chief Complaint   Patient presents with   • Coronary Artery Disease     Follow up   • Hypertension     today    • Atrial Fibrillation     Follow up   • Arthritis     pain is hurting her   • Palpitations     Follow up pt states she is used to these  no changes     History of Present Illness  Patient is 80 years old white female who has history of coronary artery disease requiring single-vessel CABG in 1997 patient denies any chest pain or palpitations.  She denies orthopnea PND or ankle edema.  She is taking Imdur although she is not having any chest pain.  She carries nitroglycerin.  She is taking beta-blocker therapy with Coreg and statin therapy with Livalo    Patient also has chronic systolic heart failure with LV ejection fraction around 31 to 35% but currently clinically no evidence of heart failure.    She states that she is under a lot of stress she is taking care of her  who is in his 80s and recently had myocardial infarction coronary artery stents.    Patient has hyperlipidemia and has been taking Livalo without any drug side effects.    Chronic atrial fibrillation.  Anticoagulated with Eliquis    She also has COPD and has been taking albuterol, Singulair, Symbicort, Atrovent and Spiriva    Blood pressure is not very well controlled she is taking losartan 25 mg daily and Coreg 6.25 twice daily.    GERD symptoms are controlled with Protonix.    Past Surgical History:   Procedure Laterality Date   • CARDIAC SURGERY  1997   • CARDIOVASCULAR STRESS TEST  06/2014   • CATARACT EXTRACTION, BILATERAL     • CHOLECYSTECTOMY  2002   • COLONOSCOPY  05/2012   • ECHO - CONVERTED  07/2014   • PACEMAKER IMPLANTATION  12/17/2015     Family History   Problem Relation Age of Onset   • Coronary artery disease Other    • Hypertension Other    • Diabetes Other    • Heart attack Other    • Heart attack Mother 62        fatal MI   • Skin cancer Mother    • Diabetes type II Mother    • Melanoma Mother    •  Heart attack Father 63        fatal MI   • Melanoma Sister 45   • Skin cancer Sister    • Heart attack Brother 62        fatal MI   • Heart attack Brother 80        Fatal MI   • Skin cancer Brother    • Heart disease Sister    • Heart disease Brother 75        pacemaker, CABG, Stents   • Osteoporosis Sister    • Pneumonia Brother    • Hypertension Brother    • Heart attack Sister 54        Fatal MI   • Heart attack Brother 59        Fatal MI   • Breast cancer Neg Hx      Past Medical History:   Diagnosis Date   • Atrial fibrillation (HCC)    • CAD (coronary artery disease)    • Chronic a-fib ([facility])    • Chronic anticoagulation    • Congestive heart failure (HCC)     compensated   • Disease of thyroid gland    • Hyperlipidemia    • Hypertension    • Hypoxemia    • Ischemic cardiomyopathy with severe LV Disfunction    • Myocardial infarction (HCC)    • Pacemaker     VVI     Patient Active Problem List   Diagnosis   • CAD (coronary artery disease) status post CABG  single-vessel*   • Ischemic cardiomyopathy with apical hypokinesis LV ejection fraction 31-35%   • Chronic systolic congestive heart failure*   • Hyperlipidemia*   • Chronic a-fib*   • Pacemaker dual-chamber pacemaker Biotronik 2015*   • Hypothyroidism*   • Hypertension*   • Chronic anticoagulation*   • Gastroesophageal reflux disease without esophagitis*   • URI (upper respiratory infection)   • Stress incontinence   • Herpes zoster without complication   • Acute midline low back pain without sciatica   • CHF (congestive heart failure) ([facility])   • Centrilobular emphysema ([facility])   • Asthmatic bronchitis , chronic ([facility])   • Moderate pulmonary hypertension (CMS/HCC), 46 mmHg   • Seasonal allergic rhinitis   • Post zoster neuralgia       Social History     Tobacco Use   • Smoking status: Former Smoker     Quit date:      Years since quittin.9   • Smokeless tobacco: Never Used   Vaping Use   • Vaping Use: Never used   Substance Use Topics   •  Alcohol use: No   • Drug use: No       Allergies   Allergen Reactions   • Codeine Rash   • Eggs Or Egg-Derived Products Itching and Rash   • Grass Itching and Rash   • Lisinopril Rash       Current Outpatient Medications on File Prior to Visit   Medication Sig   • albuterol sulfate HFA (Proventil HFA) 108 (90 Base) MCG/ACT inhaler Inhale 2 puffs Every 4 (Four) Hours As Needed for Wheezing or Shortness of Air.   • budesonide-formoterol (Symbicort) 80-4.5 MCG/ACT inhaler Inhale 2 puffs 2 (Two) Times a Day for 90 days.   • carvedilol (COREG) 6.25 MG tablet TAKE 1 TABLET BY MOUTH TWICE DAILY WITH MEALS   • Eliquis 2.5 MG tablet tablet TAKE 1 TABLET BY MOUTH EVERY 12 HOURS   • furosemide (LASIX) 20 MG tablet TAKE 1 TABLET BY MOUTH EVERY DAY   • ipratropium (ATROVENT HFA) 17 MCG/ACT inhaler Inhale 2 puffs Every 4 (Four) Hours As Needed for Wheezing (or shortness of air) for up to 90 days.   • isosorbide mononitrate (IMDUR) 60 MG 24 hr tablet TAKE 1 TABLET BY MOUTH DAILY   • levothyroxine (SYNTHROID, LEVOTHROID) 50 MCG tablet TAKE 1 TABLET BY MOUTH DAILY   • Livalo 1 MG tablet tablet TAKE 1 TABLET BY MOUTH EVERY NIGHT   • montelukast (SINGULAIR) 10 MG tablet Take 1 tablet by mouth Every Night.   • nitroglycerin (NITROSTAT) 0.4 MG SL tablet DISSOLVE ONE TABLET UNDER TONGUE AS NEEDED FOR CHEST PAIN. MAY REPEAT DOSE EVERY 5 MINUTES UP TO 3 DOSES. IF NO RELIEF DIAL 911   • pantoprazole (PROTONIX) 40 MG EC tablet TAKE 1 TABLET BY MOUTH DAILY   • tiotropium bromide monohydrate (SPIRIVA RESPIMAT) 2.5 MCG/ACT aerosol solution inhaler Inhale 2 puffs Daily.   • [DISCONTINUED] losartan (COZAAR) 25 MG tablet TAKE 1 TABLET BY MOUTH DAILY     No current facility-administered medications on file prior to visit.         The following portions of the patient's history were reviewed and updated as appropriate: allergies, current medications, past family history, past medical history, past social history, past surgical history and problem  "list.    Review of Systems   Constitutional: Negative.   HENT: Negative.  Negative for congestion.    Eyes: Negative.    Cardiovascular: Negative.  Negative for chest pain, cyanosis, dyspnea on exertion, irregular heartbeat, leg swelling, near-syncope, orthopnea, palpitations, paroxysmal nocturnal dyspnea and syncope.   Respiratory: Positive for shortness of breath.    Hematologic/Lymphatic: Negative.    Musculoskeletal: Positive for arthritis.   Gastrointestinal: Negative.    Neurological: Negative.  Negative for headaches.          Objective:     /76 (BP Location: Left arm, Patient Position: Sitting, Cuff Size: Adult)   Pulse 60   Temp 97.8 °F (36.6 °C)   Ht 165.1 cm (65\")   Wt 80.7 kg (178 lb)   SpO2 98%   BMI 29.62 kg/m²   Pulmonary:      Effort: Pulmonary effort is normal.      Breath sounds: Normal breath sounds. No stridor. No wheezing. No rhonchi. No rales.   Cardiovascular:      PMI at left midclavicular line. Normal rate. Regular rhythm. Normal S1. Normal S2.      Murmurs: There is no murmur.      No gallop. No click. No rub.   Pulses:     Intact distal pulses.   Edema:     Peripheral edema absent.           Lab Review  Lab Results   Component Value Date     08/26/2021    K 4.3 08/26/2021     08/26/2021    BUN 16 08/26/2021    CREATININE 1.12 (H) 08/26/2021    GLUCOSE 104 (H) 08/26/2021    CALCIUM 9.8 08/26/2021    ALT 16 08/26/2021    ALKPHOS 77 08/26/2021    LABIL2 1.3 (L) 04/05/2016     Lab Results   Component Value Date    CKTOTAL 55 08/26/2021     Lab Results   Component Value Date    WBC 6.16 08/26/2021    HGB 13.6 08/26/2021    HCT 40.1 08/26/2021     08/26/2021     Lab Results   Component Value Date    INR 0.91 12/17/2015    INR 1.00 02/20/2014     Lab Results   Component Value Date    MG 2.2 02/25/2019     Lab Results   Component Value Date    TSH 2.040 08/26/2021     Lab Results   Component Value Date    .0 (H) 02/28/2019     Lab Results   Component Value " Date    CHLPL 219 (H) 04/05/2016    CHOL 128 08/26/2021    TRIG 123 08/26/2021    HDL 59 08/26/2021    VLDL 22 08/26/2021    LDLHDL 0.75 08/26/2021     Lab Results   Component Value Date    LDL 47 08/26/2021    LDL 82 02/08/2021       Procedures       I personally viewed and interpreted the patient's LAB data         Assessment:     1. Coronary artery disease involving native coronary artery of native heart without angina pectoris    2. Ischemic cardiomyopathy with apical hypokinesis LV ejection fraction 31-35%    3. Chronic systolic congestive heart failure*    4. Mixed hyperlipidemia    5. Chronic a-fib*    6. Pacemaker dual-chamber pacemaker Biotronik December 2015*    7. Primary hypertension    8. Acquired hypothyroidism    9. Chronic anticoagulation*          Plan:     Coronary artery disease status post CABG with ischemic cardiomyopathy decreased LV ejection fraction and compensated heart failure.  She is doing very well she was advised to continue Lasix 20 mg daily along with the beta-blocker therapy with Coreg and statin therapy with Livalo.    Chronic atrial fibrillation, Eliquis 2.5 twice daily was continued.  Pacemaker function has been monitored and is normal.  Hypothyroidism thyroid functions are normal Synthroid was continued.  Blood pressure is not very well controlled.  She was advised to increase lisinopril 50 mg daily.  Healthy lifestyle emphasized.  Her last stress test in 2019 was negative for ischemia.        No follow-ups on file.

## 2021-12-17 RX ORDER — APIXABAN 2.5 MG/1
TABLET, FILM COATED ORAL
Qty: 60 TABLET | Refills: 3 | Status: SHIPPED | OUTPATIENT
Start: 2021-12-17 | End: 2022-04-12

## 2021-12-17 RX ORDER — PITAVASTATIN CALCIUM 1.04 MG/1
TABLET, FILM COATED ORAL
Qty: 90 TABLET | Refills: 0 | Status: SHIPPED | OUTPATIENT
Start: 2021-12-17 | End: 2022-03-14

## 2022-01-03 RX ORDER — LOSARTAN POTASSIUM 50 MG/1
50 TABLET ORAL DAILY
Qty: 90 TABLET | Refills: 1 | Status: SHIPPED | OUTPATIENT
Start: 2022-01-03 | End: 2022-02-24 | Stop reason: SDUPTHER

## 2022-01-12 RX ORDER — CARVEDILOL 6.25 MG/1
TABLET ORAL
Qty: 180 TABLET | Refills: 0 | Status: SHIPPED | OUTPATIENT
Start: 2022-01-12 | End: 2022-04-12

## 2022-01-12 RX ORDER — ISOSORBIDE MONONITRATE 60 MG/1
60 TABLET, EXTENDED RELEASE ORAL DAILY
Qty: 90 TABLET | Refills: 0 | Status: SHIPPED | OUTPATIENT
Start: 2022-01-12 | End: 2022-04-12

## 2022-01-12 RX ORDER — LEVOTHYROXINE SODIUM 0.05 MG/1
50 TABLET ORAL DAILY
Qty: 90 TABLET | Refills: 0 | Status: SHIPPED | OUTPATIENT
Start: 2022-01-12 | End: 2022-04-12

## 2022-01-12 RX ORDER — FUROSEMIDE 20 MG/1
TABLET ORAL
Qty: 90 TABLET | Refills: 1 | Status: SHIPPED | OUTPATIENT
Start: 2022-01-12 | End: 2022-07-11

## 2022-02-10 ENCOUNTER — NURSE TRIAGE (OUTPATIENT)
Dept: CALL CENTER | Facility: HOSPITAL | Age: 82
End: 2022-02-10

## 2022-02-10 NOTE — TELEPHONE ENCOUNTER
"Caller given phone number to [facility]. Advised she may need to get an appointment with PCP in order to get a referral to Rheumatologist. Caller agrees to call [facility] line.     Reason for Disposition  • Health Information question, no triage required and triager able to answer question    Additional Information  • Negative: [1] Caller is not with the adult (patient) AND [2] reporting urgent symptoms  • Negative: Lab result questions  • Negative: Medication questions  • Negative: Caller can't be reached by phone  • Negative: Caller has already spoken to PCP or another triager  • Negative: RN needs further essential information from caller in order to complete triage  • Negative: Requesting regular office appointment  • Negative: [1] Caller requesting NON-URGENT health information AND [2] PCP's office is the best resource    Answer Assessment - Initial Assessment Questions  1. REASON FOR CALL or QUESTION: \"What is your reason for calling today?\" or \"How can I best help you?\" or \"What question do you have that I can help answer?\"      Calling for information about Rheumatologist in the [place] area.    Protocols used: INFORMATION ONLY CALL - NO TRIAGE-ADULT-      "

## 2022-02-18 ENCOUNTER — LAB (OUTPATIENT)
Dept: LAB | Facility: HOSPITAL | Age: 82
End: 2022-02-18

## 2022-02-18 DIAGNOSIS — E78.2 MIXED HYPERLIPIDEMIA: ICD-10-CM

## 2022-02-18 DIAGNOSIS — E03.9 ACQUIRED HYPOTHYROIDISM: ICD-10-CM

## 2022-02-18 PROCEDURE — 80061 LIPID PANEL: CPT

## 2022-02-18 PROCEDURE — 82550 ASSAY OF CK (CPK): CPT

## 2022-02-18 PROCEDURE — 85025 COMPLETE CBC W/AUTO DIFF WBC: CPT

## 2022-02-18 PROCEDURE — 84439 ASSAY OF FREE THYROXINE: CPT

## 2022-02-18 PROCEDURE — 80053 COMPREHEN METABOLIC PANEL: CPT

## 2022-02-18 PROCEDURE — 84443 ASSAY THYROID STIM HORMONE: CPT

## 2022-02-18 PROCEDURE — 36415 COLL VENOUS BLD VENIPUNCTURE: CPT

## 2022-02-19 LAB
ALBUMIN SERPL-MCNC: 4.2 G/DL (ref 3.5–5.2)
ALBUMIN/GLOB SERPL: 1.4 G/DL
ALP SERPL-CCNC: 74 U/L (ref 39–117)
ALT SERPL W P-5'-P-CCNC: 12 U/L (ref 1–33)
ANION GAP SERPL CALCULATED.3IONS-SCNC: 11.1 MMOL/L (ref 5–15)
AST SERPL-CCNC: 19 U/L (ref 1–32)
BASOPHILS # BLD AUTO: 0.13 10*3/MM3 (ref 0–0.2)
BASOPHILS NFR BLD AUTO: 2.4 % (ref 0–1.5)
BILIRUB SERPL-MCNC: 0.7 MG/DL (ref 0–1.2)
BUN SERPL-MCNC: 16 MG/DL (ref 8–23)
BUN/CREAT SERPL: 15.2 (ref 7–25)
CALCIUM SPEC-SCNC: 9.7 MG/DL (ref 8.6–10.5)
CHLORIDE SERPL-SCNC: 105 MMOL/L (ref 98–107)
CHOLEST SERPL-MCNC: 127 MG/DL (ref 0–200)
CK SERPL-CCNC: 49 U/L (ref 20–180)
CO2 SERPL-SCNC: 26.9 MMOL/L (ref 22–29)
CREAT SERPL-MCNC: 1.05 MG/DL (ref 0.57–1)
DEPRECATED RDW RBC AUTO: 42.4 FL (ref 37–54)
EOSINOPHIL # BLD AUTO: 0.14 10*3/MM3 (ref 0–0.4)
EOSINOPHIL NFR BLD AUTO: 2.6 % (ref 0.3–6.2)
ERYTHROCYTE [DISTWIDTH] IN BLOOD BY AUTOMATED COUNT: 13 % (ref 12.3–15.4)
GFR SERPL CREATININE-BSD FRML MDRD: 51 ML/MIN/1.73
GLOBULIN UR ELPH-MCNC: 3.1 GM/DL
GLUCOSE SERPL-MCNC: 94 MG/DL (ref 65–99)
HCT VFR BLD AUTO: 38.5 % (ref 34–46.6)
HDLC SERPL-MCNC: 51 MG/DL (ref 40–60)
HGB BLD-MCNC: 12.9 G/DL (ref 12–15.9)
IMM GRANULOCYTES # BLD AUTO: 0.02 10*3/MM3 (ref 0–0.05)
IMM GRANULOCYTES NFR BLD AUTO: 0.4 % (ref 0–0.5)
LDLC SERPL CALC-MCNC: 55 MG/DL (ref 0–100)
LDLC/HDLC SERPL: 1.03 {RATIO}
LYMPHOCYTES # BLD AUTO: 0.84 10*3/MM3 (ref 0.7–3.1)
LYMPHOCYTES NFR BLD AUTO: 15.3 % (ref 19.6–45.3)
MCH RBC QN AUTO: 30.3 PG (ref 26.6–33)
MCHC RBC AUTO-ENTMCNC: 33.5 G/DL (ref 31.5–35.7)
MCV RBC AUTO: 90.4 FL (ref 79–97)
MONOCYTES # BLD AUTO: 0.38 10*3/MM3 (ref 0.1–0.9)
MONOCYTES NFR BLD AUTO: 6.9 % (ref 5–12)
NEUTROPHILS NFR BLD AUTO: 3.97 10*3/MM3 (ref 1.7–7)
NEUTROPHILS NFR BLD AUTO: 72.4 % (ref 42.7–76)
NRBC BLD AUTO-RTO: 0 /100 WBC (ref 0–0.2)
PLATELET # BLD AUTO: 156 10*3/MM3 (ref 140–450)
PMV BLD AUTO: 13 FL (ref 6–12)
POTASSIUM SERPL-SCNC: 4.2 MMOL/L (ref 3.5–5.2)
PROT SERPL-MCNC: 7.3 G/DL (ref 6–8.5)
RBC # BLD AUTO: 4.26 10*6/MM3 (ref 3.77–5.28)
SODIUM SERPL-SCNC: 143 MMOL/L (ref 136–145)
T4 FREE SERPL-MCNC: 1.59 NG/DL (ref 0.93–1.7)
TRIGL SERPL-MCNC: 117 MG/DL (ref 0–150)
TSH SERPL DL<=0.05 MIU/L-ACNC: 1.56 UIU/ML (ref 0.27–4.2)
VLDLC SERPL-MCNC: 21 MG/DL (ref 5–40)
WBC NRBC COR # BLD: 5.48 10*3/MM3 (ref 3.4–10.8)

## 2022-02-24 ENCOUNTER — OFFICE VISIT (OUTPATIENT)
Dept: CARDIOLOGY | Facility: CLINIC | Age: 82
End: 2022-02-24

## 2022-02-24 VITALS
HEART RATE: 65 BPM | HEIGHT: 65 IN | BODY MASS INDEX: 29.76 KG/M2 | WEIGHT: 178.6 LBS | SYSTOLIC BLOOD PRESSURE: 160 MMHG | TEMPERATURE: 97.1 F | DIASTOLIC BLOOD PRESSURE: 98 MMHG

## 2022-02-24 DIAGNOSIS — E03.9 ACQUIRED HYPOTHYROIDISM: ICD-10-CM

## 2022-02-24 DIAGNOSIS — E78.2 MIXED HYPERLIPIDEMIA: ICD-10-CM

## 2022-02-24 DIAGNOSIS — I10 PRIMARY HYPERTENSION: ICD-10-CM

## 2022-02-24 DIAGNOSIS — I25.10 CORONARY ARTERY DISEASE INVOLVING NATIVE CORONARY ARTERY OF NATIVE HEART WITHOUT ANGINA PECTORIS: Primary | ICD-10-CM

## 2022-02-24 DIAGNOSIS — Z79.01 CHRONIC ANTICOAGULATION: ICD-10-CM

## 2022-02-24 DIAGNOSIS — I48.20 CHRONIC A-FIB: ICD-10-CM

## 2022-02-24 DIAGNOSIS — Z95.0 PACEMAKER: ICD-10-CM

## 2022-02-24 DIAGNOSIS — R41.3 MEMORY LOSS: ICD-10-CM

## 2022-02-24 DIAGNOSIS — I50.22 CHRONIC SYSTOLIC CONGESTIVE HEART FAILURE: ICD-10-CM

## 2022-02-24 DIAGNOSIS — K21.9 GASTROESOPHAGEAL REFLUX DISEASE WITHOUT ESOPHAGITIS: ICD-10-CM

## 2022-02-24 PROCEDURE — 99214 OFFICE O/P EST MOD 30 MIN: CPT | Performed by: INTERNAL MEDICINE

## 2022-02-24 RX ORDER — DONEPEZIL HYDROCHLORIDE 5 MG/1
5 TABLET, FILM COATED ORAL NIGHTLY
Qty: 90 TABLET | Refills: 1 | Status: SHIPPED | OUTPATIENT
Start: 2022-02-24 | End: 2022-08-01

## 2022-02-24 RX ORDER — LOSARTAN POTASSIUM 50 MG/1
50 TABLET ORAL 2 TIMES DAILY
Qty: 180 TABLET | Refills: 1 | Status: SHIPPED | OUTPATIENT
Start: 2022-02-24 | End: 2022-03-01 | Stop reason: CLARIF

## 2022-02-24 NOTE — PROGRESS NOTES
subjective     Chief Complaint   Patient presents with   • Follow-up   • Memory Loss     GETTING WORSE     History of Present Illness  Stacy is 81 years old white female who is here for follow-up of multiple chronic medical problems.  Coronary artery disease  Patient has history of CABG in 1997.  She denies any chest pain palpitations or shortness of breath.    Hypertension  Patient has history of essential hypertension.  She states her blood pressure is running good however blood pressure in the office has been consistently elevated.  She is taking Coreg and losartan.  Will increase losartan.    GERD  Symptoms are controlled with Protonix.    Hyperlipidemia  Patient is taking Livalo 1 mg daily and is tolerating it very well.    Hypothyroidism  She is taking Synthroid 50 mcg daily clinically euthyroid.     atrial fibrillation  She is anticoagulant with Eliquis and is taking Coreg.    Patient also states that her memory loss is getting worse.    Past Surgical History:   Procedure Laterality Date   • CARDIAC SURGERY  1997   • CARDIOVASCULAR STRESS TEST  06/2014   • CATARACT EXTRACTION, BILATERAL     • CHOLECYSTECTOMY  2002   • COLONOSCOPY  05/2012   • ECHO - CONVERTED  07/2014   • PACEMAKER IMPLANTATION  12/17/2015     Family History   Problem Relation Age of Onset   • Coronary artery disease Other    • Hypertension Other    • Diabetes Other    • Heart attack Other    • Heart attack Mother 62        fatal MI   • Skin cancer Mother    • Diabetes type II Mother    • Melanoma Mother    • Heart attack Father 63        fatal MI   • Melanoma Sister 45   • Skin cancer Sister    • Heart attack Brother 62        fatal MI   • Heart attack Brother 80        Fatal MI   • Skin cancer Brother    • Heart disease Sister    • Heart disease Brother 75        pacemaker, CABG, Stents   • Osteoporosis Sister    • Pneumonia Brother    • Hypertension Brother    • Heart attack Sister 54        Fatal MI   • Heart attack Brother 59         Fatal MI   • Breast cancer Neg Hx      Past Medical History:   Diagnosis Date   • Atrial fibrillation (HCC)    • CAD (coronary artery disease)    • Chronic a-fib (HCC)    • Chronic anticoagulation    • Congestive heart failure (HCC)     compensated   • Disease of thyroid gland    • Hyperlipidemia    • Hypertension    • Hypoxemia    • Ischemic cardiomyopathy with severe LV Disfunction    • Myocardial infarction (HCC)    • Pacemaker     VVI     Patient Active Problem List   Diagnosis   • CAD (coronary artery disease) status post CABG  single-vessel*   • Ischemic cardiomyopathy with apical hypokinesis LV ejection fraction 31-35%   • Chronic systolic congestive heart failure*   • Hyperlipidemia*   • Chronic a-fib*   • Pacemaker dual-chamber pacemaker Biotronik 2015*   • Hypothyroidism*   • Hypertension*   • Chronic anticoagulation*   • Gastroesophageal reflux disease without esophagitis*   • URI (upper respiratory infection)   • Stress incontinence   • Herpes zoster without complication   • Acute midline low back pain without sciatica   • CHF (congestive heart failure) ([facility])   • Centrilobular emphysema ([facility])   • Asthmatic bronchitis , chronic ([facility])   • Moderate pulmonary hypertension (CMS/HCC), 46 mmHg   • Seasonal allergic rhinitis   • Post zoster neuralgia   • Memory loss       Social History     Tobacco Use   • Smoking status: Former Smoker     Quit date:      Years since quittin.   • Smokeless tobacco: Never Used   Vaping Use   • Vaping Use: Never used   Substance Use Topics   • Alcohol use: No   • Drug use: No       Allergies   Allergen Reactions   • Codeine Rash   • Eggs Or Egg-Derived Products Itching and Rash   • Grass Itching and Rash   • Lisinopril Rash       Current Outpatient Medications on File Prior to Visit   Medication Sig   • albuterol sulfate HFA (Proventil HFA) 108 (90 Base) MCG/ACT inhaler Inhale 2 puffs Every 4 (Four) Hours As Needed for Wheezing or Shortness of Air.   •  carvedilol (COREG) 6.25 MG tablet TAKE 1 TABLET BY MOUTH TWICE DAILY WITH MEALS   • Eliquis 2.5 MG tablet tablet TAKE 1 TABLET BY MOUTH EVERY 12 HOURS   • furosemide (LASIX) 20 MG tablet TAKE 1 TABLET BY MOUTH EVERY DAY   • isosorbide mononitrate (IMDUR) 60 MG 24 hr tablet TAKE 1 TABLET BY MOUTH DAILY   • levothyroxine (SYNTHROID, LEVOTHROID) 50 MCG tablet TAKE 1 TABLET BY MOUTH DAILY   • Livalo 1 MG tablet tablet TAKE 1 TABLET BY MOUTH EVERY NIGHT   • montelukast (SINGULAIR) 10 MG tablet Take 1 tablet by mouth Every Night.   • nitroglycerin (NITROSTAT) 0.4 MG SL tablet DISSOLVE ONE TABLET UNDER TONGUE AS NEEDED FOR CHEST PAIN. MAY REPEAT DOSE EVERY 5 MINUTES UP TO 3 DOSES. IF NO RELIEF DIAL 911   • pantoprazole (PROTONIX) 40 MG EC tablet TAKE 1 TABLET BY MOUTH DAILY   • tiotropium bromide monohydrate (SPIRIVA RESPIMAT) 2.5 MCG/ACT aerosol solution inhaler Inhale 2 puffs Daily.   • [DISCONTINUED] losartan (COZAAR) 50 MG tablet Take 1 tablet by mouth Daily.   • budesonide-formoterol (Symbicort) 80-4.5 MCG/ACT inhaler Inhale 2 puffs 2 (Two) Times a Day for 90 days.   • ipratropium (ATROVENT HFA) 17 MCG/ACT inhaler Inhale 2 puffs Every 4 (Four) Hours As Needed for Wheezing (or shortness of air) for up to 90 days.     No current facility-administered medications on file prior to visit.         The following portions of the patient's history were reviewed and updated as appropriate: allergies, current medications, past family history, past medical history, past social history, past surgical history and problem list.    Review of Systems   Constitutional: Negative.   HENT: Negative.  Negative for congestion.    Eyes: Negative.    Cardiovascular: Negative.  Negative for chest pain, cyanosis, dyspnea on exertion, irregular heartbeat, leg swelling, near-syncope, orthopnea, palpitations, paroxysmal nocturnal dyspnea and syncope.   Respiratory: Negative.  Negative for shortness of breath.    Hematologic/Lymphatic: Negative.   "  Musculoskeletal: Negative.    Gastrointestinal: Negative.    Neurological: Negative.  Negative for headaches.   Psychiatric/Behavioral: Positive for memory loss.          Objective:     /98   Pulse 65   Temp 97.1 °F (36.2 °C)   Ht 165.1 cm (65\")   Wt 81 kg (178 lb 9.6 oz)   BMI 29.72 kg/m²   Pulmonary:      Effort: Pulmonary effort is normal.      Breath sounds: Normal breath sounds. No stridor. No wheezing. No rhonchi. No rales.   Cardiovascular:      PMI at left midclavicular line. Normal rate. Regular rhythm. Normal S1. Normal S2.      Murmurs: There is no murmur.      No gallop. No click. No rub.   Pulses:     Intact distal pulses.   Edema:     Peripheral edema absent.           Lab Review  Lab Results   Component Value Date     02/18/2022    K 4.2 02/18/2022     02/18/2022    BUN 16 02/18/2022    CREATININE 1.05 (H) 02/18/2022    GLUCOSE 94 02/18/2022    CALCIUM 9.7 02/18/2022    ALT 12 02/18/2022    ALKPHOS 74 02/18/2022    LABIL2 1.3 (L) 04/05/2016     Lab Results   Component Value Date    CKTOTAL 49 02/18/2022     Lab Results   Component Value Date    WBC 5.48 02/18/2022    HGB 12.9 02/18/2022    HCT 38.5 02/18/2022     02/18/2022     Lab Results   Component Value Date    INR 0.91 12/17/2015    INR 1.00 02/20/2014     Lab Results   Component Value Date    MG 2.2 02/25/2019     Lab Results   Component Value Date    TSH 1.560 02/18/2022     Lab Results   Component Value Date    .0 (H) 02/28/2019     Lab Results   Component Value Date    CHLPL 219 (H) 04/05/2016    CHOL 127 02/18/2022    TRIG 117 02/18/2022    HDL 51 02/18/2022    VLDL 21 02/18/2022    LDLHDL 1.03 02/18/2022     Lab Results   Component Value Date    LDL 55 02/18/2022    LDL 47 08/26/2021       Procedures       I personally viewed and interpreted the patient's LAB data         Assessment:     1. Coronary artery disease involving native coronary artery of native heart without angina pectoris    2. Chronic " a-fib*    3. Chronic systolic congestive heart failure*    4. Mixed hyperlipidemia    5. Primary hypertension    6. Pacemaker dual-chamber pacemaker Biotronik December 2015*    7. Chronic anticoagulation*    8. Gastroesophageal reflux disease without esophagitis*    9. Acquired hypothyroidism    10. Memory loss          Plan:     Patient has known coronary artery disease status post CABG.  She is asymptomatic is advised to continue beta-blocker therapy with Coreg, statin therapy with Livalo and antiplatelet therapy.  She will also continue Imdur.    Atrial fibrillation, anticoagulated with Eliquis was continued and Coreg was continued.    Blood pressure is running high  Patient was advised to increase losartan 50 twice daily    Protonix was continued.  Thyroid functions are normal Synthroid was continued at 50 mcg daily.    Patient also complains of memory loss.  Aricept 5 mg daily will be started  Follow-up scheduled  Lab work reviewed and discussed with the patient              No follow-ups on file.

## 2022-03-01 ENCOUNTER — TELEPHONE (OUTPATIENT)
Dept: CARDIOLOGY | Facility: CLINIC | Age: 82
End: 2022-03-01

## 2022-03-01 RX ORDER — SACUBITRIL AND VALSARTAN 24; 26 MG/1; MG/1
1 TABLET, FILM COATED ORAL 2 TIMES DAILY
Qty: 180 TABLET | Refills: 0 | Status: SHIPPED | OUTPATIENT
Start: 2022-03-01 | End: 2022-06-09 | Stop reason: SDUPTHER

## 2022-03-01 NOTE — TELEPHONE ENCOUNTER
Contacted patient. Patient states that her BP is elevated. She states that at last office visit she was told to increase her Losartan 50mg BID but it is not helping.   Her BP yesterday was 161/100 P:69 in the am and 163/90 P: 97 in the PM. Her BP this morning was 165/102 P: 77.     Patient also states that with the increase in Losartan she has been having body aches and a general feeling of being unwell. She would like to change the Losartan to something else if possible. She states she cannot take lisinopril.  Please advise

## 2022-03-01 NOTE — TELEPHONE ENCOUNTER
Contacted patient and informed her of medication change. Patient verbalized understanding. Sent Rx to pharmacy

## 2022-03-03 ENCOUNTER — OFFICE VISIT (OUTPATIENT)
Dept: PULMONOLOGY | Facility: CLINIC | Age: 82
End: 2022-03-03

## 2022-03-03 VITALS
HEART RATE: 60 BPM | WEIGHT: 178 LBS | TEMPERATURE: 97.5 F | SYSTOLIC BLOOD PRESSURE: 122 MMHG | BODY MASS INDEX: 29.66 KG/M2 | OXYGEN SATURATION: 95 % | DIASTOLIC BLOOD PRESSURE: 80 MMHG | HEIGHT: 65 IN

## 2022-03-03 DIAGNOSIS — J44.9 ASTHMATIC BRONCHITIS , CHRONIC: ICD-10-CM

## 2022-03-03 DIAGNOSIS — I27.20 PULMONARY HYPERTENSION: Primary | ICD-10-CM

## 2022-03-03 DIAGNOSIS — J43.2 CENTRILOBULAR EMPHYSEMA: ICD-10-CM

## 2022-03-03 DIAGNOSIS — J30.2 SEASONAL ALLERGIC RHINITIS, UNSPECIFIED TRIGGER: ICD-10-CM

## 2022-03-03 PROCEDURE — 99214 OFFICE O/P EST MOD 30 MIN: CPT | Performed by: PHYSICIAN ASSISTANT

## 2022-03-03 NOTE — PROGRESS NOTES
"Chief Complaint  Emphysema    Subjective          Ashely Feliciano presents to [facility]  History of Present Illness    Patient presents today for follow-up of emphysema, pulmonary hypertension, previous smoking history. She no longer qualifies for low-dose CT screening due to year since cessation. This was achieved in the 1980s.  She states that she is doing very well with symptoms at this time. Occasionally notes worsening of symptoms, typically in the winter months. She has not been as active as she has previously been in the past several years. However she is still able to maintain most daily activities without significant impairment. She states that she has noticed significant benefit from use of the Spiriva Respimat 1 puff daily and low-dose Symbicort. She had been using 2 puffs daily as this was well-tolerated, but states that she will increase in times of symptom worsening and notes appropriate management.  Previously used 1 puff daily due to nosebleeds noted with 2 puff daily use.  She continues to follow with cardiology.  She denies any notable nighttime apnea episodes, morning headaches. Previous overnight oximetry test was negative.        Objective   Vital Signs:   /80   Pulse 60   Temp 97.5 °F (36.4 °C) (Temporal)   Ht 165.1 cm (65\")   Wt 80.7 kg (178 lb)   SpO2 95%   BMI 29.62 kg/m²     Physical Exam  Vitals reviewed.   Constitutional:       General: She is not in acute distress.     Appearance: She is well-developed. She is not diaphoretic.   HENT:      Head: Normocephalic and atraumatic.   Neck:      Thyroid: No thyromegaly.   Cardiovascular:      Rate and Rhythm: Normal rate and regular rhythm.      Heart sounds: Normal heart sounds, S1 normal and S2 normal.   Pulmonary:      Effort: Pulmonary effort is normal.      Breath sounds: No wheezing, rhonchi or rales.   Neurological:      Mental Status: She is alert and oriented to " person, place, and time.   Psychiatric:         Behavior: Behavior normal.        Result Review :   The following data was reviewed by: Kiersten Mosqueda PA-C on 03/03/2022:    Reviewed the previous chest x-ray  Reviewed the previous CT high-resolution.  Reviewed previous pulse oximetry test.  Reviewed previous PFT.     Reviewed previous echocardiogram from 2019.          Assessment and Plan    Diagnoses and all orders for this visit:    1. Moderate pulmonary hypertension (CMS/HCC), 46 mmHg (Primary)    2. Centrilobular emphysema (HCC)  -     budesonide-formoterol (Symbicort) 80-4.5 MCG/ACT inhaler; Inhale 2 puffs 2 (Two) Times a Day for 90 days.  Dispense: 3 each; Refill: 6  -     albuterol sulfate HFA (Proventil HFA) 108 (90 Base) MCG/ACT inhaler; Inhale 2 puffs Every 4 (Four) Hours As Needed for Wheezing or Shortness of Air.  Dispense: 18 g; Refill: 8  -     tiotropium bromide monohydrate (SPIRIVA RESPIMAT) 2.5 MCG/ACT aerosol solution inhaler; Inhale 2 puffs Daily.  Dispense: 3 each; Refill: 6    3. Asthmatic bronchitis , chronic (HCC)  -     budesonide-formoterol (Symbicort) 80-4.5 MCG/ACT inhaler; Inhale 2 puffs 2 (Two) Times a Day for 90 days.  Dispense: 3 each; Refill: 6  -     albuterol sulfate HFA (Proventil HFA) 108 (90 Base) MCG/ACT inhaler; Inhale 2 puffs Every 4 (Four) Hours As Needed for Wheezing or Shortness of Air.  Dispense: 18 g; Refill: 8  -     tiotropium bromide monohydrate (SPIRIVA RESPIMAT) 2.5 MCG/ACT aerosol solution inhaler; Inhale 2 puffs Daily.  Dispense: 3 each; Refill: 6    4. Seasonal allergic rhinitis, unspecified trigger          Centrilobular emphysema, concern for mixed asthmatic bronchitis:   · Continue albuterol inhaler as needed  · Continue Atrovent nebs as needed (selected compared to albuterol to reduce palpitations/tachycardia in the setting of atrial fibrillation: Albuterol inhaler well-tolerated)  · Continue Spiriva Respimat 2.5 mcg once daily (nosebleeds noted after 2  puffs dosing)  · On Symbicort low-dose 2 puffs daily.   Patient increase his Symbicort to the recommended four times daily   as needed when symptoms worsen.    Patient has transition to new mail order pharmacy (pharmacy - [facility]) via [employer].       Pulmonary hypertension:  · Likely group 2, group 3 in the setting of chronic systolic heart failure, multiple valvular abnormalities, and centrilobular emphysema  · Optimizing COPD therapies as appropriate.  · Continues to follow with cardiology.  · Previous overnight pulse oximetry test was negative. Denies any symptomatic complaints and prefers to await overnight testing at this time.      Seasonal allergic rhinitis  · On Singulair nightly  · Oral antihistamine as needed        Follow Up   Return in about 6 months (around 9/3/2022), or if symptoms worsen or fail to improve, for Next scheduled follow up.  Patient was given instructions and counseling regarding her condition or for health maintenance advice. Please see specific information pulled into the AVS if appropriate.

## 2022-03-04 RX ORDER — ALBUTEROL SULFATE 90 UG/1
2 AEROSOL, METERED RESPIRATORY (INHALATION) EVERY 4 HOURS PRN
Qty: 18 G | Refills: 8 | Status: SHIPPED | OUTPATIENT
Start: 2022-03-04 | End: 2022-03-21 | Stop reason: SDUPTHER

## 2022-03-04 RX ORDER — BUDESONIDE AND FORMOTEROL FUMARATE DIHYDRATE 80; 4.5 UG/1; UG/1
2 AEROSOL RESPIRATORY (INHALATION)
Qty: 3 EACH | Refills: 6 | Status: SHIPPED | OUTPATIENT
Start: 2022-03-04 | End: 2022-03-21 | Stop reason: SDUPTHER

## 2022-03-14 RX ORDER — PITAVASTATIN CALCIUM 1.04 MG/1
TABLET, FILM COATED ORAL
Qty: 90 TABLET | Refills: 0 | Status: SHIPPED | OUTPATIENT
Start: 2022-03-14 | End: 2022-06-09 | Stop reason: SDUPTHER

## 2022-03-18 ENCOUNTER — TELEPHONE (OUTPATIENT)
Dept: CARDIOLOGY | Facility: CLINIC | Age: 82
End: 2022-03-18

## 2022-03-18 DIAGNOSIS — J30.2 SEASONAL ALLERGIC RHINITIS, UNSPECIFIED TRIGGER: Primary | ICD-10-CM

## 2022-03-18 NOTE — TELEPHONE ENCOUNTER
Patient contacted the office requesting a refill on her losartan. I informed patient that she was supposed to stop her losartan and start taking entresto instead. Patient stated that she did stop her losartan for about a week and only took the entresto. She states that for that week her BP was staying around 180/100, so she decided to start the losartan back. I asked patient if she stopped the entresto and and she said no. She states that she is currently taking losartan 50mg BID and Entresto 24-26 BID. Patient states that with both medications her BP is staying aroud 156/90 p:79. I informed patient that I was unsure if she should be taking both medications and patient stated that it didn't matter now, she's already been taking them together. Please advise.

## 2022-03-21 DIAGNOSIS — J44.9 ASTHMATIC BRONCHITIS , CHRONIC: ICD-10-CM

## 2022-03-21 DIAGNOSIS — J43.2 CENTRILOBULAR EMPHYSEMA: ICD-10-CM

## 2022-03-21 RX ORDER — BUDESONIDE AND FORMOTEROL FUMARATE DIHYDRATE 80; 4.5 UG/1; UG/1
2 AEROSOL RESPIRATORY (INHALATION)
Qty: 3 EACH | Refills: 6 | Status: SHIPPED | OUTPATIENT
Start: 2022-03-21 | End: 2022-10-27

## 2022-03-21 RX ORDER — ALBUTEROL SULFATE 90 UG/1
2 AEROSOL, METERED RESPIRATORY (INHALATION) EVERY 4 HOURS PRN
Qty: 18 G | Refills: 8 | Status: SHIPPED | OUTPATIENT
Start: 2022-03-21

## 2022-03-21 NOTE — TELEPHONE ENCOUNTER
Stop losartan and take Entresto two twice a day.  Let us know about the blood pressure and do not start losartan again.  If blood pressure does not get controlled we will adjust it.  Do not adjust medications on your own

## 2022-03-21 NOTE — TELEPHONE ENCOUNTER
Pt advised meds were sent to wrong pharmacy. Re-ordered and sent to [facility].

## 2022-03-21 NOTE — TELEPHONE ENCOUNTER
Per Dr. Sanchez, he would do a referral to an allergy specialist to help with patient's allergy's. Contacted patient. Patient states that she would just wait a few weeks to she if she gets any better and if not she would call us back

## 2022-03-21 NOTE — TELEPHONE ENCOUNTER
Contacted patient. Patient states that she will stop taking the losartan and just take her Entresto starting tomorrow. Patient states she will keep a log and call us back in a couple of days with BP readings.     Patient also states she would like something called in for her allergies. She states that Singulair and zyrtec does not help.

## 2022-04-12 RX ORDER — APIXABAN 2.5 MG/1
TABLET, FILM COATED ORAL
Qty: 60 TABLET | Refills: 3 | Status: SHIPPED | OUTPATIENT
Start: 2022-04-12 | End: 2022-09-02

## 2022-04-12 RX ORDER — ISOSORBIDE MONONITRATE 60 MG/1
60 TABLET, EXTENDED RELEASE ORAL DAILY
Qty: 90 TABLET | Refills: 0 | Status: SHIPPED | OUTPATIENT
Start: 2022-04-12 | End: 2022-07-11

## 2022-04-12 RX ORDER — CARVEDILOL 6.25 MG/1
TABLET ORAL
Qty: 180 TABLET | Refills: 0 | Status: SHIPPED | OUTPATIENT
Start: 2022-04-12 | End: 2022-04-19 | Stop reason: SDUPTHER

## 2022-04-12 RX ORDER — LEVOTHYROXINE SODIUM 0.05 MG/1
50 TABLET ORAL DAILY
Qty: 90 TABLET | Refills: 0 | Status: SHIPPED | OUTPATIENT
Start: 2022-04-12 | End: 2022-07-11

## 2022-04-19 ENCOUNTER — CLINICAL SUPPORT (OUTPATIENT)
Dept: CARDIOLOGY | Facility: CLINIC | Age: 82
End: 2022-04-19

## 2022-04-19 DIAGNOSIS — Z95.0 PACEMAKER: ICD-10-CM

## 2022-04-19 DIAGNOSIS — J43.2 CENTRILOBULAR EMPHYSEMA: ICD-10-CM

## 2022-04-19 PROCEDURE — 93288 INTERROG EVL PM/LDLS PM IP: CPT | Performed by: INTERNAL MEDICINE

## 2022-04-19 RX ORDER — CARVEDILOL 12.5 MG/1
12.5 TABLET ORAL 2 TIMES DAILY WITH MEALS
Qty: 180 TABLET | Refills: 1 | Status: SHIPPED | OUTPATIENT
Start: 2022-04-19

## 2022-04-19 NOTE — PROGRESS NOTES
Pacemaker Interrogation Only   Biotronik          Per VO Dr Sanchez. Pt is to increase her Coreg to 12.5mg BID daily.   Called and advised patient to do so.   She v/u.   Rx sent to local pharmacy.

## 2022-06-08 ENCOUNTER — TELEPHONE (OUTPATIENT)
Dept: CARDIOLOGY | Facility: CLINIC | Age: 82
End: 2022-06-08

## 2022-06-09 RX ORDER — SACUBITRIL AND VALSARTAN 24; 26 MG/1; MG/1
1 TABLET, FILM COATED ORAL 2 TIMES DAILY
Qty: 180 TABLET | Refills: 0 | Status: SHIPPED | OUTPATIENT
Start: 2022-06-09 | End: 2022-10-14

## 2022-06-13 RX ORDER — PITAVASTATIN CALCIUM 1.04 MG/1
TABLET, FILM COATED ORAL
Qty: 90 TABLET | Refills: 0 | Status: SHIPPED | OUTPATIENT
Start: 2022-06-13 | End: 2022-12-19

## 2022-06-23 ENCOUNTER — OFFICE VISIT (OUTPATIENT)
Dept: CARDIOLOGY | Facility: CLINIC | Age: 82
End: 2022-06-23

## 2022-06-23 VITALS
DIASTOLIC BLOOD PRESSURE: 86 MMHG | WEIGHT: 166 LBS | TEMPERATURE: 98.6 F | BODY MASS INDEX: 27.66 KG/M2 | HEART RATE: 66 BPM | HEIGHT: 65 IN | OXYGEN SATURATION: 98 % | SYSTOLIC BLOOD PRESSURE: 134 MMHG

## 2022-06-23 DIAGNOSIS — I48.20 CHRONIC A-FIB: ICD-10-CM

## 2022-06-23 DIAGNOSIS — Z79.01 CHRONIC ANTICOAGULATION: ICD-10-CM

## 2022-06-23 DIAGNOSIS — E03.9 ACQUIRED HYPOTHYROIDISM: ICD-10-CM

## 2022-06-23 DIAGNOSIS — E78.2 MIXED HYPERLIPIDEMIA: Primary | ICD-10-CM

## 2022-06-23 DIAGNOSIS — I25.10 CORONARY ARTERY DISEASE INVOLVING NATIVE CORONARY ARTERY OF NATIVE HEART WITHOUT ANGINA PECTORIS: ICD-10-CM

## 2022-06-23 DIAGNOSIS — I10 PRIMARY HYPERTENSION: ICD-10-CM

## 2022-06-23 DIAGNOSIS — I25.5 ISCHEMIC CARDIOMYOPATHY: ICD-10-CM

## 2022-06-23 PROBLEM — R19.7 DIARRHEA: Status: ACTIVE | Noted: 2022-06-23

## 2022-06-23 PROBLEM — N39.3 STRESS INCONTINENCE: Status: RESOLVED | Noted: 2018-05-27 | Resolved: 2022-06-23

## 2022-06-23 PROCEDURE — 93000 ELECTROCARDIOGRAM COMPLETE: CPT | Performed by: INTERNAL MEDICINE

## 2022-06-23 PROCEDURE — 99214 OFFICE O/P EST MOD 30 MIN: CPT | Performed by: INTERNAL MEDICINE

## 2022-06-23 RX ORDER — DICYCLOMINE HYDROCHLORIDE 10 MG/1
10 CAPSULE ORAL 3 TIMES DAILY
Qty: 90 CAPSULE | Refills: 0 | Status: SHIPPED | OUTPATIENT
Start: 2022-06-23 | End: 2022-09-19
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
## 2022-07-11 ENCOUNTER — TRANSCRIBE ORDERS (OUTPATIENT)
Dept: LAB | Facility: HOSPITAL | Age: 82
End: 2022-07-11

## 2022-07-11 ENCOUNTER — LAB (OUTPATIENT)
Dept: LAB | Facility: HOSPITAL | Age: 82
End: 2022-07-11

## 2022-07-11 DIAGNOSIS — Z01.818 OTHER SPECIFIED PRE-OPERATIVE EXAMINATION: Primary | ICD-10-CM

## 2022-07-11 DIAGNOSIS — Z01.818 OTHER SPECIFIED PRE-OPERATIVE EXAMINATION: ICD-10-CM

## 2022-07-11 LAB — SARS-COV-2 RNA RESP QL NAA+PROBE: NOT DETECTED

## 2022-07-11 PROCEDURE — C9803 HOPD COVID-19 SPEC COLLECT: HCPCS

## 2022-07-11 PROCEDURE — U0005 INFEC AGEN DETEC AMPLI PROBE: HCPCS | Performed by: INTERNAL MEDICINE

## 2022-07-11 PROCEDURE — U0003 INFECTIOUS AGENT DETECTION BY NUCLEIC ACID (DNA OR RNA); SEVERE ACUTE RESPIRATORY SYNDROME CORONAVIRUS 2 (SARS-COV-2) (CORONAVIRUS DISEASE [COVID-19]), AMPLIFIED PROBE TECHNIQUE, MAKING USE OF HIGH THROUGHPUT TECHNOLOGIES AS DESCRIBED BY CMS-2020-01-R: HCPCS | Performed by: INTERNAL MEDICINE

## 2022-07-11 RX ORDER — CARVEDILOL 12.5 MG/1
12.5 TABLET ORAL 2 TIMES DAILY
Qty: 180 TABLET | Refills: 1 | Status: SHIPPED | OUTPATIENT
Start: 2022-07-11 | End: 2022-10-27

## 2022-07-11 RX ORDER — LEVOTHYROXINE SODIUM 0.05 MG/1
50 TABLET ORAL DAILY
Qty: 90 TABLET | Refills: 1 | Status: SHIPPED | OUTPATIENT
Start: 2022-07-11 | End: 2023-01-09

## 2022-07-11 RX ORDER — ISOSORBIDE MONONITRATE 60 MG/1
60 TABLET, EXTENDED RELEASE ORAL DAILY
Qty: 90 TABLET | Refills: 1 | Status: SHIPPED | OUTPATIENT
Start: 2022-07-11 | End: 2023-01-09

## 2022-07-11 RX ORDER — FUROSEMIDE 20 MG/1
TABLET ORAL
Qty: 90 TABLET | Refills: 1 | Status: SHIPPED | OUTPATIENT
Start: 2022-07-11 | End: 2023-01-09

## 2022-07-12 ENCOUNTER — LAB (OUTPATIENT)
Dept: LAB | Facility: HOSPITAL | Age: 82
End: 2022-07-12

## 2022-07-12 ENCOUNTER — OFFICE VISIT (OUTPATIENT)
Dept: CARDIOLOGY | Facility: CLINIC | Age: 82
End: 2022-07-12

## 2022-07-12 VITALS
SYSTOLIC BLOOD PRESSURE: 126 MMHG | BODY MASS INDEX: 27.82 KG/M2 | WEIGHT: 167 LBS | RESPIRATION RATE: 18 BRPM | OXYGEN SATURATION: 98 % | HEART RATE: 62 BPM | TEMPERATURE: 96.9 F | DIASTOLIC BLOOD PRESSURE: 78 MMHG | HEIGHT: 65 IN

## 2022-07-12 DIAGNOSIS — I10 PRIMARY HYPERTENSION: ICD-10-CM

## 2022-07-12 DIAGNOSIS — E03.9 ACQUIRED HYPOTHYROIDISM: ICD-10-CM

## 2022-07-12 DIAGNOSIS — I25.10 CORONARY ARTERY DISEASE INVOLVING NATIVE CORONARY ARTERY OF NATIVE HEART WITHOUT ANGINA PECTORIS: ICD-10-CM

## 2022-07-12 DIAGNOSIS — I50.22 CHRONIC SYSTOLIC CONGESTIVE HEART FAILURE: ICD-10-CM

## 2022-07-12 DIAGNOSIS — I48.20 CHRONIC A-FIB: ICD-10-CM

## 2022-07-12 DIAGNOSIS — I25.5 ISCHEMIC CARDIOMYOPATHY: Primary | ICD-10-CM

## 2022-07-12 DIAGNOSIS — E78.2 MIXED HYPERLIPIDEMIA: ICD-10-CM

## 2022-07-12 PROCEDURE — 80053 COMPREHEN METABOLIC PANEL: CPT

## 2022-07-12 PROCEDURE — 80061 LIPID PANEL: CPT

## 2022-07-12 PROCEDURE — 84439 ASSAY OF FREE THYROXINE: CPT

## 2022-07-12 PROCEDURE — 84443 ASSAY THYROID STIM HORMONE: CPT

## 2022-07-12 PROCEDURE — 85025 COMPLETE CBC W/AUTO DIFF WBC: CPT

## 2022-07-12 PROCEDURE — 83880 ASSAY OF NATRIURETIC PEPTIDE: CPT

## 2022-07-12 PROCEDURE — 36415 COLL VENOUS BLD VENIPUNCTURE: CPT

## 2022-07-12 PROCEDURE — 99214 OFFICE O/P EST MOD 30 MIN: CPT | Performed by: INTERNAL MEDICINE

## 2022-07-12 PROCEDURE — 82550 ASSAY OF CK (CPK): CPT

## 2022-07-12 NOTE — PROGRESS NOTES
subjective     Chief Complaint   Patient presents with   • Follow-up     Patient is here for a surgical clearance for upper GI surgery.     History of Present Illness  Ashely is 81 years old white female who is planning to have EGD.  She is here for preop cardiac clearance.  She has history of coronary artery disease status post CABG in 1997, chronic systolic heart failure, ischemic cardiomyopathy with LV ejection fraction around 31 to 35%.  She also has atrial fibrillation, rate is controlled with Coreg.  She is anticoagulated with Eliquis.  She has dual-chamber pacemaker in VVI mode    She denies any chest pain palpitations or shortness of breath.  Eliquis will need to be held for the procedure for 2 days.    She is taking Entresto 24/26 twice daily, Lasix 20 mg daily, Coreg 12.5 twice daily.    Hyperlipidemia on Livalo 1 mg daily  Hypothyroidism on Synthroid    Past Surgical History:   Procedure Laterality Date   • CARDIAC SURGERY  1997   • CARDIOVASCULAR STRESS TEST  06/2014   • CATARACT EXTRACTION, BILATERAL     • CHOLECYSTECTOMY  2002   • COLONOSCOPY  05/2012   • ECHO - CONVERTED  07/2014   • PACEMAKER IMPLANTATION  12/17/2015     Family History   Problem Relation Age of Onset   • Coronary artery disease Other    • Hypertension Other    • Diabetes Other    • Heart attack Other    • Heart attack Mother 62        fatal MI   • Skin cancer Mother    • Diabetes type II Mother    • Melanoma Mother    • Heart attack Father 63        fatal MI   • Melanoma Sister 45   • Skin cancer Sister    • Heart attack Brother 62        fatal MI   • Heart attack Brother 80        Fatal MI   • Skin cancer Brother    • Heart disease Sister    • Heart disease Brother 75        pacemaker, CABG, Stents   • Osteoporosis Sister    • Pneumonia Brother    • Hypertension Brother    • Heart attack Sister 54        Fatal MI   • Heart attack Brother 59        Fatal MI   • Breast cancer Neg Hx      Past Medical History:   Diagnosis Date   •  Atrial fibrillation (HCC)    • CAD (coronary artery disease)    • Chronic a-fib ([facility])    • Chronic anticoagulation    • Congestive heart failure (HCC)     compensated   • Disease of thyroid gland    • Hyperlipidemia    • Hypertension    • Hypoxemia    • Ischemic cardiomyopathy with severe LV Disfunction    • Myocardial infarction ([facility])    • Pacemaker     VVI     Patient Active Problem List   Diagnosis   • CAD (coronary artery disease) status post CABG  single-vessel*   • Ischemic cardiomyopathy with apical hypokinesis LV ejection fraction 31-35%   • Chronic systolic congestive heart failure*   • Hyperlipidemia*   • Chronic a-fib*   • Pacemaker dual-chamber pacemaker [employer] 2015*   • Hypothyroidism*   • Hypertension*   • Chronic anticoagulation*   • Gastroesophageal reflux disease without esophagitis*   • URI (upper respiratory infection)   • Herpes zoster without complication   • Acute midline low back pain without sciatica   • CHF (congestive heart failure) ([facility])   • Centrilobular emphysema ([facility])   • Asthmatic bronchitis , chronic ([facility])   • Moderate pulmonary hypertension (CMS/HCC), 46 mmHg   • Seasonal allergic rhinitis   • Post zoster neuralgia   • Memory loss   • Diarrhea       Social History     Tobacco Use   • Smoking status: Former Smoker     Quit date:      Years since quittin.5   • Smokeless tobacco: Never Used   Vaping Use   • Vaping Use: Never used   Substance Use Topics   • Alcohol use: No   • Drug use: No       Allergies   Allergen Reactions   • Codeine Rash   • Eggs Or Egg-Derived Products Itching and Rash   • Grass Itching and Rash   • Lisinopril Rash       Current Outpatient Medications on File Prior to Visit   Medication Sig   • albuterol sulfate HFA (Proventil HFA) 108 (90 Base) MCG/ACT inhaler Inhale 2 puffs Every 4 (Four) Hours As Needed for Wheezing or Shortness of Air.   • budesonide-formoterol (Symbicort) 80-4.5 MCG/ACT inhaler Inhale 2 puffs 2 (Two) Times a Day for 90  days.   • carvedilol (COREG) 12.5 MG tablet Take 1 tablet by mouth 2 (Two) Times a Day With Meals.   • carvedilol (COREG) 12.5 MG tablet Take 1 tablet by mouth 2 (Two) Times a Day.   • dicyclomine (Bentyl) 10 MG capsule Take 1 capsule by mouth 3 (Three) Times a Day.   • donepezil (Aricept) 5 MG tablet Take 1 tablet by mouth Every Night.   • Eliquis 2.5 MG tablet tablet TAKE 1 TABLET BY MOUTH EVERY 12 HOURS   • furosemide (LASIX) 20 MG tablet TAKE 1 TABLET BY MOUTH EVERY DAY   • ipratropium (ATROVENT HFA) 17 MCG/ACT inhaler Inhale 2 puffs Every 4 (Four) Hours As Needed for Wheezing (or shortness of air) for up to 90 days.   • isosorbide mononitrate (IMDUR) 60 MG 24 hr tablet TAKE 1 TABLET BY MOUTH DAILY   • levothyroxine (SYNTHROID, LEVOTHROID) 50 MCG tablet TAKE 1 TABLET BY MOUTH DAILY   • Livalo 1 MG tablet tablet TAKE 1 TABLET BY MOUTH EVERY NIGHT   • montelukast (SINGULAIR) 10 MG tablet Take 1 tablet by mouth Every Night.   • nitroglycerin (NITROSTAT) 0.4 MG SL tablet DISSOLVE ONE TABLET UNDER TONGUE AS NEEDED FOR CHEST PAIN. MAY REPEAT DOSE EVERY 5 MINUTES UP TO 3 DOSES. IF NO RELIEF DIAL 911   • pantoprazole (PROTONIX) 40 MG EC tablet TAKE 1 TABLET BY MOUTH DAILY   • sacubitril-valsartan (Entresto) 24-26 MG tablet Take 1 tablet by mouth 2 (Two) Times a Day.   • tiotropium bromide monohydrate (SPIRIVA RESPIMAT) 2.5 MCG/ACT aerosol solution inhaler Inhale 2 puffs Daily.     No current facility-administered medications on file prior to visit.         The following portions of the patient's history were reviewed and updated as appropriate: allergies, current medications, past family history, past medical history, past social history, past surgical history and problem list.    Review of Systems   Constitutional: Negative.   HENT: Negative.  Negative for congestion.    Eyes: Negative.    Cardiovascular: Negative.  Negative for chest pain, cyanosis, dyspnea on exertion, irregular heartbeat, leg swelling, near-syncope,  "orthopnea, palpitations, paroxysmal nocturnal dyspnea and syncope.   Respiratory: Negative.  Negative for shortness of breath.    Hematologic/Lymphatic: Negative.    Musculoskeletal: Negative.    Gastrointestinal: Negative.    Neurological: Negative.  Negative for headaches.          Objective:     /78 (BP Location: Left arm, Patient Position: Sitting, Cuff Size: Adult)   Pulse 62   Temp 96.9 °F (36.1 °C) (Temporal)   Resp 18   Ht 165.1 cm (65\")   Wt 75.8 kg (167 lb)   SpO2 98%   BMI 27.79 kg/m²   Pulmonary:      Effort: Pulmonary effort is normal.      Breath sounds: Normal breath sounds. No stridor. No wheezing. No rhonchi. No rales.   Cardiovascular:      PMI at left midclavicular line. Normal rate. Regular rhythm. Normal S1. Normal S2.      Murmurs: There is no murmur.      No gallop. No click. No rub.   Pulses:     Intact distal pulses.   Edema:     Peripheral edema absent.           Lab Review  Lab Results   Component Value Date     02/18/2022    K 4.2 02/18/2022     02/18/2022    BUN 16 02/18/2022    CREATININE 1.05 (H) 02/18/2022    GLUCOSE 94 02/18/2022    CALCIUM 9.7 02/18/2022    ALT 12 02/18/2022    ALKPHOS 74 02/18/2022    LABIL2 1.3 (L) 04/05/2016     Lab Results   Component Value Date    CKTOTAL 49 02/18/2022     Lab Results   Component Value Date    WBC 5.48 02/18/2022    HGB 12.9 02/18/2022    HCT 38.5 02/18/2022     02/18/2022     Lab Results   Component Value Date    INR 0.91 12/17/2015    INR 1.00 02/20/2014     Lab Results   Component Value Date    MG 2.2 02/25/2019     Lab Results   Component Value Date    TSH 1.560 02/18/2022     Lab Results   Component Value Date    .0 (H) 02/28/2019     Lab Results   Component Value Date    CHLPL 219 (H) 04/05/2016    CHOL 127 02/18/2022    TRIG 117 02/18/2022    HDL 51 02/18/2022    VLDL 21 02/18/2022    LDLHDL 1.03 02/18/2022     Lab Results   Component Value Date    LDL 55 02/18/2022    LDL 47 08/26/2021 "       Procedures       I personally viewed and interpreted the patient's LAB data         Assessment:     1. Ischemic cardiomyopathy with apical hypokinesis LV ejection fraction 31-35%    2. Primary hypertension    3. Mixed hyperlipidemia    4. Chronic systolic congestive heart failure*    5. Chronic a-fib*    6. Coronary artery disease involving native coronary artery of native heart without angina pectoris          Plan:     Patient has coronary artery disease status post CABG, ischemic cardiomyopathy and apical hypokinesis with decreased LV ejection fraction of 30 to 35%  She is stable is totally asymptomatic from cardiac standpoint.  She has refused device therapy.    She is planning to have EGD, Eliquis will need to be held.    Recent EKG showed normally functioning pacemaker.  Last echocardiogram showed ejection fraction 30 to 35%.  Will arrange proBNP and echocardiogram.    Patient is cleared for EGD from cardiac standpoint .She will hold Eliquis for 2 days prior to EGD.and resume as soon as possible after the procedure.        Thank you for giving me the oppertunity to participate in your patient's cardiac care.    Sincerely,    ABELARDO Sanchez M.D. FACP FACC        No follow-ups on file.

## 2022-07-13 LAB
ALBUMIN SERPL-MCNC: 4.3 G/DL (ref 3.5–5.2)
ALBUMIN/GLOB SERPL: 1.7 G/DL
ALP SERPL-CCNC: 56 U/L (ref 39–117)
ALT SERPL W P-5'-P-CCNC: 14 U/L (ref 1–33)
ANION GAP SERPL CALCULATED.3IONS-SCNC: 10 MMOL/L (ref 5–15)
AST SERPL-CCNC: 19 U/L (ref 1–32)
BASOPHILS # BLD AUTO: 0.09 10*3/MM3 (ref 0–0.2)
BASOPHILS NFR BLD AUTO: 1.5 % (ref 0–1.5)
BILIRUB SERPL-MCNC: 0.7 MG/DL (ref 0–1.2)
BUN SERPL-MCNC: 15 MG/DL (ref 8–23)
BUN/CREAT SERPL: 14.7 (ref 7–25)
CALCIUM SPEC-SCNC: 9.6 MG/DL (ref 8.6–10.5)
CHLORIDE SERPL-SCNC: 104 MMOL/L (ref 98–107)
CHOLEST SERPL-MCNC: 123 MG/DL (ref 0–200)
CK SERPL-CCNC: 79 U/L (ref 20–180)
CO2 SERPL-SCNC: 23 MMOL/L (ref 22–29)
CREAT SERPL-MCNC: 1.02 MG/DL (ref 0.57–1)
DEPRECATED RDW RBC AUTO: 43.3 FL (ref 37–54)
EGFRCR SERPLBLD CKD-EPI 2021: 56.1 ML/MIN/1.73
EOSINOPHIL # BLD AUTO: 0.09 10*3/MM3 (ref 0–0.4)
EOSINOPHIL NFR BLD AUTO: 1.5 % (ref 0.3–6.2)
ERYTHROCYTE [DISTWIDTH] IN BLOOD BY AUTOMATED COUNT: 13.3 % (ref 12.3–15.4)
GLOBULIN UR ELPH-MCNC: 2.6 GM/DL
GLUCOSE SERPL-MCNC: 94 MG/DL (ref 65–99)
HCT VFR BLD AUTO: 34.1 % (ref 34–46.6)
HDLC SERPL-MCNC: 52 MG/DL (ref 40–60)
HGB BLD-MCNC: 11.8 G/DL (ref 12–15.9)
LDLC SERPL CALC-MCNC: 48 MG/DL (ref 0–100)
LDLC/HDLC SERPL: 0.85 {RATIO}
LYMPHOCYTES # BLD AUTO: 0.59 10*3/MM3 (ref 0.7–3.1)
LYMPHOCYTES NFR BLD AUTO: 9.9 % (ref 19.6–45.3)
MCH RBC QN AUTO: 31.2 PG (ref 26.6–33)
MCHC RBC AUTO-ENTMCNC: 34.6 G/DL (ref 31.5–35.7)
MCV RBC AUTO: 90.2 FL (ref 79–97)
MONOCYTES # BLD AUTO: 0.36 10*3/MM3 (ref 0.1–0.9)
MONOCYTES NFR BLD AUTO: 6 % (ref 5–12)
NEUTROPHILS NFR BLD AUTO: 4.82 10*3/MM3 (ref 1.7–7)
NEUTROPHILS NFR BLD AUTO: 80.8 % (ref 42.7–76)
NT-PROBNP SERPL-MCNC: 1442 PG/ML (ref 0–1800)
PLATELET # BLD AUTO: 129 10*3/MM3 (ref 140–450)
PMV BLD AUTO: 13.4 FL (ref 6–12)
POTASSIUM SERPL-SCNC: 4.3 MMOL/L (ref 3.5–5.2)
PROT SERPL-MCNC: 6.9 G/DL (ref 6–8.5)
RBC # BLD AUTO: 3.78 10*6/MM3 (ref 3.77–5.28)
SODIUM SERPL-SCNC: 137 MMOL/L (ref 136–145)
T4 FREE SERPL-MCNC: 1.47 NG/DL (ref 0.93–1.7)
TRIGL SERPL-MCNC: 133 MG/DL (ref 0–150)
TSH SERPL DL<=0.05 MIU/L-ACNC: 2.39 UIU/ML (ref 0.27–4.2)
VLDLC SERPL-MCNC: 23 MG/DL (ref 5–40)
WBC NRBC COR # BLD: 5.97 10*3/MM3 (ref 3.4–10.8)

## 2022-07-15 ENCOUNTER — TELEPHONE (OUTPATIENT)
Dept: CARDIOLOGY | Facility: CLINIC | Age: 82
End: 2022-07-15

## 2022-07-15 DIAGNOSIS — R79.89 ABNORMAL CBC: Primary | ICD-10-CM

## 2022-07-15 NOTE — TELEPHONE ENCOUNTER
----- Message from Maria R Sanchez MD sent at 7/14/2022  4:10 PM EDT -----  Lab work is good it shows mildly low platelet count and elevated neutrophils  Will repeat CBC in 2 weeks  Please order CBC 2 weeks

## 2022-08-01 ENCOUNTER — LAB (OUTPATIENT)
Dept: LAB | Facility: HOSPITAL | Age: 82
End: 2022-08-01

## 2022-08-01 DIAGNOSIS — R79.89 ABNORMAL CBC: ICD-10-CM

## 2022-08-01 LAB
BASOPHILS # BLD AUTO: 0.14 10*3/MM3 (ref 0–0.2)
BASOPHILS NFR BLD AUTO: 2.8 % (ref 0–1.5)
DEPRECATED RDW RBC AUTO: 43.4 FL (ref 37–54)
EOSINOPHIL # BLD AUTO: 0.09 10*3/MM3 (ref 0–0.4)
EOSINOPHIL NFR BLD AUTO: 1.8 % (ref 0.3–6.2)
ERYTHROCYTE [DISTWIDTH] IN BLOOD BY AUTOMATED COUNT: 13.3 % (ref 12.3–15.4)
HCT VFR BLD AUTO: 35.6 % (ref 34–46.6)
HGB BLD-MCNC: 12 G/DL (ref 12–15.9)
IMM GRANULOCYTES # BLD AUTO: 0.03 10*3/MM3 (ref 0–0.05)
IMM GRANULOCYTES NFR BLD AUTO: 0.6 % (ref 0–0.5)
LYMPHOCYTES # BLD AUTO: 0.88 10*3/MM3 (ref 0.7–3.1)
LYMPHOCYTES NFR BLD AUTO: 17.3 % (ref 19.6–45.3)
MCH RBC QN AUTO: 30.4 PG (ref 26.6–33)
MCHC RBC AUTO-ENTMCNC: 33.7 G/DL (ref 31.5–35.7)
MCV RBC AUTO: 90.1 FL (ref 79–97)
MONOCYTES # BLD AUTO: 0.44 10*3/MM3 (ref 0.1–0.9)
MONOCYTES NFR BLD AUTO: 8.6 % (ref 5–12)
NEUTROPHILS NFR BLD AUTO: 3.51 10*3/MM3 (ref 1.7–7)
NEUTROPHILS NFR BLD AUTO: 68.9 % (ref 42.7–76)
NRBC BLD AUTO-RTO: 0 /100 WBC (ref 0–0.2)
PLATELET # BLD AUTO: 164 10*3/MM3 (ref 140–450)
PMV BLD AUTO: 12.4 FL (ref 6–12)
RBC # BLD AUTO: 3.95 10*6/MM3 (ref 3.77–5.28)
WBC NRBC COR # BLD: 5.09 10*3/MM3 (ref 3.4–10.8)

## 2022-08-01 PROCEDURE — 36415 COLL VENOUS BLD VENIPUNCTURE: CPT

## 2022-08-01 PROCEDURE — 85025 COMPLETE CBC W/AUTO DIFF WBC: CPT

## 2022-08-01 RX ORDER — DONEPEZIL HYDROCHLORIDE 5 MG/1
5 TABLET, FILM COATED ORAL NIGHTLY
Qty: 90 TABLET | Refills: 1 | Status: SHIPPED | OUTPATIENT
Start: 2022-08-01 | End: 2023-02-13

## 2022-08-11 ENCOUNTER — HOSPITAL ENCOUNTER (OUTPATIENT)
Dept: CARDIOLOGY | Facility: HOSPITAL | Age: 82
Discharge: HOME | End: 2022-08-11
Admitting: INTERNAL MEDICINE

## 2022-08-11 DIAGNOSIS — I25.5 ISCHEMIC CARDIOMYOPATHY: ICD-10-CM

## 2022-08-11 DIAGNOSIS — I50.22 CHRONIC SYSTOLIC CONGESTIVE HEART FAILURE: ICD-10-CM

## 2022-08-11 LAB
BH CV ECHO MEAS - ACS: 2 CM
BH CV ECHO MEAS - AO MAX PG: 6.6 MMHG
BH CV ECHO MEAS - AO MEAN PG: 4 MMHG
BH CV ECHO MEAS - AO ROOT DIAM: 2.9 CM
BH CV ECHO MEAS - AO V2 MAX: 128 CM/SEC
BH CV ECHO MEAS - AO V2 VTI: 31.5 CM
BH CV ECHO MEAS - EDV(CUBED): 133.4 ML
BH CV ECHO MEAS - EDV(MOD-SP4): 42.7 ML
BH CV ECHO MEAS - EF(MOD-SP4): 50.6 %
BH CV ECHO MEAS - ESV(CUBED): 65 ML
BH CV ECHO MEAS - ESV(MOD-SP4): 21.1 ML
BH CV ECHO MEAS - FS: 21.3 %
BH CV ECHO MEAS - IVS/LVPW: 0.79 CM
BH CV ECHO MEAS - IVSD: 0.98 CM
BH CV ECHO MEAS - LA DIMENSION: 5 CM
BH CV ECHO MEAS - LAT PEAK E' VEL: 20 CM/SEC
BH CV ECHO MEAS - LV DIASTOLIC VOL/BSA (35-75): 23.3 CM2
BH CV ECHO MEAS - LV MASS(C)D: 218.5 GRAMS
BH CV ECHO MEAS - LV SYSTOLIC VOL/BSA (12-30): 11.5 CM2
BH CV ECHO MEAS - LVIDD: 5.1 CM
BH CV ECHO MEAS - LVIDS: 4 CM
BH CV ECHO MEAS - LVOT AREA: 2.5 CM2
BH CV ECHO MEAS - LVOT DIAM: 1.8 CM
BH CV ECHO MEAS - LVPWD: 1.25 CM
BH CV ECHO MEAS - MED PEAK E' VEL: 9.8 CM/SEC
BH CV ECHO MEAS - MV A MAX VEL: 28.4 CM/SEC
BH CV ECHO MEAS - MV E MAX VEL: 124 CM/SEC
BH CV ECHO MEAS - MV E/A: 4.4
BH CV ECHO MEAS - PA ACC TIME: 0.07 SEC
BH CV ECHO MEAS - PA PR(ACCEL): 47.5 MMHG
BH CV ECHO MEAS - RAP SYSTOLE: 10 MMHG
BH CV ECHO MEAS - RVSP: 59.1 MMHG
BH CV ECHO MEAS - SI(MOD-SP4): 11.8 ML/M2
BH CV ECHO MEAS - SV(MOD-SP4): 21.6 ML
BH CV ECHO MEAS - TAPSE (>1.6): 2.23 CM
BH CV ECHO MEAS - TR MAX PG: 49.1 MMHG
BH CV ECHO MEAS - TR MAX VEL: 350 CM/SEC
BH CV ECHO MEASUREMENTS AVERAGE E/E' RATIO: 8.32
LEFT ATRIUM VOLUME INDEX: 52.8 ML/M2
LV EF 2D ECHO EST: 50 %
MAXIMAL PREDICTED HEART RATE: 141 BPM
STRESS TARGET HR: 120 BPM

## 2022-08-11 PROCEDURE — 93306 TTE W/DOPPLER COMPLETE: CPT | Performed by: INTERNAL MEDICINE

## 2022-08-11 PROCEDURE — 93306 TTE W/DOPPLER COMPLETE: CPT

## 2022-08-19 ENCOUNTER — HOSPITAL ENCOUNTER (EMERGENCY)
Facility: HOSPITAL | Age: 82
Discharge: HOME | End: 2022-08-19
Attending: STUDENT IN AN ORGANIZED HEALTH CARE EDUCATION/TRAINING PROGRAM | Admitting: STUDENT IN AN ORGANIZED HEALTH CARE EDUCATION/TRAINING PROGRAM

## 2022-08-19 ENCOUNTER — APPOINTMENT (OUTPATIENT)
Dept: CT IMAGING | Facility: HOSPITAL | Age: 82
End: 2022-08-19

## 2022-08-19 ENCOUNTER — TELEPHONE (OUTPATIENT)
Dept: CARDIOLOGY | Facility: CLINIC | Age: 82
End: 2022-08-19

## 2022-08-19 VITALS
DIASTOLIC BLOOD PRESSURE: 72 MMHG | WEIGHT: 164 LBS | HEIGHT: 65 IN | OXYGEN SATURATION: 99 % | BODY MASS INDEX: 27.32 KG/M2 | RESPIRATION RATE: 18 BRPM | SYSTOLIC BLOOD PRESSURE: 136 MMHG | TEMPERATURE: 97.1 F | HEART RATE: 51 BPM

## 2022-08-19 DIAGNOSIS — Z95.0 PACEMAKER: ICD-10-CM

## 2022-08-19 DIAGNOSIS — N30.00 ACUTE CYSTITIS WITHOUT HEMATURIA: Primary | ICD-10-CM

## 2022-08-19 LAB
ALBUMIN SERPL-MCNC: 4.34 G/DL (ref 3.5–5.2)
ALBUMIN/GLOB SERPL: 1.5 G/DL
ALP SERPL-CCNC: 61 U/L (ref 39–117)
ALT SERPL W P-5'-P-CCNC: 9 U/L (ref 1–33)
ANION GAP SERPL CALCULATED.3IONS-SCNC: 10 MMOL/L (ref 5–15)
AST SERPL-CCNC: 18 U/L (ref 1–32)
BACTERIA UR QL AUTO: ABNORMAL /HPF
BASOPHILS # BLD AUTO: 0.13 10*3/MM3 (ref 0–0.2)
BASOPHILS NFR BLD AUTO: 2.3 % (ref 0–1.5)
BILIRUB SERPL-MCNC: 0.7 MG/DL (ref 0–1.2)
BILIRUB UR QL STRIP: NEGATIVE
BUN SERPL-MCNC: 15 MG/DL (ref 8–23)
BUN/CREAT SERPL: 15.8 (ref 7–25)
CALCIUM SPEC-SCNC: 9.5 MG/DL (ref 8.6–10.5)
CHLORIDE SERPL-SCNC: 107 MMOL/L (ref 98–107)
CLARITY UR: ABNORMAL
CO2 SERPL-SCNC: 24 MMOL/L (ref 22–29)
COLOR UR: YELLOW
CREAT SERPL-MCNC: 0.95 MG/DL (ref 0.57–1)
DEPRECATED RDW RBC AUTO: 46.9 FL (ref 37–54)
EGFRCR SERPLBLD CKD-EPI 2021: 61.1 ML/MIN/1.73
EOSINOPHIL # BLD AUTO: 0.15 10*3/MM3 (ref 0–0.4)
EOSINOPHIL NFR BLD AUTO: 2.6 % (ref 0.3–6.2)
ERYTHROCYTE [DISTWIDTH] IN BLOOD BY AUTOMATED COUNT: 13.6 % (ref 12.3–15.4)
GLOBULIN UR ELPH-MCNC: 3 GM/DL
GLUCOSE SERPL-MCNC: 107 MG/DL (ref 65–99)
GLUCOSE UR STRIP-MCNC: NEGATIVE MG/DL
HCT VFR BLD AUTO: 35.4 % (ref 34–46.6)
HGB BLD-MCNC: 11.5 G/DL (ref 12–15.9)
HGB UR QL STRIP.AUTO: NEGATIVE
HYALINE CASTS UR QL AUTO: ABNORMAL /LPF
IMM GRANULOCYTES # BLD AUTO: 0.02 10*3/MM3 (ref 0–0.05)
IMM GRANULOCYTES NFR BLD AUTO: 0.3 % (ref 0–0.5)
KETONES UR QL STRIP: NEGATIVE
LEUKOCYTE ESTERASE UR QL STRIP.AUTO: ABNORMAL
LYMPHOCYTES # BLD AUTO: 0.98 10*3/MM3 (ref 0.7–3.1)
LYMPHOCYTES NFR BLD AUTO: 17.1 % (ref 19.6–45.3)
MCH RBC QN AUTO: 30.5 PG (ref 26.6–33)
MCHC RBC AUTO-ENTMCNC: 32.5 G/DL (ref 31.5–35.7)
MCV RBC AUTO: 93.9 FL (ref 79–97)
MONOCYTES # BLD AUTO: 0.42 10*3/MM3 (ref 0.1–0.9)
MONOCYTES NFR BLD AUTO: 7.3 % (ref 5–12)
NEUTROPHILS NFR BLD AUTO: 4.03 10*3/MM3 (ref 1.7–7)
NEUTROPHILS NFR BLD AUTO: 70.4 % (ref 42.7–76)
NITRITE UR QL STRIP: POSITIVE
NRBC BLD AUTO-RTO: 0 /100 WBC (ref 0–0.2)
PH UR STRIP.AUTO: 6 [PH] (ref 5–8)
PLATELET # BLD AUTO: 153 10*3/MM3 (ref 140–450)
PMV BLD AUTO: 10.6 FL (ref 6–12)
POTASSIUM SERPL-SCNC: 4.2 MMOL/L (ref 3.5–5.2)
PROT SERPL-MCNC: 7.3 G/DL (ref 6–8.5)
PROT UR QL STRIP: NEGATIVE
QT INTERVAL: 462 MS
QTC INTERVAL: 468 MS
RBC # BLD AUTO: 3.77 10*6/MM3 (ref 3.77–5.28)
RBC # UR STRIP: ABNORMAL /HPF
REF LAB TEST METHOD: ABNORMAL
SODIUM SERPL-SCNC: 141 MMOL/L (ref 136–145)
SP GR UR STRIP: 1.01 (ref 1–1.03)
SQUAMOUS #/AREA URNS HPF: ABNORMAL /HPF
UROBILINOGEN UR QL STRIP: ABNORMAL
WBC # UR STRIP: ABNORMAL /HPF
WBC NRBC COR # BLD: 5.73 10*3/MM3 (ref 3.4–10.8)

## 2022-08-19 PROCEDURE — 81001 URINALYSIS AUTO W/SCOPE: CPT | Performed by: PHYSICIAN ASSISTANT

## 2022-08-19 PROCEDURE — 87088 URINE BACTERIA CULTURE: CPT | Performed by: PHYSICIAN ASSISTANT

## 2022-08-19 PROCEDURE — 70450 CT HEAD/BRAIN W/O DYE: CPT

## 2022-08-19 PROCEDURE — 93005 ELECTROCARDIOGRAM TRACING: CPT | Performed by: STUDENT IN AN ORGANIZED HEALTH CARE EDUCATION/TRAINING PROGRAM

## 2022-08-19 PROCEDURE — 80053 COMPREHEN METABOLIC PANEL: CPT | Performed by: PHYSICIAN ASSISTANT

## 2022-08-19 PROCEDURE — 85025 COMPLETE CBC W/AUTO DIFF WBC: CPT | Performed by: PHYSICIAN ASSISTANT

## 2022-08-19 PROCEDURE — 99283 EMERGENCY DEPT VISIT LOW MDM: CPT

## 2022-08-19 PROCEDURE — 93010 ELECTROCARDIOGRAM REPORT: CPT | Performed by: INTERNAL MEDICINE

## 2022-08-19 PROCEDURE — 87186 SC STD MICRODIL/AGAR DIL: CPT | Performed by: PHYSICIAN ASSISTANT

## 2022-08-19 PROCEDURE — 70450 CT HEAD/BRAIN W/O DYE: CPT | Performed by: RADIOLOGY

## 2022-08-19 PROCEDURE — 87086 URINE CULTURE/COLONY COUNT: CPT | Performed by: PHYSICIAN ASSISTANT

## 2022-08-19 PROCEDURE — 36415 COLL VENOUS BLD VENIPUNCTURE: CPT

## 2022-08-19 RX ORDER — CEFDINIR 300 MG/1
300 CAPSULE ORAL 2 TIMES DAILY
Qty: 14 CAPSULE | Refills: 0 | Status: SHIPPED | OUTPATIENT
Start: 2022-08-19 | End: 2022-08-26

## 2022-08-19 NOTE — TELEPHONE ENCOUNTER
Called the [facility] and spoke with Aylsha.  She stated patient just left to go to the ED at [facility].  She stated they do not have an ECG at the facility but her HR was irregular and dropping low several times.

## 2022-08-19 NOTE — TELEPHONE ENCOUNTER
Ashely called from the surgery center in [place].  She just had her upper GI.  She states they advised her to call us regarding her low HR.  HR of 32.  Patient has a pacemaker,  Biotronik. Her last interrogation was April 2022.    Per verbal order Dr Sanchez patient is to hold her carvedilol and monitor heart rate and go to the ED for evaluation.    Advised patient of this and she agreed and voiced understanding.

## 2022-08-21 LAB — BACTERIA SPEC AEROBE CULT: ABNORMAL

## 2022-08-24 NOTE — ED PROVIDER NOTES
Subjective   Pt had an upper GI scope this am and they put her to sleep. While pt was under anesthesia the doctor saw her heart rate drop in the 30s so sent her to be evaluated.   Pt denies any chest pain or soa. Pt says she has not had any issues with pacemaker       History provided by:  Patient   used: No    Illness  Severity:  Moderate  Onset quality:  Sudden  Duration:  1 hour  Timing:  Intermittent  Chronicity:  New  Relieved by:  Nothing   Worsened by:  None  Associated symptoms: no chest pain, no congestion, no cough, no diarrhea, no ear pain, no fever, no headaches, no nausea, no rash, no shortness of breath, no sore throat, no vomiting and no wheezing        Review of Systems   Constitutional: Negative for chills and fever.   HENT: Negative for congestion, ear pain and sore throat.    Respiratory: Negative for cough, shortness of breath and wheezing.    Cardiovascular: Negative for chest pain.   Gastrointestinal: Negative for diarrhea, nausea and vomiting.   Genitourinary: Negative for dysuria and flank pain.   Skin: Negative for rash.   Neurological: Negative for headaches.   Psychiatric/Behavioral: The patient is not nervous/anxious.    All other systems reviewed and are negative.      Past Medical History:   Diagnosis Date   • Atrial fibrillation (HCC)    • CAD (coronary artery disease)    • Chronic a-fib (HCC)    • Chronic anticoagulation    • Congestive heart failure (HCC)     compensated   • Disease of thyroid gland    • Hyperlipidemia    • Hypertension    • Hypoxemia    • Ischemic cardiomyopathy with severe LV Disfunction    • Myocardial infarction (HCC)    • Pacemaker     VVI       Allergies   Allergen Reactions   • Codeine Rash   • Eggs Or Egg-Derived Products Itching and Rash   • Grass Itching and Rash   • Lisinopril Rash       Past Surgical History:   Procedure Laterality Date   • CARDIAC SURGERY  1997   • CARDIOVASCULAR STRESS TEST  06/2014   • CATARACT EXTRACTION, BILATERAL      • CHOLECYSTECTOMY     • COLONOSCOPY  2012   • ECHO - CONVERTED  2014   • PACEMAKER IMPLANTATION  2015       Family History   Problem Relation Age of Onset   • Coronary artery disease Other    • Hypertension Other    • Diabetes Other    • Heart attack Other    • Heart attack Mother 62        fatal MI   • Skin cancer Mother    • Diabetes type II Mother    • Melanoma Mother    • Heart attack Father 63        fatal MI   • Melanoma Sister 45   • Skin cancer Sister    • Heart attack Brother 62        fatal MI   • Heart attack Brother 80        Fatal MI   • Skin cancer Brother    • Heart disease Sister    • Heart disease Brother 75        pacemaker, CABG, Stents   • Osteoporosis Sister    • Pneumonia Brother    • Hypertension Brother    • Heart attack Sister 54        Fatal MI   • Heart attack Brother 59        Fatal MI   • Breast cancer Neg Hx        Social History     Socioeconomic History   • Marital status:    Tobacco Use   • Smoking status: Former Smoker     Quit date:      Years since quittin.6   • Smokeless tobacco: Never Used   Vaping Use   • Vaping Use: Never used   Substance and Sexual Activity   • Alcohol use: No   • Drug use: No   • Sexual activity: Defer           Objective   Physical Exam  Vitals and nursing note reviewed.   Constitutional:       Appearance: She is well-developed.   HENT:      Head: Normocephalic.   Cardiovascular:      Rate and Rhythm: Regular rhythm. Bradycardia present.   Pulmonary:      Effort: Pulmonary effort is normal.      Breath sounds: Normal breath sounds.   Abdominal:      General: Bowel sounds are normal.      Palpations: Abdomen is soft.      Tenderness: There is no abdominal tenderness.   Musculoskeletal:         General: Normal range of motion.      Cervical back: Neck supple.   Skin:     General: Skin is warm and dry.   Neurological:      Mental Status: She is alert and oriented to person, place, and time.   Psychiatric:         Behavior:  Behavior normal.         Thought Content: Thought content normal.         Judgment: Judgment normal.         Procedures           ED Course  ED Course as of 08/23/22 2202   Fri Aug 19, 2022   1237 EKG noted 60 bpm.  Ventricular paced rhythm.  QTc 468.  No acute ST elevation [SF]   1500 Spoke with the Pacemaker rep, he looked at her rhythms for today and states there was abnormalities. He states the pacemaker is working properly and pt can be let go home  [LC]      ED Course User Index  [LC] Nivia Ellis PA  [SF] Johnnie Fang,                                            MDM    Final diagnoses:   Acute cystitis without hematuria   Pacemaker       ED Disposition  ED Disposition     ED Disposition   Discharge    Condition   Stable    Comment   Handouts given to take home. Pt ambulated out of ED.             Donis See MD  [address] [facility] [place] [id number] [phone]    In 1 day           Medication List      New Prescriptions    cefdinir 300 MG capsule  Commonly known as: OMNICEF  Take 1 capsule by mouth 2 (Two) Times a Day for 7 days.           Where to Get Your Medications      These medications were sent to [id number] - [place] - [address] AT [facility] [address] - [phone]  - [phone] FX  [address] [place] [address]    Phone: [phone]   · cefdinir 300 MG capsule          Nivia Ellis PA  08/23/22 2202

## 2022-09-02 RX ORDER — APIXABAN 2.5 MG/1
TABLET, FILM COATED ORAL
Qty: 60 TABLET | Refills: 3 | Status: SHIPPED | OUTPATIENT
Start: 2022-09-02 | End: 2023-01-03

## 2022-09-19 RX ORDER — DICYCLOMINE HYDROCHLORIDE 10 MG/1
CAPSULE ORAL
Qty: 90 CAPSULE | Refills: 0 | Status: SHIPPED | OUTPATIENT
Start: 2022-09-19 | End: 2022-10-26

## 2022-09-22 ENCOUNTER — OFFICE VISIT (OUTPATIENT)
Dept: CARDIOLOGY | Facility: CLINIC | Age: 82
End: 2022-09-22

## 2022-09-22 VITALS
HEIGHT: 65 IN | WEIGHT: 162 LBS | HEART RATE: 62 BPM | OXYGEN SATURATION: 98 % | BODY MASS INDEX: 26.99 KG/M2 | DIASTOLIC BLOOD PRESSURE: 68 MMHG | SYSTOLIC BLOOD PRESSURE: 110 MMHG

## 2022-09-22 DIAGNOSIS — I10 PRIMARY HYPERTENSION: ICD-10-CM

## 2022-09-22 DIAGNOSIS — I25.10 CORONARY ARTERY DISEASE INVOLVING NATIVE CORONARY ARTERY OF NATIVE HEART WITHOUT ANGINA PECTORIS: ICD-10-CM

## 2022-09-22 DIAGNOSIS — I25.5 ISCHEMIC CARDIOMYOPATHY: Primary | ICD-10-CM

## 2022-09-22 DIAGNOSIS — Z95.0 PACEMAKER: ICD-10-CM

## 2022-09-22 DIAGNOSIS — E03.9 ACQUIRED HYPOTHYROIDISM: ICD-10-CM

## 2022-09-22 DIAGNOSIS — I48.20 CHRONIC A-FIB: ICD-10-CM

## 2022-09-22 DIAGNOSIS — E78.2 MIXED HYPERLIPIDEMIA: ICD-10-CM

## 2022-09-22 DIAGNOSIS — I50.22 CHRONIC SYSTOLIC CONGESTIVE HEART FAILURE: ICD-10-CM

## 2022-09-22 DIAGNOSIS — I27.20 PULMONARY HYPERTENSION: ICD-10-CM

## 2022-09-22 DIAGNOSIS — Z79.01 CHRONIC ANTICOAGULATION: ICD-10-CM

## 2022-09-22 PROCEDURE — 99214 OFFICE O/P EST MOD 30 MIN: CPT | Performed by: INTERNAL MEDICINE

## 2022-09-22 NOTE — PROGRESS NOTES
subjective     Chief Complaint   Patient presents with   • Hyperlipidemia     Follow up   • Coronary Artery Disease     Follow up   • Atrial Fibrillation     Follow up     History of Present Illness  Ashely is 81 years old white female who is here for cardiology follow-up.  Patient has ischemic cardiomyopathy and chronic systolic heart failure.  She is taking Entresto, Coreg, Lasix and Imdur.  She states that she is doing fairly well denies any chest pain palpitations or shortness of breath.  No orthopnea PND or ankle edema.    She also has chronic atrial fibrillation, sick sinus syndrome and dual-chamber pacemaker which has been functioning normally.  She is anticoagulated with Eliquis and heart rate is controlled with Coreg.    Hypertension is controlled with Entresto and Coreg.  She is taking her medications regularly and is doing well.  Hypothyroidism on Synthroid.    Hyperlipidemia on Livalo 1 mg daily.  She recently had echocardiogram done in the hospital which showed LV ejection fraction improved around 50%    Past Surgical History:   Procedure Laterality Date   • CARDIAC SURGERY  1997   • CARDIOVASCULAR STRESS TEST  06/2014   • CATARACT EXTRACTION, BILATERAL     • CHOLECYSTECTOMY  2002   • COLONOSCOPY  05/2012   • ECHO - CONVERTED  07/2014   • PACEMAKER IMPLANTATION  12/17/2015     Family History   Problem Relation Age of Onset   • Coronary artery disease Other    • Hypertension Other    • Diabetes Other    • Heart attack Other    • Heart attack Mother 62        fatal MI   • Skin cancer Mother    • Diabetes type II Mother    • Melanoma Mother    • Heart attack Father 63        fatal MI   • Melanoma Sister 45   • Skin cancer Sister    • Heart attack Brother 62        fatal MI   • Heart attack Brother 80        Fatal MI   • Skin cancer Brother    • Heart disease Sister    • Heart disease Brother 75        pacemaker, CABG, Stents   • Osteoporosis Sister    • Pneumonia Brother    • Hypertension Brother    •  Heart attack Sister 54        Fatal MI   • Heart attack Brother 59        Fatal MI   • Breast cancer Neg Hx      Past Medical History:   Diagnosis Date   • Atrial fibrillation (HCC)    • CAD (coronary artery disease)    • Chronic a-fib ([facility])    • Chronic anticoagulation    • Congestive heart failure (HCC)     compensated   • Disease of thyroid gland    • Hyperlipidemia    • Hypertension    • Hypoxemia    • Ischemic cardiomyopathy with severe LV Disfunction    • Myocardial infarction (HCC)    • Pacemaker     VVI     Patient Active Problem List   Diagnosis   • CAD (coronary artery disease) status post CABG  single-vessel*   • Ischemic cardiomyopathy with apical hypokinesis LV ejection fraction 50%.   • Chronic systolic congestive heart failure*   • Hyperlipidemia*   • Chronic a-fib*   • Pacemaker dual-chamber pacemaker [employer] 2015*   • Hypothyroidism*   • Hypertension*   • Chronic anticoagulation*   • Gastroesophageal reflux disease without esophagitis*   • URI (upper respiratory infection)   • Herpes zoster without complication   • Acute midline low back pain without sciatica   • CHF (congestive heart failure) ([facility])   • Centrilobular emphysema ([facility])   • Asthmatic bronchitis , chronic ([facility])   • Moderate pulmonary hypertension (CMS/HCC), 46 mmHg   • Seasonal allergic rhinitis   • Post zoster neuralgia   • Memory loss   • Diarrhea       Social History     Tobacco Use   • Smoking status: Former Smoker     Quit date:      Years since quittin.7   • Smokeless tobacco: Never Used   Vaping Use   • Vaping Use: Never used   Substance Use Topics   • Alcohol use: No   • Drug use: No       Allergies   Allergen Reactions   • Codeine Rash   • Eggs Or Egg-Derived Products Itching and Rash   • Grass Itching and Rash   • Lisinopril Rash       Current Outpatient Medications on File Prior to Visit   Medication Sig   • albuterol sulfate HFA (Proventil HFA) 108 (90 Base) MCG/ACT inhaler Inhale 2 puffs Every 4  (Four) Hours As Needed for Wheezing or Shortness of Air.   • carvedilol (COREG) 12.5 MG tablet Take 1 tablet by mouth 2 (Two) Times a Day With Meals.   • carvedilol (COREG) 12.5 MG tablet Take 1 tablet by mouth 2 (Two) Times a Day.   • dicyclomine (BENTYL) 10 MG capsule TAKE 1 CAPSULE BY MOUTH THREE TIMES DAILY   • donepezil (ARICEPT) 5 MG tablet TAKE 1 TABLET BY MOUTH EVERY NIGHT   • Eliquis 2.5 MG tablet tablet TAKE 1 TABLET BY MOUTH EVERY 12 HOURS   • furosemide (LASIX) 20 MG tablet TAKE 1 TABLET BY MOUTH EVERY DAY   • isosorbide mononitrate (IMDUR) 60 MG 24 hr tablet TAKE 1 TABLET BY MOUTH DAILY   • levothyroxine (SYNTHROID, LEVOTHROID) 50 MCG tablet TAKE 1 TABLET BY MOUTH DAILY   • Livalo 1 MG tablet tablet TAKE 1 TABLET BY MOUTH EVERY NIGHT   • montelukast (SINGULAIR) 10 MG tablet Take 1 tablet by mouth Every Night.   • nitroglycerin (NITROSTAT) 0.4 MG SL tablet DISSOLVE ONE TABLET UNDER TONGUE AS NEEDED FOR CHEST PAIN. MAY REPEAT DOSE EVERY 5 MINUTES UP TO 3 DOSES. IF NO RELIEF DIAL 911   • pantoprazole (PROTONIX) 40 MG EC tablet TAKE 1 TABLET BY MOUTH DAILY   • sacubitril-valsartan (Entresto) 24-26 MG tablet Take 1 tablet by mouth 2 (Two) Times a Day.   • tiotropium bromide monohydrate (SPIRIVA RESPIMAT) 2.5 MCG/ACT aerosol solution inhaler Inhale 2 puffs Daily.   • budesonide-formoterol (Symbicort) 80-4.5 MCG/ACT inhaler Inhale 2 puffs 2 (Two) Times a Day for 90 days.   • ipratropium (ATROVENT HFA) 17 MCG/ACT inhaler Inhale 2 puffs Every 4 (Four) Hours As Needed for Wheezing (or shortness of air) for up to 90 days.     No current facility-administered medications on file prior to visit.         The following portions of the patient's history were reviewed and updated as appropriate: allergies, current medications, past family history, past medical history, past social history, past surgical history and problem list.    Review of Systems   Constitutional: Negative.   HENT: Negative.  Negative for congestion.  "   Eyes: Negative.    Cardiovascular: Negative.  Negative for chest pain, cyanosis, dyspnea on exertion, irregular heartbeat, leg swelling, near-syncope, orthopnea, palpitations, paroxysmal nocturnal dyspnea and syncope.   Respiratory: Negative.  Negative for shortness of breath.    Hematologic/Lymphatic: Negative.    Musculoskeletal: Negative.    Gastrointestinal: Negative.    Neurological: Negative.  Negative for headaches.          Objective:     /68 (BP Location: Left arm, Patient Position: Sitting, Cuff Size: Adult)   Pulse 62   Ht 165.1 cm (65\")   Wt 73.5 kg (162 lb)   SpO2 98%   BMI 26.96 kg/m²   Pulmonary:      Breath sounds: Normal breath sounds. No stridor. No wheezing. No rhonchi. No rales.   Cardiovascular:      PMI at left midclavicular line. Normal rate. Regular rhythm. Normal S1. Normal S2.      Murmurs: There is no murmur.      No gallop. No click. No rub.   Pulses:     Intact distal pulses.   Edema:     Peripheral edema absent.           Lab Review  Lab Results   Component Value Date     08/19/2022    K 4.2 08/19/2022     08/19/2022    BUN 15 08/19/2022    CREATININE 0.95 08/19/2022    GLUCOSE 107 (H) 08/19/2022    CALCIUM 9.5 08/19/2022    ALT 9 08/19/2022    ALKPHOS 61 08/19/2022    LABIL2 1.3 (L) 04/05/2016     Lab Results   Component Value Date    CKTOTAL 79 07/12/2022     Lab Results   Component Value Date    WBC 5.73 08/19/2022    HGB 11.5 (L) 08/19/2022    HCT 35.4 08/19/2022     08/19/2022     Lab Results   Component Value Date    INR 0.91 12/17/2015    INR 1.00 02/20/2014     Lab Results   Component Value Date    MG 2.2 02/25/2019     Lab Results   Component Value Date    TSH 2.390 07/12/2022     Lab Results   Component Value Date    .0 (H) 02/28/2019     Lab Results   Component Value Date    CHLPL 219 (H) 04/05/2016    CHOL 123 07/12/2022    TRIG 133 07/12/2022    HDL 52 07/12/2022    VLDL 23 07/12/2022    LDLHDL 0.85 07/12/2022     Lab Results "   Component Value Date    LDL 48 07/12/2022    LDL 55 02/18/2022       Procedures  Interpretation Summary    · Normal left ventricular cavity size noted.  · Left ventricular wall thickness is consistent with borderline concentric hypertrophy.  · Estimated left ventricular EF = 50%. Left ventricular systolic function is low normal.  · Left ventricular diastolic function was normal.  · The aortic valve is structurally normal with no regurgitation or stenosis present.  · Moderate mitral valve regurgitation is present with moderately dilated left atrium.  · Moderate tricuspid valve regurgitation is present.  · The right atrial and right ventricular cavity is mild to moderately dilated.  · Moderate to severe pulmonary hypertension is present. Peak RV systolic pressure is 60 mmHg.  · Pacemaker lead is noted right atrium and right ventricle.  · No pericardial effusion detected.         I personally viewed and interpreted the patient's LAB data         Assessment:     1. Ischemic cardiomyopathy with apical hypokinesis LV ejection fraction 31-35%    2. Pacemaker dual-chamber pacemaker [employer] December 2015*    3. Primary hypertension    4. Mixed hyperlipidemia    5. Chronic systolic congestive heart failure*    6. Chronic a-fib*    7. Coronary artery disease involving native coronary artery of native heart without angina pectoris    8. Acquired hypothyroidism    9. Chronic anticoagulation*    10. Moderate pulmonary hypertension (CMS/HCC), 46 mmHg          Plan:     Patient is 81 years old white female with ischemic cardiomyopathy.  She is doing very well she was advised to continue Entresto, Coreg and Lasix.  Blood pressure is also very well controlled with the his medications.  Clinically there is no evidence of heart failure.    Hyperlipidemia, she is tolerating Livalo very well which was continued.  Chronic atrial fibrillation Eliquis and Coreg continued.    Hypothyroidism current dose of Synthroid continued.    Patient  has COPD and moderate pulmonary hypertension she seems be doing very well.    Coronary artery disease status post CABG single-vessel in 1997 currently asymptomatic.    Healthy lifestyle emphasized.  She will continue current medications follow-up scheduled   Imdur was also continued.        No follow-ups on file.

## 2022-10-14 RX ORDER — SACUBITRIL AND VALSARTAN 24; 26 MG/1; MG/1
TABLET, FILM COATED ORAL
Qty: 180 TABLET | Refills: 0 | Status: SHIPPED | OUTPATIENT
Start: 2022-10-14 | End: 2023-01-23

## 2022-10-18 ENCOUNTER — CLINICAL SUPPORT (OUTPATIENT)
Dept: CARDIOLOGY | Facility: CLINIC | Age: 82
End: 2022-10-18

## 2022-10-18 DIAGNOSIS — Z95.0 PACEMAKER: ICD-10-CM

## 2022-10-18 PROCEDURE — 93288 INTERROG EVL PM/LDLS PM IP: CPT | Performed by: INTERNAL MEDICINE

## 2022-10-19 DIAGNOSIS — J30.2 SEASONAL ALLERGIC RHINITIS, UNSPECIFIED TRIGGER: ICD-10-CM

## 2022-10-19 RX ORDER — MONTELUKAST SODIUM 10 MG/1
10 TABLET ORAL NIGHTLY
Qty: 30 TABLET | Refills: 6 | Status: SHIPPED | OUTPATIENT
Start: 2022-10-19

## 2022-10-26 RX ORDER — DICYCLOMINE HYDROCHLORIDE 10 MG/1
CAPSULE ORAL
Qty: 90 CAPSULE | Refills: 0 | Status: SHIPPED | OUTPATIENT
Start: 2022-10-26 | End: 2022-10-27

## 2022-10-27 ENCOUNTER — OFFICE VISIT (OUTPATIENT)
Dept: PULMONOLOGY | Facility: CLINIC | Age: 82
End: 2022-10-27

## 2022-10-27 VITALS
TEMPERATURE: 98.4 F | HEIGHT: 66 IN | WEIGHT: 161 LBS | BODY MASS INDEX: 25.88 KG/M2 | DIASTOLIC BLOOD PRESSURE: 68 MMHG | OXYGEN SATURATION: 98 % | HEART RATE: 51 BPM | SYSTOLIC BLOOD PRESSURE: 126 MMHG

## 2022-10-27 DIAGNOSIS — I27.20 PULMONARY HYPERTENSION: ICD-10-CM

## 2022-10-27 DIAGNOSIS — J44.9 ASTHMATIC BRONCHITIS , CHRONIC: ICD-10-CM

## 2022-10-27 DIAGNOSIS — J30.2 SEASONAL ALLERGIC RHINITIS, UNSPECIFIED TRIGGER: ICD-10-CM

## 2022-10-27 DIAGNOSIS — J43.2 CENTRILOBULAR EMPHYSEMA: Primary | ICD-10-CM

## 2022-10-27 PROCEDURE — 99214 OFFICE O/P EST MOD 30 MIN: CPT | Performed by: PHYSICIAN ASSISTANT

## 2022-10-27 RX ORDER — BUDESONIDE AND FORMOTEROL FUMARATE DIHYDRATE 80; 4.5 UG/1; UG/1
2 AEROSOL RESPIRATORY (INHALATION)
COMMUNITY

## 2022-12-12 RX ORDER — DICYCLOMINE HYDROCHLORIDE 10 MG/1
CAPSULE ORAL
Qty: 90 CAPSULE | Refills: 0 | Status: SHIPPED | OUTPATIENT
Start: 2022-12-12 | End: 2023-02-07

## 2022-12-14 ENCOUNTER — TELEPHONE (OUTPATIENT)
Dept: CARDIOLOGY | Facility: CLINIC | Age: 82
End: 2022-12-14

## 2022-12-14 NOTE — TELEPHONE ENCOUNTER
I called Ashely Feliciano to let her know that her monitor is not connecting to her device. She was unable to reset the monitor and stated that it is showing yellow.  I told her if she brings the monitor in for her next appointment, we can help her rest it.

## 2022-12-19 RX ORDER — PITAVASTATIN CALCIUM 1.04 MG/1
TABLET, FILM COATED ORAL
Qty: 90 TABLET | Refills: 0 | Status: SHIPPED | OUTPATIENT
Start: 2022-12-19 | End: 2023-03-14

## 2023-01-03 RX ORDER — APIXABAN 2.5 MG/1
TABLET, FILM COATED ORAL
Qty: 60 TABLET | Refills: 3 | Status: SHIPPED | OUTPATIENT
Start: 2023-01-03

## 2023-01-09 ENCOUNTER — LAB (OUTPATIENT)
Dept: LAB | Facility: HOSPITAL | Age: 83
End: 2023-01-09
Payer: MEDICARE

## 2023-01-09 DIAGNOSIS — E03.9 ACQUIRED HYPOTHYROIDISM: ICD-10-CM

## 2023-01-09 DIAGNOSIS — E78.2 MIXED HYPERLIPIDEMIA: ICD-10-CM

## 2023-01-09 PROCEDURE — 80061 LIPID PANEL: CPT

## 2023-01-09 PROCEDURE — 85025 COMPLETE CBC W/AUTO DIFF WBC: CPT

## 2023-01-09 PROCEDURE — 36415 COLL VENOUS BLD VENIPUNCTURE: CPT

## 2023-01-09 PROCEDURE — 84443 ASSAY THYROID STIM HORMONE: CPT

## 2023-01-09 PROCEDURE — 80053 COMPREHEN METABOLIC PANEL: CPT

## 2023-01-09 PROCEDURE — 82550 ASSAY OF CK (CPK): CPT

## 2023-01-09 PROCEDURE — 84439 ASSAY OF FREE THYROXINE: CPT

## 2023-01-09 RX ORDER — ISOSORBIDE MONONITRATE 60 MG/1
60 TABLET, EXTENDED RELEASE ORAL DAILY
Qty: 90 TABLET | Refills: 1 | Status: SHIPPED | OUTPATIENT
Start: 2023-01-09

## 2023-01-09 RX ORDER — LEVOTHYROXINE SODIUM 0.05 MG/1
50 TABLET ORAL DAILY
Qty: 90 TABLET | Refills: 1 | Status: SHIPPED | OUTPATIENT
Start: 2023-01-09

## 2023-01-09 RX ORDER — FUROSEMIDE 20 MG/1
TABLET ORAL
Qty: 90 TABLET | Refills: 1 | Status: SHIPPED | OUTPATIENT
Start: 2023-01-09

## 2023-01-10 LAB
ALBUMIN SERPL-MCNC: 4.2 G/DL (ref 3.5–5.2)
ALBUMIN/GLOB SERPL: 1.4 G/DL
ALP SERPL-CCNC: 78 U/L (ref 39–117)
ALT SERPL W P-5'-P-CCNC: 14 U/L (ref 1–33)
ANION GAP SERPL CALCULATED.3IONS-SCNC: 10.6 MMOL/L (ref 5–15)
AST SERPL-CCNC: 21 U/L (ref 1–32)
BASOPHILS # BLD AUTO: 0.11 10*3/MM3 (ref 0–0.2)
BASOPHILS NFR BLD AUTO: 1.6 % (ref 0–1.5)
BILIRUB SERPL-MCNC: 0.6 MG/DL (ref 0–1.2)
BUN SERPL-MCNC: 20 MG/DL (ref 8–23)
BUN/CREAT SERPL: 15.6 (ref 7–25)
CALCIUM SPEC-SCNC: 9.3 MG/DL (ref 8.6–10.5)
CHLORIDE SERPL-SCNC: 104 MMOL/L (ref 98–107)
CHOLEST SERPL-MCNC: 142 MG/DL (ref 0–200)
CK SERPL-CCNC: 56 U/L (ref 20–180)
CO2 SERPL-SCNC: 25.4 MMOL/L (ref 22–29)
CREAT SERPL-MCNC: 1.28 MG/DL (ref 0.57–1)
DEPRECATED RDW RBC AUTO: 45.8 FL (ref 37–54)
EGFRCR SERPLBLD CKD-EPI 2021: 42.4 ML/MIN/1.73
EOSINOPHIL # BLD AUTO: 0.22 10*3/MM3 (ref 0–0.4)
EOSINOPHIL NFR BLD AUTO: 3.2 % (ref 0.3–6.2)
ERYTHROCYTE [DISTWIDTH] IN BLOOD BY AUTOMATED COUNT: 13.5 % (ref 12.3–15.4)
GLOBULIN UR ELPH-MCNC: 3.1 GM/DL
GLUCOSE SERPL-MCNC: 88 MG/DL (ref 65–99)
HCT VFR BLD AUTO: 37.5 % (ref 34–46.6)
HDLC SERPL-MCNC: 62 MG/DL (ref 40–60)
HGB BLD-MCNC: 12.1 G/DL (ref 12–15.9)
IMM GRANULOCYTES # BLD AUTO: 0.02 10*3/MM3 (ref 0–0.05)
IMM GRANULOCYTES NFR BLD AUTO: 0.3 % (ref 0–0.5)
LDLC SERPL CALC-MCNC: 58 MG/DL (ref 0–100)
LDLC/HDLC SERPL: 0.88 {RATIO}
LYMPHOCYTES # BLD AUTO: 1 10*3/MM3 (ref 0.7–3.1)
LYMPHOCYTES NFR BLD AUTO: 14.7 % (ref 19.6–45.3)
MCH RBC QN AUTO: 30 PG (ref 26.6–33)
MCHC RBC AUTO-ENTMCNC: 32.3 G/DL (ref 31.5–35.7)
MCV RBC AUTO: 92.8 FL (ref 79–97)
MONOCYTES # BLD AUTO: 0.44 10*3/MM3 (ref 0.1–0.9)
MONOCYTES NFR BLD AUTO: 6.5 % (ref 5–12)
NEUTROPHILS NFR BLD AUTO: 5 10*3/MM3 (ref 1.7–7)
NEUTROPHILS NFR BLD AUTO: 73.7 % (ref 42.7–76)
NRBC BLD AUTO-RTO: 0 /100 WBC (ref 0–0.2)
PLATELET # BLD AUTO: 167 10*3/MM3 (ref 140–450)
PMV BLD AUTO: 12.7 FL (ref 6–12)
POTASSIUM SERPL-SCNC: 4.3 MMOL/L (ref 3.5–5.2)
PROT SERPL-MCNC: 7.3 G/DL (ref 6–8.5)
RBC # BLD AUTO: 4.04 10*6/MM3 (ref 3.77–5.28)
SODIUM SERPL-SCNC: 140 MMOL/L (ref 136–145)
T4 FREE SERPL-MCNC: 1.45 NG/DL (ref 0.93–1.7)
TRIGL SERPL-MCNC: 127 MG/DL (ref 0–150)
TSH SERPL DL<=0.05 MIU/L-ACNC: 3.15 UIU/ML (ref 0.27–4.2)
VLDLC SERPL-MCNC: 22 MG/DL (ref 5–40)
WBC NRBC COR # BLD: 6.79 10*3/MM3 (ref 3.4–10.8)

## 2023-01-11 ENCOUNTER — OFFICE VISIT (OUTPATIENT)
Dept: CARDIOLOGY | Facility: CLINIC | Age: 83
End: 2023-01-11
Payer: MEDICARE

## 2023-01-11 DIAGNOSIS — Z95.0 PACEMAKER: ICD-10-CM

## 2023-01-11 DIAGNOSIS — E78.2 MIXED HYPERLIPIDEMIA: ICD-10-CM

## 2023-01-11 DIAGNOSIS — I10 PRIMARY HYPERTENSION: ICD-10-CM

## 2023-01-11 DIAGNOSIS — I25.5 ISCHEMIC CARDIOMYOPATHY: ICD-10-CM

## 2023-01-11 DIAGNOSIS — I48.20 CHRONIC A-FIB: ICD-10-CM

## 2023-01-11 DIAGNOSIS — I25.10 CORONARY ARTERY DISEASE INVOLVING NATIVE CORONARY ARTERY OF NATIVE HEART WITHOUT ANGINA PECTORIS: Primary | ICD-10-CM

## 2023-01-11 PROCEDURE — 99442 PR PHYS/QHP TELEPHONE EVALUATION 11-20 MIN: CPT | Performed by: INTERNAL MEDICINE

## 2023-01-11 RX ORDER — ESOMEPRAZOLE MAGNESIUM 40 MG/1
40 CAPSULE, DELAYED RELEASE ORAL DAILY
COMMUNITY
Start: 2022-11-18

## 2023-01-11 NOTE — PROGRESS NOTES
subjective       You have chosen to receive care through a telephone visit. Do you consent to use a telephone visit for your medical care today? Yes          Chief Complaint   Patient presents with   • Cardiomyopathy     Follow up    • Results     labs     History of Present Illness  Sabiha is 82 years old white female who is being evaluated via telephone because she has upper respiratory tract symptoms.  She has not had any testing done for COVID but she denies any fever.    She states that she is doing very well from cardiac standpoint  She had lab work done recently which will be reviewed.  Essential hypertension she is taking Coreg, Entresto and Lasix 20 mg daily.  Blood pressure according to her is very nicely controlled.    Chronic atrial fibrillation  Rate is controlled with Coreg.  She is taking Eliquis 2.5 twice daily with no abnormal bleeding or bruising.    Patient also has ischemic cardiomyopathy with apical hypokinesis LV ejection fraction around 50% has had CABG in the past patient denies any chest pain or shortness of breath.  She is taking Livalo for hyperlipidemia along with Imdur and Eliquis.    Hypothyroidism on Synthroid replacement therapy.    Past Surgical History:   Procedure Laterality Date   • CARDIAC SURGERY  1997   • CARDIOVASCULAR STRESS TEST  06/2014   • CATARACT EXTRACTION, BILATERAL     • CHOLECYSTECTOMY  2002   • COLONOSCOPY  05/2012   • ECHO - CONVERTED  07/2014   • PACEMAKER IMPLANTATION  12/17/2015     Family History   Problem Relation Age of Onset   • Coronary artery disease Other    • Hypertension Other    • Diabetes Other    • Heart attack Other    • Heart attack Mother 62        fatal MI   • Skin cancer Mother    • Diabetes type II Mother    • Melanoma Mother    • Heart attack Father 63        fatal MI   • Melanoma Sister 45   • Skin cancer Sister    • Heart attack Brother 62        fatal MI   • Heart attack Brother 80        Fatal MI   • Skin cancer Brother    • Heart  disease Sister    • Heart disease Brother 75        pacemaker, CABG, Stents   • Osteoporosis Sister    • Pneumonia Brother    • Hypertension Brother    • Heart attack Sister 54        Fatal MI   • Heart attack Brother 59        Fatal MI   • Breast cancer Neg Hx      Past Medical History:   Diagnosis Date   • Atrial fibrillation (HCC)    • CAD (coronary artery disease)    • Chronic a-fib ([facility])    • Chronic anticoagulation    • Congestive heart failure (HCC)     compensated   • Disease of thyroid gland    • Hyperlipidemia    • Hypertension    • Hypoxemia    • Ischemic cardiomyopathy with severe LV Disfunction    • Myocardial infarction (HCC)    • Pacemaker     VVI     Patient Active Problem List   Diagnosis   • CAD (coronary artery disease) status post CABG  single-vessel*   • Ischemic cardiomyopathy with apical hypokinesis LV ejection fraction 50%.   • Chronic systolic congestive heart failure*   • Hyperlipidemia*   • Chronic a-fib*   • Pacemaker dual-chamber pacemaker Biotronik 2015*   • Hypothyroidism*   • Hypertension*   • Chronic anticoagulation*   • Gastroesophageal reflux disease without esophagitis*   • URI (upper respiratory infection)   • Herpes zoster without complication   • Acute midline low back pain without sciatica   • CHF (congestive heart failure) ([facility])   • Centrilobular emphysema ([facility])   • Asthmatic bronchitis , chronic ([facility])   • Moderate pulmonary hypertension (CMS/HCC), 46 mmHg   • Seasonal allergic rhinitis   • Post zoster neuralgia   • Memory loss   • Diarrhea       Social History     Tobacco Use   • Smoking status: Former     Packs/day: 0.25     Years: 17.00     Pack years: 4.25     Types: Cigarettes     Quit date:      Years since quittin.0   • Smokeless tobacco: Never   Vaping Use   • Vaping Use: Never used   Substance Use Topics   • Alcohol use: No   • Drug use: No       Allergies   Allergen Reactions   • Codeine Rash   • Grass Itching and Rash   • Lisinopril Rash        Current Outpatient Medications on File Prior to Visit   Medication Sig   • albuterol sulfate HFA (Proventil HFA) 108 (90 Base) MCG/ACT inhaler Inhale 2 puffs Every 4 (Four) Hours As Needed for Wheezing or Shortness of Air.   • budesonide-formoterol (SYMBICORT) 80-4.5 MCG/ACT inhaler Inhale 2 puffs 2 (Two) Times a Day.   • carvedilol (COREG) 12.5 MG tablet Take 1 tablet by mouth 2 (Two) Times a Day With Meals.   • dicyclomine (BENTYL) 10 MG capsule TAKE 1 CAPSULE BY MOUTH THREE TIMES DAILY   • donepezil (ARICEPT) 5 MG tablet TAKE 1 TABLET BY MOUTH EVERY NIGHT   • Eliquis 2.5 MG tablet tablet TAKE 1 TABLET BY MOUTH EVERY 12 HOURS   • Entresto 24-26 MG tablet TAKE 1 TABLET BY MOUTH TWICE DAILY   • esomeprazole (nexIUM) 40 MG capsule Take 40 mg by mouth Daily.   • furosemide (LASIX) 20 MG tablet TAKE 1 TABLET BY MOUTH EVERY DAY   • isosorbide mononitrate (IMDUR) 60 MG 24 hr tablet TAKE 1 TABLET BY MOUTH DAILY   • levothyroxine (SYNTHROID, LEVOTHROID) 50 MCG tablet TAKE 1 TABLET BY MOUTH DAILY   • Livalo 1 MG tablet tablet TAKE 1 TABLET BY MOUTH EVERY NIGHT   • montelukast (SINGULAIR) 10 MG tablet TAKE 1 TABLET BY MOUTH EVERY NIGHT   • nitroglycerin (NITROSTAT) 0.4 MG SL tablet DISSOLVE ONE TABLET UNDER TONGUE AS NEEDED FOR CHEST PAIN. MAY REPEAT DOSE EVERY 5 MINUTES UP TO 3 DOSES. IF NO RELIEF DIAL 911   • tiotropium bromide monohydrate (SPIRIVA RESPIMAT) 2.5 MCG/ACT aerosol solution inhaler Inhale 2 puffs Daily.   • [DISCONTINUED] pantoprazole (PROTONIX) 40 MG EC tablet TAKE 1 TABLET BY MOUTH DAILY   • ipratropium (ATROVENT HFA) 17 MCG/ACT inhaler Inhale 2 puffs Every 4 (Four) Hours As Needed for Wheezing (or shortness of air) for up to 90 days.     No current facility-administered medications on file prior to visit.         The following portions of the patient's history were reviewed and updated as appropriate: allergies, current medications, past family history, past medical history, past social history, past  surgical history and problem list.    Review of Systems   Constitutional: Negative.   HENT: Negative.  Negative for congestion.    Eyes: Negative.    Cardiovascular: Negative.  Negative for chest pain, cyanosis, dyspnea on exertion, irregular heartbeat, leg swelling, near-syncope, orthopnea, palpitations, paroxysmal nocturnal dyspnea and syncope.   Respiratory: Negative.  Negative for shortness of breath.    Hematologic/Lymphatic: Negative.    Musculoskeletal: Negative.    Gastrointestinal: Negative.    Neurological: Negative.  Negative for headaches.          Objective:     There were no vitals taken for this visit.  Pulmonary:      Effort: Pulmonary effort is normal.      Breath sounds: Normal breath sounds. No stridor. No wheezing. No rhonchi. No rales.   Cardiovascular:      PMI at left midclavicular line. Normal rate. Regular rhythm. Normal S1. Normal S2.      Murmurs: There is no murmur.      No gallop. No click. No rub.   Pulses:     Intact distal pulses.   Edema:     Peripheral edema absent.           Lab Review  Lab Results   Component Value Date     01/09/2023    K 4.3 01/09/2023     01/09/2023    BUN 20 01/09/2023    CREATININE 1.28 (H) 01/09/2023    GLUCOSE 88 01/09/2023    CALCIUM 9.3 01/09/2023    ALT 14 01/09/2023    ALKPHOS 78 01/09/2023    LABIL2 1.3 (L) 04/05/2016     Lab Results   Component Value Date    CKTOTAL 56 01/09/2023     Lab Results   Component Value Date    WBC 6.79 01/09/2023    HGB 12.1 01/09/2023    HCT 37.5 01/09/2023     01/09/2023     Lab Results   Component Value Date    INR 0.91 12/17/2015    INR 1.00 02/20/2014     Lab Results   Component Value Date    MG 2.2 02/25/2019     Lab Results   Component Value Date    TSH 3.150 01/09/2023     Lab Results   Component Value Date    .0 (H) 02/28/2019     Lab Results   Component Value Date    CHLPL 219 (H) 04/05/2016    CHOL 142 01/09/2023    TRIG 127 01/09/2023    HDL 62 (H) 01/09/2023    VLDL 22 01/09/2023     LDLHDL 0.88 01/09/2023     Lab Results   Component Value Date    LDL 58 01/09/2023    LDL 48 07/12/2022     Lab Results   Component Value Date    PROBNP 1,442.0 07/12/2022       Procedures       I personally viewed and interpreted the patient's LAB data         Assessment:     1. Coronary artery disease involving native coronary artery of native heart without angina pectoris    2. Pacemaker dual-chamber pacemaker Biotronik December 2015*    3. Ischemic cardiomyopathy with apical hypokinesis LV ejection fraction 50%.    4. Primary hypertension    5. Mixed hyperlipidemia    6. Chronic a-fib*          Plan:     Sabiha is 82 years old white female who is being evaluated via telephone visit.  She has known coronary artery disease status post CABG and compensated heart failure.  She is doing very well and is asymptomatic.  She had lab work done recently which was reviewed.  CBC CMP and lipid panel is normal except for creatinine being mildly elevated.  She was advised to decrease Lasix 20 mg and take it only on as needed basis.  Lipid control is excellent she will continue Livalo.  Hypothyroidism Synthroid was continued.  Chronic atrial fibrillation ,anticoagulation with low-dose Eliquis no drug side effects Eliquis continued no abnormal bleeding or bruising.    Compensated heart failure Entresto was continued.  Follow-up scheduled  This visit has been rescheduled as a phone visit to comply with patient safety concerns in accordance with CDC recommendations. Total time of discussion was 15 minutes.          No follow-ups on file.

## 2023-01-23 RX ORDER — SACUBITRIL AND VALSARTAN 24; 26 MG/1; MG/1
TABLET, FILM COATED ORAL
Qty: 180 TABLET | Refills: 0 | Status: SHIPPED | OUTPATIENT
Start: 2023-01-23

## 2023-01-31 ENCOUNTER — TELEPHONE (OUTPATIENT)
Dept: CARDIOLOGY | Facility: CLINIC | Age: 83
End: 2023-01-31
Payer: MEDICARE

## 2023-01-31 NOTE — TELEPHONE ENCOUNTER
Ashely Feliciano has intermittent Loss of RV capture on her most recent remote transmission that came through.  She was checked most recently in October and her Rv Threshold measured at 2.4mv, and her RV output was set at 2.4mv.  She is pacing about 86% of the time, and does have intrinsic beats that are conducting through. I have contacted the rep and written up the full report in [place] as she is currently followed remotley by Dr See.  The next [facility] [employer] [facility]  I have spoken with Camryn and she will get in contact with Ya to set up a time for the patient to be interrogated at [facility] office and let Ya know to inform the patient.

## 2023-01-31 NOTE — TELEPHONE ENCOUNTER
Called patient to let her know to be at our office on feb 21st at 11:45 for a pacemaker interrogation .

## 2023-02-07 RX ORDER — DICYCLOMINE HYDROCHLORIDE 10 MG/1
CAPSULE ORAL
Qty: 90 CAPSULE | Refills: 0 | Status: SHIPPED | OUTPATIENT
Start: 2023-02-07 | End: 2023-03-14

## 2023-02-13 RX ORDER — DONEPEZIL HYDROCHLORIDE 5 MG/1
5 TABLET, FILM COATED ORAL NIGHTLY
Qty: 90 TABLET | Refills: 1 | Status: SHIPPED | OUTPATIENT
Start: 2023-02-13

## 2023-02-21 ENCOUNTER — CLINICAL SUPPORT NO REQUIREMENTS (OUTPATIENT)
Dept: CARDIOLOGY | Facility: CLINIC | Age: 83
End: 2023-02-21
Payer: MEDICARE

## 2023-02-21 DIAGNOSIS — Z95.0 PACEMAKER: ICD-10-CM

## 2023-02-21 PROCEDURE — 93288 INTERROG EVL PM/LDLS PM IP: CPT | Performed by: INTERNAL MEDICINE

## 2023-03-14 RX ORDER — PITAVASTATIN CALCIUM 1.04 MG/1
TABLET, FILM COATED ORAL
Qty: 90 TABLET | Refills: 0 | Status: SHIPPED | OUTPATIENT
Start: 2023-03-14

## 2023-03-14 RX ORDER — DICYCLOMINE HYDROCHLORIDE 10 MG/1
CAPSULE ORAL
Qty: 90 CAPSULE | Refills: 0 | Status: SHIPPED | OUTPATIENT
Start: 2023-03-14

## 2023-04-12 ENCOUNTER — OFFICE VISIT (OUTPATIENT)
Dept: CARDIOLOGY | Facility: CLINIC | Age: 83
End: 2023-04-12
Payer: MEDICARE

## 2023-04-12 VITALS
DIASTOLIC BLOOD PRESSURE: 82 MMHG | SYSTOLIC BLOOD PRESSURE: 154 MMHG | WEIGHT: 155.2 LBS | HEART RATE: 80 BPM | OXYGEN SATURATION: 99 % | BODY MASS INDEX: 24.94 KG/M2 | HEIGHT: 66 IN

## 2023-04-12 DIAGNOSIS — E78.2 MIXED HYPERLIPIDEMIA: ICD-10-CM

## 2023-04-12 DIAGNOSIS — I50.22 CHRONIC HFREF (HEART FAILURE WITH REDUCED EJECTION FRACTION): ICD-10-CM

## 2023-04-12 DIAGNOSIS — I10 PRIMARY HYPERTENSION: ICD-10-CM

## 2023-04-12 DIAGNOSIS — I25.10 CORONARY ARTERY DISEASE INVOLVING NATIVE CORONARY ARTERY OF NATIVE HEART WITHOUT ANGINA PECTORIS: Primary | ICD-10-CM

## 2023-04-12 DIAGNOSIS — E03.9 ACQUIRED HYPOTHYROIDISM: ICD-10-CM

## 2023-04-12 PROCEDURE — 3077F SYST BP >= 140 MM HG: CPT | Performed by: INTERNAL MEDICINE

## 2023-04-12 PROCEDURE — 3079F DIAST BP 80-89 MM HG: CPT | Performed by: INTERNAL MEDICINE

## 2023-04-12 PROCEDURE — 99214 OFFICE O/P EST MOD 30 MIN: CPT | Performed by: INTERNAL MEDICINE

## 2023-04-12 PROCEDURE — 93000 ELECTROCARDIOGRAM COMPLETE: CPT | Performed by: INTERNAL MEDICINE

## 2023-04-12 NOTE — PROGRESS NOTES
subjective     Chief Complaint   Patient presents with   • Coronary Artery Disease     3 month follow up      History of Present Illness    Patient is 82 years old white female who has history of coronary artery disease status post CABG, chronic systolic heart failure on Entresto, Coreg, Lasix.    She complains of blood pressure running quite high.  No orthopnea PND or ankle edema.  Ankle edema is better with Lasix.    She also has atrial fibrillation and is anticoagulated with Eliquis no abnormal bleeding or bruising.    Hypothyroidism on Synthroid replacement therapy stable.  Clinically euthyroid.    She complains of sinus congestion due to allergies.    Hyperlipidemia on Livalo.  No drug side effects.    She also has COPD and is following with pulmonology on albuterol Atrovent, Symbicort and Spiriva.    Past Surgical History:   Procedure Laterality Date   • CARDIAC SURGERY  1997   • CARDIOVASCULAR STRESS TEST  06/2014   • CATARACT EXTRACTION, BILATERAL     • CHOLECYSTECTOMY  2002   • COLONOSCOPY  05/2012   • ECHO - CONVERTED  07/2014   • PACEMAKER IMPLANTATION  12/17/2015     Family History   Problem Relation Age of Onset   • Coronary artery disease Other    • Hypertension Other    • Diabetes Other    • Heart attack Other    • Heart attack Mother 62        fatal MI   • Skin cancer Mother    • Diabetes type II Mother    • Melanoma Mother    • Heart attack Father 63        fatal MI   • Melanoma Sister 45   • Skin cancer Sister    • Heart attack Brother 62        fatal MI   • Heart attack Brother 80        Fatal MI   • Skin cancer Brother    • Heart disease Sister    • Heart disease Brother 75        pacemaker, CABG, Stents   • Osteoporosis Sister    • Pneumonia Brother    • Hypertension Brother    • Heart attack Sister 54        Fatal MI   • Heart attack Brother 59        Fatal MI   • Breast cancer Neg Hx      Past Medical History:   Diagnosis Date   • Atrial fibrillation    • CAD (coronary artery disease)    •  Chronic a-fib    • Chronic anticoagulation    • Congestive heart failure     compensated   • Disease of thyroid gland    • Hyperlipidemia    • Hypertension    • Hypoxemia    • Ischemic cardiomyopathy with severe LV Disfunction    • Myocardial infarction    • Pacemaker     VVI     Patient Active Problem List   Diagnosis   • CAD (coronary artery disease) status post CABG  single-vessel*   • Ischemic cardiomyopathy with apical hypokinesis LV ejection fraction 50%.   • Chronic HFrEF (heart failure with reduced ejection fraction)   • Hyperlipidemia*   • Chronic a-fib*   • Pacemaker dual-chamber pacemaker Biotronik 2015*   • Hypothyroidism*   • Hypertension*   • Chronic anticoagulation*   • Gastroesophageal reflux disease without esophagitis*   • URI (upper respiratory infection)   • Herpes zoster without complication   • Acute midline low back pain without sciatica   • CHF (congestive heart failure)   • Centrilobular emphysema   • Asthmatic bronchitis , chronic   • Moderate pulmonary hypertension (CMS/HCC), 46 mmHg   • Seasonal allergic rhinitis   • Post zoster neuralgia   • Memory loss   • Diarrhea       Social History     Tobacco Use   • Smoking status: Former     Packs/day: 0.25     Years: 17.00     Pack years: 4.25     Types: Cigarettes     Quit date:      Years since quittin.2   • Smokeless tobacco: Never   Vaping Use   • Vaping Use: Never used   Substance Use Topics   • Alcohol use: No   • Drug use: No       Allergies   Allergen Reactions   • Codeine Rash   • Grass Itching and Rash   • Lisinopril Rash       Current Outpatient Medications on File Prior to Visit   Medication Sig   • albuterol sulfate HFA (Proventil HFA) 108 (90 Base) MCG/ACT inhaler Inhale 2 puffs Every 4 (Four) Hours As Needed for Wheezing or Shortness of Air.   • budesonide-formoterol (SYMBICORT) 80-4.5 MCG/ACT inhaler Inhale 2 puffs 2 (Two) Times a Day.   • carvedilol (COREG) 12.5 MG tablet Take 1 tablet by mouth 2 (Two)  Times a Day With Meals.   • dicyclomine (BENTYL) 10 MG capsule TAKE 1 CAPSULE BY MOUTH THREE TIMES DAILY   • donepezil (ARICEPT) 5 MG tablet TAKE 1 TABLET BY MOUTH EVERY NIGHT   • Eliquis 2.5 MG tablet tablet TAKE 1 TABLET BY MOUTH EVERY 12 HOURS   • esomeprazole (nexIUM) 40 MG capsule Take 1 capsule by mouth Daily.   • furosemide (LASIX) 20 MG tablet TAKE 1 TABLET BY MOUTH EVERY DAY   • isosorbide mononitrate (IMDUR) 60 MG 24 hr tablet TAKE 1 TABLET BY MOUTH DAILY   • levothyroxine (SYNTHROID, LEVOTHROID) 50 MCG tablet TAKE 1 TABLET BY MOUTH DAILY   • Livalo 1 MG tablet tablet TAKE 1 TABLET BY MOUTH EVERY NIGHT   • montelukast (SINGULAIR) 10 MG tablet TAKE 1 TABLET BY MOUTH EVERY NIGHT   • nitroglycerin (NITROSTAT) 0.4 MG SL tablet DISSOLVE ONE TABLET UNDER TONGUE AS NEEDED FOR CHEST PAIN. MAY REPEAT DOSE EVERY 5 MINUTES UP TO 3 DOSES. IF NO RELIEF DIAL 911   • tiotropium bromide monohydrate (SPIRIVA RESPIMAT) 2.5 MCG/ACT aerosol solution inhaler Inhale 2 puffs Daily.   • [DISCONTINUED] Entresto 24-26 MG tablet TAKE 1 TABLET BY MOUTH TWICE DAILY   • ipratropium (ATROVENT HFA) 17 MCG/ACT inhaler Inhale 2 puffs Every 4 (Four) Hours As Needed for Wheezing (or shortness of air) for up to 90 days.     No current facility-administered medications on file prior to visit.         The following portions of the patient's history were reviewed and updated as appropriate: allergies, current medications, past family history, past medical history, past social history, past surgical history and problem list.    Review of Systems   Constitutional: Negative.   HENT: Negative.  Negative for congestion.    Eyes: Negative.    Cardiovascular: Negative.  Negative for chest pain, cyanosis, dyspnea on exertion, irregular heartbeat, leg swelling, near-syncope, orthopnea, palpitations, paroxysmal nocturnal dyspnea and syncope.   Respiratory: Negative.  Negative for shortness of breath.    Hematologic/Lymphatic: Negative.    Musculoskeletal:  "Negative.    Gastrointestinal: Negative.    Neurological: Negative.  Negative for headaches.          Objective:     /82 (BP Location: Right arm, Patient Position: Sitting, Cuff Size: Adult)   Pulse 80   Ht 167.6 cm (66\")   Wt 70.4 kg (155 lb 3.2 oz)   SpO2 99%   BMI 25.05 kg/m²   Cardiovascular:      PMI at left midclavicular line. Normal rate. Regular rhythm. Normal S1. Normal S2.      Murmurs: There is no murmur.      No gallop. No click. No rub.   Pulses:     Intact distal pulses.   Edema:     Peripheral edema absent.           Lab Review  Lab Results   Component Value Date     2023    K 4.3 2023     2023    BUN 20 2023    CREATININE 1.28 (H) 2023    GLUCOSE 88 2023    CALCIUM 9.3 2023    ALT 14 2023    ALKPHOS 78 2023    LABIL2 1.3 (L) 2016     Lab Results   Component Value Date    CKTOTAL 56 2023     Lab Results   Component Value Date    WBC 6.79 2023    HGB 12.1 2023    HCT 37.5 2023     2023     Lab Results   Component Value Date    INR 0.91 2015    INR 1.00 2014     Lab Results   Component Value Date    MG 2.2 2019     Lab Results   Component Value Date    TSH 3.150 2023     Lab Results   Component Value Date    .0 (H) 2019     Lab Results   Component Value Date    CHLPL 219 (H) 2016    CHOL 142 2023    TRIG 127 2023    HDL 62 (H) 2023    VLDL 22 2023    LDLHDL 0.88 2023     Lab Results   Component Value Date    LDL 58 2023    LDL 48 2022     Lab Results   Component Value Date    PROBNP 1,442.0 2022                 ECG 12 Lead    Date/Time: 2023 6:11 PM  Performed by: Maria R Sanchez MD  Authorized by: Maria R Sanchez MD   Comparison: compared with previous ECG from 2022  Similar to previous ECG  Rhythm: paced  Rate: bradycardic  BPM: 59  QRS axis: normal  Pacin% " capture and ventricular pacing  Clinical impression: abnormal EKG               I personally viewed and interpreted the patient's LAB data         Assessment:     1. Coronary artery disease involving native coronary artery of native heart without angina pectoris    2. Primary hypertension    3. Mixed hyperlipidemia    4. Acquired hypothyroidism    5. Chronic HFrEF (heart failure with reduced ejection fraction)          Plan:     Coronary artery disease status post CABG  Patient denies any chest pain palpitations or shortness of breath she will continue beta-blocker therapy and statin therapy.    Primary hypertension and HFrEF  She is taking Entresto and Coreg.  Blood pressure is still elevated  She was advised to increase Entresto 49/51 twice daily she will continue Coreg Lasix and rest of the medications.    Hyperlipidemia  Livalo was continued.    Thyroid functions are also stable Synthroid 50 mcg daily was continued.    She was advised to check her blood pressure and will adjust medication further.  Follow-up scheduled          No follow-ups on file.

## 2023-04-13 RX ORDER — SACUBITRIL AND VALSARTAN 24; 26 MG/1; MG/1
TABLET, FILM COATED ORAL
Qty: 180 TABLET | Refills: 0 | OUTPATIENT
Start: 2023-04-13

## 2023-04-13 RX ORDER — DICYCLOMINE HYDROCHLORIDE 10 MG/1
CAPSULE ORAL
Qty: 90 CAPSULE | Refills: 0 | Status: SHIPPED | OUTPATIENT
Start: 2023-04-13

## 2023-04-24 ENCOUNTER — TELEPHONE (OUTPATIENT)
Dept: CARDIOLOGY | Facility: CLINIC | Age: 83
End: 2023-04-24
Payer: MEDICARE

## 2023-04-24 RX ORDER — CARVEDILOL 12.5 MG/1
12.5 TABLET ORAL 2 TIMES DAILY WITH MEALS
Qty: 180 TABLET | Refills: 1 | Status: SHIPPED | OUTPATIENT
Start: 2023-04-24

## 2023-04-24 NOTE — TELEPHONE ENCOUNTER
CALLED PATIENT . SHE WAS TRYING TO FILL THE WRONG DOSAGE /  OLD BOTTLE SHE HAD AND WAS CONFUSED ABOUT IT.  SENT IN REFILL FOR COREG 12.5MG BID.  SHE V/U.

## 2023-04-24 NOTE — TELEPHONE ENCOUNTER
Please call patient regarding her Carvedilol 6.25mg.  Patient needs to know if this medication has been discontinued.

## 2023-04-27 ENCOUNTER — OFFICE VISIT (OUTPATIENT)
Dept: PULMONOLOGY | Facility: CLINIC | Age: 83
End: 2023-04-27
Payer: MEDICARE

## 2023-04-27 VITALS
SYSTOLIC BLOOD PRESSURE: 138 MMHG | OXYGEN SATURATION: 97 % | WEIGHT: 158.8 LBS | HEIGHT: 65 IN | DIASTOLIC BLOOD PRESSURE: 74 MMHG | HEART RATE: 65 BPM | RESPIRATION RATE: 18 BRPM | BODY MASS INDEX: 26.46 KG/M2 | TEMPERATURE: 96.8 F

## 2023-04-27 DIAGNOSIS — J43.2 CENTRILOBULAR EMPHYSEMA: Primary | ICD-10-CM

## 2023-04-27 DIAGNOSIS — I27.20 PULMONARY HYPERTENSION: ICD-10-CM

## 2023-04-27 DIAGNOSIS — J30.2 SEASONAL ALLERGIC RHINITIS, UNSPECIFIED TRIGGER: ICD-10-CM

## 2023-04-27 DIAGNOSIS — J44.9 ASTHMATIC BRONCHITIS , CHRONIC: ICD-10-CM

## 2023-04-27 NOTE — PROGRESS NOTES
"Chief Complaint  Centrilobular emphysema (Follow up/)    Subjective        Ashely Feliciano presents to [facility]MON[facility]ICAL [facility]ARE MEDICINE  History of Present Illness    Patient presents today for follow up of centrilobular emphysema with concern for asthmatic bronchitis overlap (mild symptoms), pulmonary hypertension, allergic rhinitis.   Patient history also notable for atrial fibrillation. She states that she has been having more cardiac troubles recently (trouble with heart rate/rhythm fluctuations) and continues to follow with cardiology. They suggested repeat overnight oxygen/BOBO testing but she prefers to avoid these currently.   Did not previously qualify for nocturnal oxygen use.   Respiratory symptoms continue to remain minimal. She uses atrovent as needed, and still uses spiriva on a daily basis. On the occasion of symptom flare up, she uses symbicort as needed, but does not need this frequently.       Objective   Vital Signs:  /74 (BP Location: Left arm, Patient Position: Sitting, Cuff Size: Adult)   Pulse 65   Temp 96.8 °F (36 °C) (Temporal)   Resp 18   Ht 165.1 cm (65\")   Wt 72 kg (158 lb 12.8 oz)   SpO2 97%   BMI 26.43 kg/m²   Estimated body mass index is 26.43 kg/m² as calculated from the following:    Height as of this encounter: 165.1 cm (65\").    Weight as of this encounter: 72 kg (158 lb 12.8 oz).       Physical Exam  Vitals reviewed.   Constitutional:       General: She is not in acute distress.     Appearance: She is well-developed. She is not diaphoretic.   HENT:      Head: Normocephalic and atraumatic.   Cardiovascular:      Rate and Rhythm: Normal rate. Rhythm irregular.      Heart sounds: Normal heart sounds, S1 normal and S2 normal.   Pulmonary:      Effort: Pulmonary effort is normal.      Breath sounds: No wheezing, rhonchi or rales.   Neurological:      Mental Status: She is alert and oriented to person, place, and time.   Psychiatric:        "  Behavior: Behavior normal.        Result Review :  The following data was reviewed by: Kiersten Mosqueda PA-C on 04/27/2023:    Reviewed previous overnight pulse oximetry test.   Reviewed previous PFT.       Assessment and Plan   Diagnoses and all orders for this visit:    1. Centrilobular emphysema (Primary)  -     ipratropium (ATROVENT HFA) 17 MCG/ACT inhaler; Inhale 2 puffs Every 4 (Four) Hours As Needed for Wheezing (or shortness of air) for up to 90 days.  Dispense: 12.9 g; Refill: 6  -     tiotropium bromide monohydrate (SPIRIVA RESPIMAT) 2.5 MCG/ACT aerosol solution inhaler; Inhale 2 puffs Daily.  Dispense: 3 each; Refill: 6    2. Asthmatic bronchitis , chronic  -     ipratropium (ATROVENT HFA) 17 MCG/ACT inhaler; Inhale 2 puffs Every 4 (Four) Hours As Needed for Wheezing (or shortness of air) for up to 90 days.  Dispense: 12.9 g; Refill: 6  -     tiotropium bromide monohydrate (SPIRIVA RESPIMAT) 2.5 MCG/ACT aerosol solution inhaler; Inhale 2 puffs Daily.  Dispense: 3 each; Refill: 6    3. Moderate pulmonary hypertension (CMS/HCC)    4. Seasonal allergic rhinitis, unspecified trigger        Centrilobular emphysema, concern for mixed asthmatic bronchitis:   • Continue atrovent inhaler as needed  • Continue rescue nebs as needed  • Continue Spiriva respimat 2.5 mcg 1-2 puffs daily.   • Continue symbicort 80 mcg prn     Symptoms remain minimal. No urgent indication for PFT, imaging at this time.         Pulmonary hypertension:  Likely group 2, group 3 in the setting of systolic heart failure (EF improved to 50% on last echo), multiple valvular abnormalities, and centrilobular emphysema.   Moderately noted on recent echo.  · CE symptoms mild - continue on appropriate therapies.   · Follows with cardiology  · Prefers to avoid overnight oxygen testing at this time.        Seasonal allergic rhinitis  • Continue Singulair nightly  • Continue oral antihistamine as needed      Follow Up   Return in about 6 months  (around 10/27/2023), or if symptoms worsen or fail to improve, for Next scheduled follow up.  Patient was given instructions and counseling regarding her condition or for health maintenance advice. Please see specific information pulled into the AVS if appropriate.

## 2023-05-14 DIAGNOSIS — J30.2 SEASONAL ALLERGIC RHINITIS, UNSPECIFIED TRIGGER: ICD-10-CM

## 2023-05-15 RX ORDER — DICYCLOMINE HYDROCHLORIDE 10 MG/1
CAPSULE ORAL
Qty: 90 CAPSULE | Refills: 0 | Status: SHIPPED | OUTPATIENT
Start: 2023-05-15

## 2023-05-15 RX ORDER — MONTELUKAST SODIUM 10 MG/1
10 TABLET ORAL NIGHTLY
Qty: 30 TABLET | Refills: 6 | Status: SHIPPED | OUTPATIENT
Start: 2023-05-15

## 2023-05-15 RX ORDER — APIXABAN 2.5 MG/1
TABLET, FILM COATED ORAL
Qty: 60 TABLET | Refills: 3 | Status: SHIPPED | OUTPATIENT
Start: 2023-05-15

## 2023-06-12 RX ORDER — PITAVASTATIN CALCIUM 1.04 MG/1
TABLET, FILM COATED ORAL
Qty: 90 TABLET | Refills: 0 | Status: SHIPPED | OUTPATIENT
Start: 2023-06-12

## 2023-06-12 RX ORDER — DICYCLOMINE HYDROCHLORIDE 10 MG/1
CAPSULE ORAL
Qty: 90 CAPSULE | Refills: 0 | Status: SHIPPED | OUTPATIENT
Start: 2023-06-12

## 2023-08-11 RX ORDER — DONEPEZIL HYDROCHLORIDE 5 MG/1
5 TABLET, FILM COATED ORAL NIGHTLY
Qty: 90 TABLET | Refills: 1 | Status: SHIPPED | OUTPATIENT
Start: 2023-08-11

## 2023-08-11 RX ORDER — DICYCLOMINE HYDROCHLORIDE 10 MG/1
CAPSULE ORAL
Qty: 90 CAPSULE | Refills: 0 | Status: SHIPPED | OUTPATIENT
Start: 2023-08-11

## 2023-09-11 RX ORDER — PITAVASTATIN CALCIUM 1.04 MG/1
TABLET, FILM COATED ORAL
Qty: 90 TABLET | Refills: 0 | Status: SHIPPED | OUTPATIENT
Start: 2023-09-11

## 2023-09-11 RX ORDER — DICYCLOMINE HYDROCHLORIDE 10 MG/1
CAPSULE ORAL
Qty: 90 CAPSULE | Refills: 0 | Status: SHIPPED | OUTPATIENT
Start: 2023-09-11 | End: 2023-09-11

## 2023-09-11 RX ORDER — APIXABAN 2.5 MG/1
TABLET, FILM COATED ORAL
Qty: 60 TABLET | Refills: 3 | Status: SHIPPED | OUTPATIENT
Start: 2023-09-11

## 2023-09-11 RX ORDER — DICYCLOMINE HYDROCHLORIDE 10 MG/1
CAPSULE ORAL
Qty: 270 CAPSULE | Refills: 1 | Status: SHIPPED | OUTPATIENT
Start: 2023-09-11

## 2023-09-18 ENCOUNTER — TELEPHONE (OUTPATIENT)
Dept: CARDIOLOGY | Facility: CLINIC | Age: 83
End: 2023-09-18
Payer: MEDICARE

## 2023-09-18 RX ORDER — NITROGLYCERIN 0.4 MG/1
0.4 TABLET SUBLINGUAL
Qty: 25 TABLET | Refills: 0 | Status: SHIPPED | OUTPATIENT
Start: 2023-09-18

## 2023-09-20 ENCOUNTER — TELEPHONE (OUTPATIENT)
Dept: CARDIOLOGY | Facility: CLINIC | Age: 83
End: 2023-09-20
Payer: MEDICARE

## 2023-09-20 ENCOUNTER — LAB (OUTPATIENT)
Dept: LAB | Facility: HOSPITAL | Age: 83
End: 2023-09-20
Payer: MEDICARE

## 2023-09-20 DIAGNOSIS — I50.22 CHRONIC HFREF (HEART FAILURE WITH REDUCED EJECTION FRACTION): Primary | ICD-10-CM

## 2023-09-20 DIAGNOSIS — I50.22 CHRONIC HFREF (HEART FAILURE WITH REDUCED EJECTION FRACTION): ICD-10-CM

## 2023-09-20 LAB
CK SERPL-CCNC: 51 U/L (ref 20–180)
NT-PROBNP SERPL-MCNC: 3197 PG/ML (ref 0–1800)

## 2023-09-20 PROCEDURE — 83880 ASSAY OF NATRIURETIC PEPTIDE: CPT

## 2023-09-20 PROCEDURE — 36415 COLL VENOUS BLD VENIPUNCTURE: CPT

## 2023-09-20 PROCEDURE — 82550 ASSAY OF CK (CPK): CPT

## 2023-09-21 ENCOUNTER — TELEPHONE (OUTPATIENT)
Dept: CARDIOLOGY | Facility: CLINIC | Age: 83
End: 2023-09-21
Payer: MEDICARE

## 2023-09-21 NOTE — TELEPHONE ENCOUNTER
----- Message from Maria R Sanchez MD sent at 9/21/2023 10:58 AM EDT -----  CK is normal.  Decrease Livalo, see if you can take it.  Blood work also shows that heart failure is slightly worse.  We need to increase Entresto.  We will talk about at the office visit

## 2023-10-09 ENCOUNTER — OFFICE VISIT (OUTPATIENT)
Dept: CARDIOLOGY | Facility: CLINIC | Age: 83
End: 2023-10-09
Payer: MEDICARE

## 2023-10-09 VITALS
DIASTOLIC BLOOD PRESSURE: 83 MMHG | OXYGEN SATURATION: 97 % | BODY MASS INDEX: 25.84 KG/M2 | HEART RATE: 76 BPM | WEIGHT: 160.8 LBS | SYSTOLIC BLOOD PRESSURE: 147 MMHG | HEIGHT: 66 IN

## 2023-10-09 DIAGNOSIS — I10 PRIMARY HYPERTENSION: ICD-10-CM

## 2023-10-09 DIAGNOSIS — I25.10 CORONARY ARTERY DISEASE INVOLVING NATIVE CORONARY ARTERY OF NATIVE HEART WITHOUT ANGINA PECTORIS: ICD-10-CM

## 2023-10-09 DIAGNOSIS — I50.22 CHRONIC HFREF (HEART FAILURE WITH REDUCED EJECTION FRACTION): Primary | ICD-10-CM

## 2023-10-09 DIAGNOSIS — Z79.01 CHRONIC ANTICOAGULATION: ICD-10-CM

## 2023-10-09 DIAGNOSIS — E78.2 MIXED HYPERLIPIDEMIA: ICD-10-CM

## 2023-10-09 DIAGNOSIS — Z95.0 PACEMAKER: ICD-10-CM

## 2023-10-09 DIAGNOSIS — I48.20 CHRONIC A-FIB: ICD-10-CM

## 2023-10-09 RX ORDER — SACUBITRIL AND VALSARTAN 97; 103 MG/1; MG/1
1 TABLET, FILM COATED ORAL 2 TIMES DAILY
Qty: 60 TABLET | Refills: 1 | Status: SHIPPED | OUTPATIENT
Start: 2023-10-09

## 2023-10-09 RX ORDER — DAPAGLIFLOZIN 5 MG/1
5 TABLET, FILM COATED ORAL DAILY
Qty: 30 TABLET | Refills: 1 | Status: SHIPPED | OUTPATIENT
Start: 2023-10-09

## 2023-10-09 NOTE — PROGRESS NOTES
subjective     Chief Complaint   Patient presents with    Coronary Artery Disease     Follow up     Results     labs       Problems Addressed This Visit  1. Chronic HFrEF (heart failure with reduced ejection fraction)    2. Coronary artery disease involving native coronary artery of native heart without angina pectoris    3. Chronic a-fib*    4. Mixed hyperlipidemia    5. Primary hypertension    6. Pacemaker dual-chamber pacemaker Biotronik December 2015*    7. Chronic anticoagulation*        History of Present Illness    Sabiha is 82 years old white female who has known coronary artery disease status post CABG in 1997.  She has chronic HFrEF and is here for follow-up.  She is doing some better with Entresto  Blood pressure is still mildly elevated  Will increase Entresto and will start Farxiga.    She denies any chest pain palpitations or shortness of breath.  She does have COPD and has been taking Spiriva Atrovent and Singulair.    Chronic atrial fibrillation with [id number] she is anticoagulated with Eliquis.    Hypothyroidism on Synthroid replacement therapy clinically euthyroid.  Hyperlipidemia on Livalo.  She has not been able to tolerate statin therapy she is taking very low-dose Livalo and still because of severe myalgias.  We will switch her to Repatha.    Pacemaker function has been good.      Past Surgical History:   Procedure Laterality Date    CARDIAC SURGERY  1997    CARDIOVASCULAR STRESS TEST  06/2014    CATARACT EXTRACTION, BILATERAL      CHOLECYSTECTOMY  2002    COLONOSCOPY  05/2012    ECHO - CONVERTED  07/2014    PACEMAKER IMPLANTATION  12/17/2015     Family History   Problem Relation Age of Onset    Coronary artery disease Other     Hypertension Other     Diabetes Other     Heart attack Other     Heart attack Mother 62        fatal MI    Skin cancer Mother     Diabetes type II Mother     Melanoma Mother     Heart attack Father 63        fatal MI    Melanoma Sister 45    Skin cancer Sister      Heart attack Brother 62        fatal MI    Heart attack Brother 80        Fatal MI    Skin cancer Brother     Heart disease Sister     Heart disease Brother 75        pacemaker, CABG, Stents    Osteoporosis Sister     Pneumonia Brother     Hypertension Brother     Heart attack Sister 54        Fatal MI    Heart attack Brother 59        Fatal MI    Breast cancer Neg Hx      Past Medical History:   Diagnosis Date    Atrial fibrillation     CAD (coronary artery disease)     Chronic a-fib     Chronic anticoagulation     Congestive heart failure     compensated    Disease of thyroid gland     Hyperlipidemia     Hypertension     Hypoxemia     Ischemic cardiomyopathy with severe LV Disfunction     Myocardial infarction     Pacemaker     VVI     Patient Active Problem List   Diagnosis    CAD (coronary artery disease) status post CABG  single-vessel*    Ischemic cardiomyopathy with apical hypokinesis LV ejection fraction 50%.    Chronic HFrEF (heart failure with reduced ejection fraction)    Hyperlipidemia*    Chronic a-fib*    Pacemaker dual-chamber pacemaker Biotronik 2015*    Hypothyroidism*    Hypertension*    Chronic anticoagulation*    Gastroesophageal reflux disease without esophagitis*    URI (upper respiratory infection)    Herpes zoster without complication    Acute midline low back pain without sciatica    Centrilobular emphysema    Asthmatic bronchitis , chronic    Pulmonary hypertension    Seasonal allergic rhinitis    Post zoster neuralgia    Memory loss    Diarrhea       Social History     Tobacco Use    Smoking status: Former     Packs/day: 0.25     Years: 17.00     Additional pack years: 0.00     Total pack years: 4.25     Types: Cigarettes     Quit date: 1990     Years since quittin.8    Smokeless tobacco: Never   Vaping Use    Vaping Use: Never used   Substance Use Topics    Alcohol use: No    Drug use: No       Allergies   Allergen Reactions    Codeine Rash    Grass Itching and  Rash    Lisinopril Rash       Current Outpatient Medications on File Prior to Visit   Medication Sig    budesonide-formoterol (SYMBICORT) 80-4.5 MCG/ACT inhaler Inhale 2 puffs 2 (Two) Times a Day.    carvedilol (COREG) 12.5 MG tablet Take 1 tablet by mouth 2 (Two) Times a Day With Meals.    dicyclomine (BENTYL) 10 MG capsule TAKE 1 CAPSULE BY MOUTH THREE TIMES DAILY    donepezil (ARICEPT) 5 MG tablet TAKE 1 TABLET BY MOUTH EVERY NIGHT    Eliquis 2.5 MG tablet tablet TAKE 1 TABLET BY MOUTH EVERY 12 HOURS    esomeprazole (nexIUM) 40 MG capsule Take 1 capsule by mouth Daily.    furosemide (LASIX) 20 MG tablet TAKE 1 TABLET BY MOUTH EVERY DAY    isosorbide mononitrate (IMDUR) 60 MG 24 hr tablet TAKE 1 TABLET BY MOUTH DAILY    levothyroxine (SYNTHROID, LEVOTHROID) 50 MCG tablet TAKE 1 TABLET BY MOUTH DAILY    Livalo 1 MG tablet tablet TAKE 1 TABLET BY MOUTH EVERY NIGHT (Patient taking differently: Take 1 tablet by mouth Every Night. Patient takes QOD)    montelukast (SINGULAIR) 10 MG tablet TAKE 1 TABLET BY MOUTH EVERY NIGHT    nitroglycerin (NITROSTAT) 0.4 MG SL tablet Take 1 tablet by mouth Every 5 (Five) Minutes As Needed for Chest Pain. Take no more than 3 doses in 15 minutes.    tiotropium bromide monohydrate (SPIRIVA RESPIMAT) 2.5 MCG/ACT aerosol solution inhaler Inhale 2 puffs Daily. (Patient taking differently: Inhale 2 puffs Daily. Patient takes PRN)    triamcinolone (KENALOG) 0.025 % cream Apply 1 application  topically to the appropriate area as directed 2 (Two) Times a Day.    ipratropium (ATROVENT HFA) 17 MCG/ACT inhaler Inhale 2 puffs Every 4 (Four) Hours As Needed for Wheezing (or shortness of air) for up to 90 days.     No current facility-administered medications on file prior to visit.         The following portions of the patient's history were reviewed and updated as appropriate: allergies, current medications, past family history, past medical history, past social history, past surgical history and  "problem list.    Review of Systems   Constitutional: Negative.   HENT: Negative.  Negative for congestion.    Eyes: Negative.    Cardiovascular: Negative.  Negative for chest pain, cyanosis, dyspnea on exertion, irregular heartbeat, leg swelling, near-syncope, orthopnea, palpitations, paroxysmal nocturnal dyspnea and syncope.   Respiratory: Negative.  Negative for shortness of breath.    Hematologic/Lymphatic: Negative.    Musculoskeletal:  Positive for myalgias.   Gastrointestinal: Negative.    Neurological: Negative.  Negative for headaches.          Objective:     /83 (BP Location: Right arm, Patient Position: Sitting, Cuff Size: Adult)   Pulse 76   Ht 167.6 cm (66\")   Wt 72.9 kg (160 lb 12.8 oz)   SpO2 97%   BMI 25.95 kg/mý   Pulmonary:      Effort: Pulmonary effort is normal.      Breath sounds: Normal breath sounds. No stridor. No wheezing. No rhonchi. No rales.   Cardiovascular:      PMI at left midclavicular line. Normal rate. Regular rhythm. Normal S1. Normal S2.       Murmurs: There is no murmur.      No gallop.  No click. No rub.   Pulses:     Intact distal pulses.   Edema:     Peripheral edema absent.           Lab Review  Lab Results   Component Value Date     07/05/2023    K 4.6 07/05/2023     (H) 07/05/2023    BUN 17 07/05/2023    CREATININE 1.07 (H) 07/05/2023    GLUCOSE 99 07/05/2023    CALCIUM 9.4 07/05/2023    ALT 18 07/05/2023    ALKPHOS 84 07/05/2023    LABIL2 1.3 (L) 04/05/2016     Lab Results   Component Value Date    CKTOTAL 51 09/20/2023     Lab Results   Component Value Date    WBC 5.55 07/05/2023    HGB 11.8 (L) 07/05/2023    HCT 35.4 07/05/2023     07/05/2023     Lab Results   Component Value Date    INR 0.91 12/17/2015     Lab Results   Component Value Date    MG 2.2 02/25/2019     Lab Results   Component Value Date    TSH 3.480 07/05/2023     Lab Results   Component Value Date    .0 (H) 02/28/2019     Lab Results   Component Value Date    CHLPL 219 " (H) 2016    CHOL 124 2023    TRIG 110 2023    HDL 55 2023    VLDL 20 2023    LDL 49 2023     Lab Results   Component Value Date    LDL 49 2023    LDL 58 2023     Lab Results   Component Value Date    PROBNP 3,197.0 (H) 2023                 ECG 12 Lead    Date/Time: 10/14/2023 11:18 AM  Performed by: Maria R Sanchez MD    Authorized by: Maria R Sanchez MD  Comparison: compared with previous ECG from 2023  Comparison to previous ECG: Baseline rhythm (atrial fibrillation ) is now showing up.  Rhythm: atrial fibrillation and paced  Rate: normal  T inversion: aVF, V4, V5 and V6  Other findings: non-specific ST-T wave changes  Pacin% capture and ventricular paced rhythm  Clinical impression: abnormal EKG             I personally viewed and interpreted the patient's LAB data         Assessment:     1. Chronic HFrEF (heart failure with reduced ejection fraction)    2. Coronary artery disease involving native coronary artery of native heart without angina pectoris    3. Chronic a-fib*    4. Mixed hyperlipidemia    5. Primary hypertension    6. Pacemaker dual-chamber pacemaker Biotronik 2015*    7. Chronic anticoagulation*          Plan:     Chronic HFrEF  Entresto dose was increased to 97/103 twice daily  Farxiga 5 mg daily was added.    Hyperlipidemia with statin intolerance Livalo was discontinued.  She was started on Repatha.    Lab work reviewed and discussed with the patient.  Anticoagulation continued.  Pacemaker function is normal.  Blood pressure is elevated Entresto dose was increased.    Follow-up in 2 months.        No follow-ups on file.

## 2023-10-13 ENCOUNTER — DISEASE STATE MANAGEMENT VISIT (OUTPATIENT)
Dept: PHARMACY | Facility: HOSPITAL | Age: 83
End: 2023-10-13
Payer: MEDICARE

## 2023-10-13 ENCOUNTER — SPECIALTY PHARMACY (OUTPATIENT)
Dept: PHARMACY | Facility: HOSPITAL | Age: 83
End: 2023-10-13
Payer: MEDICARE

## 2023-10-13 NOTE — PROGRESS NOTES
[facility]  Lipid Management Program - PCSK9i       Ashely Feliciano is a 82 y.o. female referred to the [facility] by Dr. Maria R Sanchez MD for clinical pharmacy and specialty pharmacy management of PCSK9i.  Ashely Feliciano is  treated for clinical ASCVD and hyperlipidemia and currently does not take anything for her cholesterol. In the past, Pt has tried Lipitor, Crestor, etc,  which caused muscle pain/cramps. The patient denies any allergies to latex.      Relevant Past Medical History and Comorbidities  Past Medical History:   Diagnosis Date    Atrial fibrillation     CAD (coronary artery disease)     Chronic a-fib     Chronic anticoagulation     Congestive heart failure     compensated    Disease of thyroid gland     Hyperlipidemia     Hypertension     Hypoxemia     Ischemic cardiomyopathy with severe LV Disfunction     Myocardial infarction     Pacemaker     VVI     Social History     Socioeconomic History    Marital status:    Tobacco Use    Smoking status: Former     Packs/day: 0.25     Years: 17.00     Additional pack years: 0.00     Total pack years: 4.25     Types: Cigarettes     Quit date: 1990     Years since quittin.8    Smokeless tobacco: Never   Vaping Use    Vaping Use: Never used   Substance and Sexual Activity    Alcohol use: No    Drug use: No    Sexual activity: Defer       Allergies  Codeine, Grass, and Lisinopril    Current Medication List    Current Outpatient Medications:     budesonide-formoterol (SYMBICORT) 80-4.5 MCG/ACT inhaler, Inhale 2 puffs 2 (Two) Times a Day., Disp: , Rfl:     carvedilol (COREG) 12.5 MG tablet, Take 1 tablet by mouth 2 (Two) Times a Day With Meals., Disp: 180 tablet, Rfl: 1    dapagliflozin (Farxiga) 5 MG tablet tablet, Take 1 tablet by mouth Daily., Disp: 30 tablet, Rfl: 1    dicyclomine (BENTYL) 10 MG capsule, TAKE 1 CAPSULE BY MOUTH THREE TIMES DAILY, Disp: 270 capsule, Rfl: 1    donepezil (ARICEPT) 5  MG tablet, TAKE 1 TABLET BY MOUTH EVERY NIGHT, Disp: 90 tablet, Rfl: 1    Eliquis 2.5 MG tablet tablet, TAKE 1 TABLET BY MOUTH EVERY 12 HOURS, Disp: 60 tablet, Rfl: 3    esomeprazole (nexIUM) 40 MG capsule, Take 1 capsule by mouth Daily., Disp: , Rfl:     furosemide (LASIX) 20 MG tablet, TAKE 1 TABLET BY MOUTH EVERY DAY, Disp: 90 tablet, Rfl: 1    ipratropium (ATROVENT HFA) 17 MCG/ACT inhaler, Inhale 2 puffs Every 4 (Four) Hours As Needed for Wheezing (or shortness of air) for up to 90 days., Disp: 12.9 g, Rfl: 6    isosorbide mononitrate (IMDUR) 60 MG 24 hr tablet, TAKE 1 TABLET BY MOUTH DAILY, Disp: 90 tablet, Rfl: 1    levothyroxine (SYNTHROID, LEVOTHROID) 50 MCG tablet, TAKE 1 TABLET BY MOUTH DAILY, Disp: 90 tablet, Rfl: 1    Livalo 1 MG tablet tablet, TAKE 1 TABLET BY MOUTH EVERY NIGHT (Patient taking differently: Take 1 tablet by mouth Every Night. Patient takes QOD), Disp: 90 tablet, Rfl: 0    montelukast (SINGULAIR) 10 MG tablet, TAKE 1 TABLET BY MOUTH EVERY NIGHT, Disp: 30 tablet, Rfl: 6    nitroglycerin (NITROSTAT) 0.4 MG SL tablet, Take 1 tablet by mouth Every 5 (Five) Minutes As Needed for Chest Pain. Take no more than 3 doses in 15 minutes., Disp: 25 tablet, Rfl: 0    sacubitril-valsartan (Entresto)  MG tablet, Take 1 tablet by mouth 2 (Two) Times a Day., Disp: 60 tablet, Rfl: 1    tiotropium bromide monohydrate (SPIRIVA RESPIMAT) 2.5 MCG/ACT aerosol solution inhaler, Inhale 2 puffs Daily. (Patient taking differently: Inhale 2 puffs Daily. Patient takes PRN), Disp: 3 each, Rfl: 6    triamcinolone (KENALOG) 0.025 % cream, Apply 1 application  topically to the appropriate area as directed 2 (Two) Times a Day., Disp: 15 g, Rfl: 0    Evolocumab (REPATHA) solution auto-injector SureClick injection, Inject 1 mL under the skin into the appropriate area as directed Every 14 (Fourteen) Days., Disp: 2 mL, Rfl: 5    Drug Interactions  None with Repatha    Relevant Laboratory Values  Lab Results   Component  Value Date    CHOL 124 07/05/2023    CHLPL 219 (H) 04/05/2016    TRIG 110 07/05/2023    HDL 55 07/05/2023    LDL 49 07/05/2023       Medication Assessment & Plan    Patient started today on Repatha Sureclick. Injection training and medication education provided.     Pt administered administered SubQ in her abdomen today    Patient will need lipid panel in 6 weeks, order placed.     Patient will continue regular follow-up with cardiology.     Will follow up with specialty pharmacy for next injection    Will follow-up in 6 months (~4/10/2024), or sooner if needed.     Patient would like her refill mailed to her home address.    Melissa Nix RPH  10/13/2023  14:27 EDT

## 2023-10-13 NOTE — PROGRESS NOTES
Initial Education Provided for Praluent/Repatha    Patient seen in the [facility] Management Clinic for initial education and injection training for PCSK9 inhibitors. The patient was introduced to services offered by [facility], including: regular assessments, refill coordination, curbside pick-up or mail order delivery options, prior authorization maintenance, and financial assistance programs as applicable. The patient was also provided with contact information for the pharmacy team. Welcome information and patient satisfaction survey to be sent by retail team with patient's initial fill.    Patient Instructions    Repatha is used to lower LDL, or bad cholesterol, to help reduce your chance of a heart attack or stroke.  You should give your injection once every 14 days.  Your doctor will likely keep you on this medication indefinitely as long as it is working for you and not causing any adverse effects.      This medication should be kept in the refrigerator until you are ready to use it.  Once removed from the refrigerator, it is good at room temperature for 30 days.  Do not leave in your car or expose to extreme heat.  Do not shake or freeze.  Dispose the used syringe/device in a sharps container.     This injection is a subcutaneous injection, which means just under the skin.  It is important to choose an area of your skin that is not tender, bruised, cut or has scars or stretch marks.  You can inject into your abdomen (except for 2 inches around your navel), your thigh or your upper arm.  Do not administer with other drugs.   Rotate injection sites each time you give the injection. Wash your hands prior to giving yourself an injection and use an alcohol wipe to clean the area of the injection and allow to dry prior to injecting.      If you miss a dose, take it as soon as you remember and resume the original schedule if it is within 7 days from the missed dose.  If an every 2 week dose  is not administered within 7 days, wait until the next dose on the original schedule.  If a once-monthly dose is not administered within 7 days, administer the dose and start a new schedule based on this date.     Be sure to let your doctor or pharmacist know of any medication changes, including OTC and herbal supplements.     Adverse Effects  Reviewed with patient and education on management provided.     Sore Throat  Injection site pain  Itching or irritation   Flu-like symptoms  Signs of an allergic reaction     Immunizations  While there are no immunizations that you need to get specifically because you are on this drug, it is important to keep up with your recommended routine vaccinations.     Adherence and Self-Administration    Barriers to Patient Adherence and/or Self-Administration: None  Methods for Supporting Patient Adherence and/or Self-Administration: None Required     Goals of Therapy  Patient Goals of Therapy: LDL Reduction  Clinical Goals or Therapeutic Targets, If Applicable: LDL Reduction      Attestation  I attest that the initiated specialty medication(s) are appropriate for the patient based on my assessment.  If the prescribed therapy is at any point deemed not appropriate based on the current or future assessments, a consultation will be initiated with the patient's specialty care provider to determine the best course of action. The revised plan of therapy will be documented along with any additional patient education provided.     The patient has been provided with the following education and any applicable administration techniques (i.e. self-injection) have been demonstrated for the therapies indicated. All questions and concerns have been addressed prior to the patient receiving the medication, and the patient has verbalized understanding of the education and any materials provided.  Additional patient education shall be provided and documented upon request by the patient, provider or  payer.      The patient would like to receive specialty mail-out services for the next injection. Care Coordinator to set up future refill outreaches, coordinate prescription delivery, and escalate clinical questions to pharmacist.     Patient should receive a lipid panel in 4-12 weeks.  Order has been placed.     Thank you,     Melissa Nix RPH  10/13/23  14:22 EDT

## 2023-10-13 NOTE — PROGRESS NOTES
[facility]  Lipid Management Program - PCSK9i       Ashely Feliciano is a 82 y.o. female referred to the [facility] Management Clinic by Dr. Maria R Sanchez MD for clinical pharmacy and specialty pharmacy management of PCSK9i.  Ashely Feliciano is  treated for clinical ASCVD and hyperlipidemia and currently does not take anything for her cholesterol. In the past, Pt has tried Lipitor, Crestor, etc,  which caused muscle pain/cramps. The patient denies any allergies to latex.      Relevant Past Medical History and Comorbidities  Past Medical History:   Diagnosis Date    Atrial fibrillation     CAD (coronary artery disease)     Chronic a-fib     Chronic anticoagulation     Congestive heart failure     compensated    Disease of thyroid gland     Hyperlipidemia     Hypertension     Hypoxemia     Ischemic cardiomyopathy with severe LV Disfunction     Myocardial infarction     Pacemaker     VVI     Social History     Socioeconomic History    Marital status:    Tobacco Use    Smoking status: Former     Packs/day: 0.25     Years: 17.00     Additional pack years: 0.00     Total pack years: 4.25     Types: Cigarettes     Quit date: 1990     Years since quittin.8    Smokeless tobacco: Never   Vaping Use    Vaping Use: Never used   Substance and Sexual Activity    Alcohol use: No    Drug use: No    Sexual activity: Defer       Allergies  Codeine, Grass, and Lisinopril    Current Medication List    Current Outpatient Medications:     budesonide-formoterol (SYMBICORT) 80-4.5 MCG/ACT inhaler, Inhale 2 puffs 2 (Two) Times a Day., Disp: , Rfl:     carvedilol (COREG) 12.5 MG tablet, Take 1 tablet by mouth 2 (Two) Times a Day With Meals., Disp: 180 tablet, Rfl: 1    dapagliflozin (Farxiga) 5 MG tablet tablet, Take 1 tablet by mouth Daily., Disp: 30 tablet, Rfl: 1    dicyclomine (BENTYL) 10 MG capsule, TAKE 1 CAPSULE BY MOUTH THREE TIMES DAILY, Disp: 270 capsule, Rfl: 1    donepezil (ARICEPT) 5  "MG tablet, TAKE 1 TABLET BY MOUTH EVERY NIGHT, Disp: 90 tablet, Rfl: 1    Eliquis 2.5 MG tablet tablet, TAKE 1 TABLET BY MOUTH EVERY 12 HOURS, Disp: 60 tablet, Rfl: 3    esomeprazole (nexIUM) 40 MG capsule, Take 1 capsule by mouth Daily., Disp: , Rfl:     furosemide (LASIX) 20 MG tablet, TAKE 1 TABLET BY MOUTH EVERY DAY, Disp: 90 tablet, Rfl: 1    ipratropium (ATROVENT HFA) 17 MCG/ACT inhaler, Inhale 2 puffs Every 4 (Four) Hours As Needed for Wheezing (or shortness of air) for up to 90 days., Disp: 12.9 g, Rfl: 6    isosorbide mononitrate (IMDUR) 60 MG 24 hr tablet, TAKE 1 TABLET BY MOUTH DAILY, Disp: 90 tablet, Rfl: 1    levothyroxine (SYNTHROID, LEVOTHROID) 50 MCG tablet, TAKE 1 TABLET BY MOUTH DAILY, Disp: 90 tablet, Rfl: 1    Livalo 1 MG tablet tablet, TAKE 1 TABLET BY MOUTH EVERY NIGHT, Disp: 90 tablet, Rfl: 0    montelukast (SINGULAIR) 10 MG tablet, TAKE 1 TABLET BY MOUTH EVERY NIGHT, Disp: 30 tablet, Rfl: 6    nitroglycerin (NITROSTAT) 0.4 MG SL tablet, Take 1 tablet by mouth Every 5 (Five) Minutes As Needed for Chest Pain. Take no more than 3 doses in 15 minutes., Disp: 25 tablet, Rfl: 0    sacubitril-valsartan (Entresto)  MG tablet, Take 1 tablet by mouth 2 (Two) Times a Day., Disp: 60 tablet, Rfl: 1    tiotropium bromide monohydrate (SPIRIVA RESPIMAT) 2.5 MCG/ACT aerosol solution inhaler, Inhale 2 puffs Daily., Disp: 3 each, Rfl: 6    triamcinolone (KENALOG) 0.025 % cream, Apply 1 application  topically to the appropriate area as directed 2 (Two) Times a Day., Disp: 15 g, Rfl: 0    Drug Interactions  None with Repatha  DDI: Coreg and Symbicort - Patient rarely takes Symbicort, she reports she only takes \"a puff when the weather is bad\"    Relevant Laboratory Values  Lab Results   Component Value Date    CHOL 124 07/05/2023    CHLPL 219 (H) 04/05/2016    TRIG 110 07/05/2023    HDL 55 07/05/2023    LDL 49 07/05/2023       Medication Assessment & Plan    Patient started today on Repatha Sureclick. " Injection training and medication education provided.     Pt administered administered SubQ in her abdomen today    Patient will need lipid panel in 6 weeks, order placed.     Patient will continue regular follow-up with cardiology.     Will follow up with specialty pharmacy for next injection    Will follow-up in 6 months (~4/10/2024), or sooner if needed.     Patient would like her refill mailed to her home address.    Melissa Nix RPH  10/13/2023  13:38 EDT

## 2023-10-17 ENCOUNTER — CLINICAL SUPPORT NO REQUIREMENTS (OUTPATIENT)
Dept: CARDIOLOGY | Facility: CLINIC | Age: 83
End: 2023-10-17
Payer: MEDICARE

## 2023-10-17 DIAGNOSIS — Z95.0 PACEMAKER: Primary | ICD-10-CM

## 2023-10-23 RX ORDER — CARVEDILOL 12.5 MG/1
12.5 TABLET ORAL 2 TIMES DAILY WITH MEALS
Qty: 180 TABLET | Refills: 1 | Status: SHIPPED | OUTPATIENT
Start: 2023-10-23

## 2023-10-25 ENCOUNTER — OFFICE VISIT (OUTPATIENT)
Dept: PULMONOLOGY | Facility: CLINIC | Age: 83
End: 2023-10-25
Payer: MEDICARE

## 2023-10-25 VITALS
OXYGEN SATURATION: 97 % | HEIGHT: 65 IN | SYSTOLIC BLOOD PRESSURE: 122 MMHG | TEMPERATURE: 97 F | HEART RATE: 78 BPM | DIASTOLIC BLOOD PRESSURE: 60 MMHG | WEIGHT: 160 LBS | BODY MASS INDEX: 26.66 KG/M2

## 2023-10-25 DIAGNOSIS — J44.89 ASTHMATIC BRONCHITIS , CHRONIC: ICD-10-CM

## 2023-10-25 DIAGNOSIS — I27.20 PULMONARY HYPERTENSION: ICD-10-CM

## 2023-10-25 DIAGNOSIS — J30.2 SEASONAL ALLERGIC RHINITIS, UNSPECIFIED TRIGGER: ICD-10-CM

## 2023-10-25 DIAGNOSIS — J43.2 CENTRILOBULAR EMPHYSEMA: Primary | ICD-10-CM

## 2023-10-25 PROCEDURE — 1160F RVW MEDS BY RX/DR IN RCRD: CPT | Performed by: PHYSICIAN ASSISTANT

## 2023-10-25 PROCEDURE — 3078F DIAST BP <80 MM HG: CPT | Performed by: PHYSICIAN ASSISTANT

## 2023-10-25 PROCEDURE — 99214 OFFICE O/P EST MOD 30 MIN: CPT | Performed by: PHYSICIAN ASSISTANT

## 2023-10-25 PROCEDURE — 1159F MED LIST DOCD IN RCRD: CPT | Performed by: PHYSICIAN ASSISTANT

## 2023-10-25 PROCEDURE — 3074F SYST BP LT 130 MM HG: CPT | Performed by: PHYSICIAN ASSISTANT

## 2023-10-25 NOTE — PROGRESS NOTES
"Chief Complaint  Centrilobular emphysema    Subjective        Ashely Feliciano presents to [facility] PULMONARY & CRITICAL CARE MEDICINE  History of Present Illness    Mrs. Feliciano presents today for follow up of centrilobular emphysema with concern for mixed asthmatic component, pulmonary hypertension, seasonal allergic rhinitis, and former smoking history (brief, then quit in the 1990s).   Since the previous visit, she was dealing with some flare ups from [facility] but was able to avoid hospitalization. She did notice more shortness of breath at that time. After starting/changing medications, she has noticed significant improvement. Went from using symbicort and spiriva as scheduled to now using them on occasion if needed (if respiratory symptoms progress). Still uses rescue medications as needed, alternates between the atrovent and albuterol. She feels that symptoms remain stable at this time.   She occasional felt that she needed oxygen when signifcantly short of breath, but states that SPO2 was always in the 90s.   Completed previous overnight oximetry test in 2020, but did not qualify at that time.       Objective   Vital Signs:  /60   Pulse 78   Temp 97 °F (36.1 °C)   Ht 165.1 cm (65\")   Wt 72.6 kg (160 lb)   SpO2 97%   BMI 26.63 kg/m²   Estimated body mass index is 26.63 kg/m² as calculated from the following:    Height as of this encounter: 165.1 cm (65\").    Weight as of this encounter: 72.6 kg (160 lb).         Physical Exam  Vitals reviewed.   Constitutional:       General: She is not in acute distress.     Appearance: She is well-developed. She is not diaphoretic.   HENT:      Head: Normocephalic and atraumatic.   Cardiovascular:      Rate and Rhythm: Normal rate and regular rhythm.   Pulmonary:      Effort: Pulmonary effort is normal.      Breath sounds: No wheezing, rhonchi or rales.   Neurological:      Mental Status: She is alert and oriented to person, place, and time. "   Psychiatric:         Behavior: Behavior normal.        Result Review :  The following data was reviewed by: Kiersten Mosqueda PA-C on 10/25/2023:    Reviewed previous CT chest high resolution - notable for mild emphysematous changes, most visible of the left lung base.     Reviewed previous PFT.     Reviewed previous oximetry test.        Assessment and Plan   Diagnoses and all orders for this visit:    1. Centrilobular emphysema (Primary)    2. Asthmatic bronchitis , chronic    3. Moderate pulmonary hypertension (CMS/HCC)    4. Seasonal allergic rhinitis, unspecified trigger        Centrilobular emphysema, concern for mixed asthmatic bronchitis:   Continue atrovent inhaler as needed (or albuterol inhaler; keeps both and rotates)  Continue Spiriva respimat 2.5 mcg - using on an as needed basis (increases use if symptoms worsen).   Continue symbicort 80 mcg - using on as needed basis (increases use If symptom worsen).   Will aim to complete spirometry at the next visit.  On singulair at nighttime.   Symptoms were likely impacted by CHF, which worsened a few months after previous follow up. Shortness of breath improved after medication changes. On Entresto, Lasix, Imdur, Coreg and Repatha.         Pulmonary hypertension:   Moderate to severe on last echo.   Likely group 2, group 3 in the setting of systolic heart failure (EF improved to 50% on last available echo from 2022), multiple valvular abnormalities, and centrilobular emphysema.   Respiratory symptoms minimal at this time - will continue on respiratory therapies  Continues to follow with cardiology - recently underwent medication changes in the last few months.   She would like to hold off repeat overnight pulse oximetry test at this time, but if symptoms progress then will reconsider.       Seasonal allergic rhinitis:   Continue Singulair nightly  Continue oral antihistamine.         Follow Up   Return in about 6 months (around 4/25/2024), or if symptoms  worsen or fail to improve, for Next scheduled follow up.  Patient was given instructions and counseling regarding her condition or for health maintenance advice. Please see specific information pulled into the AVS if appropriate.

## 2023-11-09 DIAGNOSIS — J30.2 SEASONAL ALLERGIC RHINITIS, UNSPECIFIED TRIGGER: ICD-10-CM

## 2023-11-09 RX ORDER — MONTELUKAST SODIUM 10 MG/1
10 TABLET ORAL NIGHTLY
Qty: 30 TABLET | Refills: 6 | Status: SHIPPED | OUTPATIENT
Start: 2023-11-09

## 2023-11-10 ENCOUNTER — TELEPHONE (OUTPATIENT)
Dept: PULMONOLOGY | Facility: CLINIC | Age: 83
End: 2023-11-10
Payer: MEDICARE

## 2023-11-10 DIAGNOSIS — J44.89 ASTHMATIC BRONCHITIS , CHRONIC: ICD-10-CM

## 2023-11-10 DIAGNOSIS — J43.2 CENTRILOBULAR EMPHYSEMA: ICD-10-CM

## 2023-11-10 NOTE — TELEPHONE ENCOUNTER
Caller: Ashely Feliciano    Relationship to patient: Self    Best call back number: [phone] (home)       Patient is needing: REFILL FOR HER NEBULIZER SOLUTION.(COULD NOT FIND ON MED LIST) HAVING ISSUES BREATHING AND COUGHING.

## 2023-11-14 ENCOUNTER — TELEPHONE (OUTPATIENT)
Dept: PHARMACY | Facility: HOSPITAL | Age: 83
End: 2023-11-14
Payer: MEDICARE

## 2023-11-14 RX ORDER — BUDESONIDE AND FORMOTEROL FUMARATE DIHYDRATE 160; 4.5 UG/1; UG/1
2 AEROSOL RESPIRATORY (INHALATION)
Qty: 10.2 G | Refills: 8 | Status: SHIPPED | OUTPATIENT
Start: 2023-11-14

## 2023-11-14 RX ORDER — PREDNISONE 10 MG/1
TABLET ORAL
Qty: 31 TABLET | Refills: 0 | Status: SHIPPED | OUTPATIENT
Start: 2023-11-14

## 2023-11-14 NOTE — TELEPHONE ENCOUNTER
Patient called regarding repatha. Patient wishes to stop injections due to muscle weakness. Patient also expressed she is depressed and has been having loss of appetite. Encouraged patient to reach out to Dr. Sanchez regarding injection and further options.

## 2023-11-20 RX ORDER — NITROGLYCERIN 0.4 MG/1
TABLET SUBLINGUAL
Qty: 25 TABLET | Refills: 0 | Status: SHIPPED | OUTPATIENT
Start: 2023-11-20

## 2023-11-30 DIAGNOSIS — J43.2 CENTRILOBULAR EMPHYSEMA: Primary | ICD-10-CM

## 2023-12-05 RX ORDER — SACUBITRIL AND VALSARTAN 97; 103 MG/1; MG/1
1 TABLET, FILM COATED ORAL 2 TIMES DAILY
Qty: 60 TABLET | Refills: 1 | Status: SHIPPED | OUTPATIENT
Start: 2023-12-05

## 2023-12-18 ENCOUNTER — OFFICE VISIT (OUTPATIENT)
Dept: CARDIOLOGY | Facility: CLINIC | Age: 83
End: 2023-12-18
Payer: MEDICARE

## 2023-12-18 VITALS
SYSTOLIC BLOOD PRESSURE: 114 MMHG | HEART RATE: 67 BPM | DIASTOLIC BLOOD PRESSURE: 78 MMHG | HEIGHT: 65 IN | WEIGHT: 153.6 LBS | BODY MASS INDEX: 25.59 KG/M2 | OXYGEN SATURATION: 97 %

## 2023-12-18 DIAGNOSIS — E03.9 ACQUIRED HYPOTHYROIDISM: ICD-10-CM

## 2023-12-18 DIAGNOSIS — I25.5 ISCHEMIC CARDIOMYOPATHY: ICD-10-CM

## 2023-12-18 DIAGNOSIS — Z95.0 PACEMAKER: ICD-10-CM

## 2023-12-18 DIAGNOSIS — Z79.01 CHRONIC ANTICOAGULATION: ICD-10-CM

## 2023-12-18 DIAGNOSIS — I48.20 CHRONIC A-FIB: ICD-10-CM

## 2023-12-18 DIAGNOSIS — I25.10 CORONARY ARTERY DISEASE INVOLVING NATIVE CORONARY ARTERY OF NATIVE HEART WITHOUT ANGINA PECTORIS: Primary | ICD-10-CM

## 2023-12-18 DIAGNOSIS — I50.22 CHRONIC HFREF (HEART FAILURE WITH REDUCED EJECTION FRACTION): ICD-10-CM

## 2023-12-18 DIAGNOSIS — E78.2 MIXED HYPERLIPIDEMIA: ICD-10-CM

## 2023-12-18 RX ORDER — FUROSEMIDE 20 MG/1
20 TABLET ORAL DAILY
Qty: 90 TABLET | Refills: 1 | Status: SHIPPED | OUTPATIENT
Start: 2023-12-18

## 2023-12-18 RX ORDER — ESOMEPRAZOLE MAGNESIUM 40 MG/1
40 CAPSULE, DELAYED RELEASE ORAL DAILY
Qty: 90 CAPSULE | Refills: 0 | Status: SHIPPED | OUTPATIENT
Start: 2023-12-18

## 2023-12-18 RX ORDER — DONEPEZIL HYDROCHLORIDE 5 MG/1
5 TABLET, FILM COATED ORAL NIGHTLY
Qty: 90 TABLET | Refills: 1 | Status: SHIPPED | OUTPATIENT
Start: 2023-12-18

## 2023-12-18 RX ORDER — DICYCLOMINE HYDROCHLORIDE 10 MG/1
10 CAPSULE ORAL 3 TIMES DAILY
Qty: 270 CAPSULE | Refills: 1 | Status: SHIPPED | OUTPATIENT
Start: 2023-12-18

## 2023-12-18 RX ORDER — DAPAGLIFLOZIN 5 MG/1
5 TABLET, FILM COATED ORAL DAILY
Qty: 30 TABLET | Refills: 1 | Status: SHIPPED | OUTPATIENT
Start: 2023-12-18

## 2023-12-18 RX ORDER — CARVEDILOL 12.5 MG/1
12.5 TABLET ORAL 2 TIMES DAILY WITH MEALS
Qty: 180 TABLET | Refills: 1 | Status: SHIPPED | OUTPATIENT
Start: 2023-12-18

## 2023-12-18 RX ORDER — LEVOTHYROXINE SODIUM 0.05 MG/1
50 TABLET ORAL DAILY
Qty: 90 TABLET | Refills: 1 | Status: SHIPPED | OUTPATIENT
Start: 2023-12-18

## 2023-12-18 RX ORDER — SACUBITRIL AND VALSARTAN 97; 103 MG/1; MG/1
1 TABLET, FILM COATED ORAL 2 TIMES DAILY
Qty: 60 TABLET | Refills: 1 | Status: SHIPPED | OUTPATIENT
Start: 2023-12-18

## 2023-12-18 RX ORDER — ISOSORBIDE MONONITRATE 60 MG/1
60 TABLET, EXTENDED RELEASE ORAL DAILY
Qty: 90 TABLET | Refills: 1 | Status: SHIPPED | OUTPATIENT
Start: 2023-12-18

## 2023-12-18 RX ORDER — NITROGLYCERIN 0.4 MG/1
0.4 TABLET SUBLINGUAL
Qty: 25 TABLET | Refills: 0 | Status: SHIPPED | OUTPATIENT
Start: 2023-12-18

## 2023-12-18 NOTE — H&P (VIEW-ONLY)
subjective     Chief Complaint   Patient presents with    Chronic HFrEF (heart failure with reduced ejection fraction)     Follow up     Med Refill     pended       Problems Addressed This Visit  1. Coronary artery disease involving native coronary artery of native heart without angina pectoris    2. Ischemic cardiomyopathy with apical hypokinesis LV ejection fraction 50%.    3. Chronic HFrEF (heart failure with reduced ejection fraction)    4. Mixed hyperlipidemia    5. Chronic a-fib*    6. Pacemaker dual-chamber pacemaker Biotronik December 2015*    7. Acquired hypothyroidism    8. Chronic anticoagulation*        History of Present Illness    Ashely is 83 years old white female who is here for follow-up.    She has hyperlipidemia and states that she could not tolerate Repatha.  She had problems with the statin therapy event Repatha but had a lot of leg cramps and now wants to go back to [facility]    Chronic HFrEF  She is taking Entresto 97/103 twice daily along with Coreg, Lasix and Farxiga  She is breathing much better and has no specific complaints however she does have increased jugular venous distention and trace ankle edema.  She is not taking adequate amount of Lasix.  She was advised to take Lasix every day.    Hypertension  Blood pressure is very well-controlled with Coreg Lasix and Entresto.    Chronic atrial fibrillation  Rate is controlled with Coreg.  She is anticoagulant with Eliquis.  No abnormal bleeding.    Hypothyroidism on Synthroid replacement therapy.    Dual-chamber pacemaker functioning normally.    She denies any chest pain, dyspnea on exertion, orthopnea or PND.    Past Surgical History:   Procedure Laterality Date    CARDIAC SURGERY  1997    CARDIOVASCULAR STRESS TEST  06/2014    CATARACT EXTRACTION, BILATERAL      CHOLECYSTECTOMY  2002    COLONOSCOPY  05/2012    ECHO - CONVERTED  07/2014    PACEMAKER IMPLANTATION  12/17/2015     Family History   Problem Relation Age of Onset    Coronary  artery disease Other     Hypertension Other     Diabetes Other     Heart attack Other     Heart attack Mother 62        fatal MI    Skin cancer Mother     Diabetes type II Mother     Melanoma Mother     Heart attack Father 63        fatal MI    Melanoma Sister 45    Skin cancer Sister     Heart attack Brother 62        fatal MI    Heart attack Brother 80        Fatal MI    Skin cancer Brother     Heart disease Sister     Heart disease Brother 75        pacemaker, CABG, Stents    Osteoporosis Sister     Pneumonia Brother     Hypertension Brother     Heart attack Sister 54        Fatal MI    Heart attack Brother 59        Fatal MI    Breast cancer Neg Hx      Past Medical History:   Diagnosis Date    Atrial fibrillation     CAD (coronary artery disease)     Chronic a-fib     Chronic anticoagulation     Congestive heart failure     compensated    Disease of thyroid gland     Hyperlipidemia     Hypertension     Hypoxemia     Ischemic cardiomyopathy with severe LV Disfunction     Myocardial infarction     Pacemaker     VVI     Patient Active Problem List   Diagnosis    CAD (coronary artery disease) status post CABG 1997 single-vessel*    Ischemic cardiomyopathy with apical hypokinesis LV ejection fraction 50%.    Chronic HFrEF (heart failure with reduced ejection fraction)    Hyperlipidemia*    Chronic a-fib*    Pacemaker dual-chamber pacemaker Biotronik December 2015*    Hypothyroidism*    Hypertension*    Chronic anticoagulation*    Gastroesophageal reflux disease without esophagitis*    URI (upper respiratory infection)    Herpes zoster without complication    Acute midline low back pain without sciatica    Centrilobular emphysema    Asthmatic bronchitis , chronic    Pulmonary hypertension    Seasonal allergic rhinitis    Post zoster neuralgia    Memory loss    Diarrhea       Social History     Tobacco Use    Smoking status: Former     Packs/day: 0.25     Years: 17.00     Additional pack years: 0.00     Total pack  years: 4.25     Types: Cigarettes     Quit date: 1990     Years since quittin.9    Smokeless tobacco: Never   Vaping Use    Vaping Use: Never used   Substance Use Topics    Alcohol use: No    Drug use: No       Allergies   Allergen Reactions    Nicotine Hives    Codeine Rash    Eggs Or Egg-Derived Products Itching and Rash    Grass Itching and Rash    Lisinopril Rash       Current Outpatient Medications on File Prior to Visit   Medication Sig    budesonide-formoterol (Symbicort) 160-4.5 MCG/ACT inhaler Inhale 2 puffs 2 (Two) Times a Day.    ipratropium (ATROVENT HFA) 17 MCG/ACT inhaler Inhale 2 puffs Every 4 (Four) Hours As Needed for Wheezing (or shortness of air) for up to 90 days.    ipratropium (ATROVENT) 0.02 % nebulizer solution Take 2.5 mL by nebulization 4 (Four) Times a Day As Needed for Wheezing or Shortness of Air.    Livalo 1 MG tablet tablet TAKE 1 TABLET BY MOUTH EVERY NIGHT (Patient taking differently: Take 1 tablet by mouth Every Night. Patient takes QOD)    montelukast (SINGULAIR) 10 MG tablet TAKE 1 TABLET BY MOUTH EVERY NIGHT    predniSONE (DELTASONE) 10 MG tablet Take 4 tabs daily x 3 days, then take 3 tabs daily x 3 days, then take 2 tabs daily x 3 days, then take 1 tab daily x 3 days    tiotropium bromide monohydrate (SPIRIVA RESPIMAT) 2.5 MCG/ACT aerosol solution inhaler Inhale 2 puffs Daily. (Patient taking differently: Inhale 2 puffs Daily. Patient takes PRN)    triamcinolone (KENALOG) 0.025 % cream Apply 1 application  topically to the appropriate area as directed 2 (Two) Times a Day.    [DISCONTINUED] carvedilol (COREG) 12.5 MG tablet TAKE 1 TABLET BY MOUTH TWICE DAILY WITH MEALS    [DISCONTINUED] dapagliflozin (Farxiga) 5 MG tablet tablet Take 1 tablet by mouth Daily.    [DISCONTINUED] dicyclomine (BENTYL) 10 MG capsule TAKE 1 CAPSULE BY MOUTH THREE TIMES DAILY    [DISCONTINUED] donepezil (ARICEPT) 5 MG tablet TAKE 1 TABLET BY MOUTH EVERY NIGHT    [DISCONTINUED] Eliquis 2.5 MG  "tablet tablet TAKE 1 TABLET BY MOUTH EVERY 12 HOURS    [DISCONTINUED] Entresto  MG tablet TAKE 1 TABLET BY MOUTH TWICE DAILY    [DISCONTINUED] esomeprazole (nexIUM) 40 MG capsule Take 1 capsule by mouth Daily.    [DISCONTINUED] furosemide (LASIX) 20 MG tablet TAKE 1 TABLET BY MOUTH EVERY DAY    [DISCONTINUED] isosorbide mononitrate (IMDUR) 60 MG 24 hr tablet TAKE 1 TABLET BY MOUTH DAILY    [DISCONTINUED] levothyroxine (SYNTHROID, LEVOTHROID) 50 MCG tablet TAKE 1 TABLET BY MOUTH DAILY    [DISCONTINUED] nitroglycerin (NITROSTAT) 0.4 MG SL tablet DISSOLVE 1 TABLET UNDER THE TONGUE EVERY 5 MINUTES AS NEEDED FOR CHEST PAIN. TAKE NO MORE THAN 3 DOSES IN 15 MINUTES     No current facility-administered medications on file prior to visit.         The following portions of the patient's history were reviewed and updated as appropriate: allergies, current medications, past family history, past medical history, past social history, past surgical history and problem list.    Review of Systems   Constitutional: Negative.   HENT: Negative.  Negative for congestion.    Eyes: Negative.    Cardiovascular: Negative.  Negative for chest pain, cyanosis, dyspnea on exertion, irregular heartbeat, leg swelling, near-syncope, orthopnea, palpitations, paroxysmal nocturnal dyspnea and syncope.   Respiratory: Negative.  Negative for shortness of breath.    Hematologic/Lymphatic: Negative.    Musculoskeletal: Negative.    Gastrointestinal: Negative.    Neurological: Negative.  Negative for headaches.          Objective:     /78 (BP Location: Left arm, Patient Position: Sitting, Cuff Size: Adult)   Pulse 67   Ht 165.1 cm (65\")   Wt 69.7 kg (153 lb 9.6 oz)   SpO2 97%   BMI 25.56 kg/m²   Pulmonary:      Effort: Pulmonary effort is normal.      Breath sounds: Normal breath sounds. No stridor. No wheezing. No rhonchi. No rales.   Cardiovascular:      PMI at left midclavicular line. Normal rate. Regular rhythm. Normal S1. Normal S2.  "      Murmurs: There is no murmur.      No gallop.  No click. No rub.   Pulses:     Intact distal pulses.   Edema:     Peripheral edema absent.           Lab Review  Lab Results   Component Value Date     07/05/2023    K 4.6 07/05/2023     (H) 07/05/2023    BUN 17 07/05/2023    CREATININE 1.07 (H) 07/05/2023    GLUCOSE 99 07/05/2023    CALCIUM 9.4 07/05/2023    ALT 18 07/05/2023    ALKPHOS 84 07/05/2023    LABIL2 1.3 (L) 04/05/2016     Lab Results   Component Value Date    CKTOTAL 51 09/20/2023     Lab Results   Component Value Date    WBC 5.55 07/05/2023    HGB 11.8 (L) 07/05/2023    HCT 35.4 07/05/2023     07/05/2023     Lab Results   Component Value Date    INR 0.91 12/17/2015     Lab Results   Component Value Date    MG 2.2 02/25/2019     Lab Results   Component Value Date    TSH 3.480 07/05/2023       Lab Results   Component Value Date    CHLPL 219 (H) 04/05/2016    CHOL 124 07/05/2023    TRIG 110 07/05/2023    HDL 55 07/05/2023    VLDL 20 07/05/2023    LDL 49 07/05/2023     Lab Results   Component Value Date    LDL 49 07/05/2023    LDL 58 01/09/2023     Lab Results   Component Value Date    PROBNP 3,197.0 (H) 09/20/2023               Procedures       I personally viewed and interpreted the patient's LAB data         Assessment:     1. Coronary artery disease involving native coronary artery of native heart without angina pectoris    2. Ischemic cardiomyopathy with apical hypokinesis LV ejection fraction 50%.    3. Chronic HFrEF (heart failure with reduced ejection fraction)    4. Mixed hyperlipidemia    5. Chronic a-fib*    6. Pacemaker dual-chamber pacemaker Biotronik December 2015*    7. Acquired hypothyroidism    8. Chronic anticoagulation*          Plan:     Coronary artery disease status post single-vessel CABG.  Patient is asymptomatic denies any angina or dyspnea on exertion.  She will continue current medications.  HFrEF  She will continue Entresto, Lasix, Coreg, Farxiga.  Lasix dose  was increased.    Hyperlipidemia  Patient could not tolerate Repatha and does not wish to try Praluent or Leqvio  She wants to go back on Livalo.    Chronic atrial fibrillation  Rate is controlled with Coreg which was continued.    Chronic anticoagulation with Eliquis which was continued.    Hypothyroidism  Clinically euthyroid.    Lab work scheduled for next visit.          No follow-ups on file.

## 2023-12-20 ENCOUNTER — TELEPHONE (OUTPATIENT)
Dept: CARDIOLOGY | Facility: CLINIC | Age: 83
End: 2023-12-20
Payer: MEDICARE

## 2023-12-20 NOTE — TELEPHONE ENCOUNTER
Ashely is coming in at 1 pm today as well as the biotronic to check her today.   Ashely forgot to mention this yesterday at the office visit.

## 2023-12-20 NOTE — TELEPHONE ENCOUNTER
Per remote transmission received today patient has Bipolar RV lead failure. Non capture noted on recorded EGM. Device changed Lead polarity from Bipolar to Unipolar. Patient noted to have a RV lead impedance reading of less than 150 ohms back in November but it came back up in the 400 ohms range. It is currently 273 ohms at current Unipolar setting. Patient paces 87% of the time in the RV. Lead was implanted 08/14/2019.    Called patient to inquire if she is having symptoms. She reports she has had several episodes of near syncope and having a lot of dizziness recently. She had a episode at [facility] on Sunday were she had to ask for assistance due to near syncope. She forgot to mention symptoms yesterday at her follow up visit. Recommend in person device interrogation soon.

## 2024-01-02 ENCOUNTER — TELEPHONE (OUTPATIENT)
Dept: CARDIOLOGY | Facility: CLINIC | Age: 84
End: 2024-01-02
Payer: MEDICARE

## 2024-01-02 NOTE — TELEPHONE ENCOUNTER
Caller: Ashely Feliciano     Relationship: SELF    Best call back number: [phone]    What is your medical concern? PT IS SCHEDULED ON 01.05.24 TO GET HER PACEMAKER CHANGED. SHE NEEDS TO KNOW WHEN TO STOP TAKING HER BLOOD THINNER FOR THE PROCEDURE. PLEASE CONTACT HER TO ADVISE.

## 2024-01-03 NOTE — TELEPHONE ENCOUNTER
After confirming with [facility] cath lab that procedure was on schedule for 01/05/2024, I called patient to advise usual protocol was to hold Eliquis 2 days prior to procedure. Patient verb understanding.

## 2024-01-04 DIAGNOSIS — T82.110S FAILURE OF PACEMAKER LEAD, SEQUELA: ICD-10-CM

## 2024-01-04 DIAGNOSIS — I48.91 ATRIAL FIBRILLATION, UNSPECIFIED TYPE: ICD-10-CM

## 2024-01-04 DIAGNOSIS — I25.5 ISCHEMIC CARDIOMYOPATHY: ICD-10-CM

## 2024-01-04 DIAGNOSIS — I48.20 CHRONIC A-FIB: ICD-10-CM

## 2024-01-04 DIAGNOSIS — Z95.0 PACEMAKER: Primary | ICD-10-CM

## 2024-01-04 RX ORDER — APIXABAN 2.5 MG/1
2.5 TABLET, FILM COATED ORAL EVERY 12 HOURS
Qty: 60 TABLET | Refills: 3 | Status: SHIPPED | OUTPATIENT
Start: 2024-01-04

## 2024-01-05 ENCOUNTER — HOSPITAL ENCOUNTER (OUTPATIENT)
Facility: HOSPITAL | Age: 84
Setting detail: HOSPITAL OUTPATIENT SURGERY
Discharge: HOME | End: 2024-01-05
Attending: INTERNAL MEDICINE | Admitting: INTERNAL MEDICINE
Payer: MEDICARE

## 2024-01-05 VITALS
DIASTOLIC BLOOD PRESSURE: 97 MMHG | OXYGEN SATURATION: 98 % | SYSTOLIC BLOOD PRESSURE: 126 MMHG | TEMPERATURE: 98.1 F | HEIGHT: 65 IN | BODY MASS INDEX: 26.49 KG/M2 | WEIGHT: 159 LBS | HEART RATE: 53 BPM | RESPIRATION RATE: 18 BRPM

## 2024-01-05 DIAGNOSIS — Z95.0 PACEMAKER: ICD-10-CM

## 2024-01-05 DIAGNOSIS — I48.91 ATRIAL FIBRILLATION, UNSPECIFIED TYPE: ICD-10-CM

## 2024-01-05 DIAGNOSIS — I48.20 CHRONIC A-FIB: ICD-10-CM

## 2024-01-05 DIAGNOSIS — I25.5 ISCHEMIC CARDIOMYOPATHY: ICD-10-CM

## 2024-01-05 LAB
ALBUMIN SERPL-MCNC: 4 G/DL (ref 3.5–5.2)
ALBUMIN/GLOB SERPL: 1.5 G/DL
ALP SERPL-CCNC: 101 U/L (ref 39–117)
ALT SERPL W P-5'-P-CCNC: 8 U/L (ref 1–33)
ANION GAP SERPL CALCULATED.3IONS-SCNC: 10.4 MMOL/L (ref 5–15)
AST SERPL-CCNC: 18 U/L (ref 1–32)
BILIRUB SERPL-MCNC: 0.9 MG/DL (ref 0–1.2)
BUN SERPL-MCNC: 13 MG/DL (ref 8–23)
BUN/CREAT SERPL: 13.1 (ref 7–25)
CALCIUM SPEC-SCNC: 9.1 MG/DL (ref 8.6–10.5)
CHLORIDE SERPL-SCNC: 107 MMOL/L (ref 98–107)
CO2 SERPL-SCNC: 23.6 MMOL/L (ref 22–29)
CREAT SERPL-MCNC: 0.99 MG/DL (ref 0.57–1)
DEPRECATED RDW RBC AUTO: 52.1 FL (ref 37–54)
EGFRCR SERPLBLD CKD-EPI 2021: 57.4 ML/MIN/1.73
ERYTHROCYTE [DISTWIDTH] IN BLOOD BY AUTOMATED COUNT: 14.8 % (ref 12.3–15.4)
GLOBULIN UR ELPH-MCNC: 2.6 GM/DL
GLUCOSE SERPL-MCNC: 107 MG/DL (ref 65–99)
HCT VFR BLD AUTO: 38.3 % (ref 34–46.6)
HGB BLD-MCNC: 12.1 G/DL (ref 12–15.9)
MCH RBC QN AUTO: 29.9 PG (ref 26.6–33)
MCHC RBC AUTO-ENTMCNC: 31.6 G/DL (ref 31.5–35.7)
MCV RBC AUTO: 94.6 FL (ref 79–97)
PLATELET # BLD AUTO: 121 10*3/MM3 (ref 140–450)
PMV BLD AUTO: 10.6 FL (ref 6–12)
POTASSIUM SERPL-SCNC: 3.9 MMOL/L (ref 3.5–5.2)
PROT SERPL-MCNC: 6.6 G/DL (ref 6–8.5)
RBC # BLD AUTO: 4.05 10*6/MM3 (ref 3.77–5.28)
SODIUM SERPL-SCNC: 141 MMOL/L (ref 136–145)
WBC NRBC COR # BLD AUTO: 4.87 10*3/MM3 (ref 3.4–10.8)

## 2024-01-05 PROCEDURE — 80053 COMPREHEN METABOLIC PANEL: CPT | Performed by: INTERNAL MEDICINE

## 2024-01-05 PROCEDURE — 85027 COMPLETE CBC AUTOMATED: CPT | Performed by: INTERNAL MEDICINE

## 2024-01-06 NOTE — INTERVAL H&P NOTE
H&P reviewed. The patient was examined and there are no changes to the H&P.      ROS: All systems reviewed and are negative.    Physical Exam:  Alert ox3  No JVD  Lungs: Clear  Cor:IRR IRR rhythm  Abd:soft  Ext: no edema.    Her pacemaker was interrogated and revealed RV lead with a pacing threshold of 1.2V at 0.4ms warlier toith Impedence of 440 ohms and sensing of 2.3 mv. The pacemaker was programmed to a rate of 40 for unknown reason which could have been causing er dizziness. This was increased to 60.This should help with her dizziness.There does not seem to be an urgent need for RV lead revision at this time. I have discussed this with  and she is agreeable to hold off the lead replacement for now.Patient discharged home foe out patient follow up with .I have asked her to restart Eliquis from today.She expressed understanding.    Donis Moore MD,[facility]    Electronically signed by Donis See MD, 01/05/24, 10:40 PM EST.

## 2024-01-09 ENCOUNTER — TELEPHONE (OUTPATIENT)
Dept: CARDIOLOGY | Facility: CLINIC | Age: 84
End: 2024-01-09

## 2024-01-09 DIAGNOSIS — T82.110S FAILURE OF PACEMAKER LEAD, SEQUELA: Primary | ICD-10-CM

## 2024-01-09 NOTE — TELEPHONE ENCOUNTER
Per Dr Sanchez  Reschedule RV lead change with Dr See.  Scheduled with the cath lab on Jan 19th 2024.    Called and spoke with Rhett at [employer] to check out her readings now to see what is happening.   He stated he would get with Franck and take a look to confirm she needs a lead change.

## 2024-01-09 NOTE — TELEPHONE ENCOUNTER
" Per remote monitoring she has had episodes of RV lead noise lasting 5-12 seconds each . She had a RV lead impedance on 01/07/2024 of less than 150 ohms. Lead impedance today is 429 ohms. Lead is currently programmed Biopolar. Patients reports that on 01/07/2024 around 6pm she had a sudden onset of \"my heart not feeling right.\" She reports she felt like, \"my heart was going to quit.\" So she went to bed. There is a recorded lead noise episodes that coincides with the time of symptoms. She reports that since changes were made \"that day in the hospital\" that she has had a lot of fatigue and having difficulty with completing her ADLS. She reports feeling like her heart is going to stop and that she notices it more when she lays down. On 12/20/23 patient met Eloy with [employer] at 's office and temporary changes were made at that time due to RV lead integrity issues and patient having dizziness and near syncope that coincided with RV lead findings. She reports that after these changes she, \"felt pretty good\". She reports that she went to hospital for lead revision, but that Franck from [employer] checked the device and reported nothing was wrong with the lead and that she was told the device was good for 3 years. She reports he told her that he made changes and that it should be alright. Lead revision was not performed. I explained to patient that yes the device has 3 years on the battery but the lead is what we are seeing issues with. I alerted her that I would report my recent findings to  and alert her to his recommendations. She verbalized understanding. Report is in [employer] for review.               "

## 2024-01-09 NOTE — PROGRESS NOTES
"Per remote monitoring she has had episodes of RV lead noise lasting 5-12 seconds each . She had a RV lead impedance on 01/07/2024 of less than 150 ohms. Lead impedance today is 429 ohms. Lead is currently programmed Biopolar. Patients reports that on 01/07/2024 around 6pm she had a sudden onset of \"my heart not feeling right.\" She reports she felt like, \"my heart was going to quit.\" So she went to bed. There is a recorded lead noise episodes that coincides with the time of symptoms. She reports that since changes were made \"that day in the hospital\" that she has had a lot of fatigue and having difficulty with completing her ADLS. She reports feeling like her heart is going to stop and that she notices it more when she lays down. On 12/20/23 patient met Eloy with [employer] at 's office and temporary changes were made at that time due to RV lead integrity issues and patient having dizziness and near syncope that coincided with RV lead findings. She reports that after these changes she, \"felt pretty good\". She reports that she went to hospital for lead revision, but that Franck from [employer] checked the device and reported nothing was wrong with the lead and that she was told the device was good for 3 years. She reports he told her that he made changes and that it should be alright. Lead revision was not performed. I explained to patient that yes the device has 3 years on the battery but the lead is what we are seeing issues with. I alerted her that I would report my recent findings to  and alert her to his recommendations. She verbalized understanding. Report is in [employer] for review.                      Per Dr Sanchez  Reschedule RV lead change with Dr See.  Scheduled with the cath lab on Jan 19th 2024.    Called and spoke with Rhett at [employer] to check out her readings now to see what is happening.   He stated he would get with Franck and take a look to confirm she needs a lead change.       "

## 2024-01-18 ENCOUNTER — PREP FOR SURGERY (OUTPATIENT)
Dept: OTHER | Facility: HOSPITAL | Age: 84
End: 2024-01-18
Payer: MEDICARE

## 2024-01-18 DIAGNOSIS — T82.110A PACEMAKER LEAD MALFUNCTION: Primary | ICD-10-CM

## 2024-01-19 PROBLEM — T82.110A PACEMAKER LEAD MALFUNCTION: Status: ACTIVE | Noted: 2024-01-18

## 2024-01-22 ENCOUNTER — APPOINTMENT (OUTPATIENT)
Dept: GENERAL RADIOLOGY | Facility: HOSPITAL | Age: 84
End: 2024-01-22
Payer: MEDICARE

## 2024-01-22 ENCOUNTER — HOSPITAL ENCOUNTER (OUTPATIENT)
Facility: HOSPITAL | Age: 84
Discharge: HOME | End: 2024-01-24
Attending: INTERNAL MEDICINE | Admitting: INTERNAL MEDICINE
Payer: MEDICARE

## 2024-01-22 DIAGNOSIS — T82.110A PACEMAKER LEAD MALFUNCTION: ICD-10-CM

## 2024-01-22 PROBLEM — I49.5 SICK SINUS SYNDROME: Status: ACTIVE | Noted: 2024-01-22

## 2024-01-22 LAB
ANION GAP SERPL CALCULATED.3IONS-SCNC: 12 MMOL/L (ref 5–15)
BUN SERPL-MCNC: 15 MG/DL (ref 8–23)
BUN/CREAT SERPL: 14.9 (ref 7–25)
CALCIUM SPEC-SCNC: 9.4 MG/DL (ref 8.6–10.5)
CHLORIDE SERPL-SCNC: 109 MMOL/L (ref 98–107)
CO2 SERPL-SCNC: 22 MMOL/L (ref 22–29)
CREAT SERPL-MCNC: 1.01 MG/DL (ref 0.57–1)
DEPRECATED RDW RBC AUTO: 46.5 FL (ref 37–54)
EGFRCR SERPLBLD CKD-EPI 2021: 56 ML/MIN/1.73
ERYTHROCYTE [DISTWIDTH] IN BLOOD BY AUTOMATED COUNT: 13.8 % (ref 12.3–15.4)
GLUCOSE SERPL-MCNC: 94 MG/DL (ref 65–99)
HCT VFR BLD AUTO: 38.7 % (ref 34–46.6)
HGB BLD-MCNC: 12.8 G/DL (ref 12–15.9)
MCH RBC QN AUTO: 30.4 PG (ref 26.6–33)
MCHC RBC AUTO-ENTMCNC: 33.1 G/DL (ref 31.5–35.7)
MCV RBC AUTO: 91.9 FL (ref 79–97)
PLATELET # BLD AUTO: 154 10*3/MM3 (ref 140–450)
PMV BLD AUTO: 12.7 FL (ref 6–12)
POTASSIUM SERPL-SCNC: 4.1 MMOL/L (ref 3.5–5.2)
RBC # BLD AUTO: 4.21 10*6/MM3 (ref 3.77–5.28)
SODIUM SERPL-SCNC: 143 MMOL/L (ref 136–145)
TROPONIN T SERPL HS-MCNC: 19 NG/L
WBC NRBC COR # BLD AUTO: 5.33 10*3/MM3 (ref 3.4–10.8)

## 2024-01-22 PROCEDURE — A9270 NON-COVERED ITEM OR SERVICE: HCPCS | Performed by: INTERNAL MEDICINE

## 2024-01-22 PROCEDURE — 33216 INSERT 1 ELECTRODE PM-DEFIB: CPT | Performed by: INTERNAL MEDICINE

## 2024-01-22 PROCEDURE — 25010000002 VANCOMYCIN 1 G RECONSTITUTED SOLUTION 1 EACH VIAL: Performed by: INTERNAL MEDICINE

## 2024-01-22 PROCEDURE — 93005 ELECTROCARDIOGRAM TRACING: CPT | Performed by: INTERNAL MEDICINE

## 2024-01-22 PROCEDURE — 25010000002 FENTANYL CITRATE (PF) 50 MCG/ML SOLUTION: Performed by: INTERNAL MEDICINE

## 2024-01-22 PROCEDURE — 25010000002 FUROSEMIDE PER 20 MG: Performed by: INTERNAL MEDICINE

## 2024-01-22 PROCEDURE — 63710000001 NITROGLYCERIN 0.4 MG SUBLINGUAL TABLET: Performed by: INTERNAL MEDICINE

## 2024-01-22 PROCEDURE — 80048 BASIC METABOLIC PNL TOTAL CA: CPT | Performed by: INTERNAL MEDICINE

## 2024-01-22 PROCEDURE — G0378 HOSPITAL OBSERVATION PER HR: HCPCS

## 2024-01-22 PROCEDURE — 84484 ASSAY OF TROPONIN QUANT: CPT | Performed by: INTERNAL MEDICINE

## 2024-01-22 PROCEDURE — 25810000003 SODIUM CHLORIDE 0.9 % SOLUTION: Performed by: INTERNAL MEDICINE

## 2024-01-22 PROCEDURE — 99222 1ST HOSP IP/OBS MODERATE 55: CPT | Performed by: INTERNAL MEDICINE

## 2024-01-22 PROCEDURE — 93010 ELECTROCARDIOGRAM REPORT: CPT | Performed by: INTERNAL MEDICINE

## 2024-01-22 PROCEDURE — 85027 COMPLETE CBC AUTOMATED: CPT | Performed by: INTERNAL MEDICINE

## 2024-01-22 PROCEDURE — 63710000001 HYDROCODONE-ACETAMINOPHEN 5-325 MG TABLET: Performed by: INTERNAL MEDICINE

## 2024-01-22 PROCEDURE — 25810000003 SODIUM CHLORIDE 0.9 % SOLUTION 250 ML FLEX CONT: Performed by: INTERNAL MEDICINE

## 2024-01-22 PROCEDURE — 63710000001 NAPROXEN 250 MG TABLET: Performed by: INTERNAL MEDICINE

## 2024-01-22 PROCEDURE — 71045 X-RAY EXAM CHEST 1 VIEW: CPT

## 2024-01-22 PROCEDURE — 63710000001 NITROGLYCERIN 2 % OINTMENT: Performed by: INTERNAL MEDICINE

## 2024-01-22 PROCEDURE — C1898 LEAD, PMKR, OTHER THAN TRANS: HCPCS | Performed by: INTERNAL MEDICINE

## 2024-01-22 PROCEDURE — C1889 IMPLANT/INSERT DEVICE, NOC: HCPCS | Performed by: INTERNAL MEDICINE

## 2024-01-22 DEVICE — LD PM SOLIA S 53: Type: IMPLANTABLE DEVICE | Status: FUNCTIONAL

## 2024-01-22 DEVICE — IMPLANTABLE DEVICE: Type: IMPLANTABLE DEVICE | Status: FUNCTIONAL

## 2024-01-22 RX ORDER — ATORVASTATIN CALCIUM 20 MG/1
20 TABLET, FILM COATED ORAL 3 TIMES WEEKLY
Status: CANCELLED | OUTPATIENT
Start: 2024-01-22

## 2024-01-22 RX ORDER — FUROSEMIDE 10 MG/ML
40 INJECTION INTRAMUSCULAR; INTRAVENOUS ONCE
Status: COMPLETED | OUTPATIENT
Start: 2024-01-22 | End: 2024-01-22

## 2024-01-22 RX ORDER — DICYCLOMINE HYDROCHLORIDE 10 MG/1
10 CAPSULE ORAL 3 TIMES DAILY
Status: CANCELLED | OUTPATIENT
Start: 2024-01-22

## 2024-01-22 RX ORDER — SODIUM CHLORIDE 9 MG/ML
40 INJECTION, SOLUTION INTRAVENOUS AS NEEDED
Status: DISCONTINUED | OUTPATIENT
Start: 2024-01-22 | End: 2024-01-24 | Stop reason: HOSPADM

## 2024-01-22 RX ORDER — FUROSEMIDE 10 MG/ML
20 INJECTION INTRAMUSCULAR; INTRAVENOUS ONCE
Status: DISCONTINUED | OUTPATIENT
Start: 2024-01-22 | End: 2024-01-22

## 2024-01-22 RX ORDER — DONEPEZIL HYDROCHLORIDE 5 MG/1
5 TABLET, FILM COATED ORAL NIGHTLY
Status: CANCELLED | OUTPATIENT
Start: 2024-01-22

## 2024-01-22 RX ORDER — CARVEDILOL 6.25 MG/1
12.5 TABLET ORAL 2 TIMES DAILY WITH MEALS
Status: CANCELLED | OUTPATIENT
Start: 2024-01-22

## 2024-01-22 RX ORDER — SODIUM CHLORIDE 9 MG/ML
INJECTION, SOLUTION INTRAVENOUS
Status: COMPLETED | OUTPATIENT
Start: 2024-01-22 | End: 2024-01-22

## 2024-01-22 RX ORDER — PANTOPRAZOLE SODIUM 40 MG/1
40 TABLET, DELAYED RELEASE ORAL
Status: CANCELLED | OUTPATIENT
Start: 2024-01-23

## 2024-01-22 RX ORDER — SODIUM CHLORIDE 0.9 % (FLUSH) 0.9 %
10 SYRINGE (ML) INJECTION AS NEEDED
Status: DISCONTINUED | OUTPATIENT
Start: 2024-01-22 | End: 2024-01-24 | Stop reason: HOSPADM

## 2024-01-22 RX ORDER — NAPROXEN 250 MG/1
250 TABLET ORAL ONCE
Status: COMPLETED | OUTPATIENT
Start: 2024-01-22 | End: 2024-01-22

## 2024-01-22 RX ORDER — FUROSEMIDE 20 MG/1
20 TABLET ORAL DAILY
Status: CANCELLED | OUTPATIENT
Start: 2024-01-23

## 2024-01-22 RX ORDER — FUROSEMIDE 10 MG/ML
20 INJECTION INTRAMUSCULAR; INTRAVENOUS ONCE
Status: DISCONTINUED | OUTPATIENT
Start: 2024-01-22 | End: 2024-01-23

## 2024-01-22 RX ORDER — LIDOCAINE HYDROCHLORIDE 20 MG/ML
INJECTION, SOLUTION INFILTRATION; PERINEURAL
Status: DISCONTINUED | OUTPATIENT
Start: 2024-01-22 | End: 2024-01-22 | Stop reason: HOSPADM

## 2024-01-22 RX ORDER — DICYCLOMINE HYDROCHLORIDE 10 MG/1
10 CAPSULE ORAL 3 TIMES DAILY
COMMUNITY

## 2024-01-22 RX ORDER — SODIUM CHLORIDE 0.9 % (FLUSH) 0.9 %
10 SYRINGE (ML) INJECTION EVERY 12 HOURS SCHEDULED
Status: DISCONTINUED | OUTPATIENT
Start: 2024-01-22 | End: 2024-01-24 | Stop reason: HOSPADM

## 2024-01-22 RX ORDER — FUROSEMIDE 10 MG/ML
INJECTION INTRAMUSCULAR; INTRAVENOUS
Status: DISCONTINUED | OUTPATIENT
Start: 2024-01-22 | End: 2024-01-22 | Stop reason: HOSPADM

## 2024-01-22 RX ORDER — FENTANYL CITRATE 50 UG/ML
INJECTION, SOLUTION INTRAMUSCULAR; INTRAVENOUS
Status: DISCONTINUED | OUTPATIENT
Start: 2024-01-22 | End: 2024-01-22 | Stop reason: HOSPADM

## 2024-01-22 RX ORDER — LEVOTHYROXINE SODIUM 0.05 MG/1
50 TABLET ORAL DAILY
Status: CANCELLED | OUTPATIENT
Start: 2024-01-23

## 2024-01-22 RX ORDER — PITAVASTATIN 1 MG/1
1 TABLET, FILM COATED ORAL 3 TIMES WEEKLY
COMMUNITY

## 2024-01-22 RX ORDER — BUDESONIDE AND FORMOTEROL FUMARATE DIHYDRATE 160; 4.5 UG/1; UG/1
2 AEROSOL RESPIRATORY (INHALATION) 2 TIMES DAILY PRN
Status: CANCELLED | OUTPATIENT
Start: 2024-01-22

## 2024-01-22 RX ORDER — NITROGLYCERIN 0.4 MG/1
0.4 TABLET SUBLINGUAL
Status: DISCONTINUED | OUTPATIENT
Start: 2024-01-22 | End: 2024-01-24 | Stop reason: HOSPADM

## 2024-01-22 RX ORDER — HYDROCODONE BITARTRATE AND ACETAMINOPHEN 5; 325 MG/1; MG/1
1 TABLET ORAL EVERY 8 HOURS PRN
Status: DISCONTINUED | OUTPATIENT
Start: 2024-01-22 | End: 2024-01-24 | Stop reason: HOSPADM

## 2024-01-22 RX ORDER — ISOSORBIDE MONONITRATE 30 MG/1
60 TABLET, EXTENDED RELEASE ORAL DAILY
Status: CANCELLED | OUTPATIENT
Start: 2024-01-23

## 2024-01-22 RX ORDER — NITROGLYCERIN 0.4 MG/1
0.4 TABLET SUBLINGUAL
Status: CANCELLED | OUTPATIENT
Start: 2024-01-22

## 2024-01-22 RX ORDER — MONTELUKAST SODIUM 10 MG/1
10 TABLET ORAL NIGHTLY
Status: CANCELLED | OUTPATIENT
Start: 2024-01-22

## 2024-01-22 RX ORDER — BUDESONIDE AND FORMOTEROL FUMARATE DIHYDRATE 160; 4.5 UG/1; UG/1
2 AEROSOL RESPIRATORY (INHALATION) 2 TIMES DAILY PRN
COMMUNITY

## 2024-01-22 RX ADMIN — HYDROCODONE BITARTRATE AND ACETAMINOPHEN 1 TABLET: 5; 325 TABLET ORAL at 16:40

## 2024-01-22 RX ADMIN — Medication 10 ML: at 20:14

## 2024-01-22 RX ADMIN — NAPROXEN 250 MG: 250 TABLET ORAL at 23:11

## 2024-01-22 RX ADMIN — NITROGLYCERIN 0.4 MG: 0.4 TABLET, ORALLY DISINTEGRATING SUBLINGUAL at 21:58

## 2024-01-22 RX ADMIN — NITROGLYCERIN 1 INCH: 20 OINTMENT TOPICAL at 23:14

## 2024-01-22 RX ADMIN — FUROSEMIDE 40 MG: 10 INJECTION, SOLUTION INTRAMUSCULAR; INTRAVENOUS at 17:08

## 2024-01-22 NOTE — PLAN OF CARE
Goal Outcome Evaluation:  Patient admitted from cath lab this shift. Patient has been pleasant. Complaint with care. Patient is currently resting in bed. No S&S of distress noted at this time. Will continue the plan of care.

## 2024-01-22 NOTE — H&P
Patient Care Team:  Maria R Sanchez MD as PCP - General  Maria R Sanchez MD as PCP - Family Medicine  aMyito Salas MD as Consulting Physician (Gastroenterology)    Chief complaint right ventricular pacemaker lead malfunction    Subjective     Ms. Feliciano is a 83-year-old  female with history of sick sinus syndrome, status post permanent pacemaker implantation in the past, was noted to have right ventricular -lead malfunction with decrease in the impedance.  And she is brought in to put in the new right ventricular lead.  The procedure, potential risk, benefits and alternatives were discussed with the patient and she expressed understanding of the same and is wanting to proceed    Review of Systems   Pertinent items are noted in HPI, all other systems reviewed and negative    History  Past Medical History:   Diagnosis Date    Atrial fibrillation     CAD (coronary artery disease)     Chronic a-fib     Chronic anticoagulation     Congestive heart failure     compensated    Disease of thyroid gland     Hyperlipidemia     Hypertension     Hypoxemia     Ischemic cardiomyopathy with severe LV Disfunction     Myocardial infarction     Pacemaker     VVI     Past Surgical History:   Procedure Laterality Date    CARDIAC SURGERY  1997    CARDIOVASCULAR STRESS TEST  06/2014    CATARACT EXTRACTION, BILATERAL      CHOLECYSTECTOMY  2002    COLONOSCOPY  05/2012    ECHO - CONVERTED  07/2014    PACEMAKER IMPLANTATION  12/17/2015     Family History   Problem Relation Age of Onset    Coronary artery disease Other     Hypertension Other     Diabetes Other     Heart attack Other     Heart attack Mother 62        fatal MI    Skin cancer Mother     Diabetes type II Mother     Melanoma Mother     Heart attack Father 63        fatal MI    Melanoma Sister 45    Skin cancer Sister     Heart attack Brother 62        fatal MI    Heart attack Brother 80        Fatal MI    Skin cancer Brother     Heart  disease Sister     Heart disease Brother 75        pacemaker, CABG, Stents    Osteoporosis Sister     Pneumonia Brother     Hypertension Brother     Heart attack Sister 54        Fatal MI    Heart attack Brother 59        Fatal MI    Breast cancer Neg Hx      Social History     Tobacco Use    Smoking status: Former     Packs/day: 0.25     Years: 17.00     Additional pack years: 0.00     Total pack years: 4.25     Types: Cigarettes     Quit date: 1990     Years since quittin.0    Smokeless tobacco: Never   Vaping Use    Vaping Use: Never used   Substance Use Topics    Alcohol use: No    Drug use: No       Objective     Vital Signs  Temp:  [98 °F (36.7 °C)-98.4 °F (36.9 °C)] 98 °F (36.7 °C)  Heart Rate:  [63-73] 67  Resp:  [20] 20  BP: (131-141)/(66-82) 141/66    Physical Exam:      General Appearance:  Alert, cooperative, in no acute distress   Head:  Normocephalic, without obvious abnormality, atraumatic   Eyes:  Lids and lashes normal, conjunctivae and sclerae normal, no icterus, no pallor, corneas clear, PERRLA   Ears:  Ears appear intact with no abnormalities noted   Throat:  No oral lesions, no thrush, oral mucosa moist   Neck:  No adenopathy, supple, trachea midline, no thyromegaly, no carotid bruit, no JVD   Back:  No kyphosis present, no scoliosis present, no skin lesions, erythema or scars, no tenderness to percussion, or palpation, range of motion normal   Lungs:  Clear to auscultation,respirations regular, even and unlabored    Heart:  Regular rhythm and normal rate, normal S1 and S2, no murmur, no gallop, no rub, no click   Breast Exam:  Deferred   Abdomen:  Normal bowel sounds, no masses, no organomegaly, soft non-tender, non-distended, no guarding, no rebound tenderness   Genitalia:  Deferred   Extremities:  Moves all extremities well, no edema, no cyanosis, no redness   Pulses:  Pulses palpable and equal bilaterally   Skin:  No bleeding, bruising or rash   Lymph nodes:  No palpable adenopathy    Neurologic:  Cranial nerves 2 - 12 grossly intact, sensation intact, DTR present and equal bilaterally     Results Review:    None    Assessment & Plan       Pacemaker lead malfunction    Sick sinus syndrome      Plan: Proceed with right ventricle lead placement.  The procedure, potential risks, benefits and alternatives were discussed with the patient and she expressed understanding of same and is wanting to proceed.    I discussed the patients findings and my recommendations with patient.     Donis See MD  01/22/24  18:09 EST

## 2024-01-23 ENCOUNTER — APPOINTMENT (OUTPATIENT)
Dept: CARDIOLOGY | Facility: HOSPITAL | Age: 84
End: 2024-01-23
Payer: MEDICARE

## 2024-01-23 LAB
BH CV ECHO MEAS - ACS: 1.4 CM
BH CV ECHO MEAS - AO MAX PG: 6.9 MMHG
BH CV ECHO MEAS - AO MEAN PG: 3 MMHG
BH CV ECHO MEAS - AO ROOT DIAM: 3.2 CM
BH CV ECHO MEAS - AO V2 MAX: 131 CM/SEC
BH CV ECHO MEAS - AO V2 VTI: 25.2 CM
BH CV ECHO MEAS - EDV(CUBED): 148.9 ML
BH CV ECHO MEAS - EDV(MOD-SP4): 91.7 ML
BH CV ECHO MEAS - EF(MOD-SP4): 33.3 %
BH CV ECHO MEAS - ESV(CUBED): 68.9 ML
BH CV ECHO MEAS - ESV(MOD-SP4): 61.2 ML
BH CV ECHO MEAS - FS: 22.6 %
BH CV ECHO MEAS - IVS/LVPW: 0.89 CM
BH CV ECHO MEAS - IVSD: 0.8 CM
BH CV ECHO MEAS - LA DIMENSION: 5.4 CM
BH CV ECHO MEAS - LAT PEAK E' VEL: 16.5 CM/SEC
BH CV ECHO MEAS - LV DIASTOLIC VOL/BSA (35-75): 52.7 CM2
BH CV ECHO MEAS - LV MASS(C)D: 162.1 GRAMS
BH CV ECHO MEAS - LV SYSTOLIC VOL/BSA (12-30): 35.2 CM2
BH CV ECHO MEAS - LVIDD: 5.3 CM
BH CV ECHO MEAS - LVIDS: 4.1 CM
BH CV ECHO MEAS - LVOT AREA: 3.1 CM2
BH CV ECHO MEAS - LVOT DIAM: 2 CM
BH CV ECHO MEAS - LVPWD: 0.9 CM
BH CV ECHO MEAS - MED PEAK E' VEL: 8.9 CM/SEC
BH CV ECHO MEAS - MR MAX PG: 140.2 MMHG
BH CV ECHO MEAS - MR MAX VEL: 592 CM/SEC
BH CV ECHO MEAS - MR MEAN PG: 73 MMHG
BH CV ECHO MEAS - MR MEAN VEL: 398 CM/SEC
BH CV ECHO MEAS - MR VTI: 188 CM
BH CV ECHO MEAS - MV E MAX VEL: 117 CM/SEC
BH CV ECHO MEAS - PA ACC TIME: 0.11 SEC
BH CV ECHO MEAS - RAP SYSTOLE: 10 MMHG
BH CV ECHO MEAS - RVSP: 47.7 MMHG
BH CV ECHO MEAS - SI(MOD-SP4): 17.5 ML/M2
BH CV ECHO MEAS - SV(MOD-SP4): 30.5 ML
BH CV ECHO MEAS - TAPSE (>1.6): 1.99 CM
BH CV ECHO MEAS - TR MAX PG: 37.7 MMHG
BH CV ECHO MEAS - TR MAX VEL: 307 CM/SEC
BH CV ECHO MEASUREMENTS AVERAGE E/E' RATIO: 9.21
GEN 5 2HR TROPONIN T REFLEX: 22 NG/L
LEFT ATRIUM VOLUME INDEX: 53.2 ML/M2
QT INTERVAL: 434 MS
QT INTERVAL: 446 MS
QT INTERVAL: 476 MS
QTC INTERVAL: 447 MS
QTC INTERVAL: 506 MS
QTC INTERVAL: 545 MS
TROPONIN T DELTA: 3 NG/L

## 2024-01-23 PROCEDURE — G0378 HOSPITAL OBSERVATION PER HR: HCPCS

## 2024-01-23 PROCEDURE — A9270 NON-COVERED ITEM OR SERVICE: HCPCS | Performed by: NURSE PRACTITIONER

## 2024-01-23 PROCEDURE — 93306 TTE W/DOPPLER COMPLETE: CPT | Performed by: INTERNAL MEDICINE

## 2024-01-23 PROCEDURE — 63710000001 DONEPEZIL 5 MG TABLET: Performed by: NURSE PRACTITIONER

## 2024-01-23 PROCEDURE — 63710000001 ISOSORBIDE MONONITRATE 30 MG TABLET SUSTAINED-RELEASE 24 HOUR: Performed by: NURSE PRACTITIONER

## 2024-01-23 PROCEDURE — 63710000001 FUROSEMIDE 20 MG TABLET: Performed by: NURSE PRACTITIONER

## 2024-01-23 PROCEDURE — 63710000001 LEVOTHYROXINE 50 MCG TABLET: Performed by: NURSE PRACTITIONER

## 2024-01-23 PROCEDURE — 93306 TTE W/DOPPLER COMPLETE: CPT

## 2024-01-23 PROCEDURE — 63710000001 CARVEDILOL 6.25 MG TABLET: Performed by: NURSE PRACTITIONER

## 2024-01-23 PROCEDURE — 63710000001 EMPAGLIFLOZIN 10 MG TABLET: Performed by: NURSE PRACTITIONER

## 2024-01-23 PROCEDURE — 99024 POSTOP FOLLOW-UP VISIT: CPT | Performed by: INTERNAL MEDICINE

## 2024-01-23 PROCEDURE — 99232 SBSQ HOSP IP/OBS MODERATE 35: CPT | Performed by: INTERNAL MEDICINE

## 2024-01-23 RX ORDER — LEVOTHYROXINE SODIUM 0.05 MG/1
50 TABLET ORAL
Status: DISCONTINUED | OUTPATIENT
Start: 2024-01-23 | End: 2024-01-24 | Stop reason: HOSPADM

## 2024-01-23 RX ORDER — FUROSEMIDE 20 MG/1
20 TABLET ORAL DAILY
Status: DISCONTINUED | OUTPATIENT
Start: 2024-01-23 | End: 2024-01-24 | Stop reason: HOSPADM

## 2024-01-23 RX ORDER — CARVEDILOL 6.25 MG/1
12.5 TABLET ORAL 2 TIMES DAILY WITH MEALS
Status: DISCONTINUED | OUTPATIENT
Start: 2024-01-23 | End: 2024-01-24 | Stop reason: HOSPADM

## 2024-01-23 RX ORDER — NAPROXEN 250 MG/1
250 TABLET ORAL 2 TIMES DAILY PRN
Status: DISCONTINUED | OUTPATIENT
Start: 2024-01-23 | End: 2024-01-24 | Stop reason: HOSPADM

## 2024-01-23 RX ORDER — DONEPEZIL HYDROCHLORIDE 5 MG/1
5 TABLET, FILM COATED ORAL NIGHTLY
Status: DISCONTINUED | OUTPATIENT
Start: 2024-01-23 | End: 2024-01-24 | Stop reason: HOSPADM

## 2024-01-23 RX ORDER — ISOSORBIDE MONONITRATE 30 MG/1
60 TABLET, EXTENDED RELEASE ORAL
Status: DISCONTINUED | OUTPATIENT
Start: 2024-01-23 | End: 2024-01-24 | Stop reason: HOSPADM

## 2024-01-23 RX ADMIN — ISOSORBIDE MONONITRATE 60 MG: 30 TABLET, EXTENDED RELEASE ORAL at 12:30

## 2024-01-23 RX ADMIN — EMPAGLIFLOZIN 10 MG: 10 TABLET, FILM COATED ORAL at 12:30

## 2024-01-23 RX ADMIN — DONEPEZIL HYDROCHLORIDE 5 MG: 5 TABLET, FILM COATED ORAL at 20:39

## 2024-01-23 RX ADMIN — FUROSEMIDE 20 MG: 20 TABLET ORAL at 12:30

## 2024-01-23 RX ADMIN — CARVEDILOL 12.5 MG: 6.25 TABLET, FILM COATED ORAL at 12:30

## 2024-01-23 RX ADMIN — LEVOTHYROXINE SODIUM 50 MCG: 50 TABLET ORAL at 12:30

## 2024-01-23 RX ADMIN — Medication 10 ML: at 20:39

## 2024-01-23 NOTE — PLAN OF CARE
Pt rested in bed this shift without complaints. Pt ambulated outside of room. No s/s of acute distress noted. VSS

## 2024-01-23 NOTE — PLAN OF CARE
Goal Outcome Evaluation:   Pt has been resting this shift. Pt has complained of chest pain this shift, MD aware see orders. No signs and symptoms of acute distress noted. No complaints of SOB reported.

## 2024-01-23 NOTE — PROGRESS NOTES
[facility]  PROGRESS NOTE    Patient information:  Name: Ashely Feliciano  Age/Sex: 83 y.o. female  :  1940        PCP: Maria R Sanchez MD  Attending: Donis See MD  MRN:  [id number]  Visit Number:  [id number]    LOS:  LOS: 0 days     CODE STATUS:    Code Status and Medical Interventions:   Ordered at: 24 1604     Level Of Support Discussed With:    Patient     Code Status (Patient has no pulse and is not breathing):    CPR (Attempt to Resuscitate)     Medical Interventions (Patient has pulse or is breathing):    Full Support       PROBLEM LIST:Principal Problem:    Pacemaker lead malfunction  Active Problems:    Sick sinus syndrome      Reason for Cardiology follow-up: Pacemaker wire malfunctioning     Subjective   ADMISSION INFORMATION:  No chief complaint on file.      HPI:  Ms. Feliciano is a 83-year-old  female with history of sick sinus syndrome, status post permanent pacemaker implantation in the past, was noted to have right ventricular -lead malfunction with decrease in the impedance.  And she is brought in to put in the new right ventricular lead.     EVENT TIMELINE:   2024: New pacemaker ventricular wire and Eliquis is on HOLD for now.     Interval History:   Patient was in room 309 B when he she was seen and examined.  Patient is lying in bed resting quietly.  No acute distress noted at this time.  Patient denies any shortness of breath or palpitations.  She does report tenderness in the left upper chest wall pacemaker site.  She does report good urinary output and that her shortness of breath from yesterday has resolved. Discussed with her nurse Jodi to try to wean from oxygen.     ORDERS:   Vital signs every 8 hours  Coreg 12.5 mg PO BID  Aricept 5 mg PO daily  Jardiance 10 mg PO daily (in place of Farxiga at home)  Lasix 20 mg PO daily  Imdur 60 mg PO daily with hold parameters  Levothyroxine 50 mcg PO  daily  Full liquid diet  ECHO complete    Vital Signs  Temp:  [97.6 °F (36.4 °C)-98.4 °F (36.9 °C)] 97.9 °F (36.6 °C)  Heart Rate:  [62-73] 66  Resp:  [16-20] 18  BP: (120-154)/(66-95) 132/95  Flow (L/min):  [2-3] 3  Device (Oxygen Therapy): nasal cannula  Vital Signs (last 72 hrs)         01/20 0700  01/21 0659 01/21 0700  01/22 0659 01/22 0700 01/23 0659 01/23 0700 01/23 0822   Most Recent      Temp (°F)     97.6 -  98.4       97.9 (36.6) 01/23 0639    Heart Rate     62 -  73       66 01/23 0639    Resp     16 -  20       18 01/23 0639    BP     120/68 -  154/84       132/95 01/23 0639    SpO2 (%)     95 -  98       95 01/23 0639    Flow (L/min)     2 -  3       3 01/22 2311          BMI:Body mass index is 24.71 kg/m².    WEIGHT:      01/22/24  1249 01/22/24  1600 01/23/24  0500   Weight: 68 kg (150 lb) 68.3 kg (150 lb 9.6 oz) 67.4 kg (148 lb 8 oz)       DIET:Diet: Liquid Diets; Clear Liquid; Fluid Consistency: Thin (IDDSI 0)    I&O:  Intake & Output (last 3 days)         01/20 0701 01/21 0700 01/21 0701  01/22 0700 01/22 0701 01/23 0700 01/23 0701 01/24 0700    P.O.   240     I.V. (mL/kg)   114.4 (1.7)     Total Intake(mL/kg)   354.4 (5.3)     Urine (mL/kg/hr)   2650     Total Output   2650     Net   -2295.6                      Objective     I have seen and examined Ms. Feliciano today  Physical Exam:      General Appearance:    Alert, cooperative, in no acute distress.   Head:    Normocephalic, without obvious abnormality, atraumatic.   Eyes:                          Conjunctivae and sclerae normal, no icterus, no pallor, corneas clear.   Neck:   No adenopathy, supple, trachea midline, no thyromegaly, no carotid bruit, no JVD.   Lungs:     Clear to auscultation, respirations regular, even and             unlabored.    Heart:    Regular rhythm and normal rate, normal S1 and S2, no          murmur, no gallop, no rub, no click   Chest Wall:    No abnormalities observed.   Abdomen:     Normal bowel sounds, no  "masses, no organomegaly, soft nontender, nondistended, no guarding, no rebound tenderness.   Extremities:   Moves all extremities well, no edema, no cyanosis, no           redness.   Pulses:   Pulses palpable and equal bilaterally.   Skin:   No bleeding, bruising or rash. Dressing is dry and intact to left upper chest wall.    Neurologic:   Alert and Oriented x 3, Speech Clear & comprehensive.       Results review   Results Review:   Results from last 7 days   Lab Units 01/22/24  2351 01/22/24  2131   HSTROP T ng/L 22* 19*     Lab Results   Component Value Date    PROBNP 3,197.0 (H) 09/20/2023    PROBNP 1,442.0 07/12/2022     Results from last 7 days   Lab Units 01/22/24  1224   WBC 10*3/mm3 5.33   HEMOGLOBIN g/dL 12.8   PLATELETS 10*3/mm3 154     Results from last 7 days   Lab Units 01/22/24  1224   SODIUM mmol/L 143   POTASSIUM mmol/L 4.1   CHLORIDE mmol/L 109*   CO2 mmol/L 22.0   BUN mg/dL 15   CREATININE mg/dL 1.01*   CALCIUM mg/dL 9.4   GLUCOSE mg/dL 94     Lab Results   Component Value Date    MG 2.2 02/25/2019    MG 2.2 01/09/2014     Estimated Creatinine Clearance: 46.5 mL/min (A) (by C-G formula based on SCr of 1.01 mg/dL (H)).    No results found for: \"HGBA1C\"  Lab Results   Component Value Date    CHOL 124 07/05/2023    TRIG 110 07/05/2023    LDL 49 07/05/2023    HDL 55 07/05/2023        Lab Results   Component Value Date    INR 0.91 12/17/2015    INR 1.00 02/20/2014     No results found for: \"LABHEPA\"    Lab Results   Component Value Date    TSH 3.480 07/05/2023      Pain Management Panel          Latest Ref Rng & Units 2/25/2019   Pain Management Panel   Creatinine, Urine mg/dL  mg/dL 36.6  36.6      Microbiology Results (last 10 days)       ** No results found for the last 240 hours. **           Imaging Results (Last 24 Hours)       Procedure Component Value Units Date/Time    XR Chest 1 View [[id number]] Collected: 01/22/24 1658     Updated: 01/22/24 1701    Narrative:      EXAM:    XR Chest, 1 View   "   EXAM DATE:    1/22/2024 4:25 PM     CLINICAL HISTORY:    post pacemaker; T82.110A-Breakdown (mechanical) of cardiac electrode,  initial encounter     TECHNIQUE:    Frontal view of the chest.     COMPARISON:    6/28/2019     FINDINGS:    Lungs and pleural spaces:  Unremarkable as visualized.  No  consolidation.  No pneumothorax.    Heart:  Marked cardiomegaly.    Mediastinum:  Unremarkable as visualized.    Bones/joints:  Median sternotomy changes are noted.  Trace left  effusion.    Tubes, lines and devices:  Left cardiac pacer device.       Impression:      1.  Marked cardiomegaly with changes of prior median sternotomy.  2.  Left-sided pacer device noted in place. No pneumothorax.  3.  Trace left effusion.  4.  Otherwise stable chest        This report was finalized on 1/22/2024 4:59 PM by Dr. Martin Miguel MD.             ECHO:  Results for orders placed during the hospital encounter of 08/11/22    Adult Transthoracic Echo Complete W/ Cont if Necessary Per Protocol    Interpretation Summary  · Normal left ventricular cavity size noted.  · Left ventricular wall thickness is consistent with borderline concentric hypertrophy.  · Estimated left ventricular EF = 50%. Left ventricular systolic function is low normal.  · Left ventricular diastolic function was normal.  · The aortic valve is structurally normal with no regurgitation or stenosis present.  · Moderate mitral valve regurgitation is present with moderately dilated left atrium.  · Moderate tricuspid valve regurgitation is present.  · The right atrial and right ventricular cavity is mild to moderately dilated.  · Moderate to severe pulmonary hypertension is present. Peak RV systolic pressure is 60 mmHg.  · Pacemaker lead is noted right atrium and right ventricle.  · No pericardial effusion detected.      STRESS TEST:  Results for orders placed during the hospital encounter of 02/25/19    Stress Test With Myocardial Perfusion One Day    Interpretation  Summary  · Breast attenuation artifact is present.  · Left ventricular ejection fraction is borderline normal (Calculated EF = 48%).  · Myocardial perfusion imaging indicates a normal myocardial perfusion study with no evidence of ischemia.  · Impressions are consistent with a low risk study.  · No definite evidence of ischemia. Fixed defect noted in the anteroseptal segment which was worse on rest images.       HEART CATH:  No results found for this or any previous visit.      [id number]      TELEMETRY:      SR/paced 70s        I reviewed the patient's new clinical results.    ALLERGIES: Nicotine, Codeine, Eggs or egg-derived products, Grass, and Lisinopril    Medication Review:   Current list of medications may not reflect those currently placed in orders that are not signed or are being held.     furosemide, 20 mg, Intravenous, Once  sodium chloride, 10 mL, Intravenous, Q12H           HYDROcodone-acetaminophen    nitroglycerin    sodium chloride    sodium chloride    Assessment      Sick sinus syndrome, status post permanent pacemaker implantation in  with RV lead malfunction, status post new RV lead placement yesterday.  Acute on chronic HFpEF, improved  Pleuritic chest pain.      Plan     Switch her to oral diuretics and continue to monitor for at least 1 more day.  Obtain echo Doppler study to evaluate her LV function, valvular function and to look for any evidence of pericardial effusion.  Parents incidentally patient's vital signs have been stable since admission.  Continue to monitor O2 sats to see if she would need any home oxygen.      I have discussed the patients findings and recommendations with patient and her grandson at bedside.    Thank you very much for asking us to be involved in this patient's care.  We will follow along with you.    Electronically signed by ELIDA Feng, 24, 11:06 AM EST.     Electronically signed by Donis See MD, 24, 11:40 AM  EST.            Please note that portions of this note were completed with a voice recognition program.    Please note that portions of this note were copied and has been reviewed and is accurate as of 1/23/2024 .

## 2024-01-23 NOTE — CASE MANAGEMENT/SOCIAL WORK
Discharge Planning Assessment   [place]     Patient Name: Ashely Feliciano  MRN: [id number]  Today's Date: 1/23/2024    Admit Date: 1/22/2024    Plan: SS received a consult for advanced age & discharge planning. SS spoke with pt on this date. Pt lives alone. PCP is Maria R Sanchez. Pt does not utilize home health services at this time. Pt request home health to be arranged at discharge if needed with no preference. Pt has bedside commode and rolling walker from late . Pt has nebulizer via [employer]. Pt does not have POA/Living will. Pt plans to return home at discharge with Caden gramajo to provide transportation. SS to follow and assist with discharge planning.   Discharge Needs Assessment       Row Name 01/23/24 1740       Living Environment    People in Home alone    Current Living Arrangements home    Potentially Unsafe Housing Conditions none    Primary Care Provided by self    Provides Primary Care For no one    Family Caregiver if Needed none    Quality of Family Relationships unable to assess    Able to Return to Prior Arrangements yes       Resource/Environmental Concerns    Resource/Environmental Concerns none    Transportation Concerns none       Transition Planning    Patient/Family Anticipates Transition to home with family    Patient/Family Anticipated Services at Transition none    Transportation Anticipated family or friend will provide       Discharge Needs Assessment    Equipment Currently Used at Home commode;walker, rolling;nebulizer    Anticipated Changes Related to Illness none    Equipment Needed After Discharge none         Discharge Plan       Row Name 01/23/24 1742       Plan    Plan SS received a consult for advanced age & discharge planning. SS spoke with pt on this date. Pt lives alone. PCP is Maria R Sanchez. Pt does not utilize home health services at this time. Pt request home health to be arranged at discharge if needed with no preference. Pt has bedside  commode and rolling walker from late . Pt has nebulizer via [employer]. Pt does not have POA/Living will. Pt plans to return home at discharge with grandsonCaden to provide transportation. SS to follow and assist with discharge planning.    Patient/Family in Agreement with Plan yes          Continued Care and Services - Admitted Since 1/22/2024    Coordination has not been started for this encounter.       Expected Discharge Date and Time       Expected Discharge Date Expected Discharge Time    Jan 23, 2024            Demographic Summary       Row Name 01/23/24 2672       General Information    Admission Type inpatient    Referral Source nursing    Reason for Consult discharge planning  SS received a consult for advanced age & discharge planning.          SARAH Lorenz

## 2024-01-24 VITALS
SYSTOLIC BLOOD PRESSURE: 114 MMHG | DIASTOLIC BLOOD PRESSURE: 53 MMHG | TEMPERATURE: 97.5 F | BODY MASS INDEX: 24.53 KG/M2 | HEIGHT: 65 IN | OXYGEN SATURATION: 92 % | RESPIRATION RATE: 18 BRPM | HEART RATE: 65 BPM | WEIGHT: 147.2 LBS

## 2024-01-24 PROCEDURE — G0378 HOSPITAL OBSERVATION PER HR: HCPCS

## 2024-01-24 PROCEDURE — A9270 NON-COVERED ITEM OR SERVICE: HCPCS | Performed by: NURSE PRACTITIONER

## 2024-01-24 PROCEDURE — 63710000001 ISOSORBIDE MONONITRATE 30 MG TABLET SUSTAINED-RELEASE 24 HOUR: Performed by: NURSE PRACTITIONER

## 2024-01-24 PROCEDURE — 63710000001 APIXABAN 5 MG TABLET: Performed by: NURSE PRACTITIONER

## 2024-01-24 PROCEDURE — 63710000001 LEVOTHYROXINE 50 MCG TABLET: Performed by: NURSE PRACTITIONER

## 2024-01-24 PROCEDURE — 99234 HOSP IP/OBS SM DT SF/LOW 45: CPT | Performed by: SPECIALIST

## 2024-01-24 PROCEDURE — 63710000001 FUROSEMIDE 20 MG TABLET: Performed by: NURSE PRACTITIONER

## 2024-01-24 PROCEDURE — 63710000001 CARVEDILOL 6.25 MG TABLET: Performed by: NURSE PRACTITIONER

## 2024-01-24 PROCEDURE — 63710000001 EMPAGLIFLOZIN 10 MG TABLET: Performed by: NURSE PRACTITIONER

## 2024-01-24 RX ORDER — SACUBITRIL AND VALSARTAN 49; 51 MG/1; MG/1
1 TABLET, FILM COATED ORAL 2 TIMES DAILY
Qty: 60 TABLET | Refills: 2 | Status: SHIPPED | OUTPATIENT
Start: 2024-01-24

## 2024-01-24 RX ADMIN — EMPAGLIFLOZIN 10 MG: 10 TABLET, FILM COATED ORAL at 09:35

## 2024-01-24 RX ADMIN — Medication 10 ML: at 09:38

## 2024-01-24 RX ADMIN — APIXABAN 5 MG: 5 TABLET, FILM COATED ORAL at 12:40

## 2024-01-24 RX ADMIN — ISOSORBIDE MONONITRATE 60 MG: 30 TABLET, EXTENDED RELEASE ORAL at 09:35

## 2024-01-24 RX ADMIN — FUROSEMIDE 20 MG: 20 TABLET ORAL at 09:35

## 2024-01-24 RX ADMIN — LEVOTHYROXINE SODIUM 50 MCG: 50 TABLET ORAL at 06:09

## 2024-01-24 RX ADMIN — CARVEDILOL 12.5 MG: 6.25 TABLET, FILM COATED ORAL at 09:35

## 2024-01-24 NOTE — DISCHARGE SUMMARY
"    [facility]  DISCHARGE SUMMARY       Patient Identification:  Name:  Ashely Feliciano  Age:  83 y.o.  Sex:  female  :  1940  MRN:  [id number]  Visit Number:  [id number]    Date of Admission: 2024  Date of Discharge:  2024   LOS: 0 days       PCP: Maria R Sanchez MD    DISCHARGE DIAGNOSIS  Pacemaker wire malfunctioning      CONSULTS   none    PROCEDURES PERFORMED  Pacemaker wire replaced          HOSPITAL COURSE  Ms. Feliciano is a 83-year-old  female with history of sick sinus syndrome, status post permanent pacemaker implantation in the past, was noted to have right ventricular -lead malfunction with decrease in the impedance.  And she is brought in to put in the new right ventricular lead.      Please see the admitting history and physical for further details.      Patient did develop some shortness of breath soon after pacemaker wire was replaced.  She was treated with IV Lasix and responded well.  She was transitioned back to p.o. Lasix 2024.  Plan to decrease Entresto to 49/51 mg and keep other home medications as previously prescribed.      CONDITION ON DISCHARGE  Stable.    VITAL SIGNS  /53 (BP Location: Right arm, Patient Position: Lying)   Pulse 65   Temp 97.5 °F (36.4 °C) (Oral)   Resp 18   Ht 165.1 cm (65\")   Wt 66.8 kg (147 lb 3.2 oz)   SpO2 92%   BMI 24.50 kg/m²     DISCHARGE PHYSICAL EXAM  Physical Exam        General Appearance:    Alert, cooperative, in no acute distress.   Head:    Normocephalic, without obvious abnormality, atraumatic.   Eyes:                          Conjunctivae and sclerae normal, no icterus, no pallor, corneas clear.   Neck:   No adenopathy, supple, trachea midline, no thyromegaly, no carotid bruit, no JVD.   Lungs:    Clear to auscultation, respirations regular, even and             unlabored.    Heart:    Regular rhythm and normal rate, normal S1 and S2, no          murmur, " "no gallop, no rub, no click   Chest Wall:    No abnormalities observed.   Abdomen:     Normal bowel sounds, no masses, no organomegaly, soft      non-tender, non-distended, no guarding, no rebound                tenderness.   Extremities:   Moves all extremities well, no edema, no cyanosis, no           redness.   Pulses:   Pulses palpable and equal bilaterally.   Skin:   No bleeding, bruising or rash.  Pacemaker dressing intact and dry.  Sling in use with left arm.   Neurologic:   Alert and Oriented x 3, Speech Clear & comprehensive.      Results Review:   Results from last 7 days   Lab Units 01/22/24  1224   WBC 10*3/mm3 5.33   HEMOGLOBIN g/dL 12.8   PLATELETS 10*3/mm3 154     Results from last 7 days   Lab Units 01/22/24  1224   SODIUM mmol/L 143   POTASSIUM mmol/L 4.1   CHLORIDE mmol/L 109*   CO2 mmol/L 22.0   BUN mg/dL 15   CREATININE mg/dL 1.01*   CALCIUM mg/dL 9.4   GLUCOSE mg/dL 94     Results from last 7 days   Lab Units 01/22/24  2351 01/22/24  2131   HSTROP T ng/L 22* 19*     No components found for: \"HEPARINANTI-XA\"  Lab Results   Component Value Date    INR 0.91 12/17/2015    INR 1.00 02/20/2014     Lab Results   Component Value Date    MG 2.2 02/25/2019    MG 2.2 01/09/2014     Lab Results   Component Value Date    TSH 3.480 07/05/2023    CHLPL 219 (H) 04/05/2016    TRIG 110 07/05/2023    HDL 55 07/05/2023    LDL 49 07/05/2023      Lab Results   Component Value Date    .0 (H) 02/28/2019    .0 (H) 02/27/2019    .0 (H) 02/26/2019          ECHO:  Results for orders placed during the hospital encounter of 01/22/24    Adult Transthoracic Echo Complete W/ Cont if Necessary Per Protocol    Interpretation Summary    Normal left ventricular cavity size and wall thickness noted.    Left ventricular ejection fraction appears to be 46 - 50%.    There is calcification of the aortic valve. The aortic valve appears trileaflet. Mild aortic valve regurgitation is present. No hemodynamically " significant aortic valve stenosis is present.    There are myxomatous changes of the mitral valve apparatus present. Moderate mitral valve regurgitation is present.    Moderate to severe tricuspid valve regurgitation is present. Estimated right ventricular systolic pressure from tricuspid regurgitation is moderately elevated (45-55 mmHg). Moderate pulmonary hypertension is present.    There is no evidence of pericardial effusion.      STRESS TEST:  No results found for this or any previous visit.      HEART CATH:  No results found for this or any previous visit.      DISCHARGE DISPOSITION   Home or Self Care    DISCHARGE MEDICATIONS     Discharge Medications        New Medications        Instructions Start Date   Entresto 49-51 MG tablet  Generic drug: sacubitril-valsartan  Replaces: Entresto  MG tablet   1 tablet, Oral, 2 Times Daily             Continue These Medications        Instructions Start Date   budesonide-formoterol 160-4.5 MCG/ACT inhaler  Commonly known as: SYMBICORT   2 puffs, Inhalation, 2 Times Daily PRN      carvedilol 12.5 MG tablet  Commonly known as: COREG   12.5 mg, Oral, 2 Times Daily With Meals      dicyclomine 10 MG capsule  Commonly known as: BENTYL   10 mg, Oral, 3 times daily      donepezil 5 MG tablet  Commonly known as: ARICEPT   5 mg, Oral, Nightly      Eliquis 2.5 MG tablet tablet  Generic drug: apixaban   2.5 mg, Oral, Every 12 Hours      esomeprazole 40 MG capsule  Commonly known as: nexIUM   40 mg, Oral, Daily      Farxiga 5 MG tablet tablet  Generic drug: dapagliflozin   5 mg, Oral, Daily      furosemide 20 MG tablet  Commonly known as: LASIX   20 mg, Oral, Daily      ipratropium 0.02 % nebulizer solution  Commonly known as: ATROVENT   0.5 mg, Nebulization, 4 Times Daily PRN      ipratropium 17 MCG/ACT inhaler  Commonly known as: ATROVENT HFA   2 puffs, Inhalation, Every 4 Hours PRN      isosorbide mononitrate 60 MG 24 hr tablet  Commonly known as: IMDUR   60 mg, Oral,  Daily      levothyroxine 50 MCG tablet  Commonly known as: SYNTHROID, LEVOTHROID   50 mcg, Oral, Daily      montelukast 10 MG tablet  Commonly known as: SINGULAIR   10 mg, Oral, Nightly      nitroglycerin 0.4 MG SL tablet  Commonly known as: NITROSTAT   0.4 mg, Sublingual, Every 5 Minutes PRN, Take no more than 3 doses in 15 minutes.      pitavastatin calcium 1 MG tablet tablet  Commonly known as: LIVALO   1 mg, Oral, 3 Times Weekly             Stop These Medications      Entresto  MG tablet  Generic drug: sacubitril-valsartan  Replaced by: Entresto 49-51 MG tablet                       Your Scheduled Appointments      Mar 18, 2024  1:45 PM  Follow Up with Maria R Sanchez MD  [facility] CARDIOLOGY ([place]) [address]  [facility] [address]  [phone]   -Bring photo ID, insurance card, and list of medications to appointment  -If testing was completed outside of [facility] then patient must bring images on a disc  -Copay will be collected at time of appointment  -Established patients should arrive 10 minutes prior to appointment         Apr 10, 2024  2:00 PM  Disease State Management Follow-up with [facility]  [facility] ([place]) [address]  [facility] [address]  [phone]        Apr 25, 2024  1:00 PM  Follow Up with Kiersten Mosqueda PA-C  [facility] PULMONARY & CRITICAL CARE MEDICINE ([place]) [address]  [facility] [address]  [phone]   Established: Please bring outside images or reports.                    TEST  RESULTS PENDING AT DISCHARGE  NONE        Pacemaker Discharge Instructions:   If there is any bleeding or increased pain, patient needs to follow up in office as soon as possible. Otherwise, patient needs to come to office on Friday 01/26/2024 to have dressing checked before 12 noon and then again on Monday 01/29/2024 to have staples removed before or after lunch. No appointment is needed for  either visit.  Sponge baths ONLY until after staples are removed.   DO NOT raise left arm above head / upward for at least 4 weeks after the pacemaker is implanted and staples are removed. Patient does not have to use the swath unless they choose to. However, patient does need to use the SLING until sutures are removed on Monday 01/29/2024.   Patient needs to keep their left arm in the sling most of the time. Patient can remove their left arm out of the sling a few times each day (4-5) for light/ mild exercise in the shoulder, elbow, wrist and hand until they are seen in the office to have staples removed.  As long as they DO NOT raise their left arm upward / above your head.      Restart Eliquis this AM before being discharged home as previously prescribed.      Patient will need to follow up in Dr. Sanchez's office at the [place], [place] within 3 weeks after staples are removed for recheck.      Office phone number for Dr See is [phone] for any other questions or concerns.   Office number for Dr. Sanchez is [phone] for any questions or concerns.     I Jose Corley MD attest above note and agree with the plan    ELIDA Feng [facility]01/24/2411:58 EST      Electronically signed by ELIDA Feng, 01/24/24, 12:03 PM EST.   Electronically signed by Jose Corley MD, 01/24/24, 12:07 PM EST.              Time: greater than 30 minutes.  Please send a copy of this dictation to the following providers:  Maria R Sanchez MD  ----------------------------------------------------------------------------------------------------------------------  Please note that portions of this note were completed with a voice recognition program.    Please note that portions of this note were copied and has been reviewed and is accurate as of date.

## 2024-01-24 NOTE — PLAN OF CARE
Goal Outcome Evaluation: Patient has been very pleasant today. Compliant with all medications and care as ordered. She remains on RA, saturations maintaining well above 90%. Sling remains in place to L arm. Dressing to L Chest Wall remains C/D/I. Family has been at bedside, updated on plan of care. External Cath remains in place, good output noted. She is to be DC on this date. IV and Telemetry monitor removed. Will continue with plan of care.     DC Papers reviewed with patient, understanding verbalized. Patient walking out now.

## 2024-01-24 NOTE — DISCHARGE INSTRUCTIONS
Pacemaker Discharge Instructions:   If there is any bleeding or increased pain, patient needs to follow up in office as soon as possible. Otherwise, patient needs to come to office on Friday 01/26/2024 to have dressing checked before 12 noon and then again on Monday 01/29/2024 to have staples removed before or after lunch. No appointment is needed for either visit.  Sponge baths ONLY until after staples are removed.   DO NOT raise left arm above head / upward for at least 4 weeks after the pacemaker is implanted and staples are removed. Patient does not have to use the swath unless they choose to. However, patient does need to use the SLING until sutures are removed on Monday 01/29/2024.   Patient needs to keep their left arm in the sling most of the time. Patient can remove their left arm out of the sling a few times each day (4-5) for light/ mild exercise in the shoulder, elbow, wrist and hand until they are seen in the office to have staples removed.  As long as they DO NOT raise their left arm upward / above your head.     Restart Eliquis this AM before being discharged home as previously prescribed.      Patient will need to follow up in Dr. Sanchez's office at the [place], [place] within 3 weeks after staples are removed for recheck.     Office phone number for Dr See is [phone] for any other questions or concerns.   Office number for Dr. Sanchez is [phone] for any questions or concerns.

## 2024-01-24 NOTE — CASE MANAGEMENT/SOCIAL WORK
Discharge Planning Assessment   [place]     Patient Name: Ashely Feliciano  MRN: [id number]  Today's Date: 1/24/2024    Admit Date: 1/22/2024       Discharge Plan       Row Name 01/24/24 1245       Plan    Final Discharge Disposition Code 01 - home or self-care    Final Note Pt to be discharged home.                  Continued Care and Services - Admitted Since 1/22/2024    Coordination has not been started for this encounter.       Expected Discharge Date and Time       Expected Discharge Date Expected Discharge Time    Jan 24, 2024           SARAH Medina

## 2024-01-24 NOTE — PLAN OF CARE
Goal Outcome Evaluation:   VSS on RA, paced rhythm on the monitor. No indicators of acute distress noted. Pacemaker dressing CDI.

## 2024-01-26 ENCOUNTER — CLINICAL SUPPORT (OUTPATIENT)
Dept: CARDIOLOGY | Facility: CLINIC | Age: 84
End: 2024-01-26
Payer: MEDICARE

## 2024-01-29 ENCOUNTER — CLINICAL SUPPORT (OUTPATIENT)
Dept: CARDIOLOGY | Facility: CLINIC | Age: 84
End: 2024-01-29
Payer: MEDICARE

## 2024-01-30 NOTE — PROGRESS NOTES
Pt is here today for a wound check, removed old bandage inspected wound, no signs of infection noted. Advised pt and family member with her on signs of infection. Placed new bandage.

## 2024-01-30 NOTE — PROGRESS NOTES
Pt was here today for staple removal, minimal bruising at the time. Removed staple and then placed steri strips. Pt had questions about driving as she is Dr. Sanchez's pt we advised her that she would need to ask him when she is able to begin driving again.

## 2024-02-20 RX ORDER — DAPAGLIFLOZIN 5 MG/1
5 TABLET, FILM COATED ORAL DAILY
Qty: 30 TABLET | Refills: 1 | Status: SHIPPED | OUTPATIENT
Start: 2024-02-20

## 2024-02-21 ENCOUNTER — OFFICE VISIT (OUTPATIENT)
Dept: CARDIOLOGY | Facility: CLINIC | Age: 84
End: 2024-02-21
Payer: MEDICARE

## 2024-02-21 VITALS
HEIGHT: 65 IN | HEART RATE: 70 BPM | OXYGEN SATURATION: 100 % | DIASTOLIC BLOOD PRESSURE: 80 MMHG | RESPIRATION RATE: 16 BRPM | WEIGHT: 141.4 LBS | BODY MASS INDEX: 23.56 KG/M2 | SYSTOLIC BLOOD PRESSURE: 123 MMHG

## 2024-02-21 DIAGNOSIS — I25.10 CORONARY ARTERY DISEASE INVOLVING NATIVE CORONARY ARTERY OF NATIVE HEART WITHOUT ANGINA PECTORIS: ICD-10-CM

## 2024-02-21 DIAGNOSIS — I50.22 CHRONIC HFREF (HEART FAILURE WITH REDUCED EJECTION FRACTION): Primary | ICD-10-CM

## 2024-02-21 DIAGNOSIS — I48.20 CHRONIC A-FIB: ICD-10-CM

## 2024-02-21 DIAGNOSIS — Z95.0 PACEMAKER: ICD-10-CM

## 2024-02-21 PROCEDURE — 3074F SYST BP LT 130 MM HG: CPT | Performed by: INTERNAL MEDICINE

## 2024-02-21 PROCEDURE — 3079F DIAST BP 80-89 MM HG: CPT | Performed by: INTERNAL MEDICINE

## 2024-02-21 PROCEDURE — 99214 OFFICE O/P EST MOD 30 MIN: CPT | Performed by: INTERNAL MEDICINE

## 2024-02-21 RX ORDER — FLUCONAZOLE 150 MG/1
150 TABLET ORAL DAILY
Qty: 3 TABLET | Refills: 0 | Status: SHIPPED | OUTPATIENT
Start: 2024-02-21

## 2024-02-21 NOTE — PROGRESS NOTES
subjective     Chief Complaint   Patient presents with    Follow-up    Med Management       Problems Addressed This Visit  1. Chronic HFrEF (heart failure with reduced ejection fraction)    2. Chronic a-fib*    3. Coronary artery disease involving native coronary artery of native heart without angina pectoris    4. Pacemaker dual-chamber pacemaker Biotronik December 2015*        History of Present Illness  Patient is 83 years old white female who is here for follow-up.  She recently had pacemaker lead replaced.  She says that she is feeling much better now and is able to walk around in her yard without getting very short of breath.    Her blood pressure was running low so her Entresto dose was decreased she is currently taking 49/51 twice daily and is doing very well.    Blood pressure is very well-controlled at this time.  Hyperlipidemia on Livalo therapy she can only take Livalo 1 mg every other day.    Hypothyroidism on Synthroid replacement therapy clinically euthyroid.    She complains of yeast infection probably from Farxiga  Clinginess and dryness emphasized.  She was given Diflucan.  She is anticoagulated with Eliquis no abnormal bleeding or bruising.    Past Surgical History:   Procedure Laterality Date    CARDIAC ELECTROPHYSIOLOGY PROCEDURE N/A 1/22/2024    Procedure: Lead Revision RA or RV, PPM or ICD;  Surgeon: Donis See MD;  Location:  [facility];  Service: Cardiovascular;  Laterality: N/A;  RV lead replacement    CARDIAC ELECTROPHYSIOLOGY PROCEDURE N/A 1/22/2024    Procedure: Pacemaker lead replacement;  Surgeon: Donis See MD;  Location:  [facility];  Service: Cardiovascular;  Laterality: N/A;    CARDIAC SURGERY  1997    CARDIOVASCULAR STRESS TEST  06/2014    CATARACT EXTRACTION, BILATERAL      CHOLECYSTECTOMY  2002    COLONOSCOPY  05/2012    ECHO - CONVERTED  07/2014    PACEMAKER IMPLANTATION  12/17/2015     Family History   Problem Relation Age of  Onset    Coronary artery disease Other     Hypertension Other     Diabetes Other     Heart attack Other     Heart attack Mother 62        fatal MI    Skin cancer Mother     Diabetes type II Mother     Melanoma Mother     Heart attack Father 63        fatal MI    Melanoma Sister 45    Skin cancer Sister     Heart attack Brother 62        fatal MI    Heart attack Brother 80        Fatal MI    Skin cancer Brother     Heart disease Sister     Heart disease Brother 75        pacemaker, CABG, Stents    Osteoporosis Sister     Pneumonia Brother     Hypertension Brother     Heart attack Sister 54        Fatal MI    Heart attack Brother 59        Fatal MI    Breast cancer Neg Hx      Past Medical History:   Diagnosis Date    Atrial fibrillation     CAD (coronary artery disease)     Chronic a-fib     Chronic anticoagulation     Congestive heart failure     compensated    Disease of thyroid gland     Hyperlipidemia     Hypertension     Hypoxemia     Ischemic cardiomyopathy with severe LV Disfunction     Myocardial infarction     Pacemaker     VVI     Patient Active Problem List   Diagnosis    CAD (coronary artery disease) status post CABG 1997 single-vessel*    Ischemic cardiomyopathy with apical hypokinesis LV ejection fraction 50%.    Chronic HFrEF (heart failure with reduced ejection fraction)    Hyperlipidemia*    Chronic a-fib*    Pacemaker dual-chamber pacemaker Biotronik December 2015*    Hypothyroidism*    Hypertension*    Chronic anticoagulation*    Gastroesophageal reflux disease without esophagitis*    URI (upper respiratory infection)    Herpes zoster without complication    Acute midline low back pain without sciatica    Centrilobular emphysema    Asthmatic bronchitis , chronic    Pulmonary hypertension    Seasonal allergic rhinitis    Post zoster neuralgia    Memory loss    Diarrhea    Atrial fibrillation    Pacemaker lead malfunction    Sick sinus syndrome       Social History     Tobacco Use    Smoking status:  Former     Packs/day: 0.25     Years: 17.00     Additional pack years: 0.00     Total pack years: 4.25     Types: Cigarettes     Quit date: 1990     Years since quittin.1    Smokeless tobacco: Never   Vaping Use    Vaping Use: Never used   Substance Use Topics    Alcohol use: No    Drug use: No       Allergies   Allergen Reactions    Nicotine Hives    Codeine Rash    Eggs Or Egg-Derived Products Itching and Rash     Patient states she can now tolerate eggs (24)    Grass Itching and Rash    Lisinopril Rash       Current Outpatient Medications on File Prior to Visit   Medication Sig    apixaban (Eliquis) 5 MG tablet tablet Take 1 tablet by mouth Every 12 (Twelve) Hours.    budesonide-formoterol (SYMBICORT) 160-4.5 MCG/ACT inhaler Inhale 2 puffs 2 (Two) Times a Day As Needed.    carvedilol (COREG) 12.5 MG tablet Take 1 tablet by mouth 2 (Two) Times a Day With Meals.    dicyclomine (BENTYL) 10 MG capsule Take 1 capsule by mouth 3 times a day.    donepezil (ARICEPT) 5 MG tablet Take 1 tablet by mouth Every Night.    esomeprazole (nexIUM) 40 MG capsule Take 1 capsule by mouth Daily.    Farxiga 5 MG tablet tablet TAKE 1 TABLET BY MOUTH DAILY    furosemide (LASIX) 20 MG tablet Take 1 tablet by mouth Daily.    ipratropium (ATROVENT) 0.02 % nebulizer solution Take 2.5 mL by nebulization 4 (Four) Times a Day As Needed for Wheezing or Shortness of Air.    isosorbide mononitrate (IMDUR) 60 MG 24 hr tablet Take 1 tablet by mouth Daily.    levothyroxine (SYNTHROID, LEVOTHROID) 50 MCG tablet Take 1 tablet by mouth Daily.    montelukast (SINGULAIR) 10 MG tablet TAKE 1 TABLET BY MOUTH EVERY NIGHT    nitroglycerin (NITROSTAT) 0.4 MG SL tablet Place 1 tablet under the tongue Every 5 (Five) Minutes As Needed for Chest Pain. Take no more than 3 doses in 15 minutes.    pitavastatin calcium (LIVALO) 1 MG tablet tablet Take 1 tablet by mouth 3 (Three) Times a Week.    sacubitril-valsartan (Entresto) 49-51 MG tablet Take 1  "tablet by mouth 2 (Two) Times a Day.    ipratropium (ATROVENT HFA) 17 MCG/ACT inhaler Inhale 2 puffs Every 4 (Four) Hours As Needed for Wheezing (or shortness of air) for up to 90 days.     No current facility-administered medications on file prior to visit.         The following portions of the patient's history were reviewed and updated as appropriate: allergies, current medications, past family history, past medical history, past social history, past surgical history and problem list.    Review of Systems   Constitutional: Negative.   HENT: Negative.  Negative for congestion.    Eyes: Negative.    Cardiovascular: Negative.  Negative for chest pain, cyanosis, dyspnea on exertion, irregular heartbeat, leg swelling, near-syncope, orthopnea, palpitations, paroxysmal nocturnal dyspnea and syncope.   Respiratory: Negative.  Negative for shortness of breath.    Hematologic/Lymphatic: Negative.    Musculoskeletal: Negative.    Gastrointestinal: Negative.    Neurological: Negative.  Negative for headaches.          Objective:     /80 (BP Location: Left arm, Patient Position: Sitting, Cuff Size: Adult)   Pulse 70   Resp 16   Ht 165.1 cm (65\")   Wt 64.1 kg (141 lb 6.4 oz)   SpO2 100%   BMI 23.53 kg/m²   Pulmonary:      Effort: Pulmonary effort is normal.      Breath sounds: Normal breath sounds. No stridor. No wheezing. No rhonchi. No rales.   Cardiovascular:      PMI at left midclavicular line. Normal rate. Regular rhythm. Normal S1. Normal S2.       Murmurs: There is no murmur.      No gallop.  No click. No rub.   Pulses:     Intact distal pulses.   Edema:     Peripheral edema absent.           Lab Review  Lab Results   Component Value Date     01/22/2024    K 4.1 01/22/2024     (H) 01/22/2024    BUN 15 01/22/2024    CREATININE 1.01 (H) 01/22/2024    GLUCOSE 94 01/22/2024    CALCIUM 9.4 01/22/2024    ALT 8 01/05/2024    ALKPHOS 101 01/05/2024    LABIL2 1.3 (L) 04/05/2016     Lab Results   Component " Value Date    CKTOTAL 51 09/20/2023     Lab Results   Component Value Date    WBC 5.33 01/22/2024    HGB 12.8 01/22/2024    HCT 38.7 01/22/2024     01/22/2024     Lab Results   Component Value Date    INR 0.91 12/17/2015     Lab Results   Component Value Date    MG 2.2 02/25/2019     Lab Results   Component Value Date    TSH 3.480 07/05/2023     Lab Results   Component Value Date    .0 (H) 02/28/2019     Lab Results   Component Value Date    CHLPL 219 (H) 04/05/2016    CHOL 124 07/05/2023    TRIG 110 07/05/2023    HDL 55 07/05/2023    VLDL 20 07/05/2023    LDL 49 07/05/2023     Lab Results   Component Value Date    LDL 49 07/05/2023    LDL 58 01/09/2023     Lab Results   Component Value Date    PROBNP 3,197.0 (H) 09/20/2023               Procedures       I personally viewed and interpreted the patient's LAB data         Assessment:     1. Chronic HFrEF (heart failure with reduced ejection fraction)    2. Chronic a-fib*    3. Coronary artery disease involving native coronary artery of native heart without angina pectoris    4. Pacemaker dual-chamber pacemaker Biotronik December 2015*          Plan:   Ischemic cardiomyopathy with chronic HFrEF.  She is doing very well with Entresto Coreg and diuretic therapy which will be continued.    Blood pressure is very well-controlled she will continue Entresto 49/51 twice daily along with Coreg.    Chronic atrial fibrillation  Eliquis continued no abnormal bleeding or bruising.    Recently pacemaker lead change site is well-healed and clean.    Hypothyroidism on Synthroid replacement therapy clinically euthyroid    Hyperlipidemia she will continue Livalo.  Follow-up scheduled           No follow-ups on file.

## 2024-03-14 ENCOUNTER — TELEPHONE (OUTPATIENT)
Dept: CARDIOLOGY | Facility: CLINIC | Age: 84
End: 2024-03-14
Payer: MEDICARE

## 2024-03-14 NOTE — TELEPHONE ENCOUNTER
I  called patient to let them know that their monitor in not connecting to their device.  I requested they check to see if their remote monitor is plugged in and displays a green ok.  If not plugged in, please plug up, if displays a green ok then monitor needs rebooted.  Monitor was unplugged, and she has plugged it back in

## 2024-04-12 ENCOUNTER — LAB (OUTPATIENT)
Dept: LAB | Facility: HOSPITAL | Age: 84
End: 2024-04-12
Payer: MEDICARE

## 2024-04-12 DIAGNOSIS — E78.2 MIXED HYPERLIPIDEMIA: ICD-10-CM

## 2024-04-12 DIAGNOSIS — E03.9 ACQUIRED HYPOTHYROIDISM: ICD-10-CM

## 2024-04-12 PROCEDURE — 84443 ASSAY THYROID STIM HORMONE: CPT

## 2024-04-12 PROCEDURE — 82550 ASSAY OF CK (CPK): CPT

## 2024-04-12 PROCEDURE — 80053 COMPREHEN METABOLIC PANEL: CPT

## 2024-04-12 PROCEDURE — 85025 COMPLETE CBC W/AUTO DIFF WBC: CPT

## 2024-04-12 PROCEDURE — 80061 LIPID PANEL: CPT

## 2024-04-12 PROCEDURE — 84439 ASSAY OF FREE THYROXINE: CPT

## 2024-04-12 PROCEDURE — 36415 COLL VENOUS BLD VENIPUNCTURE: CPT

## 2024-04-13 LAB
ALBUMIN SERPL-MCNC: 4.3 G/DL (ref 3.5–5.2)
ALBUMIN/GLOB SERPL: 1.7 G/DL
ALP SERPL-CCNC: 111 U/L (ref 39–117)
ALT SERPL W P-5'-P-CCNC: 19 U/L (ref 1–33)
ANION GAP SERPL CALCULATED.3IONS-SCNC: 8 MMOL/L (ref 5–15)
AST SERPL-CCNC: 20 U/L (ref 1–32)
BASOPHILS # BLD AUTO: 0.11 10*3/MM3 (ref 0–0.2)
BASOPHILS NFR BLD AUTO: 2.4 % (ref 0–1.5)
BILIRUB SERPL-MCNC: 0.7 MG/DL (ref 0–1.2)
BUN SERPL-MCNC: 22 MG/DL (ref 8–23)
BUN/CREAT SERPL: 19.5 (ref 7–25)
CALCIUM SPEC-SCNC: 9.2 MG/DL (ref 8.6–10.5)
CHLORIDE SERPL-SCNC: 108 MMOL/L (ref 98–107)
CHOLEST SERPL-MCNC: 121 MG/DL (ref 0–200)
CK SERPL-CCNC: 45 U/L (ref 20–180)
CO2 SERPL-SCNC: 24 MMOL/L (ref 22–29)
CREAT SERPL-MCNC: 1.13 MG/DL (ref 0.57–1)
DEPRECATED RDW RBC AUTO: 41.8 FL (ref 37–54)
EGFRCR SERPLBLD CKD-EPI 2021: 49 ML/MIN/1.73
EOSINOPHIL # BLD AUTO: 0.21 10*3/MM3 (ref 0–0.4)
EOSINOPHIL NFR BLD AUTO: 4.6 % (ref 0.3–6.2)
ERYTHROCYTE [DISTWIDTH] IN BLOOD BY AUTOMATED COUNT: 12.5 % (ref 12.3–15.4)
GLOBULIN UR ELPH-MCNC: 2.5 GM/DL
GLUCOSE SERPL-MCNC: 85 MG/DL (ref 65–99)
HCT VFR BLD AUTO: 38.1 % (ref 34–46.6)
HDLC SERPL-MCNC: 61 MG/DL (ref 40–60)
HGB BLD-MCNC: 12.2 G/DL (ref 12–15.9)
LDLC SERPL CALC-MCNC: 44 MG/DL (ref 0–100)
LDLC/HDLC SERPL: 0.73 {RATIO}
LYMPHOCYTES # BLD AUTO: 0.83 10*3/MM3 (ref 0.7–3.1)
LYMPHOCYTES NFR BLD AUTO: 18.2 % (ref 19.6–45.3)
MCH RBC QN AUTO: 29.3 PG (ref 26.6–33)
MCHC RBC AUTO-ENTMCNC: 32 G/DL (ref 31.5–35.7)
MCV RBC AUTO: 91.6 FL (ref 79–97)
MONOCYTES # BLD AUTO: 0.33 10*3/MM3 (ref 0.1–0.9)
MONOCYTES NFR BLD AUTO: 7.2 % (ref 5–12)
NEUTROPHILS NFR BLD AUTO: 3.08 10*3/MM3 (ref 1.7–7)
NEUTROPHILS NFR BLD AUTO: 67.4 % (ref 42.7–76)
PLATELET # BLD AUTO: 128 10*3/MM3 (ref 140–450)
PMV BLD AUTO: 13.3 FL (ref 6–12)
POTASSIUM SERPL-SCNC: 4.9 MMOL/L (ref 3.5–5.2)
PROT SERPL-MCNC: 6.8 G/DL (ref 6–8.5)
RBC # BLD AUTO: 4.16 10*6/MM3 (ref 3.77–5.28)
SODIUM SERPL-SCNC: 140 MMOL/L (ref 136–145)
T4 FREE SERPL-MCNC: 1.31 NG/DL (ref 0.93–1.7)
TRIGL SERPL-MCNC: 78 MG/DL (ref 0–150)
TSH SERPL DL<=0.05 MIU/L-ACNC: 4.45 UIU/ML (ref 0.27–4.2)
VLDLC SERPL-MCNC: 16 MG/DL (ref 5–40)
WBC NRBC COR # BLD AUTO: 4.57 10*3/MM3 (ref 3.4–10.8)

## 2024-04-18 ENCOUNTER — OFFICE VISIT (OUTPATIENT)
Dept: CARDIOLOGY | Facility: CLINIC | Age: 84
End: 2024-04-18
Payer: MEDICARE

## 2024-04-18 VITALS
OXYGEN SATURATION: 97 % | WEIGHT: 144 LBS | BODY MASS INDEX: 23.99 KG/M2 | DIASTOLIC BLOOD PRESSURE: 72 MMHG | HEIGHT: 65 IN | HEART RATE: 60 BPM | SYSTOLIC BLOOD PRESSURE: 138 MMHG

## 2024-04-18 DIAGNOSIS — I25.10 CORONARY ARTERY DISEASE INVOLVING NATIVE CORONARY ARTERY OF NATIVE HEART WITHOUT ANGINA PECTORIS: Primary | ICD-10-CM

## 2024-04-18 DIAGNOSIS — I10 PRIMARY HYPERTENSION: ICD-10-CM

## 2024-04-18 DIAGNOSIS — I50.22 CHRONIC HFREF (HEART FAILURE WITH REDUCED EJECTION FRACTION): ICD-10-CM

## 2024-04-18 DIAGNOSIS — Z79.01 CHRONIC ANTICOAGULATION: ICD-10-CM

## 2024-04-18 DIAGNOSIS — I48.20 CHRONIC A-FIB: ICD-10-CM

## 2024-04-18 DIAGNOSIS — E78.2 MIXED HYPERLIPIDEMIA: ICD-10-CM

## 2024-04-18 DIAGNOSIS — Z95.0 PACEMAKER: ICD-10-CM

## 2024-04-18 RX ORDER — LEVOTHYROXINE SODIUM 0.07 MG/1
75 TABLET ORAL DAILY
Qty: 90 TABLET | Refills: 1 | Status: SHIPPED | OUTPATIENT
Start: 2024-04-18

## 2024-04-18 NOTE — PROGRESS NOTES
subjective     Chief Complaint   Patient presents with    Coronary Artery Disease     Follow up    Results     labs    Med Refill     pending       Problems Addressed This Visit  1. Coronary artery disease involving native coronary artery of native heart without angina pectoris    2. Chronic a-fib*    3. Chronic HFrEF (heart failure with reduced ejection fraction)    4. Mixed hyperlipidemia    5. Primary hypertension    6. Pacemaker dual-chamber pacemaker Biotronik December 2015*    7. Chronic anticoagulation*        History of Present Illness  Patient is 83 years old white female who has history of coronary artery disease status post CABG.  He denies any chest pain palpitations or shortness of breath.    She also has history of chronic HFrEF with ejection fraction around 50%  She is doing very well with current medications.  She is taking Coreg 12.5 twice daily along with Lasix.  She discontinued Farxiga however she is taking Entresto 49/51 twice daily.  She is doing much better no orthopnea PND or ankle edema.    Hyperlipidemia  She is taking Livalo and is doing very well with that.    Chronic atrial fibrillation and dual-chamber pacemaker on anticoagulation with Eliquis.  Pacemaker function has been normal.  She denies any dizziness syncope or presyncope.        Past Surgical History:   Procedure Laterality Date    CARDIAC ELECTROPHYSIOLOGY PROCEDURE N/A 1/22/2024    Procedure: Lead Revision RA or RV, PPM or ICD;  Surgeon: Donis See MD;  Location:  [facility];  Service: Cardiovascular;  Laterality: N/A;  RV lead replacement    CARDIAC ELECTROPHYSIOLOGY PROCEDURE N/A 1/22/2024    Procedure: Pacemaker lead replacement;  Surgeon: Donis See MD;  Location:  [facility];  Service: Cardiovascular;  Laterality: N/A;    CARDIAC SURGERY  1997    CARDIOVASCULAR STRESS TEST  06/2014    CATARACT EXTRACTION, BILATERAL      CHOLECYSTECTOMY  2002    COLONOSCOPY  05/2012    ECHO -  CONVERTED  07/2014    PACEMAKER IMPLANTATION  12/17/2015     Family History   Problem Relation Age of Onset    Coronary artery disease Other     Hypertension Other     Diabetes Other     Heart attack Other     Heart attack Mother 62        fatal MI    Skin cancer Mother     Diabetes type II Mother     Melanoma Mother     Heart attack Father 63        fatal MI    Melanoma Sister 45    Skin cancer Sister     Heart attack Brother 62        fatal MI    Heart attack Brother 80        Fatal MI    Skin cancer Brother     Heart disease Sister     Heart disease Brother 75        pacemaker, CABG, Stents    Osteoporosis Sister     Pneumonia Brother     Hypertension Brother     Heart attack Sister 54        Fatal MI    Heart attack Brother 59        Fatal MI    Breast cancer Neg Hx      Past Medical History:   Diagnosis Date    Atrial fibrillation     CAD (coronary artery disease)     Chronic a-fib     Chronic anticoagulation     Congestive heart failure     compensated    Disease of thyroid gland     Hyperlipidemia     Hypertension     Hypoxemia     Ischemic cardiomyopathy with severe LV Disfunction     Myocardial infarction     Pacemaker     VVI     Patient Active Problem List   Diagnosis    CAD (coronary artery disease) status post CABG 1997 single-vessel*    Ischemic cardiomyopathy with apical hypokinesis LV ejection fraction 50%.    Chronic HFrEF (heart failure with reduced ejection fraction)    Hyperlipidemia*    Chronic a-fib*    Pacemaker dual-chamber pacemaker Biotronik December 2015*    Hypothyroidism*    Hypertension*    Chronic anticoagulation*    Gastroesophageal reflux disease without esophagitis*    URI (upper respiratory infection)    Herpes zoster without complication    Acute midline low back pain without sciatica    Centrilobular emphysema    Asthmatic bronchitis , chronic    Pulmonary hypertension    Seasonal allergic rhinitis    Post zoster neuralgia    Memory loss    Diarrhea    Pacemaker lead malfunction     Sick sinus syndrome       Social History     Tobacco Use    Smoking status: Former     Current packs/day: 0.00     Average packs/day: 0.3 packs/day for 17.0 years (4.3 ttl pk-yrs)     Types: Cigarettes     Start date: 1973     Quit date: 1990     Years since quittin.3    Smokeless tobacco: Never   Vaping Use    Vaping status: Never Used   Substance Use Topics    Alcohol use: No    Drug use: No       Allergies   Allergen Reactions    Nicotine Hives    Codeine Rash    Egg-Derived Products Itching and Rash     Patient states she can now tolerate eggs (24)    Grass Itching and Rash    Lisinopril Rash       Current Outpatient Medications on File Prior to Visit   Medication Sig    budesonide-formoterol (SYMBICORT) 160-4.5 MCG/ACT inhaler Inhale 2 puffs 2 (Two) Times a Day As Needed.    carvedilol (COREG) 12.5 MG tablet Take 1 tablet by mouth 2 (Two) Times a Day With Meals.    dicyclomine (BENTYL) 10 MG capsule Take 1 capsule by mouth 3 times a day.    donepezil (ARICEPT) 5 MG tablet Take 1 tablet by mouth Every Night.    esomeprazole (nexIUM) 40 MG capsule Take 1 capsule by mouth Daily.    furosemide (LASIX) 20 MG tablet Take 1 tablet by mouth Daily. (Patient taking differently: Take 1 tablet by mouth 3 (Three) Times a Week.)    ipratropium (ATROVENT) 0.02 % nebulizer solution Take 2.5 mL by nebulization 4 (Four) Times a Day As Needed for Wheezing or Shortness of Air.    isosorbide mononitrate (IMDUR) 60 MG 24 hr tablet Take 1 tablet by mouth Daily.    levothyroxine (SYNTHROID, LEVOTHROID) 50 MCG tablet Take 1 tablet by mouth Daily.    montelukast (SINGULAIR) 10 MG tablet TAKE 1 TABLET BY MOUTH EVERY NIGHT    nitroglycerin (NITROSTAT) 0.4 MG SL tablet Place 1 tablet under the tongue Every 5 (Five) Minutes As Needed for Chest Pain. Take no more than 3 doses in 15 minutes.    pitavastatin calcium (LIVALO) 1 MG tablet tablet Take 1 tablet by mouth 3 (Three) Times a Week.    sacubitril-valsartan  "(Entresto) 49-51 MG tablet Take 1 tablet by mouth 2 (Two) Times a Day.    [DISCONTINUED] apixaban (Eliquis) 5 MG tablet tablet Take 1 tablet by mouth Every 12 (Twelve) Hours.    Farxiga 5 MG tablet tablet TAKE 1 TABLET BY MOUTH DAILY    ipratropium (ATROVENT HFA) 17 MCG/ACT inhaler Inhale 2 puffs Every 4 (Four) Hours As Needed for Wheezing (or shortness of air) for up to 90 days.    [DISCONTINUED] fluconazole (Diflucan) 150 MG tablet Take 1 tablet by mouth Daily.     No current facility-administered medications on file prior to visit.         The following portions of the patient's history were reviewed and updated as appropriate: allergies, current medications, past family history, past medical history, past social history, past surgical history and problem list.    Review of Systems   Constitutional: Negative.   HENT: Negative.  Negative for congestion.    Eyes: Negative.    Cardiovascular: Negative.  Negative for chest pain, cyanosis, dyspnea on exertion, irregular heartbeat, leg swelling, near-syncope, orthopnea, palpitations, paroxysmal nocturnal dyspnea and syncope.   Respiratory: Negative.  Negative for shortness of breath.    Hematologic/Lymphatic: Negative.    Musculoskeletal: Negative.    Gastrointestinal: Negative.    Neurological: Negative.  Negative for headaches.          Objective:     /72 (BP Location: Left arm, Patient Position: Sitting, Cuff Size: Adult)   Pulse 60   Ht 165.1 cm (65\")   Wt 65.3 kg (144 lb)   SpO2 97%   BMI 23.96 kg/m²   Pulmonary:      Effort: Pulmonary effort is normal.      Breath sounds: Normal breath sounds. No stridor. No wheezing. No rhonchi. No rales.   Cardiovascular:      PMI at left midclavicular line. Normal rate. Regular rhythm. Normal S1. Normal S2.       Murmurs: There is no murmur.      No gallop.  No click. No rub.   Pulses:     Intact distal pulses.   Edema:     Peripheral edema absent.           Lab Review  Lab Results   Component Value Date     " 04/12/2024    K 4.9 04/12/2024     (H) 04/12/2024    BUN 22 04/12/2024    CREATININE 1.13 (H) 04/12/2024    GLUCOSE 85 04/12/2024    CALCIUM 9.2 04/12/2024    ALT 19 04/12/2024    ALKPHOS 111 04/12/2024    LABIL2 1.3 (L) 04/05/2016     Lab Results   Component Value Date    CKTOTAL 45 04/12/2024     Lab Results   Component Value Date    WBC 4.57 04/12/2024    HGB 12.2 04/12/2024    HCT 38.1 04/12/2024     (L) 04/12/2024     Lab Results   Component Value Date    INR 0.91 12/17/2015     Lab Results   Component Value Date    MG 2.2 02/25/2019     Lab Results   Component Value Date    TSH 4.450 (H) 04/12/2024     Lab Results   Component Value Date    .0 (H) 02/28/2019     Lab Results   Component Value Date    CHLPL 219 (H) 04/05/2016    CHOL 121 04/12/2024    TRIG 78 04/12/2024    HDL 61 (H) 04/12/2024    VLDL 16 04/12/2024    LDL 44 04/12/2024     Lab Results   Component Value Date    LDL 44 04/12/2024    LDL 49 07/05/2023     Lab Results   Component Value Date    PROBNP 3,197.0 (H) 09/20/2023               Procedures       I personally viewed and interpreted the patient's LAB data         Assessment:     1. Coronary artery disease involving native coronary artery of native heart without angina pectoris    2. Chronic a-fib*    3. Chronic HFrEF (heart failure with reduced ejection fraction)    4. Mixed hyperlipidemia    5. Primary hypertension    6. Pacemaker dual-chamber pacemaker Biotronik December 2015*    7. Chronic anticoagulation*          Plan:     Coronary artery disease status post CABG patient is asymptomatic she was advised to continue beta-blocker therapy statin therapy and Eliquis.    Chronic atrial fibrillation  Rate is very well-controlled anticoagulated with Eliquis continued.    Hyperlipidemia LDL is 44 Livalo was continued.  Diet restriction discussed.    Chronic HFrEF  She stopped Farxiga but she is taking Entresto along with Coreg and Lasix she is doing very well and is  asymptomatic.  Pacemaker function has been normal.  Lab work reviewed TSH is mildly elevated  Synthroid dose will be increased to 75 mcg daily at next visit.        No follow-ups on file.

## 2024-04-24 RX ORDER — CARVEDILOL 12.5 MG/1
12.5 TABLET ORAL 2 TIMES DAILY WITH MEALS
Qty: 180 TABLET | Refills: 1 | Status: SHIPPED | OUTPATIENT
Start: 2024-04-24

## 2024-04-25 ENCOUNTER — OFFICE VISIT (OUTPATIENT)
Dept: PULMONOLOGY | Facility: CLINIC | Age: 84
End: 2024-04-25
Payer: MEDICARE

## 2024-04-25 VITALS
BODY MASS INDEX: 23.99 KG/M2 | TEMPERATURE: 97.8 F | OXYGEN SATURATION: 95 % | SYSTOLIC BLOOD PRESSURE: 118 MMHG | DIASTOLIC BLOOD PRESSURE: 68 MMHG | WEIGHT: 144 LBS | HEART RATE: 74 BPM | HEIGHT: 65 IN

## 2024-04-25 DIAGNOSIS — J30.2 SEASONAL ALLERGIC RHINITIS, UNSPECIFIED TRIGGER: ICD-10-CM

## 2024-04-25 DIAGNOSIS — J43.2 CENTRILOBULAR EMPHYSEMA: ICD-10-CM

## 2024-04-25 DIAGNOSIS — J44.89 ASTHMATIC BRONCHITIS , CHRONIC: ICD-10-CM

## 2024-04-25 DIAGNOSIS — I27.20 PULMONARY HYPERTENSION: Primary | ICD-10-CM

## 2024-04-25 PROCEDURE — 99214 OFFICE O/P EST MOD 30 MIN: CPT | Performed by: PHYSICIAN ASSISTANT

## 2024-04-25 PROCEDURE — 3078F DIAST BP <80 MM HG: CPT | Performed by: PHYSICIAN ASSISTANT

## 2024-04-25 PROCEDURE — 1160F RVW MEDS BY RX/DR IN RCRD: CPT | Performed by: PHYSICIAN ASSISTANT

## 2024-04-25 PROCEDURE — 1159F MED LIST DOCD IN RCRD: CPT | Performed by: PHYSICIAN ASSISTANT

## 2024-04-25 PROCEDURE — 3074F SYST BP LT 130 MM HG: CPT | Performed by: PHYSICIAN ASSISTANT

## 2024-04-25 RX ORDER — BUDESONIDE AND FORMOTEROL FUMARATE DIHYDRATE 160; 4.5 UG/1; UG/1
2 AEROSOL RESPIRATORY (INHALATION)
Qty: 10.2 G | Refills: 8 | Status: SHIPPED | OUTPATIENT
Start: 2024-04-25

## 2024-04-25 RX ORDER — AZELASTINE 1 MG/ML
2 SPRAY, METERED NASAL 2 TIMES DAILY PRN
Qty: 30 ML | Refills: 8 | Status: SHIPPED | OUTPATIENT
Start: 2024-04-25

## 2024-04-25 NOTE — PROGRESS NOTES
"Chief Complaint  Centrilobular emphysema    Subjective        Ashely Feliciano presents to [facility]  History of Present Illness    Mrs. Gomez presents today for follow up.   Experiences shortness of breath with exertion but quickly improves with rest. Modifies some daily activities as needed to still do what is desired.   Had significant episode of shortness of breath, wheezing in the winter that improved with brief use of Breztri and oral steroid course.   States that she was notable for pacemaker complications in January and required replacement.   Notes benefit for shortness of breath from atrovent HFA (not needing the nebulizer as much). Still uses symbicort on occasion as needed.   Notes frequent rhinitis despite oral antihistamine.     Objective   Vital Signs:  /68   Pulse 74   Temp 97.8 °F (36.6 °C)   Ht 165.1 cm (65\")   Wt 65.3 kg (144 lb)   SpO2 95%   BMI 23.96 kg/m²   Estimated body mass index is 23.96 kg/m² as calculated from the following:    Height as of this encounter: 165.1 cm (65\").    Weight as of this encounter: 65.3 kg (144 lb).       BMI is within normal parameters. No other follow-up for BMI required.      Physical Exam  Vitals reviewed.   Constitutional:       General: She is not in acute distress.     Appearance: She is well-developed. She is not diaphoretic.   HENT:      Head: Normocephalic and atraumatic.   Cardiovascular:      Rate and Rhythm: Normal rate and regular rhythm.   Pulmonary:      Effort: Pulmonary effort is normal.      Breath sounds: No wheezing, rhonchi or rales.   Neurological:      Mental Status: She is alert and oriented to person, place, and time.   Psychiatric:         Behavior: Behavior normal.        Result Review :    The following data was reviewed by: Kiersten Mosqueda PA-C on 04/25/2024:  Chest xray January 2024  Echo January 2024  PFT 2019      Assessment and Plan     Diagnoses and all orders " for this visit:    1. Pulmonary hypertension (Primary)    2. Centrilobular emphysema  -     ipratropium (ATROVENT HFA) 17 MCG/ACT inhaler; Inhale 2 puffs Every 4 (Four) Hours As Needed for Wheezing (or shortness of air) for up to 90 days.  Dispense: 12.9 g; Refill: 9    3. Asthmatic bronchitis , chronic  -     ipratropium (ATROVENT HFA) 17 MCG/ACT inhaler; Inhale 2 puffs Every 4 (Four) Hours As Needed for Wheezing (or shortness of air) for up to 90 days.  Dispense: 12.9 g; Refill: 9    Other orders  -     azelastine (ASTELIN) 0.1 % nasal spray; 2 sprays into the nostril(s) as directed by provider 2 (Two) Times a Day As Needed for Rhinitis or Allergies. Use in each nostril as directed  Dispense: 30 mL; Refill: 8  -     budesonide-formoterol (SYMBICORT) 160-4.5 MCG/ACT inhaler; Inhale 2 puffs 2 (Two) Times a Day.  Dispense: 10.2 g; Refill: 8        Centrilobular emphysema, concern for mixed asthmatic bronchitis:   Continue atrovent inhaler as needed  Continue Symbicort - currently uses as needed, but does need new prescription as this had recently .   Self discontinued Spiriva as this resulted in nasal sores  Continue singulair tablet once nightly.   Prefers to hold off on spirometry, overnight oxygen testing or walk testing today.   Prefers to hold off on walk oximetry testing today.     Symptoms also likely impacted by CHF, follows with cardiology.       Moderate pulmonary hypertension:   Moderate PH per last echo.   Likely group 2, group 3.   (multiple valvular abnormalities, and centrilobular emphysema)  Respiratory symptoms stable at this time.   Continues to follow with cardiology.   Prefers to await oxygen testing at this time.       Seasonal allergic rhinitis:   Ordered azelastine for PRN use.   Flonase not as well tolerated due to irritation  Continue oral antihistamine as needed.         Follow Up     Return in about 6 months (around 10/25/2024), or if symptoms worsen or fail to improve, for Next  scheduled follow up.  Patient was given instructions and counseling regarding her condition or for health maintenance advice. Please see specific information pulled into the AVS if appropriate.

## 2024-05-05 DIAGNOSIS — J30.2 SEASONAL ALLERGIC RHINITIS, UNSPECIFIED TRIGGER: ICD-10-CM

## 2024-05-06 RX ORDER — MONTELUKAST SODIUM 10 MG/1
10 TABLET ORAL NIGHTLY
Qty: 30 TABLET | Refills: 6 | Status: SHIPPED | OUTPATIENT
Start: 2024-05-06

## 2024-05-18 DIAGNOSIS — J30.2 SEASONAL ALLERGIC RHINITIS, UNSPECIFIED TRIGGER: ICD-10-CM

## 2024-05-20 RX ORDER — MONTELUKAST SODIUM 10 MG/1
10 TABLET ORAL NIGHTLY
Qty: 30 TABLET | Refills: 6 | Status: SHIPPED | OUTPATIENT
Start: 2024-05-20

## 2024-05-21 ENCOUNTER — CLINICAL SUPPORT NO REQUIREMENTS (OUTPATIENT)
Dept: CARDIOLOGY | Facility: CLINIC | Age: 84
End: 2024-05-21
Payer: MEDICARE

## 2024-05-21 DIAGNOSIS — Z95.0 PACEMAKER: Primary | ICD-10-CM

## 2024-05-21 PROCEDURE — 93288 INTERROG EVL PM/LDLS PM IP: CPT | Performed by: INTERNAL MEDICINE

## 2024-05-23 ENCOUNTER — TELEPHONE (OUTPATIENT)
Dept: PULMONOLOGY | Facility: CLINIC | Age: 84
End: 2024-05-23
Payer: MEDICARE

## 2024-05-23 RX ORDER — PREDNISONE 20 MG/1
40 TABLET ORAL DAILY
Qty: 10 TABLET | Refills: 0 | Status: SHIPPED | OUTPATIENT
Start: 2024-05-23 | End: 2024-05-28

## 2024-05-23 RX ORDER — GUAIFENESIN/DEXTROMETHORPHAN 100-10MG/5
5 SYRUP ORAL 3 TIMES DAILY PRN
Qty: 150 ML | Refills: 1 | Status: SHIPPED | OUTPATIENT
Start: 2024-05-23

## 2024-05-23 NOTE — TELEPHONE ENCOUNTER
Caller: Ashely Feliciano    Relationship to patient: Self    Best call back number:     [phone] (Home)       Patient is needing: PT SAYS SHE HAS A PAINFUL COUGH AND TROUBLE BREATHING, WANTS SOMETHING TO BE CALLED IN   [id number] - [place], [place] - [address] AT [facility] OF [address] - [phone] PH - [phone]  [phone]

## 2024-05-23 NOTE — TELEPHONE ENCOUNTER
Caller: Ashely Feliciano    Relationship to patient: Self    Best call back number: [phone]    Patient is needing: PT CALLED BACK REGARDING THIS, WANTING TO TRY AND GET PRESCRIPTION BY TODAY

## 2024-05-24 NOTE — TELEPHONE ENCOUNTER
Out of office today due to sickness.   Did not see this message until late this evening.     Ordering prednisone course and robitussin-DM.   Will hold off on antibiotic for now and reconsider if symptoms do not improve.   Will return call in the AM.

## 2024-05-29 ENCOUNTER — OFFICE VISIT (OUTPATIENT)
Dept: PULMONOLOGY | Facility: CLINIC | Age: 84
End: 2024-05-29
Payer: MEDICARE

## 2024-05-29 ENCOUNTER — HOSPITAL ENCOUNTER (OUTPATIENT)
Dept: GENERAL RADIOLOGY | Facility: HOSPITAL | Age: 84
Discharge: HOME | End: 2024-05-29
Admitting: PHYSICIAN ASSISTANT
Payer: MEDICARE

## 2024-05-29 VITALS
HEART RATE: 57 BPM | DIASTOLIC BLOOD PRESSURE: 66 MMHG | WEIGHT: 145 LBS | BODY MASS INDEX: 24.16 KG/M2 | TEMPERATURE: 97.9 F | OXYGEN SATURATION: 99 % | HEIGHT: 65 IN | SYSTOLIC BLOOD PRESSURE: 118 MMHG

## 2024-05-29 DIAGNOSIS — R06.02 SHORTNESS OF BREATH: Primary | ICD-10-CM

## 2024-05-29 DIAGNOSIS — R06.02 SHORTNESS OF BREATH: ICD-10-CM

## 2024-05-29 DIAGNOSIS — R06.01 ORTHOPNEA: ICD-10-CM

## 2024-05-29 PROCEDURE — 1160F RVW MEDS BY RX/DR IN RCRD: CPT | Performed by: PHYSICIAN ASSISTANT

## 2024-05-29 PROCEDURE — 71046 X-RAY EXAM CHEST 2 VIEWS: CPT

## 2024-05-29 PROCEDURE — 3078F DIAST BP <80 MM HG: CPT | Performed by: PHYSICIAN ASSISTANT

## 2024-05-29 PROCEDURE — 99214 OFFICE O/P EST MOD 30 MIN: CPT | Performed by: PHYSICIAN ASSISTANT

## 2024-05-29 PROCEDURE — 1159F MED LIST DOCD IN RCRD: CPT | Performed by: PHYSICIAN ASSISTANT

## 2024-05-29 PROCEDURE — 3074F SYST BP LT 130 MM HG: CPT | Performed by: PHYSICIAN ASSISTANT

## 2024-05-29 NOTE — PROGRESS NOTES
"Chief Complaint  Shortness of breath    Subjective        Ashely Feliciano presents to [facility]ULMON[facility] & CRIT[facility]ARE MEDICINE  History of Present Illness    Mrs. Feliciano presents today for short term follow-up due to recent increase of shortness of breath, coughing with resulting soreness. Last weekend (around 10 days ago), she was notable for interval increase of shortness of breath. Course of oral steroids were tried without relief. She states that she currently notes more shortness of breath at nighttime. Partial relief with sleeping in a propped up position. Also notes lightheadedness and shortness of breath with exertion. No major increase of swelling noted.       Objective   Vital Signs:  /66   Pulse 57   Temp 97.9 °F (36.6 °C)   Ht 165.1 cm (65\")   Wt 65.8 kg (145 lb)   SpO2 99%   BMI 24.13 kg/m²   Estimated body mass index is 24.13 kg/m² as calculated from the following:    Height as of this encounter: 165.1 cm (65\").    Weight as of this encounter: 65.8 kg (145 lb).       BMI is within normal parameters. No other follow-up for BMI required.      Physical Exam  Vitals reviewed.   Constitutional:       General: She is not in acute distress.     Appearance: She is not diaphoretic.   HENT:      Head: Normocephalic and atraumatic.   Cardiovascular:      Rate and Rhythm: Normal rate and regular rhythm.      Comments: +JVD bilaterally  Pulmonary:      Effort: Pulmonary effort is normal.      Breath sounds: No wheezing, rhonchi or rales.   Musculoskeletal:         General: Swelling (trace lower extremity edema) present.   Neurological:      Mental Status: She is alert and oriented to person, place, and time.   Psychiatric:         Behavior: Behavior normal.        Result Review :    The following data was reviewed by: Kiersten Mosqueda PA-C on 05/29/2024:  Chest xray January 2024  Echo January 2024  PFT 2019      Assessment and Plan     Diagnoses and all orders for this " visit:    1. Shortness of breath (Primary)  -     XR Chest 2 View; Future  -     BNP; Future  -     Basic Metabolic Panel; Future  -     CBC & Differential; Future    2. Orthopnea  -     Overnight Sleep Oximetry Study; Future        Shortness of breath, Orthopnea:   Known centrilobular emphysema, concern for mixed asthmatic bronchitis, known systolic HF, pulmonary hypertension -- concern for CHF exacerbation versus LRTI/exacerbation.   No improvement with recent oral steroid trial.   Ordered CBC with differential  More concern for inflammatory versus infectious process at this time   Ordered BMP  Ordered BNP  Ordered chest xray.  Can continue as needed medications and scheduled inhaled medications due to underlying CE/asthmatic bronchitis overlap  Ordered overnight pulse oximetry testing.       Will notify her once testing is available and develop plan according to results. Can follow up sooner as needed.       Follow Up     Return in about 5 months (around 10/29/2024), or if symptoms worsen or fail to improve, for Next scheduled follow up.  Patient was given instructions and counseling regarding her condition or for health maintenance advice. Please see specific information pulled into the AVS if appropriate.       Addendum:  5/30/2024  Remains notable for cardiac enlargement on chest xray but no visible effusion. Still concern for volume overload versus asthma/emphysema flare up, or combination.   She will complete labs today.   She will come back for walk test today.   Will go ahead and order medrol dose pack

## 2024-05-30 ENCOUNTER — LAB (OUTPATIENT)
Dept: LAB | Facility: HOSPITAL | Age: 84
End: 2024-05-30
Payer: MEDICARE

## 2024-05-30 ENCOUNTER — DOCUMENTATION (OUTPATIENT)
Dept: CARDIOLOGY | Facility: HOSPITAL | Age: 84
End: 2024-05-30
Payer: MEDICARE

## 2024-05-30 DIAGNOSIS — R06.02 SHORTNESS OF BREATH: ICD-10-CM

## 2024-05-30 LAB
ANION GAP SERPL CALCULATED.3IONS-SCNC: 11.6 MMOL/L (ref 5–15)
BASOPHILS # BLD AUTO: 0.09 10*3/MM3 (ref 0–0.2)
BASOPHILS NFR BLD AUTO: 1.1 % (ref 0–1.5)
BUN SERPL-MCNC: 25 MG/DL (ref 8–23)
BUN/CREAT SERPL: 22.9 (ref 7–25)
CALCIUM SPEC-SCNC: 8.8 MG/DL (ref 8.6–10.5)
CHLORIDE SERPL-SCNC: 103 MMOL/L (ref 98–107)
CO2 SERPL-SCNC: 22.4 MMOL/L (ref 22–29)
CREAT SERPL-MCNC: 1.09 MG/DL (ref 0.57–1)
DEPRECATED RDW RBC AUTO: 49 FL (ref 37–54)
EGFRCR SERPLBLD CKD-EPI 2021: 51.1 ML/MIN/1.73
EOSINOPHIL # BLD AUTO: 0.26 10*3/MM3 (ref 0–0.4)
EOSINOPHIL NFR BLD AUTO: 3 % (ref 0.3–6.2)
ERYTHROCYTE [DISTWIDTH] IN BLOOD BY AUTOMATED COUNT: 14.3 % (ref 12.3–15.4)
GLUCOSE SERPL-MCNC: 103 MG/DL (ref 65–99)
HCT VFR BLD AUTO: 38.6 % (ref 34–46.6)
HGB BLD-MCNC: 12.3 G/DL (ref 12–15.9)
IMM GRANULOCYTES # BLD AUTO: 0.04 10*3/MM3 (ref 0–0.05)
IMM GRANULOCYTES NFR BLD AUTO: 0.5 % (ref 0–0.5)
LYMPHOCYTES # BLD AUTO: 1.33 10*3/MM3 (ref 0.7–3.1)
LYMPHOCYTES NFR BLD AUTO: 15.6 % (ref 19.6–45.3)
MCH RBC QN AUTO: 29.9 PG (ref 26.6–33)
MCHC RBC AUTO-ENTMCNC: 31.9 G/DL (ref 31.5–35.7)
MCV RBC AUTO: 93.9 FL (ref 79–97)
MONOCYTES # BLD AUTO: 0.62 10*3/MM3 (ref 0.1–0.9)
MONOCYTES NFR BLD AUTO: 7.3 % (ref 5–12)
NEUTROPHILS NFR BLD AUTO: 6.19 10*3/MM3 (ref 1.7–7)
NEUTROPHILS NFR BLD AUTO: 72.5 % (ref 42.7–76)
NRBC BLD AUTO-RTO: 0 /100 WBC (ref 0–0.2)
NT-PROBNP SERPL-MCNC: 4945 PG/ML (ref 0–1800)
PLATELET # BLD AUTO: 150 10*3/MM3 (ref 140–450)
PMV BLD AUTO: 11 FL (ref 6–12)
POTASSIUM SERPL-SCNC: 4 MMOL/L (ref 3.5–5.2)
RBC # BLD AUTO: 4.11 10*6/MM3 (ref 3.77–5.28)
SODIUM SERPL-SCNC: 137 MMOL/L (ref 136–145)
WBC NRBC COR # BLD AUTO: 8.53 10*3/MM3 (ref 3.4–10.8)

## 2024-05-30 PROCEDURE — 85025 COMPLETE CBC W/AUTO DIFF WBC: CPT

## 2024-05-30 PROCEDURE — 83880 ASSAY OF NATRIURETIC PEPTIDE: CPT

## 2024-05-30 PROCEDURE — 80048 BASIC METABOLIC PNL TOTAL CA: CPT

## 2024-05-30 PROCEDURE — 36415 COLL VENOUS BLD VENIPUNCTURE: CPT

## 2024-05-30 RX ORDER — METHYLPREDNISOLONE 4 MG/1
TABLET ORAL
Qty: 21 TABLET | Refills: 0 | Status: SHIPPED | OUTPATIENT
Start: 2024-05-30

## 2024-05-30 NOTE — PROGRESS NOTES
Updated with lab results.  Interval increase of proBNP to 4900.    Other CMP and CBC are appropriate.  Interval  improvement in creatinine to 1.09.    Concern more for CHF flareup.    Recommended to contact Dr. Sanchez as she may benefit from medication adjustment, she will do so soon as possible.  Started the oral steroid course.  Can complete.  Stop as needed if symptoms/swelling worsens.

## 2024-05-31 ENCOUNTER — TELEPHONE (OUTPATIENT)
Dept: CARDIOLOGY | Facility: CLINIC | Age: 84
End: 2024-05-31
Payer: MEDICARE

## 2024-05-31 DIAGNOSIS — I50.22 CHRONIC HFREF (HEART FAILURE WITH REDUCED EJECTION FRACTION): Primary | ICD-10-CM

## 2024-05-31 NOTE — TELEPHONE ENCOUNTER
Maria R Sanchez MD Collins, Sheila D, MA  Caller: Unspecified (Today, 10:39 AM)  Increase Lasix 20 mg daily, add Aldactone 25 mg every other day, start Farxiga 10 mg daily  BMP in 10 days          Called pt, she refused to take the farxiga due to yeast infection.  .. Orders pending.

## 2024-05-31 NOTE — TELEPHONE ENCOUNTER
Patient had blood work done yesterday and was told she has fluid build up and advised to contact this office regarding how to proceed.

## 2024-05-31 NOTE — TELEPHONE ENCOUNTER
Maria R Sanchez MD Collins, Sheila D, MA  Caller: Unspecified (Today, 10:39 AM)  Increase Lasix 20 mg daily, add Aldactone 25 mg every other day, start Farxiga 10 mg daily  BMP in 10 days

## 2024-06-02 RX ORDER — FUROSEMIDE 20 MG/1
20 TABLET ORAL DAILY
Qty: 90 TABLET | Refills: 1 | Status: SHIPPED | OUTPATIENT
Start: 2024-06-02

## 2024-06-02 RX ORDER — SPIRONOLACTONE 25 MG/1
25 TABLET ORAL EVERY OTHER DAY
Qty: 30 TABLET | Refills: 1 | Status: SHIPPED | OUTPATIENT
Start: 2024-06-02

## 2024-06-04 DIAGNOSIS — R06.02 SHORTNESS OF BREATH: Primary | ICD-10-CM

## 2024-06-04 PROCEDURE — 94618 PULMONARY STRESS TESTING: CPT | Performed by: PHYSICIAN ASSISTANT

## 2024-06-10 ENCOUNTER — LAB (OUTPATIENT)
Dept: LAB | Facility: HOSPITAL | Age: 84
End: 2024-06-10
Payer: MEDICARE

## 2024-06-10 DIAGNOSIS — I50.22 CHRONIC HFREF (HEART FAILURE WITH REDUCED EJECTION FRACTION): ICD-10-CM

## 2024-06-10 PROCEDURE — 80048 BASIC METABOLIC PNL TOTAL CA: CPT

## 2024-06-10 PROCEDURE — 36415 COLL VENOUS BLD VENIPUNCTURE: CPT

## 2024-06-11 ENCOUNTER — TELEPHONE (OUTPATIENT)
Dept: CARDIOLOGY | Facility: CLINIC | Age: 84
End: 2024-06-11
Payer: MEDICARE

## 2024-06-11 LAB
ANION GAP SERPL CALCULATED.3IONS-SCNC: 10 MMOL/L (ref 5–15)
BUN SERPL-MCNC: 29 MG/DL (ref 8–23)
BUN/CREAT SERPL: 30.2 (ref 7–25)
CALCIUM SPEC-SCNC: 9.4 MG/DL (ref 8.6–10.5)
CHLORIDE SERPL-SCNC: 104 MMOL/L (ref 98–107)
CO2 SERPL-SCNC: 23 MMOL/L (ref 22–29)
CREAT SERPL-MCNC: 0.96 MG/DL (ref 0.57–1)
EGFRCR SERPLBLD CKD-EPI 2021: 59.6 ML/MIN/1.73
GLUCOSE SERPL-MCNC: 79 MG/DL (ref 65–99)
POTASSIUM SERPL-SCNC: 5 MMOL/L (ref 3.5–5.2)
SODIUM SERPL-SCNC: 137 MMOL/L (ref 136–145)

## 2024-06-11 NOTE — TELEPHONE ENCOUNTER
Spoke with pt, adv of Dr. Mesa message    ----- Message from Maria R Sanchez sent at 6/11/2024  9:20 AM EDT -----  Lab work is good.  Increase Lasix 40 mg daily.  BMP and proBNP in 2 weeks

## 2024-06-26 DIAGNOSIS — J30.2 SEASONAL ALLERGIC RHINITIS, UNSPECIFIED TRIGGER: ICD-10-CM

## 2024-06-26 RX ORDER — SACUBITRIL AND VALSARTAN 49; 51 MG/1; MG/1
1 TABLET, FILM COATED ORAL 2 TIMES DAILY
Qty: 60 TABLET | Refills: 2 | Status: SHIPPED | OUTPATIENT
Start: 2024-06-26

## 2024-06-26 RX ORDER — MONTELUKAST SODIUM 10 MG/1
10 TABLET ORAL NIGHTLY
Qty: 30 TABLET | Refills: 6 | Status: SHIPPED | OUTPATIENT
Start: 2024-06-26

## 2024-06-27 ENCOUNTER — LAB (OUTPATIENT)
Dept: LAB | Facility: HOSPITAL | Age: 84
End: 2024-06-27
Payer: MEDICARE

## 2024-06-27 DIAGNOSIS — I50.22 CHRONIC HFREF (HEART FAILURE WITH REDUCED EJECTION FRACTION): ICD-10-CM

## 2024-06-27 PROCEDURE — 83880 ASSAY OF NATRIURETIC PEPTIDE: CPT

## 2024-06-27 PROCEDURE — 80048 BASIC METABOLIC PNL TOTAL CA: CPT

## 2024-06-27 PROCEDURE — 36415 COLL VENOUS BLD VENIPUNCTURE: CPT

## 2024-06-28 ENCOUNTER — TELEPHONE (OUTPATIENT)
Dept: CARDIOLOGY | Facility: CLINIC | Age: 84
End: 2024-06-28
Payer: MEDICARE

## 2024-06-28 DIAGNOSIS — I50.22 CHRONIC HFREF (HEART FAILURE WITH REDUCED EJECTION FRACTION): Primary | ICD-10-CM

## 2024-06-28 LAB
ANION GAP SERPL CALCULATED.3IONS-SCNC: 8 MMOL/L (ref 5–15)
BUN SERPL-MCNC: 29 MG/DL (ref 8–23)
BUN/CREAT SERPL: 21 (ref 7–25)
CALCIUM SPEC-SCNC: 9.1 MG/DL (ref 8.6–10.5)
CHLORIDE SERPL-SCNC: 105 MMOL/L (ref 98–107)
CO2 SERPL-SCNC: 26 MMOL/L (ref 22–29)
CREAT SERPL-MCNC: 1.38 MG/DL (ref 0.57–1)
EGFRCR SERPLBLD CKD-EPI 2021: 38.5 ML/MIN/1.73
GLUCOSE SERPL-MCNC: 89 MG/DL (ref 65–99)
NT-PROBNP SERPL-MCNC: 1539 PG/ML (ref 0–1800)
POTASSIUM SERPL-SCNC: 4.9 MMOL/L (ref 3.5–5.2)
SODIUM SERPL-SCNC: 139 MMOL/L (ref 136–145)

## 2024-06-28 NOTE — TELEPHONE ENCOUNTER
Maria R Sanchez MD Collins, Sheila D, MA  Heart failure is much better  Kidney function is getting worse.  Decrease Lasix 20 mg every other day  BMP in 2 weeks        Called and left a detailed vm   will call back Monday.

## 2024-06-28 NOTE — TELEPHONE ENCOUNTER
----- Message from Maria R Sanchez sent at 6/28/2024 10:21 AM EDT -----  Heart failure is much better  Kidney function is getting worse.  Decrease Lasix 20 mg every other day  BMP in 2 weeks

## 2024-06-30 RX ORDER — FUROSEMIDE 20 MG/1
20 TABLET ORAL EVERY OTHER DAY
Qty: 90 TABLET | Refills: 1 | Status: SHIPPED | OUTPATIENT
Start: 2024-06-30

## 2024-07-01 ENCOUNTER — TELEPHONE (OUTPATIENT)
Dept: CARDIOLOGY | Facility: CLINIC | Age: 84
End: 2024-07-01
Payer: MEDICARE

## 2024-07-01 NOTE — TELEPHONE ENCOUNTER
----- Message from Maria R Sanchez sent at 6/28/2024 10:21 AM EDT -----  Heart failure is much better  Kidney function is getting worse.  Decrease Lasix 20 mg every other day  BMP in 2 weeks  
Called pt to make sure she received my vm last fri.  She did .   Whe will get her repeat labs prior to her next appt.         Maria R Sanchez MD Collins, Sheila D, MA  Heart failure is much better  Kidney function is getting worse.  Decrease Lasix 20 mg every other day  BMP in 2 weeks  
left

## 2024-07-11 ENCOUNTER — LAB (OUTPATIENT)
Dept: LAB | Facility: HOSPITAL | Age: 84
End: 2024-07-11
Payer: MEDICARE

## 2024-07-11 DIAGNOSIS — I50.22 CHRONIC HFREF (HEART FAILURE WITH REDUCED EJECTION FRACTION): ICD-10-CM

## 2024-07-11 LAB
ANION GAP SERPL CALCULATED.3IONS-SCNC: 11.8 MMOL/L (ref 5–15)
BUN SERPL-MCNC: 21 MG/DL (ref 8–23)
BUN/CREAT SERPL: 19.4 (ref 7–25)
CALCIUM SPEC-SCNC: 9 MG/DL (ref 8.6–10.5)
CHLORIDE SERPL-SCNC: 108 MMOL/L (ref 98–107)
CO2 SERPL-SCNC: 18.2 MMOL/L (ref 22–29)
CREAT SERPL-MCNC: 1.08 MG/DL (ref 0.57–1)
EGFRCR SERPLBLD CKD-EPI 2021: 51.7 ML/MIN/1.73
GLUCOSE SERPL-MCNC: 89 MG/DL (ref 65–99)
POTASSIUM SERPL-SCNC: 4.9 MMOL/L (ref 3.5–5.2)
SODIUM SERPL-SCNC: 138 MMOL/L (ref 136–145)

## 2024-07-11 PROCEDURE — 80048 BASIC METABOLIC PNL TOTAL CA: CPT

## 2024-07-11 PROCEDURE — 36415 COLL VENOUS BLD VENIPUNCTURE: CPT

## 2024-07-15 ENCOUNTER — OFFICE VISIT (OUTPATIENT)
Dept: CARDIOLOGY | Facility: CLINIC | Age: 84
End: 2024-07-15
Payer: MEDICARE

## 2024-07-15 VITALS
SYSTOLIC BLOOD PRESSURE: 116 MMHG | BODY MASS INDEX: 24.36 KG/M2 | HEIGHT: 65 IN | DIASTOLIC BLOOD PRESSURE: 71 MMHG | OXYGEN SATURATION: 93 % | WEIGHT: 146.2 LBS | HEART RATE: 81 BPM

## 2024-07-15 DIAGNOSIS — I25.10 CORONARY ARTERY DISEASE INVOLVING NATIVE CORONARY ARTERY OF NATIVE HEART WITHOUT ANGINA PECTORIS: Primary | ICD-10-CM

## 2024-07-15 DIAGNOSIS — E03.9 ACQUIRED HYPOTHYROIDISM: ICD-10-CM

## 2024-07-15 DIAGNOSIS — E78.2 MIXED HYPERLIPIDEMIA: ICD-10-CM

## 2024-07-15 DIAGNOSIS — I48.20 CHRONIC A-FIB: ICD-10-CM

## 2024-07-15 DIAGNOSIS — I50.22 CHRONIC HFREF (HEART FAILURE WITH REDUCED EJECTION FRACTION): ICD-10-CM

## 2024-07-15 PROCEDURE — 99214 OFFICE O/P EST MOD 30 MIN: CPT | Performed by: INTERNAL MEDICINE

## 2024-07-15 PROCEDURE — 3074F SYST BP LT 130 MM HG: CPT | Performed by: INTERNAL MEDICINE

## 2024-07-15 PROCEDURE — 3078F DIAST BP <80 MM HG: CPT | Performed by: INTERNAL MEDICINE

## 2024-07-15 NOTE — PROGRESS NOTES
subjective     Chief Complaint   Patient presents with    Coronary Artery Disease       Problems Addressed This Visit  1. Coronary artery disease involving native coronary artery of native heart without angina pectoris    2. Chronic HFrEF (heart failure with reduced ejection fraction)    3. Mixed hyperlipidemia    4. Chronic a-fib*        History of Present Illness    Patient is 83 years old white female who is here for follow-up.  Patient has coronary artery disease status post CABG in 1997 single-vessel  Denies any chest pain palpitations or shortness of breath.    She denies any chest pain doing very well with Imdur.    Chronic HFrEF doing very well with Coreg and Entresto and Aldactone.  Hyperlipidemia on Livalo however she cannot take full dose because of myalgias and is taking every other day.    Hypothyroidism on Synthroid 75 mcg daily.  Clinically euthyroid.    Chronic atrial fibrillation  Rate is very well-controlled.  She is anticoagulated with Eliquis.  No abnormal bleeding.    Pacemaker has been functioning normally.    Past Surgical History:   Procedure Laterality Date    CARDIAC ELECTROPHYSIOLOGY PROCEDURE N/A 1/22/2024    Procedure: Lead Revision RA or RV, PPM or ICD;  Surgeon: Donis See MD;  Location: [facility] CATH INVASIVE LOCATION;  Service: Cardiovascular;  Laterality: N/A;  RV lead replacement    CARDIAC ELECTROPHYSIOLOGY PROCEDURE N/A 1/22/2024    Procedure: Pacemaker lead replacement;  Surgeon: Donis See MD;  Location:  [facility] CATH INVASIVE LOCATION;  Service: Cardiovascular;  Laterality: N/A;    CARDIAC SURGERY  1997    CARDIOVASCULAR STRESS TEST  06/2014    CATARACT EXTRACTION, BILATERAL      CHOLECYSTECTOMY  2002    COLONOSCOPY  05/2012    ECHO - CONVERTED  07/2014    PACEMAKER IMPLANTATION  12/17/2015     Family History   Problem Relation Age of Onset    Coronary artery disease Other     Hypertension Other     Diabetes Other     Heart attack Other     Heart attack Mother 62         fatal MI    Skin cancer Mother     Diabetes type II Mother     Melanoma Mother     Heart attack Father 63        fatal MI    Melanoma Sister 45    Skin cancer Sister     Heart attack Brother 62        fatal MI    Heart attack Brother 80        Fatal MI    Skin cancer Brother     Heart disease Sister     Heart disease Brother 75        pacemaker, CABG, Stents    Osteoporosis Sister     Pneumonia Brother     Hypertension Brother     Heart attack Sister 54        Fatal MI    Heart attack Brother 59        Fatal MI    Breast cancer Neg Hx      Past Medical History:   Diagnosis Date    Atrial fibrillation     CAD (coronary artery disease)     Chronic a-fib     Chronic anticoagulation     Congestive heart failure     compensated    Disease of thyroid gland     Hyperlipidemia     Hypertension     Hypoxemia     Ischemic cardiomyopathy with severe LV Disfunction     Myocardial infarction     Pacemaker     VVI     Patient Active Problem List   Diagnosis    CAD (coronary artery disease) status post CABG 1997 single-vessel*    Ischemic cardiomyopathy with apical hypokinesis LV ejection fraction 50%.    Chronic HFrEF (heart failure with reduced ejection fraction)    Hyperlipidemia*    Chronic a-fib*    Pacemaker dual-chamber pacemaker [employer] December 2015*    Hypothyroidism*    Hypertension*    Chronic anticoagulation*    Gastroesophageal reflux disease without esophagitis*    URI (upper respiratory infection)    Herpes zoster without complication    Acute midline low back pain without sciatica    Centrilobular emphysema    Asthmatic bronchitis , chronic    Pulmonary hypertension    Seasonal allergic rhinitis    Post zoster neuralgia    Memory loss    Diarrhea    Pacemaker lead malfunction    Sick sinus syndrome       Social History     Tobacco Use    Smoking status: Former     Current packs/day: 0.00     Average packs/day: 0.3 packs/day for 17.0 years (4.3 ttl pk-yrs)     Types: Cigarettes     Start date: 1/1/1973     Quit  date: 1990     Years since quittin.5     Passive exposure: Never    Smokeless tobacco: Never   Vaping Use    Vaping status: Never Used   Substance Use Topics    Alcohol use: No    Drug use: No       Allergies   Allergen Reactions    Nicotine Hives    Codeine Rash    Egg-Derived Products Itching and Rash     Patient states she can now tolerate eggs (24)    Grass Itching and Rash    Lisinopril Rash       Current Outpatient Medications on File Prior to Visit   Medication Sig    apixaban (Eliquis) 5 MG tablet tablet Take 1 tablet by mouth Every 12 (Twelve) Hours.    azelastine (ASTELIN) 0.1 % nasal spray 2 sprays into the nostril(s) as directed by provider 2 (Two) Times a Day As Needed for Rhinitis or Allergies. Use in each nostril as directed    budesonide-formoterol (SYMBICORT) 160-4.5 MCG/ACT inhaler Inhale 2 puffs 2 (Two) Times a Day.    carvedilol (COREG) 12.5 MG tablet TAKE 1 TABLET BY MOUTH TWICE DAILY WITH MEALS    dicyclomine (BENTYL) 10 MG capsule Take 1 capsule by mouth 3 times a day.    donepezil (ARICEPT) 5 MG tablet Take 1 tablet by mouth Every Night.    Entresto 49-51 MG tablet TAKE 1 TABLET BY MOUTH TWICE DAILY    esomeprazole (nexIUM) 40 MG capsule Take 1 capsule by mouth Daily.    furosemide (LASIX) 20 MG tablet Take 1 tablet by mouth Every Other Day.    guaiFENesin-dextromethorphan (ROBITUSSIN DM) 100-10 MG/5ML syrup Take 5 mL by mouth 3 (Three) Times a Day As Needed for Cough or Congestion.    ipratropium (ATROVENT HFA) 17 MCG/ACT inhaler Inhale 2 puffs Every 4 (Four) Hours As Needed for Wheezing (or shortness of air) for up to 90 days.    ipratropium (ATROVENT) 0.02 % nebulizer solution Take 2.5 mL by nebulization 4 (Four) Times a Day As Needed for Wheezing or Shortness of Air.    isosorbide mononitrate (IMDUR) 60 MG 24 hr tablet Take 1 tablet by mouth Daily.    levothyroxine (SYNTHROID, LEVOTHROID) 75 MCG tablet Take 1 tablet by mouth Daily.    methylPREDNISolone (MEDROL) 4 MG dose  pack Take as directed on package instructions.    montelukast (SINGULAIR) 10 MG tablet TAKE 1 TABLET BY MOUTH EVERY NIGHT    nitroglycerin (NITROSTAT) 0.4 MG SL tablet Place 1 tablet under the tongue Every 5 (Five) Minutes As Needed for Chest Pain. Take no more than 3 doses in 15 minutes.    pitavastatin calcium (LIVALO) 1 MG tablet tablet Take 1 tablet by mouth 3 (Three) Times a Week.    spironolactone (ALDACTONE) 25 MG tablet Take 1 tablet by mouth Every Other Day.     No current facility-administered medications on file prior to visit.     (Not in a hospital admission)      Results for orders placed during the hospital encounter of 01/22/24    Adult Transthoracic Echo Complete W/ Cont if Necessary Per Protocol    Interpretation Summary    Normal left ventricular cavity size and wall thickness noted.    Left ventricular ejection fraction appears to be 46 - 50%.    There is calcification of the aortic valve. The aortic valve appears trileaflet. Mild aortic valve regurgitation is present. No hemodynamically significant aortic valve stenosis is present.    There are myxomatous changes of the mitral valve apparatus present. Moderate mitral valve regurgitation is present.    Moderate to severe tricuspid valve regurgitation is present. Estimated right ventricular systolic pressure from tricuspid regurgitation is moderately elevated (45-55 mmHg). Moderate pulmonary hypertension is present.    There is no evidence of pericardial effusion.    Results for orders placed during the hospital encounter of 02/25/19    Stress Test With Myocardial Perfusion One Day    Interpretation Summary  · Breast attenuation artifact is present.  · Left ventricular ejection fraction is borderline normal (Calculated EF = 48%).  · Myocardial perfusion imaging indicates a normal myocardial perfusion study with no evidence of ischemia.  · Impressions are consistent with a low risk study.  · No definite evidence of ischemia. Fixed defect noted in  "the anteroseptal segment which was worse on rest images.          The following portions of the patient's history were reviewed and updated as appropriate: allergies, current medications, past family history, past medical history, past social history, past surgical history and problem list.    Review of Systems   Constitutional: Negative.   HENT: Negative.  Negative for congestion.    Eyes: Negative.    Cardiovascular: Negative.  Negative for chest pain, cyanosis, dyspnea on exertion, irregular heartbeat, leg swelling, near-syncope, orthopnea, palpitations, paroxysmal nocturnal dyspnea and syncope.   Respiratory: Negative.  Negative for shortness of breath.    Hematologic/Lymphatic: Negative.    Musculoskeletal: Negative.    Gastrointestinal: Negative.    Neurological: Negative.  Negative for headaches.          Objective:     /71 (BP Location: Left arm, Patient Position: Sitting, Cuff Size: Adult)   Pulse 81   Ht 165.1 cm (65\")   Wt 66.3 kg (146 lb 3.2 oz)   SpO2 93%   BMI 24.33 kg/m²   Pulmonary:      Effort: Pulmonary effort is normal.      Breath sounds: Normal breath sounds. No stridor. No wheezing. No rhonchi. No rales.   Cardiovascular:      PMI at left midclavicular line. Normal rate. Regular rhythm. Normal S1. Normal S2.       Murmurs: There is no murmur.      No gallop.  No click. No rub.   Pulses:     Intact distal pulses.   Edema:     Peripheral edema absent.           Lab Review  Lab Results   Component Value Date     07/11/2024    K 4.9 07/11/2024     (H) 07/11/2024    BUN 21 07/11/2024    CREATININE 1.08 (H) 07/11/2024    GLUCOSE 89 07/11/2024    CALCIUM 9.0 07/11/2024    ALT 19 04/12/2024    ALKPHOS 111 04/12/2024    LABIL2 1.3 (L) 04/05/2016     Lab Results   Component Value Date    CKTOTAL 45 04/12/2024     Lab Results   Component Value Date    WBC 8.53 05/30/2024    HGB 12.3 05/30/2024    HCT 38.6 05/30/2024     05/30/2024     Lab Results   Component Value Date    INR " 0.91 2015     Lab Results   Component Value Date    MG 2.2 2019     Lab Results   Component Value Date    TSH 4.450 (H) 2024     Lab Results   Component Value Date    .0 (H) 2019     Lab Results   Component Value Date    CHLPL 219 (H) 2016    CHOL 121 2024    TRIG 78 2024    HDL 61 (H) 2024    VLDL 16 2024    LDL 44 2024     Lab Results   Component Value Date    LDL 44 2024    LDL 49 2023     Lab Results   Component Value Date    PROBNP 1,539.0 2024                 ECG 12 Lead    Date/Time: 2024 2:19 PM  Performed by: Maria R Sanchez MD    Authorized by: Maria R Sanchez MD  Comparison: compared with previous ECG from 2024  Similar to previous ECG  Rhythm: atrial fibrillation and paced  Rate: normal  BPM: 67  QRS axis: left  Pacin% capture and ventricular paced rhythm  Clinical impression: abnormal EKG             I personally viewed and interpreted the patient's LAB data         Assessment:     1. Coronary artery disease involving native coronary artery of native heart without angina pectoris    2. Chronic HFrEF (heart failure with reduced ejection fraction)    3. Mixed hyperlipidemia    4. Chronic a-fib*          Plan:     Coronary artery disease status post CABG .  Patient is asymptomatic she was advised to continue Imdur along with Coreg, Livalo.  She is not taking any antiplatelet therapy.  She is on Eliquis 5 mg twice a day.    Chronic HFrEF she is doing much better on Entresto.  She will continue Entresto 49/51 twice daily along with Aldactone, Lasix.  Farxiga/Jardiance will be considered next visit.    proBNP is normal at this time 1539.    Hyperlipidemia  LDL is 49 she will continue current dose of Livalo.  She cannot tolerate higher dose.    Chronic atrial fibrillation  Rate is very well-controlled anticoagulation was also continued.  Pacemaker has been functioning normally also.  Lab  work  next visit.      No follow-ups on file.

## 2024-07-16 PROCEDURE — 93000 ELECTROCARDIOGRAM COMPLETE: CPT | Performed by: INTERNAL MEDICINE

## 2024-07-22 RX ORDER — PITAVASTATIN CALCIUM 1.04 MG/1
1 TABLET, FILM COATED ORAL NIGHTLY
Qty: 90 TABLET | Refills: 1 | Status: SHIPPED | OUTPATIENT
Start: 2024-07-22

## 2024-07-26 ENCOUNTER — TELEPHONE (OUTPATIENT)
Dept: CARDIOLOGY | Facility: CLINIC | Age: 84
End: 2024-07-26
Payer: MEDICARE

## 2024-07-26 RX ORDER — FUROSEMIDE 20 MG/1
10 TABLET ORAL EVERY OTHER DAY
Start: 2024-07-26

## 2024-07-26 NOTE — TELEPHONE ENCOUNTER
She can take 10 mg every other day however they do not make 10 mg so she will have to break it   Called pt and given message per Dr Sanchez.  She agreed.

## 2024-07-26 NOTE — TELEPHONE ENCOUNTER
Patient called thinks the lasix is to strong she has to wear depends and does not make it to the bathroom wants to know if she can take a lower dose but take it more days a week right teo she takes 20mg every other day.

## 2024-08-05 RX ORDER — ISOSORBIDE MONONITRATE 60 MG/1
60 TABLET, EXTENDED RELEASE ORAL DAILY
Qty: 90 TABLET | Refills: 1 | Status: SHIPPED | OUTPATIENT
Start: 2024-08-05

## 2024-08-05 RX ORDER — CARVEDILOL 12.5 MG/1
12.5 TABLET ORAL 2 TIMES DAILY WITH MEALS
Qty: 180 TABLET | Refills: 1 | Status: SHIPPED | OUTPATIENT
Start: 2024-08-05

## 2024-08-05 RX ORDER — LEVOTHYROXINE SODIUM 0.05 MG/1
50 TABLET ORAL DAILY
Qty: 90 TABLET | Refills: 1 | Status: SHIPPED | OUTPATIENT
Start: 2024-08-05

## 2024-08-28 RX ORDER — DONEPEZIL HYDROCHLORIDE 5 MG/1
5 TABLET, FILM COATED ORAL NIGHTLY
Qty: 90 TABLET | Refills: 1 | Status: SHIPPED | OUTPATIENT
Start: 2024-08-28

## 2024-08-30 ENCOUNTER — TELEPHONE (OUTPATIENT)
Dept: CARDIOLOGY | Facility: CLINIC | Age: 84
End: 2024-08-30
Payer: MEDICARE

## 2024-08-30 RX ORDER — FLUCONAZOLE 150 MG/1
150 TABLET ORAL ONCE
Qty: 1 TABLET | Refills: 0 | Status: SHIPPED | OUTPATIENT
Start: 2024-08-30 | End: 2024-08-30

## 2024-09-23 RX ORDER — SACUBITRIL AND VALSARTAN 49; 51 MG/1; MG/1
1 TABLET, FILM COATED ORAL 2 TIMES DAILY
Qty: 60 TABLET | Refills: 2 | Status: SHIPPED | OUTPATIENT
Start: 2024-09-23

## 2024-10-10 ENCOUNTER — LAB (OUTPATIENT)
Dept: LAB | Facility: HOSPITAL | Age: 84
End: 2024-10-10
Payer: MEDICARE

## 2024-10-10 DIAGNOSIS — J43.2 CENTRILOBULAR EMPHYSEMA: ICD-10-CM

## 2024-10-10 DIAGNOSIS — E03.9 ACQUIRED HYPOTHYROIDISM: ICD-10-CM

## 2024-10-10 DIAGNOSIS — E78.2 MIXED HYPERLIPIDEMIA: ICD-10-CM

## 2024-10-10 DIAGNOSIS — J44.89 ASTHMATIC BRONCHITIS , CHRONIC: ICD-10-CM

## 2024-10-10 DIAGNOSIS — I50.22 CHRONIC HFREF (HEART FAILURE WITH REDUCED EJECTION FRACTION): ICD-10-CM

## 2024-10-10 PROCEDURE — 80061 LIPID PANEL: CPT

## 2024-10-10 PROCEDURE — 82550 ASSAY OF CK (CPK): CPT

## 2024-10-10 PROCEDURE — 80053 COMPREHEN METABOLIC PANEL: CPT

## 2024-10-10 PROCEDURE — 84443 ASSAY THYROID STIM HORMONE: CPT

## 2024-10-10 PROCEDURE — 36415 COLL VENOUS BLD VENIPUNCTURE: CPT

## 2024-10-10 PROCEDURE — 83880 ASSAY OF NATRIURETIC PEPTIDE: CPT

## 2024-10-10 PROCEDURE — 85025 COMPLETE CBC W/AUTO DIFF WBC: CPT

## 2024-10-10 PROCEDURE — 83735 ASSAY OF MAGNESIUM: CPT

## 2024-10-10 RX ORDER — BUDESONIDE AND FORMOTEROL FUMARATE DIHYDRATE 160; 4.5 UG/1; UG/1
2 AEROSOL RESPIRATORY (INHALATION)
Qty: 10.2 G | Refills: 8 | Status: SHIPPED | OUTPATIENT
Start: 2024-10-10

## 2024-10-10 NOTE — TELEPHONE ENCOUNTER
Caller: Ashely Feliciano    Relationship: Self    Best call back number: [phone]    Requested Prescriptions:   Requested Prescriptions     Pending Prescriptions Disp Refills    ipratropium (ATROVENT HFA) 17 MCG/ACT inhaler 12.9 g 9     Sig: Inhale 2 puffs Every 4 (Four) Hours As Needed for Wheezing (or shortness of air) for up to 90 days.    budesonide-formoterol (SYMBICORT) 160-4.5 MCG/ACT inhaler 10.2 g 8     Sig: Inhale 2 puffs 2 (Two) Times a Day.        Pharmacy where request should be sent: [id number] - [place], [place] - [address] AT [facility] [address] - [phone]  - [phone] FX     Last office visit with prescribing clinician: 5/29/2024   Last telemedicine visit with prescribing clinician: Visit date not found   Next office visit with prescribing clinician: 10/28/2024       Does the patient have less than a 3 day supply:  [x] Yes  [] No    Would you like a call back once the refill request has been completed: [x] Yes [] No    If the office needs to give you a call back, can they leave a voicemail: [x] Yes [] No    Gretchen Capellan Rep   10/10/24 12:45 EDT

## 2024-10-11 LAB
ALBUMIN SERPL-MCNC: 4.2 G/DL (ref 3.5–5.2)
ALBUMIN/GLOB SERPL: 1.6 G/DL
ALP SERPL-CCNC: 93 U/L (ref 39–117)
ALT SERPL W P-5'-P-CCNC: 16 U/L (ref 1–33)
ANION GAP SERPL CALCULATED.3IONS-SCNC: 9.8 MMOL/L (ref 5–15)
AST SERPL-CCNC: 19 U/L (ref 1–32)
BASOPHILS # BLD AUTO: 0.11 10*3/MM3 (ref 0–0.2)
BASOPHILS NFR BLD AUTO: 2.1 % (ref 0–1.5)
BILIRUB SERPL-MCNC: 0.7 MG/DL (ref 0–1.2)
BUN SERPL-MCNC: 17 MG/DL (ref 8–23)
BUN/CREAT SERPL: 14.9 (ref 7–25)
CALCIUM SPEC-SCNC: 9.2 MG/DL (ref 8.6–10.5)
CHLORIDE SERPL-SCNC: 107 MMOL/L (ref 98–107)
CHOLEST SERPL-MCNC: 137 MG/DL (ref 0–200)
CK SERPL-CCNC: 73 U/L (ref 20–180)
CO2 SERPL-SCNC: 22.2 MMOL/L (ref 22–29)
CREAT SERPL-MCNC: 1.14 MG/DL (ref 0.57–1)
DEPRECATED RDW RBC AUTO: 42 FL (ref 37–54)
EGFRCR SERPLBLD CKD-EPI 2021: 48.5 ML/MIN/1.73
EOSINOPHIL # BLD AUTO: 0.16 10*3/MM3 (ref 0–0.4)
EOSINOPHIL NFR BLD AUTO: 3 % (ref 0.3–6.2)
ERYTHROCYTE [DISTWIDTH] IN BLOOD BY AUTOMATED COUNT: 12.2 % (ref 12.3–15.4)
GLOBULIN UR ELPH-MCNC: 2.6 GM/DL
GLUCOSE SERPL-MCNC: 91 MG/DL (ref 65–99)
HCT VFR BLD AUTO: 32.8 % (ref 34–46.6)
HDLC SERPL-MCNC: 57 MG/DL (ref 40–60)
HGB BLD-MCNC: 10.9 G/DL (ref 12–15.9)
IMM GRANULOCYTES # BLD AUTO: 0.01 10*3/MM3 (ref 0–0.05)
IMM GRANULOCYTES NFR BLD AUTO: 0.2 % (ref 0–0.5)
LDLC SERPL CALC-MCNC: 63 MG/DL (ref 0–100)
LDLC/HDLC SERPL: 1.09 {RATIO}
LYMPHOCYTES # BLD AUTO: 0.76 10*3/MM3 (ref 0.7–3.1)
LYMPHOCYTES NFR BLD AUTO: 14.3 % (ref 19.6–45.3)
MAGNESIUM SERPL-MCNC: 2.3 MG/DL (ref 1.6–2.4)
MCH RBC QN AUTO: 31.3 PG (ref 26.6–33)
MCHC RBC AUTO-ENTMCNC: 33.2 G/DL (ref 31.5–35.7)
MCV RBC AUTO: 94.3 FL (ref 79–97)
MONOCYTES # BLD AUTO: 0.35 10*3/MM3 (ref 0.1–0.9)
MONOCYTES NFR BLD AUTO: 6.6 % (ref 5–12)
NEUTROPHILS NFR BLD AUTO: 3.92 10*3/MM3 (ref 1.7–7)
NEUTROPHILS NFR BLD AUTO: 73.8 % (ref 42.7–76)
NRBC BLD AUTO-RTO: 0 /100 WBC (ref 0–0.2)
NT-PROBNP SERPL-MCNC: 2839 PG/ML (ref 0–1800)
PLATELET # BLD AUTO: 129 10*3/MM3 (ref 140–450)
PMV BLD AUTO: 12.9 FL (ref 6–12)
POTASSIUM SERPL-SCNC: 4.5 MMOL/L (ref 3.5–5.2)
PROT SERPL-MCNC: 6.8 G/DL (ref 6–8.5)
RBC # BLD AUTO: 3.48 10*6/MM3 (ref 3.77–5.28)
SODIUM SERPL-SCNC: 139 MMOL/L (ref 136–145)
TRIGL SERPL-MCNC: 89 MG/DL (ref 0–150)
TSH SERPL DL<=0.05 MIU/L-ACNC: 2.56 UIU/ML (ref 0.27–4.2)
VLDLC SERPL-MCNC: 17 MG/DL (ref 5–40)
WBC NRBC COR # BLD AUTO: 5.31 10*3/MM3 (ref 3.4–10.8)

## 2024-10-15 ENCOUNTER — OFFICE VISIT (OUTPATIENT)
Dept: CARDIOLOGY | Facility: CLINIC | Age: 84
End: 2024-10-15
Payer: MEDICARE

## 2024-10-15 VITALS
OXYGEN SATURATION: 99 % | HEART RATE: 60 BPM | BODY MASS INDEX: 25.66 KG/M2 | HEIGHT: 65 IN | WEIGHT: 154 LBS | SYSTOLIC BLOOD PRESSURE: 136 MMHG | DIASTOLIC BLOOD PRESSURE: 64 MMHG

## 2024-10-15 DIAGNOSIS — I48.91 ATRIAL FIBRILLATION, UNSPECIFIED TYPE: ICD-10-CM

## 2024-10-15 DIAGNOSIS — D64.9 LOW HEMOGLOBIN: Primary | ICD-10-CM

## 2024-10-15 DIAGNOSIS — I25.10 CORONARY ARTERY DISEASE INVOLVING NATIVE CORONARY ARTERY OF NATIVE HEART WITHOUT ANGINA PECTORIS: ICD-10-CM

## 2024-10-15 DIAGNOSIS — E78.2 MIXED HYPERLIPIDEMIA: ICD-10-CM

## 2024-10-15 DIAGNOSIS — I50.22 HEART FAILURE WITH MILDLY REDUCED EJECTION FRACTION (HFMREF): ICD-10-CM

## 2024-10-15 DIAGNOSIS — I10 PRIMARY HYPERTENSION: ICD-10-CM

## 2024-10-15 DIAGNOSIS — E03.9 ACQUIRED HYPOTHYROIDISM: ICD-10-CM

## 2024-10-15 DIAGNOSIS — Z79.01 CHRONIC ANTICOAGULATION: ICD-10-CM

## 2024-10-15 DIAGNOSIS — Z95.0 PACEMAKER: ICD-10-CM

## 2024-10-15 DIAGNOSIS — Z12.11 ENCOUNTER FOR SCREENING FOR MALIGNANT NEOPLASM OF COLON: ICD-10-CM

## 2024-10-15 RX ORDER — IRON POLYSACCHARIDE COMPLEX 150 MG
150 CAPSULE ORAL DAILY
Qty: 30 CAPSULE | Refills: 3 | Status: SHIPPED | OUTPATIENT
Start: 2024-10-15

## 2024-10-15 NOTE — PROGRESS NOTES
Subjective     Ashely Feliciano is a 83 y.o. female.   Chief Complaint   Patient presents with    Coronary Artery Disease     Follow up     History of Present Illness   Ashely Feliciano is an 83-year-old female who presents to clinic today for routine follow-up.    Coronary artery disease status post CABG in 1997 single-vessel.  She remains on Coreg, Livalo and Imdur.  She denies chest pain, palpitations or shortness of breath.     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  She denies dyspnea, orthopnea or swelling. LVEF is around 46-50%.  She was not able to tolerate Farxiga.    Hyperlipidemia on Livalo however she cannot take full dose because of myalgias and is taking every other day. She had labs prior to today's visit with LDL around 63.      Hypothyroidism on Synthroid 75 mcg daily.  Clinically euthyroid.  Labs with recent normal TSH.     Chronic atrial fibrillation, rate is very well-controlled.  She is anticoagulated with Eliquis. She denies bleeding however labs with hemoglobin around 10.9.  Upon further questioning she does admit to having a little blood in her urine that resolved spontaneously several weeks ago.  She denies fever or dysuria.     Pacemaker has been functioning normally. Recent interrogation.      Patient Active Problem List   Diagnosis    CAD (coronary artery disease) status post CABG 1997 single-vessel*    Ischemic cardiomyopathy with apical hypokinesis LV ejection fraction 50%.    Chronic HFrEF (heart failure with reduced ejection fraction)    Hyperlipidemia*    Chronic a-fib*    Pacemaker dual-chamber pacemaker [employer] December 2015*    Hypothyroidism*    Hypertension*    Chronic anticoagulation*    Gastroesophageal reflux disease without esophagitis*    URI (upper respiratory infection)    Herpes zoster without complication    Acute midline low back pain without sciatica    Centrilobular emphysema    Asthmatic bronchitis , chronic    Pulmonary hypertension     Seasonal allergic rhinitis    Post zoster neuralgia    Memory loss    Diarrhea    Pacemaker lead malfunction    Sick sinus syndrome     Past Medical History:   Diagnosis Date    Atrial fibrillation     CAD (coronary artery disease)     Chronic a-fib     Chronic anticoagulation     Congestive heart failure     compensated    Disease of thyroid gland     Hyperlipidemia     Hypertension     Hypoxemia     Ischemic cardiomyopathy with severe LV Disfunction     Myocardial infarction     Pacemaker     VVI     Past Surgical History:   Procedure Laterality Date    CARDIAC ELECTROPHYSIOLOGY PROCEDURE N/A 1/22/2024    Procedure: Lead Revision RA or RV, PPM or ICD;  Surgeon: Donis See MD;  Location:  [facility];  Service: Cardiovascular;  Laterality: N/A;  RV lead replacement    CARDIAC ELECTROPHYSIOLOGY PROCEDURE N/A 1/22/2024    Procedure: Pacemaker lead replacement;  Surgeon: Donis See MD;  Location:  [facility];  Service: Cardiovascular;  Laterality: N/A;    CARDIAC SURGERY  1997    CARDIOVASCULAR STRESS TEST  06/2014    CATARACT EXTRACTION, BILATERAL      CHOLECYSTECTOMY  2002    COLONOSCOPY  05/2012    ECHO - CONVERTED  07/2014    PACEMAKER IMPLANTATION  12/17/2015       Family History   Problem Relation Age of Onset    Coronary artery disease Other     Hypertension Other     Diabetes Other     Heart attack Other     Heart attack Mother 62        fatal MI    Skin cancer Mother     Diabetes type II Mother     Melanoma Mother     Heart attack Father 63        fatal MI    Melanoma Sister 45    Skin cancer Sister     Heart attack Brother 62        fatal MI    Heart attack Brother 80        Fatal MI    Skin cancer Brother     Heart disease Sister     Heart disease Brother 75        pacemaker, CABG, Stents    Osteoporosis Sister     Pneumonia Brother     Hypertension Brother     Heart attack Sister 54        Fatal MI    Heart attack Brother 59        Fatal MI    Breast cancer  Neg Hx      Social History     Tobacco Use    Smoking status: Former     Current packs/day: 0.00     Average packs/day: 0.3 packs/day for 17.0 years (4.3 ttl pk-yrs)     Types: Cigarettes     Start date: 1973     Quit date: 1990     Years since quittin.8     Passive exposure: Never    Smokeless tobacco: Never   Vaping Use    Vaping status: Never Used   Substance Use Topics    Alcohol use: No    Drug use: No         The following portions of the patient's history were reviewed and updated as appropriate: allergies, current medications, past family history, past medical history, past social history, past surgical history and problem list.    Allergies   Allergen Reactions    Nicotine Hives    Codeine Rash    Egg-Derived Products Itching and Rash     Patient states she can now tolerate eggs (24)    Grass Itching and Rash    Lisinopril Rash         Current Outpatient Medications:     apixaban (Eliquis) 5 MG tablet tablet, Take 1 tablet by mouth Every 12 (Twelve) Hours., Disp: 180 tablet, Rfl: 1    azelastine (ASTELIN) 0.1 % nasal spray, 2 sprays into the nostril(s) as directed by provider 2 (Two) Times a Day As Needed for Rhinitis or Allergies. Use in each nostril as directed, Disp: 30 mL, Rfl: 8    budesonide-formoterol (SYMBICORT) 160-4.5 MCG/ACT inhaler, Inhale 2 puffs 2 (Two) Times a Day., Disp: 10.2 g, Rfl: 8    carvedilol (COREG) 12.5 MG tablet, TAKE 1 TABLET BY MOUTH TWICE DAILY WITH MEALS, Disp: 180 tablet, Rfl: 1    dicyclomine (BENTYL) 10 MG capsule, Take 1 capsule by mouth 3 times a day., Disp: , Rfl:     donepezil (ARICEPT) 5 MG tablet, TAKE 1 TABLET BY MOUTH EVERY NIGHT, Disp: 90 tablet, Rfl: 1    Entresto 49-51 MG tablet, TAKE 1 TABLET BY MOUTH TWICE DAILY, Disp: 60 tablet, Rfl: 2    esomeprazole (nexIUM) 40 MG capsule, Take 1 capsule by mouth Daily., Disp: 90 capsule, Rfl: 0    furosemide (LASIX) 20 MG tablet, Take 0.5 tablets by mouth Every Other Day., Disp: , Rfl:      guaiFENesin-dextromethorphan (ROBITUSSIN DM) 100-10 MG/5ML syrup, Take 5 mL by mouth 3 (Three) Times a Day As Needed for Cough or Congestion., Disp: 150 mL, Rfl: 1    ipratropium (ATROVENT HFA) 17 MCG/ACT inhaler, Inhale 2 puffs Every 4 (Four) Hours As Needed for Wheezing (or shortness of air) for up to 90 days., Disp: 12.9 g, Rfl: 9    ipratropium (ATROVENT) 0.02 % nebulizer solution, Take 2.5 mL by nebulization 4 (Four) Times a Day As Needed for Wheezing or Shortness of Air., Disp: 300 mL, Rfl: 11    isosorbide mononitrate (IMDUR) 60 MG 24 hr tablet, TAKE 1 TABLET BY MOUTH DAILY, Disp: 90 tablet, Rfl: 1    levothyroxine (SYNTHROID, LEVOTHROID) 50 MCG tablet, TAKE 1 TABLET BY MOUTH DAILY, Disp: 90 tablet, Rfl: 1    levothyroxine (SYNTHROID, LEVOTHROID) 75 MCG tablet, Take 1 tablet by mouth Daily., Disp: 90 tablet, Rfl: 1    methylPREDNISolone (MEDROL) 4 MG dose pack, Take as directed on package instructions., Disp: 21 tablet, Rfl: 0    montelukast (SINGULAIR) 10 MG tablet, TAKE 1 TABLET BY MOUTH EVERY NIGHT, Disp: 30 tablet, Rfl: 6    nitroglycerin (NITROSTAT) 0.4 MG SL tablet, Place 1 tablet under the tongue Every 5 (Five) Minutes As Needed for Chest Pain. Take no more than 3 doses in 15 minutes., Disp: 25 tablet, Rfl: 0    pitavastatin calcium (LIVALO) 1 MG tablet tablet, TAKE 1 TABLET BY MOUTH EVERY NIGHT, Disp: 90 tablet, Rfl: 1    iron polysaccharides (NIFEREX) 150 MG capsule, Take 1 capsule by mouth Daily., Disp: 30 capsule, Rfl: 3    spironolactone (ALDACTONE) 25 MG tablet, TAKE 1 TABLET BY MOUTH EVERY OTHER DAY, Disp: 30 tablet, Rfl: 1    Review of Systems   Constitutional:  Negative for activity change, appetite change, chills, diaphoresis, fatigue and fever.   HENT:  Negative for congestion, drooling, ear discharge, ear pain, mouth sores, nosebleeds, postnasal drip, rhinorrhea, sinus pressure, sneezing and sore throat.    Eyes:  Negative for pain, discharge and visual disturbance.   Respiratory:  Negative  "for cough, chest tightness, shortness of breath and wheezing.    Cardiovascular:  Negative for chest pain, palpitations and leg swelling.   Gastrointestinal:  Negative for abdominal pain, constipation, diarrhea, nausea and vomiting.   Endocrine: Negative for cold intolerance, heat intolerance, polydipsia, polyphagia and polyuria.   Genitourinary:  Positive for hematuria.   Musculoskeletal:  Negative for arthralgias, myalgias and neck pain.   Skin:  Negative for rash and wound.   Neurological:  Negative for dizziness, syncope, speech difficulty, weakness, light-headedness and headaches.   Hematological:  Negative for adenopathy. Does not bruise/bleed easily.   Psychiatric/Behavioral:  Negative for confusion, dysphoric mood and sleep disturbance. The patient is not nervous/anxious.    All other systems reviewed and are negative.      /64 (BP Location: Left arm, Patient Position: Sitting, Cuff Size: Adult)   Pulse 60   Ht 165.1 cm (65\")   Wt 69.9 kg (154 lb)   SpO2 99%   BMI 25.63 kg/m²     Objective   Allergies   Allergen Reactions    Nicotine Hives    Codeine Rash    Egg-Derived Products Itching and Rash     Patient states she can now tolerate eggs (01/22/24)    Grass Itching and Rash    Lisinopril Rash       Physical Exam  Vitals reviewed.   Constitutional:       Appearance: Normal appearance. She is well-developed.   HENT:      Head: Normocephalic.      Right Ear: Tympanic membrane normal.      Left Ear: Tympanic membrane normal.      Nose: Nose normal.   Eyes:      Conjunctiva/sclera: Conjunctivae normal.      Pupils: Pupils are equal, round, and reactive to light.   Neck:      Thyroid: No thyromegaly.      Vascular: No carotid bruit or JVD.   Cardiovascular:      Rate and Rhythm: Normal rate and regular rhythm.   Pulmonary:      Effort: Pulmonary effort is normal.      Breath sounds: Normal breath sounds.   Abdominal:      General: Bowel sounds are normal.      Palpations: Abdomen is soft. There is no " hepatomegaly, splenomegaly or mass.      Tenderness: There is no abdominal tenderness.   Musculoskeletal:         General: Normal range of motion.      Cervical back: Neck supple.      Right lower leg: No edema.      Left lower leg: No edema.   Skin:     General: Skin is warm and dry.   Neurological:      Mental Status: She is alert and oriented to person, place, and time.   Psychiatric:         Attention and Perception: Attention normal.         Mood and Affect: Mood normal.         Speech: Speech normal.         Behavior: Behavior normal. Behavior is cooperative.         Cognition and Memory: Cognition normal.           LABS  WBC   Date Value Ref Range Status   10/10/2024 5.31 3.40 - 10.80 10*3/mm3 Final     RBC   Date Value Ref Range Status   10/10/2024 3.48 (L) 3.77 - 5.28 10*6/mm3 Final     Hemoglobin   Date Value Ref Range Status   10/10/2024 10.9 (L) 12.0 - 15.9 g/dL Final     Hematocrit   Date Value Ref Range Status   10/10/2024 32.8 (L) 34.0 - 46.6 % Final     MCV   Date Value Ref Range Status   10/10/2024 94.3 79.0 - 97.0 fL Final     MCH   Date Value Ref Range Status   10/10/2024 31.3 26.6 - 33.0 pg Final     MCHC   Date Value Ref Range Status   10/10/2024 33.2 31.5 - 35.7 g/dL Final     RDW   Date Value Ref Range Status   10/10/2024 12.2 (L) 12.3 - 15.4 % Final     RDW-SD   Date Value Ref Range Status   10/10/2024 42.0 37.0 - 54.0 fl Final     MPV   Date Value Ref Range Status   10/10/2024 12.9 (H) 6.0 - 12.0 fL Final     Platelets   Date Value Ref Range Status   10/10/2024 129 (L) 140 - 450 10*3/mm3 Final     Neutrophil %   Date Value Ref Range Status   10/10/2024 73.8 42.7 - 76.0 % Final     Lymphocyte %   Date Value Ref Range Status   10/10/2024 14.3 (L) 19.6 - 45.3 % Final     Monocyte %   Date Value Ref Range Status   10/10/2024 6.6 5.0 - 12.0 % Final     Eosinophil %   Date Value Ref Range Status   10/10/2024 3.0 0.3 - 6.2 % Final     Basophil %   Date Value Ref Range Status   10/10/2024 2.1 (H)  0.0 - 1.5 % Final     Immature Grans %   Date Value Ref Range Status   10/10/2024 0.2 0.0 - 0.5 % Final     Neutrophils, Absolute   Date Value Ref Range Status   10/10/2024 3.92 1.70 - 7.00 10*3/mm3 Final     Lymphocytes, Absolute   Date Value Ref Range Status   10/10/2024 0.76 0.70 - 3.10 10*3/mm3 Final     Monocytes, Absolute   Date Value Ref Range Status   10/10/2024 0.35 0.10 - 0.90 10*3/mm3 Final     Eosinophils, Absolute   Date Value Ref Range Status   10/10/2024 0.16 0.00 - 0.40 10*3/mm3 Final     Basophils, Absolute   Date Value Ref Range Status   10/10/2024 0.11 0.00 - 0.20 10*3/mm3 Final     Immature Grans, Absolute   Date Value Ref Range Status   10/10/2024 0.01 0.00 - 0.05 10*3/mm3 Final     nRBC   Date Value Ref Range Status   10/10/2024 0.0 0.0 - 0.2 /100 WBC Final         Total Cholesterol   Date Value Ref Range Status   10/10/2024 137 0 - 200 mg/dL Final     Triglycerides   Date Value Ref Range Status   10/10/2024 89 0 - 150 mg/dL Final     HDL Cholesterol   Date Value Ref Range Status   10/10/2024 57 40 - 60 mg/dL Final     LDL Cholesterol    Date Value Ref Range Status   10/10/2024 63 0 - 100 mg/dL Final         Assessment & Plan   Diagnoses and all orders for this visit:    1. Low hemoglobin (Primary)  -     Urinalysis With Culture If Indicated -; Future  -     Cologuard - Stool, Per Rectum; Future  -     CBC & Differential; Future  -     Iron Profile; Future  Discussed and reviewed recent labs with slight drop in hemoglobin at 10.9.  She is anticoagulated chronically on Eliquis.  She denies bleeding but does admit to an episode of hematuria several weeks ago that resolved quickly.  Discussed further workup but she declines currently  Start iron supplementation  Urinalysis with culture if indicated  Stool cards  Labs prior to follow-up  Have discussed hematology evaluation however she defers currently     2. Encounter for screening for malignant neoplasm of colon  -     Cologuard - Stool, Per  Rectum; Future    3. Coronary artery disease involving native coronary artery of native heart without angina pectoris  Clinically asymptomatic  Continue Coreg, Imdur and statin    4. Heart failure with mildly reduced ejection fraction (HFmrEF)  Clinically euvolemic without complaint, continue Entresto, spironolactone and Coreg.  She has been unable to tolerate Farxiga  Sodium restrictions recommended    5. Primary hypertension  Controlled, continue current therapy    6. Mixed hyperlipidemia  Discussed and reviewed recent labs with LDL at goal, continue current therapy  Heart healthy diet    7. Atrial fibrillation, unspecified type  Continue on Coreg    8. Chronic anticoagulation  Continue on Eliquis.  She does not meet low-dose criteria, will continue to monitor    9. Pacemaker  Routine interrogation    10. Acquired hypothyroidism  Recent labs were normal, continue current dosing of Synthroid    Other orders  -     iron polysaccharides (NIFEREX) 150 MG capsule; Take 1 capsule by mouth Daily.  Dispense: 30 capsule; Refill: 3      Review of medical record and discussion of recent labs    Discussed preventative health maintenance, she defers mammogram and flu shot and colonoscopy at this time    Last modifications including heart healthy diet, regular exercise, maintenance of desirable body weight and avoidance of tobacco products    Follow-up in 3 months, sooner if needed.

## 2024-10-15 NOTE — Clinical Note
October 26, 2024     Maria R Sanchez MD  [address] [place] [address]    Patient: Ashely Feliciano   YOB: 1940   Date of Visit: 10/15/2024       Dear Maria R Sanchez MD    Ashely Feliciano was in my office today. Below is a copy of my note.    If you have questions, please do not hesitate to call me. I look forward to following Ashely along with you.         Sincerely,        ELIDA Gama        CC: Mayito Salas MD    Subjective    Ashely Feliciano is a 83 y.o. female.   Chief Complaint   Patient presents with    Coronary Artery Disease     Follow up     History of Present Illness     Patient has coronary artery disease status post CABG in 1997 single-vessel  Denies any chest pain palpitations or shortness of breath.     She denies any chest pain doing very well with Imdur.     Chronic HFrEF doing very well with Coreg and Entresto and Aldactone.  Hyperlipidemia on Livalo however she cannot take full dose because of myalgias and is taking every other day.     Hypothyroidism on Synthroid 75 mcg daily.  Clinically euthyroid.     Chronic atrial fibrillation  Rate is very well-controlled.  She is anticoagulated with Eliquis.  No abnormal bleeding.     Pacemaker has been functioning normally.     Patient Active Problem List   Diagnosis    CAD (coronary artery disease) status post CABG 1997 single-vessel*    Ischemic cardiomyopathy with apical hypokinesis LV ejection fraction 50%.    Chronic HFrEF (heart failure with reduced ejection fraction)    Hyperlipidemia*    Chronic a-fib*    Pacemaker dual-chamber pacemaker [employer] December 2015*    Hypothyroidism*    Hypertension*    Chronic anticoagulation*    Gastroesophageal reflux disease without esophagitis*    URI (upper respiratory infection)    Herpes zoster without complication    Acute midline low back pain without sciatica    Centrilobular emphysema    Asthmatic bronchitis , chronic    Pulmonary  hypertension    Seasonal allergic rhinitis    Post zoster neuralgia    Memory loss    Diarrhea    Pacemaker lead malfunction    Sick sinus syndrome     Past Medical History:   Diagnosis Date    Atrial fibrillation     CAD (coronary artery disease)     Chronic a-fib     Chronic anticoagulation     Congestive heart failure     compensated    Disease of thyroid gland     Hyperlipidemia     Hypertension     Hypoxemia     Ischemic cardiomyopathy with severe LV Disfunction     Myocardial infarction     Pacemaker     VVI     Past Surgical History:   Procedure Laterality Date    CARDIAC ELECTROPHYSIOLOGY PROCEDURE N/A 1/22/2024    Procedure: Lead Revision RA or RV, PPM or ICD;  Surgeon: Donis See MD;  Location:  [facility];  Service: Cardiovascular;  Laterality: N/A;  RV lead replacement    CARDIAC ELECTROPHYSIOLOGY PROCEDURE N/A 1/22/2024    Procedure: Pacemaker lead replacement;  Surgeon: Donis See MD;  Location:  [facility];  Service: Cardiovascular;  Laterality: N/A;    CARDIAC SURGERY  1997    CARDIOVASCULAR STRESS TEST  06/2014    CATARACT EXTRACTION, BILATERAL      CHOLECYSTECTOMY  2002    COLONOSCOPY  05/2012    ECHO - CONVERTED  07/2014    PACEMAKER IMPLANTATION  12/17/2015       Family History   Problem Relation Age of Onset    Coronary artery disease Other     Hypertension Other     Diabetes Other     Heart attack Other     Heart attack Mother 62        fatal MI    Skin cancer Mother     Diabetes type II Mother     Melanoma Mother     Heart attack Father 63        fatal MI    Melanoma Sister 45    Skin cancer Sister     Heart attack Brother 62        fatal MI    Heart attack Brother 80        Fatal MI    Skin cancer Brother     Heart disease Sister     Heart disease Brother 75        pacemaker, CABG, Stents    Osteoporosis Sister     Pneumonia Brother     Hypertension Brother     Heart attack Sister 54        Fatal MI    Heart attack Brother 59        Fatal MI     Breast cancer Neg Hx      Social History     Tobacco Use    Smoking status: Former     Current packs/day: 0.00     Average packs/day: 0.3 packs/day for 17.0 years (4.3 ttl pk-yrs)     Types: Cigarettes     Start date: 1973     Quit date: 1990     Years since quittin.8     Passive exposure: Never    Smokeless tobacco: Never   Vaping Use    Vaping status: Never Used   Substance Use Topics    Alcohol use: No    Drug use: No         The following portions of the patient's history were reviewed and updated as appropriate: allergies, current medications, past family history, past medical history, past social history, past surgical history and problem list.    Allergies   Allergen Reactions    Nicotine Hives    Codeine Rash    Egg-Derived Products Itching and Rash     Patient states she can now tolerate eggs (24)    Grass Itching and Rash    Lisinopril Rash         Current Outpatient Medications:     apixaban (Eliquis) 5 MG tablet tablet, Take 1 tablet by mouth Every 12 (Twelve) Hours., Disp: 180 tablet, Rfl: 1    azelastine (ASTELIN) 0.1 % nasal spray, 2 sprays into the nostril(s) as directed by provider 2 (Two) Times a Day As Needed for Rhinitis or Allergies. Use in each nostril as directed, Disp: 30 mL, Rfl: 8    budesonide-formoterol (SYMBICORT) 160-4.5 MCG/ACT inhaler, Inhale 2 puffs 2 (Two) Times a Day., Disp: 10.2 g, Rfl: 8    carvedilol (COREG) 12.5 MG tablet, TAKE 1 TABLET BY MOUTH TWICE DAILY WITH MEALS, Disp: 180 tablet, Rfl: 1    dicyclomine (BENTYL) 10 MG capsule, Take 1 capsule by mouth 3 times a day., Disp: , Rfl:     donepezil (ARICEPT) 5 MG tablet, TAKE 1 TABLET BY MOUTH EVERY NIGHT, Disp: 90 tablet, Rfl: 1    Entresto 49-51 MG tablet, TAKE 1 TABLET BY MOUTH TWICE DAILY, Disp: 60 tablet, Rfl: 2    esomeprazole (nexIUM) 40 MG capsule, Take 1 capsule by mouth Daily., Disp: 90 capsule, Rfl: 0    furosemide (LASIX) 20 MG tablet, Take 0.5 tablets by mouth Every Other Day., Disp: , Rfl:      "guaiFENesin-dextromethorphan (ROBITUSSIN DM) 100-10 MG/5ML syrup, Take 5 mL by mouth 3 (Three) Times a Day As Needed for Cough or Congestion., Disp: 150 mL, Rfl: 1    ipratropium (ATROVENT HFA) 17 MCG/ACT inhaler, Inhale 2 puffs Every 4 (Four) Hours As Needed for Wheezing (or shortness of air) for up to 90 days., Disp: 12.9 g, Rfl: 9    ipratropium (ATROVENT) 0.02 % nebulizer solution, Take 2.5 mL by nebulization 4 (Four) Times a Day As Needed for Wheezing or Shortness of Air., Disp: 300 mL, Rfl: 11    isosorbide mononitrate (IMDUR) 60 MG 24 hr tablet, TAKE 1 TABLET BY MOUTH DAILY, Disp: 90 tablet, Rfl: 1    levothyroxine (SYNTHROID, LEVOTHROID) 50 MCG tablet, TAKE 1 TABLET BY MOUTH DAILY, Disp: 90 tablet, Rfl: 1    levothyroxine (SYNTHROID, LEVOTHROID) 75 MCG tablet, Take 1 tablet by mouth Daily., Disp: 90 tablet, Rfl: 1    methylPREDNISolone (MEDROL) 4 MG dose pack, Take as directed on package instructions., Disp: 21 tablet, Rfl: 0    montelukast (SINGULAIR) 10 MG tablet, TAKE 1 TABLET BY MOUTH EVERY NIGHT, Disp: 30 tablet, Rfl: 6    nitroglycerin (NITROSTAT) 0.4 MG SL tablet, Place 1 tablet under the tongue Every 5 (Five) Minutes As Needed for Chest Pain. Take no more than 3 doses in 15 minutes., Disp: 25 tablet, Rfl: 0    pitavastatin calcium (LIVALO) 1 MG tablet tablet, TAKE 1 TABLET BY MOUTH EVERY NIGHT, Disp: 90 tablet, Rfl: 1    spironolactone (ALDACTONE) 25 MG tablet, Take 1 tablet by mouth Every Other Day., Disp: 30 tablet, Rfl: 1    Review of Systems    /64 (BP Location: Left arm, Patient Position: Sitting, Cuff Size: Adult)   Pulse 60   Ht 165.1 cm (65\")   Wt 69.9 kg (154 lb)   SpO2 99%   BMI 25.63 kg/m²     Objective  Allergies   Allergen Reactions    Nicotine Hives    Codeine Rash    Egg-Derived Products Itching and Rash     Patient states she can now tolerate eggs (01/22/24)    Grass Itching and Rash    Lisinopril Rash       Physical Exam    Procedures    [unfilled]  WBC   Date Value Ref " Range Status   10/10/2024 5.31 3.40 - 10.80 10*3/mm3 Final     RBC   Date Value Ref Range Status   10/10/2024 3.48 (L) 3.77 - 5.28 10*6/mm3 Final     Hemoglobin   Date Value Ref Range Status   10/10/2024 10.9 (L) 12.0 - 15.9 g/dL Final     Hematocrit   Date Value Ref Range Status   10/10/2024 32.8 (L) 34.0 - 46.6 % Final     MCV   Date Value Ref Range Status   10/10/2024 94.3 79.0 - 97.0 fL Final     MCH   Date Value Ref Range Status   10/10/2024 31.3 26.6 - 33.0 pg Final     MCHC   Date Value Ref Range Status   10/10/2024 33.2 31.5 - 35.7 g/dL Final     RDW   Date Value Ref Range Status   10/10/2024 12.2 (L) 12.3 - 15.4 % Final     RDW-SD   Date Value Ref Range Status   10/10/2024 42.0 37.0 - 54.0 fl Final     MPV   Date Value Ref Range Status   10/10/2024 12.9 (H) 6.0 - 12.0 fL Final     Platelets   Date Value Ref Range Status   10/10/2024 129 (L) 140 - 450 10*3/mm3 Final     Neutrophil %   Date Value Ref Range Status   10/10/2024 73.8 42.7 - 76.0 % Final     Lymphocyte %   Date Value Ref Range Status   10/10/2024 14.3 (L) 19.6 - 45.3 % Final     Monocyte %   Date Value Ref Range Status   10/10/2024 6.6 5.0 - 12.0 % Final     Eosinophil %   Date Value Ref Range Status   10/10/2024 3.0 0.3 - 6.2 % Final     Basophil %   Date Value Ref Range Status   10/10/2024 2.1 (H) 0.0 - 1.5 % Final     Immature Grans %   Date Value Ref Range Status   10/10/2024 0.2 0.0 - 0.5 % Final     Neutrophils, Absolute   Date Value Ref Range Status   10/10/2024 3.92 1.70 - 7.00 10*3/mm3 Final     Lymphocytes, Absolute   Date Value Ref Range Status   10/10/2024 0.76 0.70 - 3.10 10*3/mm3 Final     Monocytes, Absolute   Date Value Ref Range Status   10/10/2024 0.35 0.10 - 0.90 10*3/mm3 Final     Eosinophils, Absolute   Date Value Ref Range Status   10/10/2024 0.16 0.00 - 0.40 10*3/mm3 Final     Basophils, Absolute   Date Value Ref Range Status   10/10/2024 0.11 0.00 - 0.20 10*3/mm3 Final     Immature Grans, Absolute   Date Value Ref Range  Status   10/10/2024 0.01 0.00 - 0.05 10*3/mm3 Final     nRBC   Date Value Ref Range Status   10/10/2024 0.0 0.0 - 0.2 /100 WBC Final   LASTLAB(GLUCOSE,NA,K,CO2,CL,ANIONGAP,CREATININE,BUN,BCR,CALCIUM,EGFRIFNONA,ALKPHOS,PROTEINTOT,ALT,AST,BILITOT,ALBUMIN,GLOB,LABIL2)@  Total Cholesterol   Date Value Ref Range Status   10/10/2024 137 0 - 200 mg/dL Final     Triglycerides   Date Value Ref Range Status   10/10/2024 89 0 - 150 mg/dL Final     HDL Cholesterol   Date Value Ref Range Status   10/10/2024 57 40 - 60 mg/dL Final     LDL Cholesterol    Date Value Ref Range Status   10/10/2024 63 0 - 100 mg/dL Final       No Images in the past 120 days found..          Assessment & Plan  There are no diagnoses linked to this encounter.  No diagnosis found.

## 2024-10-21 RX ORDER — SPIRONOLACTONE 25 MG/1
25 TABLET ORAL EVERY OTHER DAY
Qty: 30 TABLET | Refills: 1 | Status: SHIPPED | OUTPATIENT
Start: 2024-10-21

## 2024-10-28 ENCOUNTER — OFFICE VISIT (OUTPATIENT)
Dept: PULMONOLOGY | Facility: CLINIC | Age: 84
End: 2024-10-28
Payer: MEDICARE

## 2024-10-28 VITALS
BODY MASS INDEX: 25.83 KG/M2 | SYSTOLIC BLOOD PRESSURE: 124 MMHG | DIASTOLIC BLOOD PRESSURE: 76 MMHG | OXYGEN SATURATION: 98 % | HEIGHT: 65 IN | WEIGHT: 155 LBS | HEART RATE: 71 BPM | TEMPERATURE: 95.8 F

## 2024-10-28 DIAGNOSIS — J43.2 CENTRILOBULAR EMPHYSEMA: Primary | ICD-10-CM

## 2024-10-28 DIAGNOSIS — J30.2 SEASONAL ALLERGIC RHINITIS, UNSPECIFIED TRIGGER: ICD-10-CM

## 2024-10-28 DIAGNOSIS — J44.89 ASTHMATIC BRONCHITIS , CHRONIC: ICD-10-CM

## 2024-10-28 DIAGNOSIS — I27.20 PULMONARY HYPERTENSION: ICD-10-CM

## 2024-10-28 PROCEDURE — 1160F RVW MEDS BY RX/DR IN RCRD: CPT | Performed by: PHYSICIAN ASSISTANT

## 2024-10-28 PROCEDURE — 1159F MED LIST DOCD IN RCRD: CPT | Performed by: PHYSICIAN ASSISTANT

## 2024-10-28 PROCEDURE — 99214 OFFICE O/P EST MOD 30 MIN: CPT | Performed by: PHYSICIAN ASSISTANT

## 2024-10-28 PROCEDURE — 3078F DIAST BP <80 MM HG: CPT | Performed by: PHYSICIAN ASSISTANT

## 2024-10-28 PROCEDURE — 3074F SYST BP LT 130 MM HG: CPT | Performed by: PHYSICIAN ASSISTANT

## 2024-10-28 RX ORDER — APIXABAN 5 MG/1
5 TABLET, FILM COATED ORAL EVERY 12 HOURS
Qty: 180 TABLET | Refills: 1 | Status: SHIPPED | OUTPATIENT
Start: 2024-10-28

## 2024-10-28 NOTE — PROGRESS NOTES
"Chief Complaint  Shortness of Breath    Subjective        Ashely Feliciano presents to [facility]  History of Present Illness    Mrs. Feliciano presents today for follow-up.  Doing very well since starting Breztri for maintenance. Was previously only using as needed.  Now started using it more frequently, but this has maintained symptoms better.  Has been able to travel recently, and was able to tolerate more exertion without as much difficulty as in the past.      Objective   Vital Signs:  /76   Pulse 71   Temp 95.8 °F (35.4 °C)   Ht 165.1 cm (65\")   Wt 70.3 kg (155 lb)   SpO2 98%   BMI 25.79 kg/m²   Estimated body mass index is 25.79 kg/m² as calculated from the following:    Height as of this encounter: 165.1 cm (65\").    Weight as of this encounter: 70.3 kg (155 lb).      Physical Exam  Vitals reviewed.   Constitutional:       General: She is not in acute distress.     Appearance: She is not diaphoretic.   HENT:      Head: Normocephalic and atraumatic.   Cardiovascular:      Rate and Rhythm: Normal rate and regular rhythm.   Pulmonary:      Effort: Pulmonary effort is normal.      Breath sounds: No wheezing, rhonchi or rales.   Neurological:      Mental Status: She is alert and oriented to person, place, and time.   Psychiatric:         Behavior: Behavior normal.        Result Review :  The following data was reviewed by: Kiersten Mosqueda PA-C on 10/28/2024:    Chest x-ray imaging/report May 2024    -----------------------------------------------------------------------------------    CT chest high-resolution imaging/report February 2019      -----------------------------------------------------------------------------------    Overnight pulse oximetry test 2020      -----------------------------------------------------------------------------------    Echocardiogram results January 2024          Assessment and Plan   Diagnoses and all orders for " this visit:    1. Centrilobular emphysema (Primary)    2. Asthmatic bronchitis , chronic    3. Seasonal allergic rhinitis, unspecified trigger    4. Pulmonary hypertension          Centrilobular emphysema, concern for mixed asthmatic bronchitis:   Continue atrovent inhaler as needed  Continue Breztri as scheduled  Doing much better with consistent use.     Symptoms also likely impacted by CHF/valvular changes, follows with cardiology.           Moderate pulmonary hypertension:   Moderate PH per last echo.   Likely group 2, group 3.   (multiple valvular abnormalities, and centrilobular emphysema)  Respiratory symptoms stable  Follows with cardiology  Prefers to hold off on oxygen overnight testing          Seasonal allergic rhinitis:   Continue azelastine for PRN use.   Flonase not as well tolerated due to irritation  Continue oral antihistamine as needed.         Follow Up   Return in about 6 months (around 4/28/2025), or if symptoms worsen or fail to improve, for Next scheduled follow up.  Patient was given instructions and counseling regarding her condition or for health maintenance advice. Please see specific information pulled into the AVS if appropriate.

## 2024-11-27 ENCOUNTER — TELEPHONE (OUTPATIENT)
Dept: CARDIOLOGY | Facility: CLINIC | Age: 84
End: 2024-11-27
Payer: MEDICARE

## 2024-11-27 NOTE — TELEPHONE ENCOUNTER
Spoke with pt, adv of message regarding cologuard      ----- Message from Madelin Pride sent at 11/27/2024  8:06 AM EST -----  Cologuard is negative. Please update in health maintenance

## 2024-12-24 DIAGNOSIS — J30.2 SEASONAL ALLERGIC RHINITIS, UNSPECIFIED TRIGGER: ICD-10-CM

## 2024-12-26 RX ORDER — SACUBITRIL AND VALSARTAN 49; 51 MG/1; MG/1
1 TABLET, FILM COATED ORAL 2 TIMES DAILY
Qty: 60 TABLET | Refills: 2 | Status: SHIPPED | OUTPATIENT
Start: 2024-12-26

## 2024-12-27 RX ORDER — DICYCLOMINE HYDROCHLORIDE 10 MG/1
10 CAPSULE ORAL 3 TIMES DAILY
Qty: 270 CAPSULE | Refills: 0 | Status: SHIPPED | OUTPATIENT
Start: 2024-12-27

## 2024-12-27 RX ORDER — MONTELUKAST SODIUM 10 MG/1
10 TABLET ORAL NIGHTLY
Qty: 90 TABLET | Refills: 3 | Status: SHIPPED | OUTPATIENT
Start: 2024-12-27

## 2024-12-31 ENCOUNTER — LAB (OUTPATIENT)
Dept: LAB | Facility: HOSPITAL | Age: 84
End: 2024-12-31
Payer: MEDICARE

## 2024-12-31 ENCOUNTER — OFFICE VISIT (OUTPATIENT)
Dept: CARDIOLOGY | Facility: CLINIC | Age: 84
End: 2024-12-31
Payer: MEDICARE

## 2024-12-31 VITALS
SYSTOLIC BLOOD PRESSURE: 122 MMHG | BODY MASS INDEX: 26.59 KG/M2 | HEART RATE: 80 BPM | DIASTOLIC BLOOD PRESSURE: 76 MMHG | WEIGHT: 159.6 LBS | HEIGHT: 65 IN | OXYGEN SATURATION: 97 %

## 2024-12-31 DIAGNOSIS — D64.9 LOW HEMOGLOBIN: ICD-10-CM

## 2024-12-31 DIAGNOSIS — I25.10 CORONARY ARTERY DISEASE INVOLVING NATIVE CORONARY ARTERY OF NATIVE HEART WITHOUT ANGINA PECTORIS: ICD-10-CM

## 2024-12-31 DIAGNOSIS — E03.9 ACQUIRED HYPOTHYROIDISM: ICD-10-CM

## 2024-12-31 DIAGNOSIS — I50.23 ACUTE ON CHRONIC HEART FAILURE WITH MILDLY REDUCED EJECTION FRACTION (HFMREF, 41-49%): Primary | ICD-10-CM

## 2024-12-31 DIAGNOSIS — I10 PRIMARY HYPERTENSION: ICD-10-CM

## 2024-12-31 DIAGNOSIS — E78.2 MIXED HYPERLIPIDEMIA: ICD-10-CM

## 2024-12-31 DIAGNOSIS — I48.91 ATRIAL FIBRILLATION, UNSPECIFIED TYPE: ICD-10-CM

## 2024-12-31 DIAGNOSIS — I50.22 HEART FAILURE WITH MILDLY REDUCED EJECTION FRACTION (HFMREF): ICD-10-CM

## 2024-12-31 DIAGNOSIS — Z79.01 CHRONIC ANTICOAGULATION: ICD-10-CM

## 2024-12-31 PROCEDURE — 80048 BASIC METABOLIC PNL TOTAL CA: CPT

## 2024-12-31 PROCEDURE — 83880 ASSAY OF NATRIURETIC PEPTIDE: CPT

## 2024-12-31 PROCEDURE — 36415 COLL VENOUS BLD VENIPUNCTURE: CPT

## 2024-12-31 PROCEDURE — 85025 COMPLETE CBC W/AUTO DIFF WBC: CPT

## 2024-12-31 NOTE — PROGRESS NOTES
Subjective     Ashely Feliciano is a 84 y.o. female.   Chief Complaint   Patient presents with    Shortness of Breath    Rapid Heart Rate     History of Present Illness   Ashely Feliciano is an 84-year-old female who presents to clinic today for routine follow-up.     Coronary artery disease status post CABG in 1997 single-vessel.  She remains on Coreg, Livalo and Imdur.  She denies chest pain, palpitations or shortness of breath.     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 LVEF is around 46-50%.  She was not able to tolerate Farxiga. She complains today about retaining fluid since [place] and feeling a little more short of breath.  She is inquiring about increasing her Lasix for a few days.  She feels she probably had too much of think she should have enough for Joelle.  She denies fever, cough or congestion.     Hyperlipidemia on Livalo however she cannot take full dose because of myalgias and is taking every other day. She had labs prior to today's visit with LDL around 63.      Hypothyroidism on Synthroid 75 mcg daily.  Clinically euthyroid. Labs with recent normal TSH.      Chronic atrial fibrillation, rate is very well-controlled.  She is anticoagulated with Eliquis. She denies bleeding however labs with hemoglobin around 10.9.  Upon further questioning she does admit to having a little blood in her urine that resolved spontaneously several weeks ago.  She denies fever or dysuria.     Pacemaker has been functioning normally. Interrogation in October.     Patient Active Problem List   Diagnosis    CAD (coronary artery disease) status post CABG 1997 single-vessel*    Ischemic cardiomyopathy with apical hypokinesis LV ejection fraction 50%.    Chronic HFrEF (heart failure with reduced ejection fraction)    Hyperlipidemia*    Chronic a-fib*    Pacemaker dual-chamber pacemaker [employer] December 2015*    Hypothyroidism*    Hypertension*    Chronic  anticoagulation*    Gastroesophageal reflux disease without esophagitis*    URI (upper respiratory infection)    Herpes zoster without complication    Acute midline low back pain without sciatica    Centrilobular emphysema    Asthmatic bronchitis , chronic    Pulmonary hypertension    Seasonal allergic rhinitis    Post zoster neuralgia    Memory loss    Diarrhea    Pacemaker lead malfunction    Sick sinus syndrome     Past Medical History:   Diagnosis Date    Atrial fibrillation     CAD (coronary artery disease)     Chronic a-fib     Chronic anticoagulation     Congestive heart failure     compensated    Disease of thyroid gland     Hyperlipidemia     Hypertension     Hypoxemia     Ischemic cardiomyopathy with severe LV Disfunction     Myocardial infarction     Pacemaker     VVI     Past Surgical History:   Procedure Laterality Date    CARDIAC ELECTROPHYSIOLOGY PROCEDURE N/A 1/22/2024    Procedure: Lead Revision RA or RV, PPM or ICD;  Surgeon: Donis See MD;  Location:  [facility];  Service: Cardiovascular;  Laterality: N/A;  RV lead replacement    CARDIAC ELECTROPHYSIOLOGY PROCEDURE N/A 1/22/2024    Procedure: Pacemaker lead replacement;  Surgeon: Donis See MD;  Location:  [facility];  Service: Cardiovascular;  Laterality: N/A;    CARDIAC SURGERY  1997    CARDIOVASCULAR STRESS TEST  06/2014    CATARACT EXTRACTION, BILATERAL      CHOLECYSTECTOMY  2002    COLONOSCOPY  05/2012    ECHO - CONVERTED  07/2014    PACEMAKER IMPLANTATION  12/17/2015       Family History   Problem Relation Age of Onset    Coronary artery disease Other     Hypertension Other     Diabetes Other     Heart attack Other     Heart attack Mother 62        fatal MI    Skin cancer Mother     Diabetes type II Mother     Melanoma Mother     Heart attack Father 63        fatal MI    Melanoma Sister 45    Skin cancer Sister     Heart attack Brother 62        fatal MI    Heart attack Brother 80        Fatal  MI    Skin cancer Brother     Heart disease Sister     Heart disease Brother 75        pacemaker, CABG, Stents    Osteoporosis Sister     Pneumonia Brother     Hypertension Brother     Heart attack Sister 54        Fatal MI    Heart attack Brother 59        Fatal MI    Breast cancer Neg Hx      Social History     Tobacco Use    Smoking status: Former     Current packs/day: 0.00     Average packs/day: 0.3 packs/day for 17.0 years (4.3 ttl pk-yrs)     Types: Cigarettes     Start date: 1973     Quit date: 1990     Years since quittin.0     Passive exposure: Never    Smokeless tobacco: Never   Vaping Use    Vaping status: Never Used   Substance Use Topics    Alcohol use: No    Drug use: No         The following portions of the patient's history were reviewed and updated as appropriate: allergies, current medications, past family history, past medical history, past social history, past surgical history and problem list.    Allergies   Allergen Reactions    Nicotine Hives    Codeine Rash    Egg-Derived Products Itching and Rash     Patient states she can now tolerate eggs (24)    Grass Itching and Rash    Lisinopril Rash         Current Outpatient Medications:     azelastine (ASTELIN) 0.1 % nasal spray, 2 sprays into the nostril(s) as directed by provider 2 (Two) Times a Day As Needed for Rhinitis or Allergies. Use in each nostril as directed, Disp: 30 mL, Rfl: 8    budesonide-formoterol (SYMBICORT) 160-4.5 MCG/ACT inhaler, Inhale 2 puffs 2 (Two) Times a Day., Disp: 10.2 g, Rfl: 8    carvedilol (COREG) 12.5 MG tablet, TAKE 1 TABLET BY MOUTH TWICE DAILY WITH MEALS, Disp: 180 tablet, Rfl: 1    dicyclomine (BENTYL) 10 MG capsule, TAKE 1 CAPSULE BY MOUTH THREE TIMES DAILY, Disp: 270 capsule, Rfl: 0    donepezil (ARICEPT) 5 MG tablet, TAKE 1 TABLET BY MOUTH EVERY NIGHT, Disp: 90 tablet, Rfl: 1    Eliquis 5 MG tablet tablet, TAKE 1 TABLET BY MOUTH EVERY 12 HOURS, Disp: 180 tablet, Rfl: 1    Entresto 49-51 MG  tablet, TAKE 1 TABLET BY MOUTH TWICE DAILY, Disp: 60 tablet, Rfl: 2    esomeprazole (nexIUM) 40 MG capsule, Take 1 capsule by mouth Daily., Disp: 90 capsule, Rfl: 0    furosemide (LASIX) 20 MG tablet, Take 0.5 tablets by mouth Every Other Day., Disp: , Rfl:     guaiFENesin-dextromethorphan (ROBITUSSIN DM) 100-10 MG/5ML syrup, Take 5 mL by mouth 3 (Three) Times a Day As Needed for Cough or Congestion., Disp: 150 mL, Rfl: 1    ipratropium (ATROVENT HFA) 17 MCG/ACT inhaler, Inhale 2 puffs Every 4 (Four) Hours As Needed for Wheezing (or shortness of air) for up to 90 days., Disp: 12.9 g, Rfl: 9    ipratropium (ATROVENT) 0.02 % nebulizer solution, Take 2.5 mL by nebulization 4 (Four) Times a Day As Needed for Wheezing or Shortness of Air., Disp: 300 mL, Rfl: 11    iron polysaccharides (NIFEREX) 150 MG capsule, Take 1 capsule by mouth Daily., Disp: 30 capsule, Rfl: 3    isosorbide mononitrate (IMDUR) 60 MG 24 hr tablet, TAKE 1 TABLET BY MOUTH DAILY, Disp: 90 tablet, Rfl: 1    levothyroxine (SYNTHROID, LEVOTHROID) 50 MCG tablet, TAKE 1 TABLET BY MOUTH DAILY, Disp: 90 tablet, Rfl: 1    levothyroxine (SYNTHROID, LEVOTHROID) 75 MCG tablet, Take 1 tablet by mouth Daily., Disp: 90 tablet, Rfl: 1    methylPREDNISolone (MEDROL) 4 MG dose pack, Take as directed on package instructions., Disp: 21 tablet, Rfl: 0    montelukast (SINGULAIR) 10 MG tablet, TAKE 1 TABLET BY MOUTH EVERY NIGHT, Disp: 90 tablet, Rfl: 3    nitroglycerin (NITROSTAT) 0.4 MG SL tablet, Place 1 tablet under the tongue Every 5 (Five) Minutes As Needed for Chest Pain. Take no more than 3 doses in 15 minutes., Disp: 25 tablet, Rfl: 0    pitavastatin calcium (LIVALO) 1 MG tablet tablet, TAKE 1 TABLET BY MOUTH EVERY NIGHT, Disp: 90 tablet, Rfl: 1    spironolactone (ALDACTONE) 25 MG tablet, TAKE 1 TABLET BY MOUTH EVERY OTHER DAY, Disp: 30 tablet, Rfl: 1    Review of Systems   Constitutional:  Negative for activity change, appetite change, chills, diaphoresis, fatigue  "and fever.   HENT:  Negative for congestion, drooling, ear discharge, ear pain, mouth sores, nosebleeds, postnasal drip, rhinorrhea, sinus pressure, sneezing and sore throat.    Eyes:  Negative for pain, discharge and visual disturbance.   Respiratory:  Positive for shortness of breath. Negative for cough, chest tightness and wheezing.    Cardiovascular:  Positive for leg swelling. Negative for chest pain and palpitations.   Gastrointestinal:  Negative for abdominal pain, constipation, diarrhea, nausea and vomiting.   Endocrine: Negative for cold intolerance, heat intolerance, polydipsia, polyphagia and polyuria.   Musculoskeletal:  Negative for arthralgias, myalgias and neck pain.   Skin:  Negative for rash and wound.   Neurological:  Negative for syncope, speech difficulty, weakness, light-headedness and headaches.   Hematological:  Negative for adenopathy. Does not bruise/bleed easily.   Psychiatric/Behavioral:  Negative for confusion, dysphoric mood and sleep disturbance. The patient is not nervous/anxious.    All other systems reviewed and are negative.    /76 (BP Location: Left arm, Patient Position: Sitting, Cuff Size: Adult)   Pulse 80   Ht 165.1 cm (65\")   Wt 72.4 kg (159 lb 9.6 oz)   SpO2 97%   BMI 26.56 kg/m²     Objective   Allergies   Allergen Reactions    Nicotine Hives    Codeine Rash    Egg-Derived Products Itching and Rash     Patient states she can now tolerate eggs (01/22/24)    Grass Itching and Rash    Lisinopril Rash       Physical Exam  Vitals reviewed.   Constitutional:       Appearance: Normal appearance. She is well-developed.   HENT:      Head: Normocephalic.      Right Ear: Tympanic membrane normal.      Left Ear: Tympanic membrane normal.      Nose: Nose normal.   Eyes:      Conjunctiva/sclera: Conjunctivae normal.      Pupils: Pupils are equal, round, and reactive to light.   Neck:      Thyroid: No thyromegaly.      Vascular: No carotid bruit or JVD.   Cardiovascular:      " Rate and Rhythm: Normal rate and regular rhythm.   Pulmonary:      Effort: Pulmonary effort is normal.      Breath sounds: Normal breath sounds.   Abdominal:      General: Bowel sounds are normal.      Palpations: Abdomen is soft. There is no hepatomegaly, splenomegaly or mass.      Tenderness: There is no abdominal tenderness.   Musculoskeletal:         General: Normal range of motion.      Cervical back: Neck supple.      Right lower leg: Edema (trace) present.      Left lower leg: Edema (trace) present.   Skin:     General: Skin is warm and dry.   Neurological:      Mental Status: She is alert and oriented to person, place, and time.   Psychiatric:         Attention and Perception: Attention normal.         Mood and Affect: Mood normal.         Speech: Speech normal.         Behavior: Behavior normal. Behavior is cooperative.         Cognition and Memory: Cognition normal.           LABS  WBC   Date Value Ref Range Status   10/10/2024 5.31 3.40 - 10.80 10*3/mm3 Final     RBC   Date Value Ref Range Status   10/10/2024 3.48 (L) 3.77 - 5.28 10*6/mm3 Final     Hemoglobin   Date Value Ref Range Status   10/10/2024 10.9 (L) 12.0 - 15.9 g/dL Final     Hematocrit   Date Value Ref Range Status   10/10/2024 32.8 (L) 34.0 - 46.6 % Final     MCV   Date Value Ref Range Status   10/10/2024 94.3 79.0 - 97.0 fL Final     MCH   Date Value Ref Range Status   10/10/2024 31.3 26.6 - 33.0 pg Final     MCHC   Date Value Ref Range Status   10/10/2024 33.2 31.5 - 35.7 g/dL Final     RDW   Date Value Ref Range Status   10/10/2024 12.2 (L) 12.3 - 15.4 % Final     RDW-SD   Date Value Ref Range Status   10/10/2024 42.0 37.0 - 54.0 fl Final     MPV   Date Value Ref Range Status   10/10/2024 12.9 (H) 6.0 - 12.0 fL Final     Platelets   Date Value Ref Range Status   10/10/2024 129 (L) 140 - 450 10*3/mm3 Final     Neutrophil %   Date Value Ref Range Status   10/10/2024 73.8 42.7 - 76.0 % Final     Lymphocyte %   Date Value Ref Range Status    10/10/2024 14.3 (L) 19.6 - 45.3 % Final     Monocyte %   Date Value Ref Range Status   10/10/2024 6.6 5.0 - 12.0 % Final     Eosinophil %   Date Value Ref Range Status   10/10/2024 3.0 0.3 - 6.2 % Final     Basophil %   Date Value Ref Range Status   10/10/2024 2.1 (H) 0.0 - 1.5 % Final     Immature Grans %   Date Value Ref Range Status   10/10/2024 0.2 0.0 - 0.5 % Final     Neutrophils, Absolute   Date Value Ref Range Status   10/10/2024 3.92 1.70 - 7.00 10*3/mm3 Final     Lymphocytes, Absolute   Date Value Ref Range Status   10/10/2024 0.76 0.70 - 3.10 10*3/mm3 Final     Monocytes, Absolute   Date Value Ref Range Status   10/10/2024 0.35 0.10 - 0.90 10*3/mm3 Final     Eosinophils, Absolute   Date Value Ref Range Status   10/10/2024 0.16 0.00 - 0.40 10*3/mm3 Final     Basophils, Absolute   Date Value Ref Range Status   10/10/2024 0.11 0.00 - 0.20 10*3/mm3 Final     Immature Grans, Absolute   Date Value Ref Range Status   10/10/2024 0.01 0.00 - 0.05 10*3/mm3 Final     nRBC   Date Value Ref Range Status   10/10/2024 0.0 0.0 - 0.2 /100 WBC Final       Total Cholesterol   Date Value Ref Range Status   10/10/2024 137 0 - 200 mg/dL Final     Triglycerides   Date Value Ref Range Status   10/10/2024 89 0 - 150 mg/dL Final     HDL Cholesterol   Date Value Ref Range Status   10/10/2024 57 40 - 60 mg/dL Final     LDL Cholesterol    Date Value Ref Range Status   10/10/2024 63 0 - 100 mg/dL Final           Assessment & Plan   Diagnoses and all orders for this visit:    1. Acute on chronic heart failure with mildly reduced ejection fraction (HFmrEF, 41-49%) (Primary)  -     Basic Metabolic Panel; Future  -     BNP; Future  She can increase her Lasix daily for one week and then resume normal dosing   She reports she does not like to take spironolactone as she feels it irritates her.  She has recently been taking it but plans to stop when symptoms are stable.  Recommend decreasing to half pill 3 times weekly and see if she can  tolerate, she is agreeable  Sodium restrictions recommend  Monitor daily weight  Labs recommended    2. Coronary artery disease involving native coronary artery of native heart without angina pectoris  Clinically asymptomatic, continue current therapy    3. Atrial fibrillation, unspecified type  Continue on Coreg    4. Chronic anticoagulation  Continue anticoagulation    5. Primary hypertension  Well-controlled, continue current therapy     6. Mixed hyperlipidemia  Continue on statin, heart healthy diet    7. Acquired hypothyroidism  Continue on Synthroid        Review of medical record    Lifestyle modifications including heart healthy diet, regular exercise, maintenance of desirable body weight and avoidance of tobacco products    Follow-up in 3 months, sooner if needed.

## 2025-01-01 LAB
ANION GAP SERPL CALCULATED.3IONS-SCNC: 8.9 MMOL/L (ref 5–15)
BASOPHILS # BLD AUTO: 0.14 10*3/MM3 (ref 0–0.2)
BASOPHILS NFR BLD AUTO: 2.2 % (ref 0–1.5)
BUN SERPL-MCNC: 16 MG/DL (ref 8–23)
BUN/CREAT SERPL: 15.1 (ref 7–25)
CALCIUM SPEC-SCNC: 9.4 MG/DL (ref 8.6–10.5)
CHLORIDE SERPL-SCNC: 107 MMOL/L (ref 98–107)
CO2 SERPL-SCNC: 23.1 MMOL/L (ref 22–29)
CREAT SERPL-MCNC: 1.06 MG/DL (ref 0.57–1)
DEPRECATED RDW RBC AUTO: 43.5 FL (ref 37–54)
EGFRCR SERPLBLD CKD-EPI 2021: 52.6 ML/MIN/1.73
EOSINOPHIL # BLD AUTO: 0.13 10*3/MM3 (ref 0–0.4)
EOSINOPHIL NFR BLD AUTO: 2.1 % (ref 0.3–6.2)
ERYTHROCYTE [DISTWIDTH] IN BLOOD BY AUTOMATED COUNT: 12.3 % (ref 12.3–15.4)
GLUCOSE SERPL-MCNC: 106 MG/DL (ref 65–99)
HCT VFR BLD AUTO: 38.1 % (ref 34–46.6)
HGB BLD-MCNC: 12 G/DL (ref 12–15.9)
IMM GRANULOCYTES # BLD AUTO: 0.02 10*3/MM3 (ref 0–0.05)
IMM GRANULOCYTES NFR BLD AUTO: 0.3 % (ref 0–0.5)
LYMPHOCYTES # BLD AUTO: 0.94 10*3/MM3 (ref 0.7–3.1)
LYMPHOCYTES NFR BLD AUTO: 14.8 % (ref 19.6–45.3)
MCH RBC QN AUTO: 30.1 PG (ref 26.6–33)
MCHC RBC AUTO-ENTMCNC: 31.5 G/DL (ref 31.5–35.7)
MCV RBC AUTO: 95.5 FL (ref 79–97)
MONOCYTES # BLD AUTO: 0.34 10*3/MM3 (ref 0.1–0.9)
MONOCYTES NFR BLD AUTO: 5.4 % (ref 5–12)
NEUTROPHILS NFR BLD AUTO: 4.77 10*3/MM3 (ref 1.7–7)
NEUTROPHILS NFR BLD AUTO: 75.2 % (ref 42.7–76)
NT-PROBNP SERPL-MCNC: 2693 PG/ML (ref 0–1800)
PLATELET # BLD AUTO: 140 10*3/MM3 (ref 140–450)
PMV BLD AUTO: 13.5 FL (ref 6–12)
POTASSIUM SERPL-SCNC: 4.7 MMOL/L (ref 3.5–5.2)
RBC # BLD AUTO: 3.99 10*6/MM3 (ref 3.77–5.28)
SODIUM SERPL-SCNC: 139 MMOL/L (ref 136–145)
WBC NRBC COR # BLD AUTO: 6.34 10*3/MM3 (ref 3.4–10.8)

## 2025-01-02 ENCOUNTER — TELEPHONE (OUTPATIENT)
Dept: CARDIOLOGY | Facility: CLINIC | Age: 85
End: 2025-01-02
Payer: MEDICARE

## 2025-01-02 NOTE — TELEPHONE ENCOUNTER
Called and given message per JORGE MARRERO.  She v/u.   Madelin Pride APRN P [facility]  Reviewed labs - overall stable    Continue with current plan of care

## 2025-01-02 NOTE — TELEPHONE ENCOUNTER
----- Message from Madelin Pride sent at 1/2/2025  8:01 AM EST -----  Reviewed labs - overall stable     Continue with current plan of care

## 2025-01-20 DIAGNOSIS — J30.2 SEASONAL ALLERGIC RHINITIS, UNSPECIFIED TRIGGER: ICD-10-CM

## 2025-01-20 RX ORDER — MONTELUKAST SODIUM 10 MG/1
10 TABLET ORAL NIGHTLY
Qty: 90 TABLET | Refills: 3 | Status: SHIPPED | OUTPATIENT
Start: 2025-01-20

## 2025-02-03 RX ORDER — ISOSORBIDE MONONITRATE 60 MG/1
60 TABLET, EXTENDED RELEASE ORAL DAILY
Qty: 90 TABLET | Refills: 1 | Status: SHIPPED | OUTPATIENT
Start: 2025-02-03

## 2025-02-03 RX ORDER — LEVOTHYROXINE SODIUM 50 UG/1
50 TABLET ORAL DAILY
Qty: 90 TABLET | Refills: 1 | Status: SHIPPED | OUTPATIENT
Start: 2025-02-03

## 2025-02-18 RX ORDER — SPIRONOLACTONE 25 MG/1
25 TABLET ORAL EVERY OTHER DAY
Qty: 30 TABLET | Refills: 1 | Status: SHIPPED | OUTPATIENT
Start: 2025-02-18

## 2025-02-18 RX ORDER — DONEPEZIL HYDROCHLORIDE 5 MG/1
5 TABLET, FILM COATED ORAL NIGHTLY
Qty: 90 TABLET | Refills: 1 | Status: SHIPPED | OUTPATIENT
Start: 2025-02-18

## 2025-02-25 RX ORDER — PITAVASTATIN CALCIUM 1.04 MG/1
1 TABLET, FILM COATED ORAL NIGHTLY
Qty: 90 TABLET | Refills: 1 | Status: SHIPPED | OUTPATIENT
Start: 2025-02-25

## 2025-03-20 ENCOUNTER — LAB (OUTPATIENT)
Dept: LAB | Facility: HOSPITAL | Age: 85
End: 2025-03-20
Payer: MEDICARE

## 2025-03-20 DIAGNOSIS — D64.9 LOW HEMOGLOBIN: ICD-10-CM

## 2025-03-20 PROCEDURE — 81001 URINALYSIS AUTO W/SCOPE: CPT

## 2025-03-21 LAB
BACTERIA UR QL AUTO: ABNORMAL /HPF
BILIRUB UR QL STRIP: NEGATIVE
CLARITY UR: CLEAR
COLOR UR: YELLOW
GLUCOSE UR STRIP-MCNC: NEGATIVE MG/DL
HGB UR QL STRIP.AUTO: NEGATIVE
HOLD SPECIMEN: NORMAL
HYALINE CASTS UR QL AUTO: ABNORMAL /LPF
KETONES UR QL STRIP: NEGATIVE
LEUKOCYTE ESTERASE UR QL STRIP.AUTO: ABNORMAL
NITRITE UR QL STRIP: NEGATIVE
PH UR STRIP.AUTO: 6 [PH] (ref 5–8)
PROT UR QL STRIP: NEGATIVE
RBC # UR STRIP: ABNORMAL /HPF
REF LAB TEST METHOD: ABNORMAL
SP GR UR STRIP: 1.01 (ref 1–1.03)
SQUAMOUS #/AREA URNS HPF: ABNORMAL /HPF
UROBILINOGEN UR QL STRIP: ABNORMAL
WBC # UR STRIP: ABNORMAL /HPF

## 2025-03-25 ENCOUNTER — OFFICE VISIT (OUTPATIENT)
Dept: CARDIOLOGY | Facility: CLINIC | Age: 85
End: 2025-03-25
Payer: MEDICARE

## 2025-03-25 VITALS
OXYGEN SATURATION: 97 % | HEIGHT: 65 IN | DIASTOLIC BLOOD PRESSURE: 68 MMHG | HEART RATE: 65 BPM | SYSTOLIC BLOOD PRESSURE: 122 MMHG | WEIGHT: 158 LBS | BODY MASS INDEX: 26.33 KG/M2

## 2025-03-25 DIAGNOSIS — I25.5 ISCHEMIC CARDIOMYOPATHY: ICD-10-CM

## 2025-03-25 DIAGNOSIS — E03.9 ACQUIRED HYPOTHYROIDISM: ICD-10-CM

## 2025-03-25 DIAGNOSIS — E78.2 MIXED HYPERLIPIDEMIA: ICD-10-CM

## 2025-03-25 DIAGNOSIS — I25.10 CORONARY ARTERY DISEASE INVOLVING NATIVE CORONARY ARTERY OF NATIVE HEART WITHOUT ANGINA PECTORIS: Primary | ICD-10-CM

## 2025-03-25 DIAGNOSIS — Z79.01 CHRONIC ANTICOAGULATION: ICD-10-CM

## 2025-03-25 DIAGNOSIS — I10 PRIMARY HYPERTENSION: ICD-10-CM

## 2025-03-25 DIAGNOSIS — R41.3 MEMORY LOSS: ICD-10-CM

## 2025-03-25 DIAGNOSIS — I50.22 CHRONIC HFREF (HEART FAILURE WITH REDUCED EJECTION FRACTION): ICD-10-CM

## 2025-03-25 DIAGNOSIS — Z95.0 PACEMAKER: ICD-10-CM

## 2025-03-25 DIAGNOSIS — I48.20 CHRONIC A-FIB: ICD-10-CM

## 2025-03-25 DIAGNOSIS — K21.9 GASTROESOPHAGEAL REFLUX DISEASE WITHOUT ESOPHAGITIS: ICD-10-CM

## 2025-03-25 PROCEDURE — 3078F DIAST BP <80 MM HG: CPT | Performed by: INTERNAL MEDICINE

## 2025-03-25 PROCEDURE — 3074F SYST BP LT 130 MM HG: CPT | Performed by: INTERNAL MEDICINE

## 2025-03-25 PROCEDURE — G2211 COMPLEX E/M VISIT ADD ON: HCPCS | Performed by: INTERNAL MEDICINE

## 2025-03-25 PROCEDURE — 99214 OFFICE O/P EST MOD 30 MIN: CPT | Performed by: INTERNAL MEDICINE

## 2025-03-25 RX ORDER — PITAVASTATIN CALCIUM 1.04 MG/1
1 TABLET, FILM COATED ORAL NIGHTLY
Qty: 90 TABLET | Refills: 1 | Status: SHIPPED | OUTPATIENT
Start: 2025-03-25

## 2025-03-25 RX ORDER — ISOSORBIDE MONONITRATE 60 MG/1
60 TABLET, EXTENDED RELEASE ORAL DAILY
Qty: 90 TABLET | Refills: 1 | Status: SHIPPED | OUTPATIENT
Start: 2025-03-25

## 2025-03-25 RX ORDER — LEVOTHYROXINE SODIUM 50 UG/1
50 TABLET ORAL DAILY
Qty: 90 TABLET | Refills: 1 | Status: SHIPPED | OUTPATIENT
Start: 2025-03-25

## 2025-03-25 RX ORDER — DONEPEZIL HYDROCHLORIDE 5 MG/1
5 TABLET, FILM COATED ORAL NIGHTLY
Qty: 90 TABLET | Refills: 1 | Status: SHIPPED | OUTPATIENT
Start: 2025-03-25

## 2025-03-25 RX ORDER — FUROSEMIDE 20 MG/1
10 TABLET ORAL EVERY OTHER DAY
Start: 2025-03-25

## 2025-03-25 RX ORDER — SACUBITRIL AND VALSARTAN 49; 51 MG/1; MG/1
1 TABLET, FILM COATED ORAL 2 TIMES DAILY
Qty: 60 TABLET | Refills: 2 | Status: SHIPPED | OUTPATIENT
Start: 2025-03-25

## 2025-03-25 RX ORDER — CARVEDILOL 12.5 MG/1
12.5 TABLET ORAL 2 TIMES DAILY WITH MEALS
Qty: 180 TABLET | Refills: 1 | Status: SHIPPED | OUTPATIENT
Start: 2025-03-25

## 2025-03-25 RX ORDER — NITROGLYCERIN 0.4 MG/1
0.4 TABLET SUBLINGUAL
Qty: 25 TABLET | Refills: 0 | Status: SHIPPED | OUTPATIENT
Start: 2025-03-25

## 2025-03-25 RX ORDER — ESOMEPRAZOLE MAGNESIUM 40 MG/1
40 CAPSULE, DELAYED RELEASE ORAL DAILY
Qty: 90 CAPSULE | Refills: 0 | Status: SHIPPED | OUTPATIENT
Start: 2025-03-25

## 2025-03-25 NOTE — PROGRESS NOTES
subjective     Chief Complaint   Patient presents with    Coronary Artery Disease       Problems Addressed This Visit  1. Coronary artery disease involving native coronary artery of native heart without angina pectoris    2. Ischemic cardiomyopathy with apical hypokinesis LV ejection fraction 50%.    3. Mixed hyperlipidemia    4. Chronic a-fib*    5. Pacemaker dual-chamber pacemaker Biotronik December 2015*    6. Primary hypertension    7. Acquired hypothyroidism    8. Chronic anticoagulation*    9. Memory loss    10. Chronic HFrEF (heart failure with reduced ejection fraction)    11. Gastroesophageal reflux disease without esophagitis        History of Present Illness  History of Present Illness  The patient is an 84-year-old white female here for a follow-up visit.    She has known coronary artery disease, status post CABG in 1997. She is taking Imdur and nitroglycerin on an as-needed basis but has not had an occasion to use nitroglycerin. She is also on statin therapy with Livalo and beta-blocker therapy with Coreg. She reports doing fairly well and does not experience any chest pain, shortness of breath, or palpitations. She remains fairly active.    Chronic HFrEF, she is on Entresto and Lasix, which have been effective, although she could not tolerate Aldactone. She does not report any orthopnea, PND, or ankle edema.    She has hyperlipidemia and is on Livalo therapy without any drug side effects.    She has chronic hypothyroidism and is taking Synthroid 50 mcg daily, maintaining a clinically euthyroid state.    She has chronic atrial fibrillation, with her rate controlled by Coreg. She is anticoagulated with Eliquis 5 mg twice daily and does not report any abnormal bleeding or bruising.    She also has gastroesophageal reflux disorder and has been taking Nexium, with no current GI complaints.    Her memory loss is minimal, and she is managing well on Aricept.    She has sick sinus syndrome with a dual-chamber  pacemaker, which is functioning normally and being monitored closely.    MEDICATIONS  Imdur, nitroglycerin, Livalo, Coreg, Entresto, Lasix, Synthroid, Eliquis, Nexium, Aricept      Past Surgical History:   Procedure Laterality Date    CARDIAC ELECTROPHYSIOLOGY PROCEDURE N/A 1/22/2024    Procedure: Lead Revision RA or RV, PPM or ICD;  Surgeon: Donis See MD;  Location:  [facility];  Service: Cardiovascular;  Laterality: N/A;  RV lead replacement    CARDIAC ELECTROPHYSIOLOGY PROCEDURE N/A 1/22/2024    Procedure: Pacemaker lead replacement;  Surgeon: Donis See MD;  Location:  [facility];  Service: Cardiovascular;  Laterality: N/A;    CARDIAC SURGERY  1997    CARDIOVASCULAR STRESS TEST  06/2014    CATARACT EXTRACTION, BILATERAL      CHOLECYSTECTOMY  2002    COLONOSCOPY  05/2012    ECHO - CONVERTED  07/2014    PACEMAKER IMPLANTATION  12/17/2015     Family History   Problem Relation Age of Onset    Coronary artery disease Other     Hypertension Other     Diabetes Other     Heart attack Other     Heart attack Mother 62        fatal MI    Skin cancer Mother     Diabetes type II Mother     Melanoma Mother     Heart attack Father 63        fatal MI    Melanoma Sister 45    Skin cancer Sister     Heart attack Brother 62        fatal MI    Heart attack Brother 80        Fatal MI    Skin cancer Brother     Heart disease Sister     Heart disease Brother 75        pacemaker, CABG, Stents    Osteoporosis Sister     Pneumonia Brother     Hypertension Brother     Heart attack Sister 54        Fatal MI    Heart attack Brother 59        Fatal MI    Breast cancer Neg Hx      Past Medical History:   Diagnosis Date    Atrial fibrillation     CAD (coronary artery disease)     Chronic a-fib     Chronic anticoagulation     Congestive heart failure     compensated    Disease of thyroid gland     Hyperlipidemia     Hypertension     Hypoxemia     Ischemic cardiomyopathy with severe LV Disfunction      Myocardial infarction     Pacemaker     VVI     Patient Active Problem List   Diagnosis    CAD (coronary artery disease) status post CABG  single-vessel*    Ischemic cardiomyopathy with apical hypokinesis LV ejection fraction 50%.    Chronic HFrEF (heart failure with reduced ejection fraction)    Hyperlipidemia*    Chronic a-fib*    Pacemaker dual-chamber pacemaker Biotronik 2015*    Hypothyroidism*    Hypertension*    Chronic anticoagulation*    Gastroesophageal reflux disease without esophagitis*    URI (upper respiratory infection)    Herpes zoster without complication    Acute midline low back pain without sciatica    Centrilobular emphysema    Asthmatic bronchitis , chronic    Pulmonary hypertension    Seasonal allergic rhinitis    Post zoster neuralgia    Memory loss    Diarrhea    Pacemaker lead malfunction    Sick sinus syndrome       Social History     Tobacco Use    Smoking status: Former     Current packs/day: 0.00     Average packs/day: 0.3 packs/day for 17.0 years (4.3 ttl pk-yrs)     Types: Cigarettes     Start date: 1973     Quit date: 1990     Years since quittin.2     Passive exposure: Never    Smokeless tobacco: Never   Vaping Use    Vaping status: Never Used   Substance Use Topics    Alcohol use: No    Drug use: No       Allergies   Allergen Reactions    Nicotine Hives    Codeine Rash    Egg-Derived Products Itching and Rash     Patient states she can now tolerate eggs (24)    Grass Itching and Rash    Lisinopril Rash       Current Outpatient Medications on File Prior to Visit   Medication Sig    azelastine (ASTELIN) 0.1 % nasal spray 2 sprays into the nostril(s) as directed by provider 2 (Two) Times a Day As Needed for Rhinitis or Allergies. Use in each nostril as directed    budesonide-formoterol (SYMBICORT) 160-4.5 MCG/ACT inhaler Inhale 2 puffs 2 (Two) Times a Day.    dicyclomine (BENTYL) 10 MG capsule TAKE 1 CAPSULE BY MOUTH THREE TIMES DAILY     guaiFENesin-dextromethorphan (ROBITUSSIN DM) 100-10 MG/5ML syrup Take 5 mL by mouth 3 (Three) Times a Day As Needed for Cough or Congestion.    ipratropium (ATROVENT) 0.02 % nebulizer solution Take 2.5 mL by nebulization 4 (Four) Times a Day As Needed for Wheezing or Shortness of Air.    iron polysaccharides (NIFEREX) 150 MG capsule Take 1 capsule by mouth Daily.    methylPREDNISolone (MEDROL) 4 MG dose pack Take as directed on package instructions.    montelukast (SINGULAIR) 10 MG tablet TAKE 1 TABLET BY MOUTH EVERY NIGHT    [DISCONTINUED] carvedilol (COREG) 12.5 MG tablet TAKE 1 TABLET BY MOUTH TWICE DAILY WITH MEALS    [DISCONTINUED] donepezil (ARICEPT) 5 MG tablet TAKE 1 TABLET BY MOUTH EVERY NIGHT    [DISCONTINUED] Eliquis 5 MG tablet tablet TAKE 1 TABLET BY MOUTH EVERY 12 HOURS    [DISCONTINUED] Entresto 49-51 MG tablet TAKE 1 TABLET BY MOUTH TWICE DAILY    [DISCONTINUED] esomeprazole (nexIUM) 40 MG capsule Take 1 capsule by mouth Daily.    [DISCONTINUED] furosemide (LASIX) 20 MG tablet Take 0.5 tablets by mouth Every Other Day.    [DISCONTINUED] isosorbide mononitrate (IMDUR) 60 MG 24 hr tablet TAKE 1 TABLET BY MOUTH DAILY    [DISCONTINUED] levothyroxine (SYNTHROID, LEVOTHROID) 50 MCG tablet TAKE 1 TABLET BY MOUTH DAILY    [DISCONTINUED] levothyroxine (SYNTHROID, LEVOTHROID) 75 MCG tablet Take 1 tablet by mouth Daily.    [DISCONTINUED] Livalo 1 MG tablet tablet TAKE 1 TABLET BY MOUTH EVERY NIGHT    [DISCONTINUED] nitroglycerin (NITROSTAT) 0.4 MG SL tablet Place 1 tablet under the tongue Every 5 (Five) Minutes As Needed for Chest Pain. Take no more than 3 doses in 15 minutes.    [DISCONTINUED] spironolactone (ALDACTONE) 25 MG tablet TAKE 1 TABLET BY MOUTH EVERY OTHER DAY    ipratropium (ATROVENT HFA) 17 MCG/ACT inhaler Inhale 2 puffs Every 4 (Four) Hours As Needed for Wheezing (or shortness of air) for up to 90 days.     No current facility-administered medications on file prior to visit.     (Not in a  hospital admission)      Results for orders placed during the hospital encounter of 01/22/24    Adult Transthoracic Echo Complete W/ Cont if Necessary Per Protocol    Interpretation Summary    Normal left ventricular cavity size and wall thickness noted.    Left ventricular ejection fraction appears to be 46 - 50%.    There is calcification of the aortic valve. The aortic valve appears trileaflet. Mild aortic valve regurgitation is present. No hemodynamically significant aortic valve stenosis is present.    There are myxomatous changes of the mitral valve apparatus present. Moderate mitral valve regurgitation is present.    Moderate to severe tricuspid valve regurgitation is present. Estimated right ventricular systolic pressure from tricuspid regurgitation is moderately elevated (45-55 mmHg). Moderate pulmonary hypertension is present.    There is no evidence of pericardial effusion.    Results for orders placed during the hospital encounter of 02/25/19    Stress Test With Myocardial Perfusion One Day    Interpretation Summary  · Breast attenuation artifact is present.  · Left ventricular ejection fraction is borderline normal (Calculated EF = 48%).  · Myocardial perfusion imaging indicates a normal myocardial perfusion study with no evidence of ischemia.  · Impressions are consistent with a low risk study.  · No definite evidence of ischemia. Fixed defect noted in the anteroseptal segment which was worse on rest images.          The following portions of the patient's history were reviewed and updated as appropriate: allergies, current medications, past family history, past medical history, past social history, past surgical history and problem list.    Review of Systems   Constitutional: Negative.   HENT: Negative.  Negative for congestion.    Eyes: Negative.    Cardiovascular: Negative.  Negative for chest pain, cyanosis, dyspnea on exertion, irregular heartbeat, leg swelling, near-syncope, orthopnea,  "palpitations, paroxysmal nocturnal dyspnea and syncope.   Respiratory: Negative.  Negative for shortness of breath.    Hematologic/Lymphatic: Negative.    Musculoskeletal: Negative.    Gastrointestinal: Negative.    Neurological: Negative.  Negative for headaches.          Objective:     /68 (BP Location: Left arm, Patient Position: Sitting, Cuff Size: Adult)   Pulse 65   Ht 165.1 cm (65\")   Wt 71.7 kg (158 lb)   SpO2 97%   BMI 26.29 kg/m²   Pulmonary:      Effort: Pulmonary effort is normal.      Breath sounds: Normal breath sounds. No stridor. No wheezing. No rhonchi. No rales.   Cardiovascular:      PMI at left midclavicular line. Normal rate. Regular rhythm. Normal S1. Normal S2.       Murmurs: There is no murmur.      No gallop.  No click. No rub.   Pulses:     Intact distal pulses.   Edema:     Peripheral edema absent.       Physical Exam        Lab Review  Lab Results   Component Value Date     12/31/2024    K 4.7 12/31/2024     12/31/2024    BUN 16 12/31/2024    CREATININE 1.06 (H) 12/31/2024    GLUCOSE 106 (H) 12/31/2024    CALCIUM 9.4 12/31/2024    ALT 16 10/10/2024    ALKPHOS 93 10/10/2024     Lab Results   Component Value Date    CKTOTAL 73 10/10/2024     Lab Results   Component Value Date    WBC 6.34 12/31/2024    HGB 12.0 12/31/2024    HCT 38.1 12/31/2024     12/31/2024     Lab Results   Component Value Date    INR 0.91 12/17/2015     Lab Results   Component Value Date    MG 2.3 10/10/2024     Lab Results   Component Value Date    TSH 2.560 10/10/2024     Lab Results   Component Value Date    .0 (H) 02/28/2019     Lab Results   Component Value Date    CHLPL 219 (H) 04/05/2016    CHOL 137 10/10/2024    TRIG 89 10/10/2024    HDL 57 10/10/2024    VLDL 17 10/10/2024    LDL 63 10/10/2024     Lab Results   Component Value Date    LDL 63 10/10/2024    LDL 44 04/12/2024     Lab Results   Component Value Date    PROBNP 2,693.0 (H) 12/31/2024             "   Procedures    Results       I personally viewed and interpreted the patient's LAB data         Assessment:     1. Coronary artery disease involving native coronary artery of native heart without angina pectoris    2. Ischemic cardiomyopathy with apical hypokinesis LV ejection fraction 50%.    3. Mixed hyperlipidemia    4. Chronic a-fib*    5. Pacemaker dual-chamber pacemaker Biotronik December 2015*    6. Primary hypertension    7. Acquired hypothyroidism    8. Chronic anticoagulation*    9. Memory loss    10. Chronic HFrEF (heart failure with reduced ejection fraction)    11. Gastroesophageal reflux disease without esophagitis        Assessment & Plan  1. Coronary artery disease, status post CABG.  She is currently asymptomatic. The current regimen of beta-blocker therapy, statin therapy, and anticoagulation with Eliquis will be maintained. She will continue the use of Imdur and nitroglycerin. Aggressive risk factor modification and regular exercise were discussed.    2. Hyperlipidemia.  She will persist with Livalo treatment. The importance of exercise and dietary restrictions was emphasized.    3. Chronic HFrEF.  Her condition is improving. She will continue with Entresto and Lasix, as she has expressed intolerance to Aldactone.    4. Chronic atrial fibrillation.  Her rate is well-controlled. The continuation of Coreg and anticoagulation with Eliquis was confirmed.    5. Sick sinus syndrome.  The dual chamber pacemaker is functioning optimally. Continued monitoring is planned.    6. Hypothyroidism.  She will maintain her current Synthroid 50 mcg dosage. Lab work has been scheduled for the next visit.    7. Slight memory loss.  She is responding well to Aricept, which will be continued.    Follow-up  The patient will follow up with lab work at the next visit.    PROCEDURE  The patient underwent CABG in 1997. A dual chamber pacemaker was inserted previously.               No follow-ups on file.

## 2025-04-08 DIAGNOSIS — I50.22 CHRONIC HFREF (HEART FAILURE WITH REDUCED EJECTION FRACTION): ICD-10-CM

## 2025-04-14 DIAGNOSIS — E78.2 MIXED HYPERLIPIDEMIA: ICD-10-CM

## 2025-04-14 DIAGNOSIS — I50.22 CHRONIC HFREF (HEART FAILURE WITH REDUCED EJECTION FRACTION): ICD-10-CM

## 2025-04-14 RX ORDER — SACUBITRIL AND VALSARTAN 49; 51 MG/1; MG/1
1 TABLET, FILM COATED ORAL 2 TIMES DAILY
Qty: 60 TABLET | Refills: 2 | Status: CANCELLED | OUTPATIENT
Start: 2025-04-14

## 2025-04-14 RX ORDER — CEFDINIR 300 MG/1
300 CAPSULE ORAL 2 TIMES DAILY
Qty: 20 CAPSULE | Refills: 0 | Status: SHIPPED | OUTPATIENT
Start: 2025-04-14

## 2025-04-14 RX ORDER — SACUBITRIL AND VALSARTAN 49; 51 MG/1; MG/1
1 TABLET, FILM COATED ORAL 2 TIMES DAILY
Qty: 60 TABLET | Refills: 2 | Status: SHIPPED | OUTPATIENT
Start: 2025-04-14

## 2025-04-14 RX ORDER — SACUBITRIL AND VALSARTAN 49; 51 MG/1; MG/1
1 TABLET, FILM COATED ORAL 2 TIMES DAILY
Qty: 60 TABLET | Refills: 2 | Status: SHIPPED | OUTPATIENT
Start: 2025-04-14 | End: 2025-04-14 | Stop reason: SDUPTHER

## 2025-04-14 NOTE — TELEPHONE ENCOUNTER
Caller: Ashely Feliciano    Relationship: Self    Best call back number: [phone]    Requested Prescriptions:   Requested Prescriptions     Pending Prescriptions Disp Refills    sacubitril-valsartan (Entresto) 49-51 MG tablet 60 tablet 2     Sig: Take 1 tablet by mouth 2 (Two) Times a Day.        Pharmacy where request should be sent: [id number] - [facility] - [id number] AT [facility] [address] - [phone]  - [phone] FX     Last office visit with prescribing clinician: 3/25/2025   Last telemedicine visit with prescribing clinician: Visit date not found   Next office visit with prescribing clinician: 6/19/2025     ADDITIONAL NOTES:PATIENT ALSO HAS A COLD AND WANTS ANTIBIOTICS, PLEASE ADVISE    Does the patient have less than a 3 day supply:  [x] Yes  [] No    Would you like a call back once the refill request has been completed: [x] Yes [] No    If the office needs to give you a call back, can they leave a voicemail: [x] Yes [] No    Gretchen Keane Rep   04/14/25 09:41 EDT

## 2025-04-14 NOTE — TELEPHONE ENCOUNTER
Pt called stating she called a week ago and asked for an antibiotic along with the refill for entresto.   Was not mentioned by the hub in chart.     She did test for covid which was negative at home.   She has been coughing, feverish and chest congestion.       Please advise.    She has an appt her this wed for a pacer check.

## 2025-04-17 ENCOUNTER — TELEPHONE (OUTPATIENT)
Dept: CARDIOLOGY | Facility: CLINIC | Age: 85
End: 2025-04-17
Payer: MEDICARE

## 2025-04-17 ENCOUNTER — HOSPITAL ENCOUNTER (OUTPATIENT)
Dept: GENERAL RADIOLOGY | Facility: HOSPITAL | Age: 85
Discharge: HOME | End: 2025-04-17
Payer: MEDICARE

## 2025-04-17 ENCOUNTER — LAB (OUTPATIENT)
Dept: LAB | Facility: HOSPITAL | Age: 85
End: 2025-04-17
Payer: MEDICARE

## 2025-04-17 DIAGNOSIS — I50.22 CHRONIC HFREF (HEART FAILURE WITH REDUCED EJECTION FRACTION): Primary | ICD-10-CM

## 2025-04-17 DIAGNOSIS — E78.2 MIXED HYPERLIPIDEMIA: ICD-10-CM

## 2025-04-17 DIAGNOSIS — I50.22 CHRONIC HFREF (HEART FAILURE WITH REDUCED EJECTION FRACTION): ICD-10-CM

## 2025-04-17 DIAGNOSIS — E03.9 ACQUIRED HYPOTHYROIDISM: ICD-10-CM

## 2025-04-17 PROCEDURE — 84443 ASSAY THYROID STIM HORMONE: CPT

## 2025-04-17 PROCEDURE — 36415 COLL VENOUS BLD VENIPUNCTURE: CPT

## 2025-04-17 PROCEDURE — 71046 X-RAY EXAM CHEST 2 VIEWS: CPT

## 2025-04-17 PROCEDURE — 83880 ASSAY OF NATRIURETIC PEPTIDE: CPT

## 2025-04-17 PROCEDURE — 71046 X-RAY EXAM CHEST 2 VIEWS: CPT | Performed by: RADIOLOGY

## 2025-04-17 PROCEDURE — 80061 LIPID PANEL: CPT

## 2025-04-17 PROCEDURE — 84439 ASSAY OF FREE THYROXINE: CPT

## 2025-04-17 PROCEDURE — 80053 COMPREHEN METABOLIC PANEL: CPT

## 2025-04-17 PROCEDURE — 85025 COMPLETE CBC W/AUTO DIFF WBC: CPT

## 2025-04-17 NOTE — TELEPHONE ENCOUNTER
PT IS HAVING COUGHING AND CONGESTION IN CHEST. PT HAS TAKEN ANTIBIOTIC GIVEN TO HER ON 4/14, SHE HAS TAKEN HALF OF IT AND IT IS NOT HELPING.

## 2025-04-18 ENCOUNTER — TELEPHONE (OUTPATIENT)
Dept: CARDIOLOGY | Facility: CLINIC | Age: 85
End: 2025-04-18
Payer: MEDICARE

## 2025-04-18 DIAGNOSIS — I25.10 CORONARY ARTERY DISEASE INVOLVING NATIVE CORONARY ARTERY OF NATIVE HEART WITHOUT ANGINA PECTORIS: ICD-10-CM

## 2025-04-18 LAB
ALBUMIN SERPL-MCNC: 3.9 G/DL (ref 3.5–5.2)
ALBUMIN/GLOB SERPL: 1.2 G/DL
ALP SERPL-CCNC: 88 U/L (ref 39–117)
ALT SERPL W P-5'-P-CCNC: 18 U/L (ref 1–33)
ANION GAP SERPL CALCULATED.3IONS-SCNC: 10.4 MMOL/L (ref 5–15)
AST SERPL-CCNC: 26 U/L (ref 1–32)
BASOPHILS # BLD AUTO: 0.07 10*3/MM3 (ref 0–0.2)
BASOPHILS NFR BLD AUTO: 1.2 % (ref 0–1.5)
BILIRUB SERPL-MCNC: 0.6 MG/DL (ref 0–1.2)
BUN SERPL-MCNC: 15 MG/DL (ref 8–23)
BUN/CREAT SERPL: 13.6 (ref 7–25)
CALCIUM SPEC-SCNC: 9 MG/DL (ref 8.6–10.5)
CHLORIDE SERPL-SCNC: 106 MMOL/L (ref 98–107)
CHOLEST SERPL-MCNC: 129 MG/DL (ref 0–200)
CO2 SERPL-SCNC: 22.6 MMOL/L (ref 22–29)
CREAT SERPL-MCNC: 1.1 MG/DL (ref 0.57–1)
DEPRECATED RDW RBC AUTO: 45 FL (ref 37–54)
EGFRCR SERPLBLD CKD-EPI 2021: 50.3 ML/MIN/1.73
EOSINOPHIL # BLD AUTO: 0.26 10*3/MM3 (ref 0–0.4)
EOSINOPHIL NFR BLD AUTO: 4.3 % (ref 0.3–6.2)
ERYTHROCYTE [DISTWIDTH] IN BLOOD BY AUTOMATED COUNT: 12.9 % (ref 12.3–15.4)
GLOBULIN UR ELPH-MCNC: 3.3 GM/DL
GLUCOSE SERPL-MCNC: 83 MG/DL (ref 65–99)
HCT VFR BLD AUTO: 36.9 % (ref 34–46.6)
HDLC SERPL-MCNC: 56 MG/DL (ref 40–60)
HGB BLD-MCNC: 12.3 G/DL (ref 12–15.9)
IMM GRANULOCYTES # BLD AUTO: 0.02 10*3/MM3 (ref 0–0.05)
IMM GRANULOCYTES NFR BLD AUTO: 0.3 % (ref 0–0.5)
LDLC SERPL CALC-MCNC: 52 MG/DL (ref 0–100)
LDLC/HDLC SERPL: 0.87 {RATIO}
LYMPHOCYTES # BLD AUTO: 1.03 10*3/MM3 (ref 0.7–3.1)
LYMPHOCYTES NFR BLD AUTO: 17.2 % (ref 19.6–45.3)
MCH RBC QN AUTO: 31.6 PG (ref 26.6–33)
MCHC RBC AUTO-ENTMCNC: 33.3 G/DL (ref 31.5–35.7)
MCV RBC AUTO: 94.9 FL (ref 79–97)
MONOCYTES # BLD AUTO: 0.26 10*3/MM3 (ref 0.1–0.9)
MONOCYTES NFR BLD AUTO: 4.3 % (ref 5–12)
NEUTROPHILS NFR BLD AUTO: 4.35 10*3/MM3 (ref 1.7–7)
NEUTROPHILS NFR BLD AUTO: 72.7 % (ref 42.7–76)
NRBC BLD AUTO-RTO: 0 /100 WBC (ref 0–0.2)
NT-PROBNP SERPL-MCNC: 1759 PG/ML (ref 0–1800)
PLATELET # BLD AUTO: 142 10*3/MM3 (ref 140–450)
PMV BLD AUTO: 13.2 FL (ref 6–12)
POTASSIUM SERPL-SCNC: 4.2 MMOL/L (ref 3.5–5.2)
PROT SERPL-MCNC: 7.2 G/DL (ref 6–8.5)
RBC # BLD AUTO: 3.89 10*6/MM3 (ref 3.77–5.28)
SODIUM SERPL-SCNC: 139 MMOL/L (ref 136–145)
T4 FREE SERPL-MCNC: 1.35 NG/DL (ref 0.92–1.68)
TRIGL SERPL-MCNC: 121 MG/DL (ref 0–150)
TSH SERPL DL<=0.05 MIU/L-ACNC: 4.06 UIU/ML (ref 0.27–4.2)
VLDLC SERPL-MCNC: 21 MG/DL (ref 5–40)
WBC NRBC COR # BLD AUTO: 5.99 10*3/MM3 (ref 3.4–10.8)

## 2025-04-18 RX ORDER — CARVEDILOL 12.5 MG/1
12.5 TABLET ORAL 2 TIMES DAILY WITH MEALS
Qty: 180 TABLET | Refills: 1 | Status: SHIPPED | OUTPATIENT
Start: 2025-04-18 | End: 2025-04-18

## 2025-04-18 RX ORDER — CARVEDILOL 12.5 MG/1
12.5 TABLET ORAL 2 TIMES DAILY WITH MEALS
Qty: 180 TABLET | Refills: 1 | Status: SHIPPED | OUTPATIENT
Start: 2025-04-18

## 2025-04-18 NOTE — TELEPHONE ENCOUNTER
Spoke with patient to let her know the results from Dr. Sanchez. Informed her that Dr. Sanchez has placed a nasal swab to check for respiratory illness. Patient verbalized understanding of orders and stated she would go Monday to get nasal swab performed.   Patient verbalized an understanding of other results with no further questions at this time.

## 2025-04-28 ENCOUNTER — OFFICE VISIT (OUTPATIENT)
Dept: PULMONOLOGY | Facility: CLINIC | Age: 85
End: 2025-04-28
Payer: MEDICARE

## 2025-04-28 VITALS
DIASTOLIC BLOOD PRESSURE: 72 MMHG | WEIGHT: 162.4 LBS | BODY MASS INDEX: 27.06 KG/M2 | HEIGHT: 65 IN | SYSTOLIC BLOOD PRESSURE: 104 MMHG | HEART RATE: 72 BPM | TEMPERATURE: 97.1 F | OXYGEN SATURATION: 94 %

## 2025-04-28 DIAGNOSIS — J43.2 CENTRILOBULAR EMPHYSEMA: ICD-10-CM

## 2025-04-28 DIAGNOSIS — J44.89 ASTHMATIC BRONCHITIS , CHRONIC: ICD-10-CM

## 2025-04-28 DIAGNOSIS — N39.44 NOCTURNAL ENURESIS: Primary | ICD-10-CM

## 2025-04-28 PROCEDURE — 1159F MED LIST DOCD IN RCRD: CPT | Performed by: PHYSICIAN ASSISTANT

## 2025-04-28 PROCEDURE — 3078F DIAST BP <80 MM HG: CPT | Performed by: PHYSICIAN ASSISTANT

## 2025-04-28 PROCEDURE — 3074F SYST BP LT 130 MM HG: CPT | Performed by: PHYSICIAN ASSISTANT

## 2025-04-28 PROCEDURE — 99214 OFFICE O/P EST MOD 30 MIN: CPT | Performed by: PHYSICIAN ASSISTANT

## 2025-04-28 PROCEDURE — 1160F RVW MEDS BY RX/DR IN RCRD: CPT | Performed by: PHYSICIAN ASSISTANT

## 2025-04-28 RX ORDER — AZELASTINE 1 MG/ML
2 SPRAY, METERED NASAL 2 TIMES DAILY PRN
Qty: 30 ML | Refills: 8 | Status: SHIPPED | OUTPATIENT
Start: 2025-04-28

## 2025-04-28 RX ORDER — BUDESONIDE, GLYCOPYRROLATE, AND FORMOTEROL FUMARATE 160; 9; 4.8 UG/1; UG/1; UG/1
2 AEROSOL, METERED RESPIRATORY (INHALATION) 2 TIMES DAILY
Qty: 1 EACH | Refills: 8 | Status: SHIPPED | OUTPATIENT
Start: 2025-04-28

## 2025-04-28 NOTE — PROGRESS NOTES
"Chief Complaint  Centrilobular emphysema    Subjective        Ashely Feliciano presents to [facility]  History of Present Illness      Mrs. Feliciano presents today for follow up evaluation.   Had recent viral infection with upper respiratory symptoms. Has had recurrent rhinitis. Also had a hard time with increased phlegm production and buildup of the chest, but this improved after nebulizer treatment.   Did occasionally have some urination with coughing.   Slowly recovering from this. Had otherwise been doing well since the last visit.       Objective   Vital Signs:  /72   Pulse 72   Temp 97.1 °F (36.2 °C)   Ht 165.1 cm (65\")   Wt 73.7 kg (162 lb 6.4 oz)   SpO2 94%   BMI 27.02 kg/m²   Estimated body mass index is 27.02 kg/m² as calculated from the following:    Height as of this encounter: 165.1 cm (65\").    Weight as of this encounter: 73.7 kg (162 lb 6.4 oz).      Physical Exam  Vitals reviewed.   Constitutional:       General: She is not in acute distress.  HENT:      Nose: Rhinorrhea present.   Cardiovascular:      Rate and Rhythm: Normal rate and regular rhythm.   Pulmonary:      Effort: Pulmonary effort is normal.      Breath sounds: No wheezing, rhonchi or rales.   Neurological:      Mental Status: She is alert and oriented to person, place, and time.   Psychiatric:         Behavior: Behavior normal.          Result Review :  The following data was reviewed by: Kiersten Mosqueda PA-C on 04/28/2025:      Chest xray imaging/report April 2025     IMPRESSION:  Cardiomegaly.     This report was finalized on 4/17/2025 1:08 PM by Dr. El Serrano MD.      -----------------------------------------------------------------------------------    Echo January 2024      Normal left ventricular cavity size and wall thickness noted.    Left ventricular ejection fraction appears to be 46 - 50%.    There is calcification of the aortic valve. The aortic valve " appears trileaflet. Mild aortic valve regurgitation is present. No hemodynamically significant aortic valve stenosis is present.    There are myxomatous changes of the mitral valve apparatus present. Moderate mitral valve regurgitation is present.    Moderate to severe tricuspid valve regurgitation is present. Estimated right ventricular systolic pressure from tricuspid regurgitation is moderately elevated (45-55 mmHg). Moderate pulmonary hypertension is present.    There is no evidence of pericardial effusion.      -----------------------------------------------------------------------------------    Overnight pulse oximetry test 2020      -----------------------------------------------------------------------------------    PFT 2019  Flow volume loop was assessed.  Spirometry showed no obstruction..  There was no significant bronchodilator response.  Lung volumes show mild restrictive ventilatory defect.  Diffusing capacity, uncorrected for hemoglobin, is moderately reduced.                  Assessment and Plan   Diagnoses and all orders for this visit:    1. Nocturnal enuresis (Primary)  -     Miscellaneous DME    2. Centrilobular emphysema  -     ipratropium (ATROVENT HFA) 17 MCG/ACT inhaler; Inhale 2 puffs Every 4 (Four) Hours As Needed for Wheezing (or shortness of air) for up to 90 days.  Dispense: 12.9 g; Refill: 9  -     Home Nebulizer Accessories    3. Asthmatic bronchitis , chronic  -     ipratropium (ATROVENT HFA) 17 MCG/ACT inhaler; Inhale 2 puffs Every 4 (Four) Hours As Needed for Wheezing (or shortness of air) for up to 90 days.  Dispense: 12.9 g; Refill: 9    Other orders  -     azelastine (ASTELIN) 0.1 % nasal spray; Administer 2 sprays into the nostril(s) as directed by provider 2 (Two) Times a Day As Needed for Rhinitis or Allergies. Use in each nostril as directed  Dispense: 30 mL; Refill: 8  -     ipratropium (ATROVENT) 0.02 % nebulizer solution; Take 2.5 mL by nebulization 4 (Four) Times a Day  As Needed for Wheezing or Shortness of Air.  Dispense: 300 mL; Refill: 12  -     Budeson-Glycopyrrol-Formoterol (Breztri Aerosphere) 160-9-4.8 MCG/ACT aerosol inhaler; Inhale 2 puffs 2 (Two) Times a Day.  Dispense: 1 each; Refill: 8            Centrilobular emphysema, concern for mixed asthmatic bronchitis:   Continue atrovent inhaler as needed  Continue atrovent nebulizer as needed  Continue Breztri as scheduled  Has been doing well since switching to Breztri.  Prefers to hold off on PFT - not urgently needed.   Ordered nebulizer supplies     Symptoms also likely impacted by CHF/valvular changes, follows with cardiology.            Moderate pulmonary hypertension:   Moderate PH per last echo.   Likely group 2, group 3.   (multiple valvular abnormalities, and centrilobular emphysema)  Overall respiratory symptoms still remain stable  Follows with cardiology.   Did not qualify for O2 at nighttime on previous testing.   Has preferred to hold off on repeat recently.             Seasonal allergic rhinitis with acute flare up:   Continue azelastine for PRN use - refilled  Flonase not as well tolerated due to irritation  Continue oral antihistamine as needed.   Continue Singulair nightly        Urinary incontinence at nighttime:   Requested order for Purewick - urination note with coughing.   Using pads, but still having excess/leakage with these  Preferred to hold off on specialty referral at this time.           Follow Up   Return in about 6 months (around 10/28/2025), or if symptoms worsen or fail to improve, for Recheck.  Patient was given instructions and counseling regarding her condition or for health maintenance advice. Please see specific information pulled into the AVS if appropriate.

## 2025-05-05 ENCOUNTER — TELEPHONE (OUTPATIENT)
Dept: CARDIOLOGY | Facility: CLINIC | Age: 85
End: 2025-05-05
Payer: MEDICARE

## 2025-05-05 NOTE — TELEPHONE ENCOUNTER
Spoke with patient to let them know that their Monitor in not connecting to their device.  I requested they check to see if their remote monitor is plugged in and displays a green ok.  If not plugged in, please plug up, if displays a green ok then monitor needs rebooted.  I left my call back number if they need assistance in doing so.

## 2025-05-06 DIAGNOSIS — I48.20 CHRONIC A-FIB: ICD-10-CM

## 2025-05-06 RX ORDER — APIXABAN 5 MG/1
5 TABLET, FILM COATED ORAL
Qty: 180 TABLET | Refills: 1 | Status: SHIPPED | OUTPATIENT
Start: 2025-05-06

## 2025-05-10 DIAGNOSIS — I25.10 CORONARY ARTERY DISEASE INVOLVING NATIVE CORONARY ARTERY OF NATIVE HEART WITHOUT ANGINA PECTORIS: ICD-10-CM

## 2025-05-10 DIAGNOSIS — E03.9 ACQUIRED HYPOTHYROIDISM: ICD-10-CM

## 2025-05-12 RX ORDER — ISOSORBIDE MONONITRATE 60 MG/1
60 TABLET, EXTENDED RELEASE ORAL DAILY
Qty: 90 TABLET | Refills: 1 | Status: SHIPPED | OUTPATIENT
Start: 2025-05-12

## 2025-05-12 RX ORDER — LEVOTHYROXINE SODIUM 50 UG/1
50 TABLET ORAL DAILY
Qty: 90 TABLET | Refills: 1 | Status: SHIPPED | OUTPATIENT
Start: 2025-05-12

## 2025-05-12 RX ORDER — DICYCLOMINE HYDROCHLORIDE 10 MG/1
10 CAPSULE ORAL 3 TIMES DAILY
Qty: 270 CAPSULE | Refills: 0 | Status: SHIPPED | OUTPATIENT
Start: 2025-05-12

## 2025-06-02 ENCOUNTER — TELEPHONE (OUTPATIENT)
Dept: PULMONOLOGY | Facility: CLINIC | Age: 85
End: 2025-06-02
Payer: MEDICARE

## 2025-06-02 NOTE — TELEPHONE ENCOUNTER
Patient wants to discontinue use of Breztri at this time, does not like the taste and does not feel that she needs this currently.    Also wants to discontinue order for azelastine nasal spray.    Initially requested refill for Atrovent inhaler, tolerates this well, but already had some on file.    Asked MA to cancel any orders for Breztri and azelastine at the pharmacy.

## 2025-06-03 ENCOUNTER — TELEPHONE (OUTPATIENT)
Dept: PULMONOLOGY | Facility: CLINIC | Age: 85
End: 2025-06-03
Payer: MEDICARE

## 2025-06-03 NOTE — TELEPHONE ENCOUNTER
Per Kiersten's request I called and spoke with [facility] to cancel Symbicort and Azelastine prescriptions.

## 2025-06-23 ENCOUNTER — LAB (OUTPATIENT)
Dept: LAB | Facility: HOSPITAL | Age: 85
End: 2025-06-23
Payer: MEDICARE

## 2025-06-23 DIAGNOSIS — I50.22 CHRONIC HFREF (HEART FAILURE WITH REDUCED EJECTION FRACTION): ICD-10-CM

## 2025-06-23 DIAGNOSIS — E03.9 ACQUIRED HYPOTHYROIDISM: Primary | ICD-10-CM

## 2025-06-23 DIAGNOSIS — E03.9 ACQUIRED HYPOTHYROIDISM: ICD-10-CM

## 2025-06-23 DIAGNOSIS — E78.2 MIXED HYPERLIPIDEMIA: ICD-10-CM

## 2025-06-23 LAB
ALBUMIN SERPL-MCNC: 4 G/DL (ref 3.5–5.2)
ALBUMIN/GLOB SERPL: 1.3 G/DL
ALP SERPL-CCNC: 82 U/L (ref 39–117)
ALT SERPL W P-5'-P-CCNC: 12 U/L (ref 1–33)
ANION GAP SERPL CALCULATED.3IONS-SCNC: 12.3 MMOL/L (ref 5–15)
AST SERPL-CCNC: 20 U/L (ref 1–32)
BASOPHILS # BLD AUTO: 0.07 10*3/MM3 (ref 0–0.2)
BASOPHILS NFR BLD AUTO: 1.5 % (ref 0–1.5)
BILIRUB SERPL-MCNC: 0.8 MG/DL (ref 0–1.2)
BUN SERPL-MCNC: 16 MG/DL (ref 8–23)
BUN/CREAT SERPL: 14.7 (ref 7–25)
CALCIUM SPEC-SCNC: 9.2 MG/DL (ref 8.6–10.5)
CHLORIDE SERPL-SCNC: 106 MMOL/L (ref 98–107)
CHOLEST SERPL-MCNC: 126 MG/DL (ref 0–200)
CO2 SERPL-SCNC: 21.7 MMOL/L (ref 22–29)
CREAT SERPL-MCNC: 1.09 MG/DL (ref 0.57–1)
DEPRECATED RDW RBC AUTO: 44.7 FL (ref 37–54)
EGFRCR SERPLBLD CKD-EPI 2021: 50.8 ML/MIN/1.73
EOSINOPHIL # BLD AUTO: 0.14 10*3/MM3 (ref 0–0.4)
EOSINOPHIL NFR BLD AUTO: 3 % (ref 0.3–6.2)
ERYTHROCYTE [DISTWIDTH] IN BLOOD BY AUTOMATED COUNT: 12.7 % (ref 12.3–15.4)
GLOBULIN UR ELPH-MCNC: 3 GM/DL
GLUCOSE SERPL-MCNC: 92 MG/DL (ref 65–99)
HCT VFR BLD AUTO: 36.7 % (ref 34–46.6)
HDLC SERPL-MCNC: 59 MG/DL (ref 40–60)
HGB BLD-MCNC: 11.9 G/DL (ref 12–15.9)
IMM GRANULOCYTES # BLD AUTO: 0.02 10*3/MM3 (ref 0–0.05)
IMM GRANULOCYTES NFR BLD AUTO: 0.4 % (ref 0–0.5)
LDLC SERPL CALC-MCNC: 49 MG/DL (ref 0–100)
LDLC/HDLC SERPL: 0.82 {RATIO}
LYMPHOCYTES # BLD AUTO: 0.78 10*3/MM3 (ref 0.7–3.1)
LYMPHOCYTES NFR BLD AUTO: 16.6 % (ref 19.6–45.3)
MCH RBC QN AUTO: 31.5 PG (ref 26.6–33)
MCHC RBC AUTO-ENTMCNC: 32.4 G/DL (ref 31.5–35.7)
MCV RBC AUTO: 97.1 FL (ref 79–97)
MONOCYTES # BLD AUTO: 0.33 10*3/MM3 (ref 0.1–0.9)
MONOCYTES NFR BLD AUTO: 7 % (ref 5–12)
NEUTROPHILS NFR BLD AUTO: 3.36 10*3/MM3 (ref 1.7–7)
NEUTROPHILS NFR BLD AUTO: 71.5 % (ref 42.7–76)
NRBC BLD AUTO-RTO: 0 /100 WBC (ref 0–0.2)
NT-PROBNP SERPL-MCNC: 1998 PG/ML (ref 0–1800)
PLATELET # BLD AUTO: 140 10*3/MM3 (ref 140–450)
PMV BLD AUTO: 10.9 FL (ref 6–12)
POTASSIUM SERPL-SCNC: 4.1 MMOL/L (ref 3.5–5.2)
PROT SERPL-MCNC: 7 G/DL (ref 6–8.5)
RBC # BLD AUTO: 3.78 10*6/MM3 (ref 3.77–5.28)
SODIUM SERPL-SCNC: 140 MMOL/L (ref 136–145)
T4 FREE SERPL-MCNC: 1.46 NG/DL (ref 0.92–1.68)
TRIGL SERPL-MCNC: 93 MG/DL (ref 0–150)
TSH SERPL DL<=0.05 MIU/L-ACNC: 3.11 UIU/ML (ref 0.27–4.2)
VLDLC SERPL-MCNC: 18 MG/DL (ref 5–40)
WBC NRBC COR # BLD AUTO: 4.7 10*3/MM3 (ref 3.4–10.8)

## 2025-06-23 PROCEDURE — 83880 ASSAY OF NATRIURETIC PEPTIDE: CPT

## 2025-06-23 PROCEDURE — 36415 COLL VENOUS BLD VENIPUNCTURE: CPT

## 2025-06-23 PROCEDURE — 84443 ASSAY THYROID STIM HORMONE: CPT

## 2025-06-23 PROCEDURE — 85025 COMPLETE CBC W/AUTO DIFF WBC: CPT

## 2025-06-23 PROCEDURE — 84439 ASSAY OF FREE THYROXINE: CPT

## 2025-06-23 PROCEDURE — 80061 LIPID PANEL: CPT

## 2025-06-23 PROCEDURE — 80053 COMPREHEN METABOLIC PANEL: CPT

## 2025-06-27 ENCOUNTER — OFFICE VISIT (OUTPATIENT)
Dept: CARDIOLOGY | Facility: CLINIC | Age: 85
End: 2025-06-27
Payer: MEDICARE

## 2025-06-27 VITALS
HEART RATE: 71 BPM | DIASTOLIC BLOOD PRESSURE: 78 MMHG | WEIGHT: 162.4 LBS | BODY MASS INDEX: 27.06 KG/M2 | OXYGEN SATURATION: 98 % | SYSTOLIC BLOOD PRESSURE: 114 MMHG | HEIGHT: 65 IN

## 2025-06-27 DIAGNOSIS — Z95.0 PACEMAKER: ICD-10-CM

## 2025-06-27 DIAGNOSIS — I25.10 CORONARY ARTERY DISEASE INVOLVING NATIVE CORONARY ARTERY OF NATIVE HEART WITHOUT ANGINA PECTORIS: Primary | ICD-10-CM

## 2025-06-27 DIAGNOSIS — I48.20 CHRONIC A-FIB: ICD-10-CM

## 2025-06-27 DIAGNOSIS — I50.22 CHRONIC HFREF (HEART FAILURE WITH REDUCED EJECTION FRACTION): ICD-10-CM

## 2025-06-27 DIAGNOSIS — E03.9 ACQUIRED HYPOTHYROIDISM: ICD-10-CM

## 2025-06-27 DIAGNOSIS — Z79.01 CHRONIC ANTICOAGULATION: ICD-10-CM

## 2025-06-27 DIAGNOSIS — E78.2 MIXED HYPERLIPIDEMIA: ICD-10-CM

## 2025-06-27 PROCEDURE — 3074F SYST BP LT 130 MM HG: CPT | Performed by: INTERNAL MEDICINE

## 2025-06-27 PROCEDURE — 3078F DIAST BP <80 MM HG: CPT | Performed by: INTERNAL MEDICINE

## 2025-06-27 PROCEDURE — 99214 OFFICE O/P EST MOD 30 MIN: CPT | Performed by: INTERNAL MEDICINE

## 2025-06-27 RX ORDER — SPIRONOLACTONE 25 MG/1
12.5 TABLET ORAL DAILY
Qty: 45 TABLET | Refills: 1 | Status: SHIPPED | OUTPATIENT
Start: 2025-06-27

## 2025-06-28 NOTE — PROGRESS NOTES
subjective     Chief Complaint   Patient presents with    Results     labs       Problems Addressed This Visit  1. Coronary artery disease involving native coronary artery of native heart without angina pectoris    2. Chronic HFrEF (heart failure with reduced ejection fraction)    3. Mixed hyperlipidemia    4. Chronic a-fib*    5. Pacemaker dual-chamber pacemaker Biotronik December 2015*    6. Acquired hypothyroidism    7. Chronic anticoagulation*        History of Present Illness  History of Present Illness  The patient is an 84-year-old white female here for a cardiology follow-up.    She has a history of coronary artery disease, status post CABG in 1997, and chronic HFrEF with an ejection fraction around 50%. She reports mild shortness of breath and mild lower extremity edema. There are no symptoms of orthopnea, PND, or ankle edema. Despite long-standing chronic HFrEF, she is doing significantly better on her current regimen of Entresto 49/51 twice a day, Lasix 20 mg daily, Coreg 12.5 mg twice a day, and Imdur 60 mg daily. Aldactone 12.5 mg daily was added today. She is unable to take Jardiance.    She does not have chronic atrial fibrillation and sick sinus syndrome, status post pacemaker, and is doing very well. Her heart rate is controlled with Coreg, and she is anticoagulated with Eliquis. She is tolerating anticoagulation with Eliquis without any abnormal bleeding. Pacemaker checks have been normal.    For hyperlipidemia, she is on Livalo without any drug side effects and is trying to follow a healthy lifestyle.    She has hypothyroidism and is on Synthroid replacement therapy, clinically euthyroid.    PAST SURGICAL HISTORY:  Status post CABG in 1997.      Past Surgical History:   Procedure Laterality Date    CARDIAC ELECTROPHYSIOLOGY PROCEDURE N/A 1/22/2024    Procedure: Lead Revision RA or RV, PPM or ICD;  Surgeon: Donis See MD;  Location: [facility] INVASIVE LOCATION;  Service: Cardiovascular;   Laterality: N/A;  RV lead replacement    CARDIAC ELECTROPHYSIOLOGY PROCEDURE N/A 1/22/2024    Procedure: Pacemaker lead replacement;  Surgeon: Donis See MD;  Location: [facility] INVASIVE LOCATION;  Service: Cardiovascular;  Laterality: N/A;    CARDIAC SURGERY  1997    CARDIOVASCULAR STRESS TEST  06/2014    CATARACT EXTRACTION, BILATERAL      CHOLECYSTECTOMY  2002    COLONOSCOPY  05/2012    ECHO - CONVERTED  07/2014    PACEMAKER IMPLANTATION  12/17/2015     Family History   Problem Relation Age of Onset    Coronary artery disease Other     Hypertension Other     Diabetes Other     Heart attack Other     Heart attack Mother 62        fatal MI    Skin cancer Mother     Diabetes type II Mother     Melanoma Mother     Heart attack Father 63        fatal MI    Melanoma Sister 45    Skin cancer Sister     Heart attack Brother 62        fatal MI    Heart attack Brother 80        Fatal MI    Skin cancer Brother     Heart disease Sister     Heart disease Brother 75        pacemaker, CABG, Stents    Osteoporosis Sister     Pneumonia Brother     Hypertension Brother     Heart attack Sister 54        Fatal MI    Heart attack Brother 59        Fatal MI    Breast cancer Neg Hx      Past Medical History:   Diagnosis Date    Atrial fibrillation     CAD (coronary artery disease)     Chronic a-fib     Chronic anticoagulation     Congestive heart failure     compensated    Disease of thyroid gland     Hyperlipidemia     Hypertension     Hypoxemia     Ischemic cardiomyopathy with severe LV Disfunction     Myocardial infarction     Pacemaker     VVI     Patient Active Problem List   Diagnosis    CAD (coronary artery disease) status post CABG 1997 single-vessel*    Ischemic cardiomyopathy with apical hypokinesis LV ejection fraction 50%.    Chronic HFrEF (heart failure with reduced ejection fraction)    Hyperlipidemia*    Chronic a-fib*    Pacemaker dual-chamber pacemaker Biotronik December 2015*    Hypothyroidism*     Hypertension*    Chronic anticoagulation*    Gastroesophageal reflux disease without esophagitis*    URI (upper respiratory infection)    Herpes zoster without complication    Acute midline low back pain without sciatica    Centrilobular emphysema    Asthmatic bronchitis , chronic    Pulmonary hypertension    Seasonal allergic rhinitis    Post zoster neuralgia    Memory loss    Diarrhea    Pacemaker lead malfunction    Sick sinus syndrome       Social History     Tobacco Use    Smoking status: Former     Current packs/day: 0.00     Average packs/day: 0.3 packs/day for 17.0 years (4.3 ttl pk-yrs)     Types: Cigarettes     Start date: 1973     Quit date: 1990     Years since quittin.5     Passive exposure: Never    Smokeless tobacco: Never   Vaping Use    Vaping status: Never Used   Substance Use Topics    Alcohol use: No    Drug use: No       Allergies   Allergen Reactions    Nicotine Hives    Codeine Rash    Egg-Derived Products Itching and Rash     Patient states she can now tolerate eggs (24)    Grass Itching and Rash    Lisinopril Rash       Current Outpatient Medications on File Prior to Visit   Medication Sig    carvedilol (COREG) 12.5 MG tablet Take 1 tablet by mouth 2 (Two) Times a Day With Meals.    cefdinir (OMNICEF) 300 MG capsule Take 1 capsule by mouth 2 (Two) Times a Day.    dicyclomine (BENTYL) 10 MG capsule TAKE 1 CAPSULE BY MOUTH THREE TIMES DAILY    donepezil (ARICEPT) 5 MG tablet Take 1 tablet by mouth Every Night.    Eliquis 5 MG tablet tablet TAKE 1 TABLET BY MOUTH EVERY 12 HOURS    esomeprazole (nexIUM) 40 MG capsule Take 1 capsule by mouth Daily.    furosemide (LASIX) 20 MG tablet Take 0.5 tablets by mouth Every Other Day.    guaiFENesin-dextromethorphan (ROBITUSSIN DM) 100-10 MG/5ML syrup Take 5 mL by mouth 3 (Three) Times a Day As Needed for Cough or Congestion.    ipratropium (ATROVENT HFA) 17 MCG/ACT inhaler Inhale 2 puffs Every 4 (Four) Hours As Needed for Wheezing (or  shortness of air) for up to 90 days.    ipratropium (ATROVENT) 0.02 % nebulizer solution Take 2.5 mL by nebulization 4 (Four) Times a Day As Needed for Wheezing or Shortness of Air.    iron polysaccharides (NIFEREX) 150 MG capsule Take 1 capsule by mouth Daily.    isosorbide mononitrate (IMDUR) 60 MG 24 hr tablet TAKE 1 TABLET BY MOUTH DAILY    levothyroxine (SYNTHROID, LEVOTHROID) 50 MCG tablet TAKE 1 TABLET BY MOUTH DAILY    methylPREDNISolone (MEDROL) 4 MG dose pack Take as directed on package instructions.    montelukast (SINGULAIR) 10 MG tablet TAKE 1 TABLET BY MOUTH EVERY NIGHT    nitroglycerin (NITROSTAT) 0.4 MG SL tablet Place 1 tablet under the tongue Every 5 (Five) Minutes As Needed for Chest Pain. Take no more than 3 doses in 15 minutes.    pitavastatin calcium (Livalo) 1 MG tablet tablet Take 1 tablet by mouth Every Night.    sacubitril-valsartan (Entresto) 49-51 MG tablet Take 1 tablet by mouth 2 (Two) Times a Day.     No current facility-administered medications on file prior to visit.     (Not in a hospital admission)      Results for orders placed during the hospital encounter of 01/22/24    Adult Transthoracic Echo Complete W/ Cont if Necessary Per Protocol    Interpretation Summary    Normal left ventricular cavity size and wall thickness noted.    Left ventricular ejection fraction appears to be 46 - 50%.    There is calcification of the aortic valve. The aortic valve appears trileaflet. Mild aortic valve regurgitation is present. No hemodynamically significant aortic valve stenosis is present.    There are myxomatous changes of the mitral valve apparatus present. Moderate mitral valve regurgitation is present.    Moderate to severe tricuspid valve regurgitation is present. Estimated right ventricular systolic pressure from tricuspid regurgitation is moderately elevated (45-55 mmHg). Moderate pulmonary hypertension is present.    There is no evidence of pericardial effusion.    Results for orders  "placed during the hospital encounter of 02/25/19    Stress Test With Myocardial Perfusion One Day    Interpretation Summary  · Breast attenuation artifact is present.  · Left ventricular ejection fraction is borderline normal (Calculated EF = 48%).  · Myocardial perfusion imaging indicates a normal myocardial perfusion study with no evidence of ischemia.  · Impressions are consistent with a low risk study.  · No definite evidence of ischemia. Fixed defect noted in the anteroseptal segment which was worse on rest images.          The following portions of the patient's history were reviewed and updated as appropriate: allergies, current medications, past family history, past medical history, past social history, past surgical history and problem list.    Review of Systems   Constitutional: Negative.   HENT: Negative.  Negative for congestion.    Eyes: Negative.    Cardiovascular:  Positive for leg swelling. Negative for chest pain, cyanosis, dyspnea on exertion, irregular heartbeat, near-syncope, orthopnea, palpitations, paroxysmal nocturnal dyspnea and syncope.   Respiratory: Negative.  Negative for shortness of breath.    Hematologic/Lymphatic: Negative.    Musculoskeletal: Negative.    Gastrointestinal: Negative.    Neurological: Negative.  Negative for headaches.          Objective:     /78 (BP Location: Left arm, Patient Position: Sitting, Cuff Size: Adult)   Pulse 71   Ht 165.1 cm (65\")   Wt 73.7 kg (162 lb 6.4 oz)   SpO2 98%   BMI 27.02 kg/m²   Pulmonary:      Effort: Pulmonary effort is normal.      Breath sounds: Normal breath sounds. No stridor. No wheezing. No rhonchi. No rales.   Cardiovascular:      PMI at left midclavicular line. Normal rate. Irregular rhythm. Normal S1. Normal S2.       Murmurs: There is no murmur.      No gallop.  No click. No rub.   Pulses:     Intact distal pulses.   Edema:     Peripheral edema absent.       Physical Exam        Lab Review  Lab Results   Component Value " Date     2025    K 4.1 2025     2025    BUN 16.0 2025    CREATININE 1.09 (H) 2025    GLUCOSE 92 2025    CALCIUM 9.2 2025    ALT 12 2025    ALKPHOS 82 2025     Lab Results   Component Value Date    CKTOTAL 73 10/10/2024     Lab Results   Component Value Date    WBC 4.70 2025    HGB 11.9 (L) 2025    HCT 36.7 2025     2025     Lab Results   Component Value Date    INR 0.91 2015     Lab Results   Component Value Date    MG 2.3 10/10/2024     Lab Results   Component Value Date    TSH 3.110 2025     Lab Results   Component Value Date    .0 (H) 2019     Lab Results   Component Value Date    CHLPL 219 (H) 2016    CHOL 126 2025    TRIG 93 2025    HDL 59 2025    VLDL 18 2025    LDL 49 2025     Lab Results   Component Value Date    LDL 49 2025    LDL 52 2025     Lab Results   Component Value Date    PROBNP 1,998.0 (H) 2025           Lab 25  1030   PROBNP 1,998.0*           ECG 12 Lead    Date/Time: 2025 10:53 AM  Performed by: Maria R Sanchez MD    Authorized by: Maria R Sanchez MD  Comparison: compared with previous ECG from 2024  Similar to previous ECG  Rhythm: paced  Rate: normal  BPM: 71  QRS axis: right  Pacin% capture and ventricular paced rhythm  Clinical impression: abnormal EKG          Results       I personally viewed and interpreted the patient's LAB data         Assessment:     1. Coronary artery disease involving native coronary artery of native heart without angina pectoris    2. Chronic HFrEF (heart failure with reduced ejection fraction)    3. Mixed hyperlipidemia    4. Chronic a-fib*    5. Pacemaker dual-chamber pacemaker Biotronik 2015*    6. Acquired hypothyroidism    7. Chronic anticoagulation*        Assessment & Plan  1. Chronic HFrEF:  - Continue Entresto 49/51 mg twice a day, Lasix  20 mg daily, Coreg 12.5 mg twice a day, and Imdur 60 mg daily.  - Aldactone 12.5 mg daily was added today.  - Reported intolerance to Jardiance.    2. Coronary artery disease, status post CABG:  - Asymptomatic with no angina.  - Continue statin therapy with Livalo, beta-blocker therapy with Coreg, and antiplatelet therapy.  - Currently taking Eliquis as recommended.  - Continue Imdur 60 mg daily.    EKG shows paced rhythm at a rate of 71 bpm    3. Chronic atrial fibrillation:  - Rate controlled with Coreg 12.5 mg twice a day, which will be continued.  - Anticoagulation with Eliquis was continued.  - Pacemaker function remains normal.    4. Hyperlipidemia:  - Continue Livalo without any drug side effects.  - Emphasize maintaining a healthy lifestyle.    5. Hypothyroidism:  - Thyroid function is normal, and clinically euthyroid.  - Continue Synthroid replacement therapy.    Risks, benefits, and alternatives of treatment were discussed, including the addition of Aldactone for managing heart failure symptoms. The patient was informed about potential side effects and the importance of adherence to the prescribed regimen.    Follow-up  Follow-up scheduled.            No follow-ups on file.

## 2025-06-30 ENCOUNTER — TELEPHONE (OUTPATIENT)
Dept: CARDIOLOGY | Facility: CLINIC | Age: 85
End: 2025-06-30
Payer: MEDICARE

## 2025-06-30 DIAGNOSIS — R32 INCONTINENCE IN FEMALE: Primary | ICD-10-CM

## 2025-06-30 PROCEDURE — 93000 ELECTROCARDIOGRAM COMPLETE: CPT | Performed by: INTERNAL MEDICINE

## 2025-06-30 NOTE — TELEPHONE ENCOUNTER
Maria R Sanchez MD to Me (Selected Message)        6/30/25 10:20 AM  Referred to urology      Spoke with patient and advised her this may be something she may have to order on her own as I contacted several supply companies.  She would like to go ahead with a referral to urology.  Order for referral pending sig.

## 2025-06-30 NOTE — TELEPHONE ENCOUNTER
Patient called requesting an order for an external catheter called [employer]dir[employer] for her to wear at night.   (They use at the hospital)   .

## 2025-07-04 LAB
MDC_IDC_MSMT_BATTERY_REMAINING_PERCENTAGE: 30 %
MDC_IDC_MSMT_BATTERY_STATUS: NORMAL
MDC_IDC_MSMT_LEADCHNL_RA_DTM: NORMAL
MDC_IDC_MSMT_LEADCHNL_RV_DTM: NORMAL
MDC_IDC_MSMT_LEADCHNL_RV_IMPEDANCE_VALUE: 527
MDC_IDC_MSMT_LEADCHNL_RV_PACING_THRESHOLD_AMPLITUDE: 0.7
MDC_IDC_MSMT_LEADCHNL_RV_PACING_THRESHOLD_POLARITY: NORMAL
MDC_IDC_MSMT_LEADCHNL_RV_PACING_THRESHOLD_PULSEWIDTH: 0.4
MDC_IDC_MSMT_LEADCHNL_RV_SENSING_INTR_AMPL: 10.4
MDC_IDC_PG_IMPLANT_DTM: NORMAL
MDC_IDC_PG_MFG: NORMAL
MDC_IDC_PG_MODEL: NORMAL
MDC_IDC_PG_SERIAL: NORMAL
MDC_IDC_PG_TYPE: NORMAL
MDC_IDC_SESS_DTM: NORMAL
MDC_IDC_SESS_TYPE: NORMAL
MDC_IDC_SET_BRADY_LOWRATE: 60
MDC_IDC_SET_BRADY_MAX_SENSOR_RATE: 120
MDC_IDC_SET_BRADY_MODE: NORMAL
MDC_IDC_SET_LEADCHNL_RV_PACING_AMPLITUDE: 1.7
MDC_IDC_SET_LEADCHNL_RV_PACING_POLARITY: NORMAL
MDC_IDC_SET_LEADCHNL_RV_PACING_PULSEWIDTH: 0.4
MDC_IDC_SET_LEADCHNL_RV_SENSING_POLARITY: NORMAL
MDC_IDC_SET_LEADCHNL_RV_SENSING_SENSITIVITY: 0.5
MDC_IDC_STAT_BRADY_RV_PERCENT_PACED: 93

## 2025-07-15 DIAGNOSIS — I50.22 CHRONIC HFREF (HEART FAILURE WITH REDUCED EJECTION FRACTION): ICD-10-CM

## 2025-07-15 RX ORDER — SACUBITRIL AND VALSARTAN 49; 51 MG/1; MG/1
1 TABLET, FILM COATED ORAL 2 TIMES DAILY
Qty: 60 TABLET | Refills: 2 | Status: SHIPPED | OUTPATIENT
Start: 2025-07-15

## 2025-07-31 LAB
MDC_IDC_MSMT_BATTERY_REMAINING_PERCENTAGE: 30 %
MDC_IDC_MSMT_BATTERY_STATUS: NORMAL
MDC_IDC_MSMT_LEADCHNL_RA_DTM: NORMAL
MDC_IDC_MSMT_LEADCHNL_RV_DTM: NORMAL
MDC_IDC_MSMT_LEADCHNL_RV_IMPEDANCE_VALUE: 507
MDC_IDC_MSMT_LEADCHNL_RV_PACING_THRESHOLD_AMPLITUDE: 0.7
MDC_IDC_MSMT_LEADCHNL_RV_PACING_THRESHOLD_POLARITY: NORMAL
MDC_IDC_MSMT_LEADCHNL_RV_PACING_THRESHOLD_PULSEWIDTH: 0.4
MDC_IDC_MSMT_LEADCHNL_RV_SENSING_INTR_AMPL: 0.9
MDC_IDC_PG_IMPLANT_DTM: NORMAL
MDC_IDC_PG_MFG: NORMAL
MDC_IDC_PG_MODEL: NORMAL
MDC_IDC_PG_SERIAL: NORMAL
MDC_IDC_PG_TYPE: NORMAL
MDC_IDC_SESS_DTM: NORMAL
MDC_IDC_SESS_TYPE: NORMAL
MDC_IDC_SET_BRADY_LOWRATE: 60
MDC_IDC_SET_BRADY_MAX_SENSOR_RATE: 120
MDC_IDC_SET_BRADY_MODE: NORMAL
MDC_IDC_SET_LEADCHNL_RV_PACING_AMPLITUDE: 1.7
MDC_IDC_SET_LEADCHNL_RV_PACING_POLARITY: NORMAL
MDC_IDC_SET_LEADCHNL_RV_PACING_PULSEWIDTH: 0.4
MDC_IDC_SET_LEADCHNL_RV_SENSING_POLARITY: NORMAL
MDC_IDC_SET_LEADCHNL_RV_SENSING_SENSITIVITY: 0.5
MDC_IDC_STAT_BRADY_RV_PERCENT_PACED: 93

## 2025-08-07 DIAGNOSIS — E78.2 MIXED HYPERLIPIDEMIA: ICD-10-CM

## 2025-08-07 DIAGNOSIS — R41.3 MEMORY LOSS: ICD-10-CM

## 2025-08-07 RX ORDER — DICYCLOMINE HYDROCHLORIDE 10 MG/1
10 CAPSULE ORAL 3 TIMES DAILY
Qty: 270 CAPSULE | Refills: 0 | Status: SHIPPED | OUTPATIENT
Start: 2025-08-07

## 2025-08-07 RX ORDER — DONEPEZIL HYDROCHLORIDE 5 MG/1
5 TABLET, FILM COATED ORAL NIGHTLY
Qty: 90 TABLET | Refills: 1 | Status: SHIPPED | OUTPATIENT
Start: 2025-08-07

## 2025-08-07 RX ORDER — PITAVASTATIN CALCIUM 1.04 MG/1
1 TABLET, FILM COATED ORAL NIGHTLY
Qty: 90 TABLET | Refills: 1 | Status: SHIPPED | OUTPATIENT
Start: 2025-08-07

## 2025-08-18 LAB
MDC_IDC_MSMT_BATTERY_REMAINING_PERCENTAGE: 30 %
MDC_IDC_MSMT_BATTERY_REMAINING_PERCENTAGE: 30 %
MDC_IDC_MSMT_BATTERY_STATUS: NORMAL
MDC_IDC_MSMT_BATTERY_STATUS: NORMAL
MDC_IDC_MSMT_LEADCHNL_RA_DTM: NORMAL
MDC_IDC_MSMT_LEADCHNL_RA_DTM: NORMAL
MDC_IDC_MSMT_LEADCHNL_RV_DTM: NORMAL
MDC_IDC_MSMT_LEADCHNL_RV_DTM: NORMAL
MDC_IDC_MSMT_LEADCHNL_RV_IMPEDANCE_VALUE: 507
MDC_IDC_MSMT_LEADCHNL_RV_IMPEDANCE_VALUE: 527
MDC_IDC_MSMT_LEADCHNL_RV_PACING_THRESHOLD_AMPLITUDE: 0.7
MDC_IDC_MSMT_LEADCHNL_RV_PACING_THRESHOLD_AMPLITUDE: 0.7
MDC_IDC_MSMT_LEADCHNL_RV_PACING_THRESHOLD_POLARITY: NORMAL
MDC_IDC_MSMT_LEADCHNL_RV_PACING_THRESHOLD_POLARITY: NORMAL
MDC_IDC_MSMT_LEADCHNL_RV_PACING_THRESHOLD_PULSEWIDTH: 0.4
MDC_IDC_MSMT_LEADCHNL_RV_PACING_THRESHOLD_PULSEWIDTH: 0.4
MDC_IDC_MSMT_LEADCHNL_RV_SENSING_INTR_AMPL: 0.9
MDC_IDC_MSMT_LEADCHNL_RV_SENSING_INTR_AMPL: 10.4
MDC_IDC_PG_IMPLANT_DTM: NORMAL
MDC_IDC_PG_IMPLANT_DTM: NORMAL
MDC_IDC_PG_MFG: NORMAL
MDC_IDC_PG_MFG: NORMAL
MDC_IDC_PG_MODEL: NORMAL
MDC_IDC_PG_MODEL: NORMAL
MDC_IDC_PG_SERIAL: NORMAL
MDC_IDC_PG_SERIAL: NORMAL
MDC_IDC_PG_TYPE: NORMAL
MDC_IDC_PG_TYPE: NORMAL
MDC_IDC_SESS_DTM: NORMAL
MDC_IDC_SESS_DTM: NORMAL
MDC_IDC_SESS_TYPE: NORMAL
MDC_IDC_SESS_TYPE: NORMAL
MDC_IDC_SET_BRADY_LOWRATE: 60
MDC_IDC_SET_BRADY_LOWRATE: 60
MDC_IDC_SET_BRADY_MAX_SENSOR_RATE: 120
MDC_IDC_SET_BRADY_MAX_SENSOR_RATE: 120
MDC_IDC_SET_BRADY_MODE: NORMAL
MDC_IDC_SET_BRADY_MODE: NORMAL
MDC_IDC_SET_LEADCHNL_RV_PACING_AMPLITUDE: 1.7
MDC_IDC_SET_LEADCHNL_RV_PACING_AMPLITUDE: 1.7
MDC_IDC_SET_LEADCHNL_RV_PACING_POLARITY: NORMAL
MDC_IDC_SET_LEADCHNL_RV_PACING_POLARITY: NORMAL
MDC_IDC_SET_LEADCHNL_RV_PACING_PULSEWIDTH: 0.4
MDC_IDC_SET_LEADCHNL_RV_PACING_PULSEWIDTH: 0.4
MDC_IDC_SET_LEADCHNL_RV_SENSING_POLARITY: NORMAL
MDC_IDC_SET_LEADCHNL_RV_SENSING_POLARITY: NORMAL
MDC_IDC_SET_LEADCHNL_RV_SENSING_SENSITIVITY: 0.5
MDC_IDC_SET_LEADCHNL_RV_SENSING_SENSITIVITY: 0.5
MDC_IDC_STAT_BRADY_RV_PERCENT_PACED: 93
MDC_IDC_STAT_BRADY_RV_PERCENT_PACED: 93

## (undated) DEVICE — Device

## (undated) DEVICE — SENSR SPO2 PURELIGHT VALULINE CLTH A/

## (undated) DEVICE — ELECTRD BLD PLASMABLADE PEAK 4.0

## (undated) DEVICE — DRSNG SURESITE123 6X8IN

## (undated) DEVICE — DRP IOBAN ANTIMICROB 13X13IN

## (undated) DEVICE — ST EXT IV SMRTSTE 2VLV FIX M LL 6ML 41

## (undated) DEVICE — LN FLTR ORL/NASL MICROSTREAM NONINTUB A/LNG

## (undated) DEVICE — ST INF PRI SMRTSTE 20DRP 2VLV 24ML 117